# Patient Record
Sex: FEMALE | Race: WHITE | Employment: OTHER | URBAN - METROPOLITAN AREA
[De-identification: names, ages, dates, MRNs, and addresses within clinical notes are randomized per-mention and may not be internally consistent; named-entity substitution may affect disease eponyms.]

---

## 2017-01-03 ENCOUNTER — HOSPITAL ENCOUNTER (OUTPATIENT)
Dept: SURGERY | Age: 82
Discharge: HOME | End: 2017-01-03
Payer: MEDICARE

## 2017-01-03 VITALS
DIASTOLIC BLOOD PRESSURE: 63 MMHG | TEMPERATURE: 97.7 F | SYSTOLIC BLOOD PRESSURE: 147 MMHG | HEART RATE: 57 BPM | BODY MASS INDEX: 28.75 KG/M2 | RESPIRATION RATE: 20 BRPM | OXYGEN SATURATION: 98 % | HEIGHT: 63 IN | WEIGHT: 162.25 LBS

## 2017-01-03 LAB
ALBUMIN SERPL BCP-MCNC: 3.6 G/DL (ref 3.2–4.6)
ALBUMIN/GLOB SERPL: 1.1 {RATIO} (ref 1.2–3.5)
ALP SERPL-CCNC: 75 U/L (ref 50–136)
ALT SERPL-CCNC: 13 U/L (ref 12–65)
ANION GAP BLD CALC-SCNC: 9 MMOL/L (ref 7–16)
AST SERPL W P-5'-P-CCNC: 22 U/L (ref 15–37)
BILIRUB SERPL-MCNC: 0.5 MG/DL (ref 0.2–1.1)
BUN SERPL-MCNC: 11 MG/DL (ref 8–23)
CALCIUM SERPL-MCNC: 8.2 MG/DL (ref 8.3–10.4)
CHLORIDE SERPL-SCNC: 91 MMOL/L (ref 98–107)
CO2 SERPL-SCNC: 29 MMOL/L (ref 21–32)
CREAT SERPL-MCNC: 0.77 MG/DL (ref 0.6–1)
ERYTHROCYTE [DISTWIDTH] IN BLOOD BY AUTOMATED COUNT: 14.5 % (ref 11.9–14.6)
GLOBULIN SER CALC-MCNC: 3.3 G/DL (ref 2.3–3.5)
GLUCOSE SERPL-MCNC: 93 MG/DL (ref 65–100)
HCT VFR BLD AUTO: 38.1 % (ref 35.8–46.3)
HGB BLD-MCNC: 12.9 G/DL (ref 11.7–15.4)
MCH RBC QN AUTO: 30.3 PG (ref 26.1–32.9)
MCHC RBC AUTO-ENTMCNC: 33.9 G/DL (ref 31.4–35)
MCV RBC AUTO: 89.4 FL (ref 79.6–97.8)
PLATELET # BLD AUTO: 298 K/UL (ref 150–450)
PMV BLD AUTO: 9.3 FL (ref 10.8–14.1)
POTASSIUM SERPL-SCNC: 3.6 MMOL/L (ref 3.5–5.1)
PROT SERPL-MCNC: 6.9 G/DL (ref 6.3–8.2)
RBC # BLD AUTO: 4.26 M/UL (ref 4.05–5.25)
SODIUM SERPL-SCNC: 129 MMOL/L (ref 136–145)
WBC # BLD AUTO: 5.5 K/UL (ref 4.3–11.1)

## 2017-01-03 PROCEDURE — 85027 COMPLETE CBC AUTOMATED: CPT | Performed by: SURGERY

## 2017-01-03 PROCEDURE — 80053 COMPREHEN METABOLIC PANEL: CPT | Performed by: SURGERY

## 2017-01-03 NOTE — PERIOP NOTES
Able to print/ see  cardiac records from [facility] at this time--NOTED ef 72% ON LAST STRESS 2014--- BUT dr Bri Kauffman as mod/severe risk for surg-- then states reasonable to proceed---- will place chart in chartroom to have mda to review

## 2017-01-03 NOTE — PERIOP NOTES
Patient verified name, , and surgery as listed in [facility]. TYPE  CASE:1b  Orders per surgeon: on chart  Labs per surgeon:cbc, cmp--this collected today and sent to lab  Labs per anesthesia protocol: no add't needed  EKG  :  2016 and cardiac office note 2016---unable to see recent cath report or last echo-- that is in [facility]--- problems with epi--- will have to try again later--- did call [facility] cardio-- they also could not pull it up      Patient provided with handouts including guide to surgery , transfusions, pain management and hand hygiene for the family and community. Pt verbalizes understanding of all pre-op instructions . Instructed that family must be present in building at all times. Hibiclens and instructions given per hospital policy. Instructed patient to continue  previous medications as prescribed prior to surgery and  to take the following medications the day of surgery according to anesthesia guidelines : norvasc, dexilant,inderal       Original medication prescription bottles was visualized during patient appointment. Continue all previous medications unless otherwise directed. Instructed patient to hold  the following medications prior to surgery: per dr Mima Jean Baptiste and dr chavez pt to stop plavix 17-- last dose 17-- and continue on asa 81 mg--- pt verbalized understanding      Patient verbalized understanding of all instructions and provided all medical/health information to the best of their ability.

## 2017-01-09 ENCOUNTER — ANESTHESIA EVENT (OUTPATIENT)
Dept: SURGERY | Age: 82
End: 2017-01-09
Payer: MEDICARE

## 2017-01-10 ENCOUNTER — ANESTHESIA (OUTPATIENT)
Dept: SURGERY | Age: 82
End: 2017-01-10
Payer: MEDICARE

## 2017-01-10 ENCOUNTER — SURGERY (OUTPATIENT)
Age: 82
End: 2017-01-10

## 2017-01-10 PROCEDURE — 74011000258 HC RX REV CODE- 258: Performed by: SURGERY

## 2017-01-10 PROCEDURE — 77030008477 HC STYL SATN SLP COVD -A: Performed by: ANESTHESIOLOGY

## 2017-01-10 PROCEDURE — 74011250636 HC RX REV CODE- 250/636

## 2017-01-10 PROCEDURE — 74011000250 HC RX REV CODE- 250

## 2017-01-10 PROCEDURE — 77030008703 HC TU ET UNCUF COVD -A: Performed by: ANESTHESIOLOGY

## 2017-01-10 PROCEDURE — 77030020782 HC GWN BAIR PAWS FLX 3M -B: Performed by: ANESTHESIOLOGY

## 2017-01-10 PROCEDURE — 74011250636 HC RX REV CODE- 250/636: Performed by: ANESTHESIOLOGY

## 2017-01-10 PROCEDURE — 74011000250 HC RX REV CODE- 250: Performed by: SURGERY

## 2017-01-10 RX ORDER — ETOMIDATE 2 MG/ML
INJECTION INTRAVENOUS AS NEEDED
Status: DISCONTINUED | OUTPATIENT
Start: 2017-01-10 | End: 2017-01-10 | Stop reason: HOSPADM

## 2017-01-10 RX ORDER — LIDOCAINE HYDROCHLORIDE 20 MG/ML
INJECTION, SOLUTION EPIDURAL; INFILTRATION; INTRACAUDAL; PERINEURAL AS NEEDED
Status: DISCONTINUED | OUTPATIENT
Start: 2017-01-10 | End: 2017-01-10 | Stop reason: HOSPADM

## 2017-01-10 RX ORDER — PROPOFOL 10 MG/ML
INJECTION, EMULSION INTRAVENOUS AS NEEDED
Status: DISCONTINUED | OUTPATIENT
Start: 2017-01-10 | End: 2017-01-10 | Stop reason: HOSPADM

## 2017-01-10 RX ORDER — ROCURONIUM BROMIDE 10 MG/ML
INJECTION, SOLUTION INTRAVENOUS AS NEEDED
Status: DISCONTINUED | OUTPATIENT
Start: 2017-01-10 | End: 2017-01-10 | Stop reason: HOSPADM

## 2017-01-10 RX ORDER — GLYCOPYRROLATE 0.2 MG/ML
INJECTION INTRAMUSCULAR; INTRAVENOUS AS NEEDED
Status: DISCONTINUED | OUTPATIENT
Start: 2017-01-10 | End: 2017-01-10 | Stop reason: HOSPADM

## 2017-01-10 RX ORDER — NEOSTIGMINE METHYLSULFATE 1 MG/ML
INJECTION INTRAVENOUS AS NEEDED
Status: DISCONTINUED | OUTPATIENT
Start: 2017-01-10 | End: 2017-01-10 | Stop reason: HOSPADM

## 2017-01-10 RX ORDER — FENTANYL CITRATE 50 UG/ML
INJECTION, SOLUTION INTRAMUSCULAR; INTRAVENOUS AS NEEDED
Status: DISCONTINUED | OUTPATIENT
Start: 2017-01-10 | End: 2017-01-10 | Stop reason: HOSPADM

## 2017-01-10 RX ORDER — ONDANSETRON 2 MG/ML
INJECTION INTRAMUSCULAR; INTRAVENOUS AS NEEDED
Status: DISCONTINUED | OUTPATIENT
Start: 2017-01-10 | End: 2017-01-10 | Stop reason: HOSPADM

## 2017-01-10 RX ADMIN — LIDOCAINE HYDROCHLORIDE 40 MG: 20 INJECTION, SOLUTION EPIDURAL; INFILTRATION; INTRACAUDAL; PERINEURAL at 12:05

## 2017-01-10 RX ADMIN — GLYCOPYRROLATE 0.2 MG: 0.2 INJECTION INTRAMUSCULAR; INTRAVENOUS at 13:05

## 2017-01-10 RX ADMIN — AZTREONAM 2 G: 2 INJECTION, POWDER, LYOPHILIZED, FOR SOLUTION INTRAMUSCULAR; INTRAVENOUS at 12:20

## 2017-01-10 RX ADMIN — ONDANSETRON 4 MG: 2 INJECTION INTRAMUSCULAR; INTRAVENOUS at 13:05

## 2017-01-10 RX ADMIN — ROCURONIUM BROMIDE 20 MG: 10 INJECTION, SOLUTION INTRAVENOUS at 12:05

## 2017-01-10 RX ADMIN — SODIUM CHLORIDE, SODIUM LACTATE, POTASSIUM CHLORIDE, AND CALCIUM CHLORIDE: 600; 310; 30; 20 INJECTION, SOLUTION INTRAVENOUS at 12:00

## 2017-01-10 RX ADMIN — PROPOFOL 60 MG: 10 INJECTION, EMULSION INTRAVENOUS at 12:05

## 2017-01-10 RX ADMIN — BUPIVACAINE HYDROCHLORIDE 30 ML: 2.5 INJECTION, SOLUTION EPIDURAL; INFILTRATION; INTRACAUDAL; PERINEURAL at 13:04

## 2017-01-10 RX ADMIN — FENTANYL CITRATE 100 MCG: 50 INJECTION, SOLUTION INTRAMUSCULAR; INTRAVENOUS at 12:05

## 2017-01-10 RX ADMIN — ETOMIDATE 10 MG: 2 INJECTION INTRAVENOUS at 12:05

## 2017-01-10 RX ADMIN — NEOSTIGMINE METHYLSULFATE 2 MG: 1 INJECTION INTRAVENOUS at 13:05

## 2017-01-10 NOTE — ANESTHESIA PREPROCEDURE EVALUATION
Anesthetic History   No history of anesthetic complications            Review of Systems / Medical History  Patient summary reviewed, nursing notes reviewed and pertinent labs reviewed    Pulmonary                Comments: Restrictive lung disease   Neuro/Psych              Cardiovascular    Hypertension        Dysrhythmias : atrial fibrillation  CAD and cardiac stents      Comments: CABG in 2005    Stents times 4, off Plavix times six days    A fib ablation in 7/2016       GI/Hepatic/Renal     GERD: well controlled           Endo/Other        Arthritis     Other Findings              Physical Exam    Airway  Mallampati: II  TM Distance: 4 - 6 cm  Neck ROM: normal range of motion   Mouth opening: Normal     Cardiovascular    Rhythm: regular           Dental    Dentition: Upper partial plate     Pulmonary  Breath sounds clear to auscultation               Abdominal         Other Findings            Anesthetic Plan    ASA: 3  Patient did not consent to regional anesthesiaAnesthesia type: general            Anesthetic plan and risks discussed with: Patient

## 2017-01-10 NOTE — ANESTHESIA POSTPROCEDURE EVALUATION
Post-Anesthesia Evaluation and Assessment    Patient: Sdy Siu MRN: [id number]  SSN: [id number]    YOB: 1935  Age: [de-identified] y.o. Sex: female       Cardiovascular Function/Vital Signs  Visit Vitals    /74    Pulse 66    Temp 36.9 °C (98.4 °F)    Resp 16    Ht 5' 3\" (1.6 m)    Wt 73.6 kg (162 lb 4 oz)    SpO2 100%    BMI 28.74 kg/m2       Patient is status post general anesthesia for Procedure(s):  LEFT HERNIA INGUINAL REPAIR WITH MESH. Nausea/Vomiting: None    Postoperative hydration reviewed and adequate. Pain:  Pain Scale 1: Numeric (0 - 10) (01/10/17 1321)  Pain Intensity 1: 0 (01/10/17 1321)   Managed    Neurological Status:   Neuro (WDL): Exceptions to WDL (01/10/17 1321)  Neuro  Neurologic State: Lethargic (01/10/17 1321)  LUE Motor Response: Purposeful (01/10/17 1321)  LLE Motor Response: Purposeful (01/10/17 1321)  RUE Motor Response: Purposeful (01/10/17 1321)  RLE Motor Response: Purposeful (01/10/17 1321)   At baseline    Mental Status and Level of Consciousness: Arousable    Pulmonary Status:   O2 Device: Nasal cannula (01/10/17 1321)   Adequate oxygenation and airway patent    Complications related to anesthesia: None    Post-anesthesia assessment completed.  No concerns    Signed By: Eve Dodd MD     January 10, 2017

## 2017-03-12 ENCOUNTER — HOSPITAL ENCOUNTER (INPATIENT)
Age: 82
LOS: 2 days | Discharge: HOME | DRG: 287 | End: 2017-03-14
Attending: EMERGENCY MEDICINE | Admitting: INTERNAL MEDICINE
Payer: MEDICARE

## 2017-03-12 ENCOUNTER — APPOINTMENT (OUTPATIENT)
Dept: GENERAL RADIOLOGY | Age: 82
DRG: 287 | End: 2017-03-12
Attending: EMERGENCY MEDICINE
Payer: MEDICARE

## 2017-03-12 DIAGNOSIS — R07.9 ACUTE CHEST PAIN: Primary | ICD-10-CM

## 2017-03-12 DIAGNOSIS — E78.00 PURE HYPERCHOLESTEROLEMIA: ICD-10-CM

## 2017-03-12 DIAGNOSIS — I25.10 CORONARY ARTERY DISEASE INVOLVING NATIVE CORONARY ARTERY OF NATIVE HEART WITHOUT ANGINA PECTORIS: ICD-10-CM

## 2017-03-12 DIAGNOSIS — I25.110 CORONARY ARTERY DISEASE INVOLVING NATIVE CORONARY ARTERY OF NATIVE HEART WITH UNSTABLE ANGINA PECTORIS (HCC): ICD-10-CM

## 2017-03-12 PROBLEM — I24.9 ACS (ACUTE CORONARY SYNDROME) (HCC): Status: ACTIVE | Noted: 2017-03-12

## 2017-03-12 PROBLEM — Z95.1 S/P CABG (CORONARY ARTERY BYPASS GRAFT): Chronic | Status: ACTIVE | Noted: 2017-03-12

## 2017-03-12 PROBLEM — Z95.820 S/P ANGIOPLASTY WITH STENT: Chronic | Status: ACTIVE | Noted: 2017-03-12

## 2017-03-12 LAB
ALBUMIN SERPL BCP-MCNC: 3.7 G/DL (ref 3.2–4.6)
ALBUMIN/GLOB SERPL: 1.2 {RATIO} (ref 1.2–3.5)
ALP SERPL-CCNC: 72 U/L (ref 50–136)
ALT SERPL-CCNC: 11 U/L (ref 12–65)
ANION GAP BLD CALC-SCNC: 7 MMOL/L (ref 7–16)
AST SERPL W P-5'-P-CCNC: 13 U/L (ref 15–37)
BASOPHILS # BLD AUTO: 0 K/UL (ref 0–0.2)
BASOPHILS # BLD: 1 % (ref 0–2)
BILIRUB SERPL-MCNC: 0.4 MG/DL (ref 0.2–1.1)
BUN SERPL-MCNC: 14 MG/DL (ref 8–23)
CALCIUM SERPL-MCNC: 9 MG/DL (ref 8.3–10.4)
CHLORIDE SERPL-SCNC: 95 MMOL/L (ref 98–107)
CK SERPL-CCNC: 53 U/L (ref 21–215)
CO2 SERPL-SCNC: 31 MMOL/L (ref 21–32)
CREAT SERPL-MCNC: 0.93 MG/DL (ref 0.6–1)
DIFFERENTIAL METHOD BLD: ABNORMAL
EOSINOPHIL # BLD: 0.2 K/UL (ref 0–0.8)
EOSINOPHIL NFR BLD: 4 % (ref 0.5–7.8)
ERYTHROCYTE [DISTWIDTH] IN BLOOD BY AUTOMATED COUNT: 13.8 % (ref 11.9–14.6)
GLOBULIN SER CALC-MCNC: 3.2 G/DL (ref 2.3–3.5)
GLUCOSE SERPL-MCNC: 100 MG/DL (ref 65–100)
HCT VFR BLD AUTO: 38 % (ref 35.8–46.3)
HGB BLD-MCNC: 13.2 G/DL (ref 11.7–15.4)
IMM GRANULOCYTES # BLD: 0 K/UL (ref 0–0.5)
IMM GRANULOCYTES NFR BLD AUTO: 0.2 % (ref 0–5)
LYMPHOCYTES # BLD AUTO: 26 % (ref 13–44)
LYMPHOCYTES # BLD: 1.4 K/UL (ref 0.5–4.6)
MCH RBC QN AUTO: 31.8 PG (ref 26.1–32.9)
MCHC RBC AUTO-ENTMCNC: 34.7 G/DL (ref 31.4–35)
MCV RBC AUTO: 91.6 FL (ref 79.6–97.8)
MONOCYTES # BLD: 0.6 K/UL (ref 0.1–1.3)
MONOCYTES NFR BLD AUTO: 10 % (ref 4–12)
NEUTS SEG # BLD: 3.2 K/UL (ref 1.7–8.2)
NEUTS SEG NFR BLD AUTO: 59 % (ref 43–78)
PLATELET # BLD AUTO: 295 K/UL (ref 150–450)
PMV BLD AUTO: 9.2 FL (ref 10.8–14.1)
POTASSIUM SERPL-SCNC: 3.9 MMOL/L (ref 3.5–5.1)
PROT SERPL-MCNC: 6.9 G/DL (ref 6.3–8.2)
RBC # BLD AUTO: 4.15 M/UL (ref 4.05–5.25)
SODIUM SERPL-SCNC: 133 MMOL/L (ref 136–145)
TROPONIN I SERPL-MCNC: <0.02 NG/ML (ref 0.02–0.05)
TROPONIN I SERPL-MCNC: <0.02 NG/ML (ref 0.02–0.05)
WBC # BLD AUTO: 5.4 K/UL (ref 4.3–11.1)

## 2017-03-12 PROCEDURE — 74011250637 HC RX REV CODE- 250/637: Performed by: EMERGENCY MEDICINE

## 2017-03-12 PROCEDURE — 94760 N-INVAS EAR/PLS OXIMETRY 1: CPT

## 2017-03-12 PROCEDURE — 85025 COMPLETE CBC W/AUTO DIFF WBC: CPT | Performed by: EMERGENCY MEDICINE

## 2017-03-12 PROCEDURE — 74011250637 HC RX REV CODE- 250/637: Performed by: INTERNAL MEDICINE

## 2017-03-12 PROCEDURE — 93005 ELECTROCARDIOGRAM TRACING: CPT | Performed by: EMERGENCY MEDICINE

## 2017-03-12 PROCEDURE — 71020 XR CHEST PA LAT: CPT

## 2017-03-12 PROCEDURE — 74011000258 HC RX REV CODE- 258: Performed by: INTERNAL MEDICINE

## 2017-03-12 PROCEDURE — 84484 ASSAY OF TROPONIN QUANT: CPT | Performed by: EMERGENCY MEDICINE

## 2017-03-12 PROCEDURE — 74011250636 HC RX REV CODE- 250/636: Performed by: INTERNAL MEDICINE

## 2017-03-12 PROCEDURE — 96374 THER/PROPH/DIAG INJ IV PUSH: CPT | Performed by: EMERGENCY MEDICINE

## 2017-03-12 PROCEDURE — 65660000000 HC RM CCU STEPDOWN

## 2017-03-12 PROCEDURE — 74011250636 HC RX REV CODE- 250/636: Performed by: EMERGENCY MEDICINE

## 2017-03-12 PROCEDURE — 36415 COLL VENOUS BLD VENIPUNCTURE: CPT | Performed by: INTERNAL MEDICINE

## 2017-03-12 PROCEDURE — 80053 COMPREHEN METABOLIC PANEL: CPT | Performed by: EMERGENCY MEDICINE

## 2017-03-12 PROCEDURE — 96375 TX/PRO/DX INJ NEW DRUG ADDON: CPT | Performed by: EMERGENCY MEDICINE

## 2017-03-12 PROCEDURE — 82550 ASSAY OF CK (CPK): CPT | Performed by: INTERNAL MEDICINE

## 2017-03-12 PROCEDURE — 99284 EMERGENCY DEPT VISIT MOD MDM: CPT | Performed by: EMERGENCY MEDICINE

## 2017-03-12 RX ORDER — AMLODIPINE BESYLATE 5 MG/1
5 TABLET ORAL DAILY
Status: DISCONTINUED | OUTPATIENT
Start: 2017-03-13 | End: 2017-03-14 | Stop reason: HOSPADM

## 2017-03-12 RX ORDER — ASPIRIN 81 MG/1
81 TABLET ORAL DAILY
Status: DISCONTINUED | OUTPATIENT
Start: 2017-03-13 | End: 2017-03-12

## 2017-03-12 RX ORDER — PROPRANOLOL HYDROCHLORIDE 80 MG/1
80 CAPSULE, EXTENDED RELEASE ORAL DAILY
Status: DISCONTINUED | OUTPATIENT
Start: 2017-03-13 | End: 2017-03-14 | Stop reason: HOSPADM

## 2017-03-12 RX ORDER — MORPHINE SULFATE 2 MG/ML
2 INJECTION, SOLUTION INTRAMUSCULAR; INTRAVENOUS
Status: COMPLETED | OUTPATIENT
Start: 2017-03-12 | End: 2017-03-12

## 2017-03-12 RX ORDER — HEPARIN SODIUM 5000 [USP'U]/100ML
12-25 INJECTION, SOLUTION INTRAVENOUS
Status: DISCONTINUED | OUTPATIENT
Start: 2017-03-12 | End: 2017-03-13

## 2017-03-12 RX ORDER — ACETAMINOPHEN 325 MG/1
650 TABLET ORAL
Status: DISCONTINUED | OUTPATIENT
Start: 2017-03-12 | End: 2017-03-14 | Stop reason: HOSPADM

## 2017-03-12 RX ORDER — ROSUVASTATIN CALCIUM 20 MG/1
20 TABLET, COATED ORAL
Status: DISCONTINUED | OUTPATIENT
Start: 2017-03-12 | End: 2017-03-13

## 2017-03-12 RX ORDER — SODIUM CHLORIDE 0.9 % (FLUSH) 0.9 %
5-10 SYRINGE (ML) INJECTION EVERY 8 HOURS
Status: DISCONTINUED | OUTPATIENT
Start: 2017-03-12 | End: 2017-03-13 | Stop reason: HOSPADM

## 2017-03-12 RX ORDER — ONDANSETRON 2 MG/ML
4 INJECTION INTRAMUSCULAR; INTRAVENOUS
Status: COMPLETED | OUTPATIENT
Start: 2017-03-12 | End: 2017-03-12

## 2017-03-12 RX ORDER — SODIUM CHLORIDE 0.9 % (FLUSH) 0.9 %
5-10 SYRINGE (ML) INJECTION EVERY 8 HOURS
Status: DISCONTINUED | OUTPATIENT
Start: 2017-03-12 | End: 2017-03-14 | Stop reason: ALTCHOICE

## 2017-03-12 RX ORDER — SODIUM CHLORIDE 0.9 % (FLUSH) 0.9 %
5-10 SYRINGE (ML) INJECTION AS NEEDED
Status: DISCONTINUED | OUTPATIENT
Start: 2017-03-12 | End: 2017-03-14 | Stop reason: HOSPADM

## 2017-03-12 RX ORDER — MORPHINE SULFATE 2 MG/ML
2 INJECTION, SOLUTION INTRAMUSCULAR; INTRAVENOUS
Status: DISCONTINUED | OUTPATIENT
Start: 2017-03-12 | End: 2017-03-14 | Stop reason: HOSPADM

## 2017-03-12 RX ORDER — HEPARIN SODIUM 5000 [USP'U]/100ML
12-25 INJECTION, SOLUTION INTRAVENOUS CONTINUOUS
Status: DISCONTINUED | OUTPATIENT
Start: 2017-03-12 | End: 2017-03-12 | Stop reason: SDUPTHER

## 2017-03-12 RX ORDER — NITROGLYCERIN 0.4 MG/1
0.4 TABLET SUBLINGUAL
Status: DISCONTINUED | OUTPATIENT
Start: 2017-03-12 | End: 2017-03-14 | Stop reason: HOSPADM

## 2017-03-12 RX ORDER — CLOPIDOGREL BISULFATE 75 MG/1
75 TABLET ORAL DAILY
Status: DISCONTINUED | OUTPATIENT
Start: 2017-03-13 | End: 2017-03-14 | Stop reason: HOSPADM

## 2017-03-12 RX ORDER — DEXTROSE MONOHYDRATE AND SODIUM CHLORIDE 5; .45 G/100ML; G/100ML
100 INJECTION, SOLUTION INTRAVENOUS CONTINUOUS
Status: DISCONTINUED | OUTPATIENT
Start: 2017-03-13 | End: 2017-03-13

## 2017-03-12 RX ORDER — LORAZEPAM 0.5 MG/1
0.5 TABLET ORAL
Status: DISCONTINUED | OUTPATIENT
Start: 2017-03-12 | End: 2017-03-14 | Stop reason: HOSPADM

## 2017-03-12 RX ORDER — LISINOPRIL AND HYDROCHLOROTHIAZIDE 20; 25 MG/1; MG/1
1 TABLET ORAL DAILY
Status: DISCONTINUED | OUTPATIENT
Start: 2017-03-13 | End: 2017-03-14 | Stop reason: HOSPADM

## 2017-03-12 RX ORDER — PANTOPRAZOLE SODIUM 40 MG/1
40 TABLET, DELAYED RELEASE ORAL
Status: DISCONTINUED | OUTPATIENT
Start: 2017-03-13 | End: 2017-03-13

## 2017-03-12 RX ORDER — SODIUM CHLORIDE 0.9 % (FLUSH) 0.9 %
5-10 SYRINGE (ML) INJECTION AS NEEDED
Status: DISCONTINUED | OUTPATIENT
Start: 2017-03-12 | End: 2017-03-13 | Stop reason: HOSPADM

## 2017-03-12 RX ORDER — AMLODIPINE BESYLATE 5 MG/1
5 TABLET ORAL
Status: DISCONTINUED | OUTPATIENT
Start: 2017-03-13 | End: 2017-03-12 | Stop reason: SDUPTHER

## 2017-03-12 RX ORDER — ASPIRIN 81 MG/1
81 TABLET ORAL DAILY
Status: DISCONTINUED | OUTPATIENT
Start: 2017-03-12 | End: 2017-03-14 | Stop reason: HOSPADM

## 2017-03-12 RX ORDER — ACETAMINOPHEN 325 MG/1
325 TABLET ORAL
Status: DISCONTINUED | OUTPATIENT
Start: 2017-03-12 | End: 2017-03-12

## 2017-03-12 RX ADMIN — LORAZEPAM 0.5 MG: 0.5 TABLET ORAL at 23:50

## 2017-03-12 RX ADMIN — NITROGLYCERIN 1 INCH: 20 OINTMENT TOPICAL at 20:03

## 2017-03-12 RX ADMIN — HEPARIN SODIUM AND DEXTROSE 12 UNITS/KG/HR: 5000; 5 INJECTION INTRAVENOUS at 20:03

## 2017-03-12 RX ADMIN — ASPIRIN 81 MG: 81 TABLET, COATED ORAL at 20:46

## 2017-03-12 RX ADMIN — ACETAMINOPHEN 650 MG: 325 TABLET, FILM COATED ORAL at 20:33

## 2017-03-12 RX ADMIN — Medication 2 MG: at 18:38

## 2017-03-12 RX ADMIN — Medication 5 ML: at 20:32

## 2017-03-12 RX ADMIN — ONDANSETRON 4 MG: 2 INJECTION INTRAMUSCULAR; INTRAVENOUS at 18:38

## 2017-03-12 RX ADMIN — DEXTROSE MONOHYDRATE AND SODIUM CHLORIDE 100 ML/HR: 5; .45 INJECTION, SOLUTION INTRAVENOUS at 23:16

## 2017-03-12 RX ADMIN — NITROGLYCERIN 1 INCH: 20 OINTMENT TOPICAL at 18:38

## 2017-03-12 RX ADMIN — ROSUVASTATIN CALCIUM 20 MG: 20 TABLET ORAL at 20:31

## 2017-03-12 NOTE — ED TRIAGE NOTES
Patient complains of mid chest tightness that began today. States she took 2 NTG. Reports SOB and nausea.

## 2017-03-12 NOTE — IP AVS SNAPSHOT
Estephania Morales 
 
 
 [address] 
[phone] Patient: Maranda Runner MRN: [id number] You are allergic to the following Allergen Reactions Augmentin (Amoxicillin-Pot Clavulanate) Other (comments) Causes yeast infection Codeine Other (comments) Made my heart go crazy Other Medication Other (comments) Conjugated estrogens methyltestosterone Headaches Prednisone Other (comments) Visual loss and headache Prevnar 13 (Pneumoc 13-Sierra Conj-Dip Cr(Pf)) Other (comments) Fever, arm pain, redness, hallucinations, flu like symptoms, chest pain Recent Documentation Height Weight Breastfeeding? BMI OB Status Smoking Status 1.626 m 69.9 kg No 26.47 kg/m2 Hysterectomy Never Smoker Emergency Contacts Name Discharge Info Relation Home Work Mobile JorgeJean DISCHARGE CAREGIVER [3] Child [2] [phone] About your hospitalization You were admitted on:  March 12, 2017 You last received care in the:  [facility] 3 TELEMETRY You were discharged on:  March 14, 2017 Unit phone number:  [phone] Why you were hospitalized Your primary diagnosis was:  Acs (Acute Coronary Syndrome) (Hcc) Your diagnoses also included:  Angina At Rest (Hcc), Cad (Coronary Artery Disease), Hyperlipidemia, Hypertension, S/P Cabg (Coronary Artery Bypass Graft), S/P Angioplasty With Stent Providers Seen During Your Hospitalizations Provider Role Specialty Primary office phone Kev Wong MD Attending Provider Emergency Medicine [phone] Andrea Agosto MD Attending Provider Cardiology [phone] Your Primary Care Physician (PCP) Primary Care Physician Office Phone Office Fax Alice Hays [phone] Follow-up Information Follow up With Details Comments Contact Info Maryetta Epley, DO On 3/29/2017 Follow up on March 29th at 2:30pm [facility])  [address] [facility] [place] [address] 
[phone] Salbador Jones MD   [address] [place] [address] [address] Azul Casas 
[phone] Your Appointments Wednesday March 29, 2017  2:30 PM EDT HOSPITAL FOLLOW-UP with Maryetta Epley, DO [facility] ([facility]) [address] 
[address] [place] [address]  
[phone] Current Discharge Medication List  
  
START taking these medications Dose & Instructions Dispensing Information Comments Morning Noon Evening Bedtime  
 isosorbide mononitrate ER 30 mg tablet Commonly known as:  IMDUR Your last dose was: Your next dose is: Other:  _________ Dose:  30 mg Take 1 Tab by mouth daily. Quantity:  30 Tab Refills:  6  
     
   
   
   
  
 pantoprazole 40 mg tablet Commonly known as:  PROTONIX Replaces:  Dexlansoprazole 60 mg Cpdb Your last dose was: Your next dose is: Other:  _________ Dose:  40 mg Take 1 Tab by mouth two (2) times a day. Indications: GASTROESOPHAGEAL REFLUX, samples Quantity:  60 Tab Refills:  6 CONTINUE these medications which have CHANGED Dose & Instructions Dispensing Information Comments Morning Noon Evening Bedtime  
 amLODIPine 5 mg tablet Commonly known as:  Poppy Blade What changed:  how much to take Your last dose was: Your next dose is: Other:  _________ Dose:  2.5 mg Take 0.5 Tabs by mouth every morning. Quantity:  90 Tab Refills:  1  
     
   
   
   
  
 clopidogrel 75 mg Tab Commonly known as:  PLAVIX What changed:   
- when to take this 
- additional instructions Your last dose was: Your next dose is: Other:  _________ Dose:  75 mg Take 1 Tab by mouth daily. Quantity:  90 Tab Refills:  1  
     
   
   
   
  
 furosemide 40 mg tablet Commonly known as:  LASIX What changed:   
- when to take this 
- reasons to take this Your last dose was: Your next dose is: Other:  _________ Dose:  40 mg Take 1 Tab by mouth daily. Quantity:  30 Tab Refills:  3  
     
   
   
   
  
 lisinopril-hydroCHLOROthiazide 20-25 mg per tablet Commonly known as:  Oumar Ewing What changed:  when to take this Your last dose was: Your next dose is: Other:  _________ Dose:  1 Tab Take 1 Tab by mouth daily. Quantity:  90 Tab Refills:  3 LORazepam 1 mg tablet Commonly known as:  ATIVAN What changed:   
- how much to take 
- how to take this - when to take this 
- reasons to take this 
- additional instructions Your last dose was: Your next dose is: Other:  _________ Take 1/2 -1 tab q 8 hrs prn panic attacks  Indications: ANXIETY Quantity:  90 Tab Refills:  5  
     
   
   
   
  
 propranolol LA 80 mg SR capsule Commonly known as:  INDERAL LA What changed:  when to take this Your last dose was: Your next dose is: Other:  _________ Dose:  80 mg Take 1 Cap by mouth daily. Quantity:  90 Cap Refills:  1  
     
   
   
   
  
 rosuvastatin 40 mg tablet Commonly known as:  CRESTOR What changed:   
- medication strength 
- how much to take Your last dose was: Your next dose is: Other:  _________ Dose:  40 mg Take 1 Tab by mouth nightly. Quantity:  30 Tab Refills:  11 CONTINUE these medications which have NOT CHANGED Dose & Instructions Dispensing Information Comments Morning Noon Evening Bedtime  
 aspirin 81 mg tablet Your last dose was: Your next dose is: Other:  _________ Dose:  81 mg Take 81 mg by mouth nightly. Refills:  0  
     
   
   
   
  
 loratadine 10 mg Cap Your last dose was: Your next dose is: Other:  _________ Dose:  1 Tab Take 1 Tab by mouth nightly. Refills:  0  
     
   
   
   
  
 magnesium oxide 400 mg tablet Commonly known as:  MAG-OX Your last dose was: Your next dose is: Other:  _________ Dose:  400 mg Take 400 mg by mouth daily. Refills:  0  
     
   
   
   
  
 nitroglycerin 400 mcg/spray spray Commonly known as:  Rhesa Chasten Your last dose was: Your next dose is: Other:  _________ Dose:  1 Spray 1 Spray by SubLINGual route every five (5) minutes as needed for Chest Pain. Quantity:  1 Bottle Refills:  5 Potassium Gluconate 595 mg (99 mg) tablet Your last dose was: Your next dose is: Other:  _________ Dose:  595 mg Take 595 mg by mouth two (2) times a day. Refills:  0 PRESERVISION AREDS PO Your last dose was: Your next dose is: Other:  _________ Dose:  1 Tab Take 1 Tab by mouth two (2) times a day. Stopped 1/3/17 Refills:  0  
     
   
   
   
  
 TYLENOL 325 mg tablet Generic drug:  acetaminophen Your last dose was: Your next dose is: Other:  _________ Take  by mouth every four (4) hours as needed for Pain. Refills:  0 STOP taking these medications Dexlansoprazole 60 mg Cpdb Commonly known as:  DEXILANT Replaced by:  pantoprazole 40 mg tablet Where to Get Your Medications Information on where to get these meds will be given to you by the nurse or doctor. ! Ask your nurse or doctor about these medications  
  isosorbide mononitrate ER 30 mg tablet pantoprazole 40 mg tablet  
 rosuvastatin 40 mg tablet Discharge Instructions Cardiac Catheterization/Angiography Discharge Instructions *Check the puncture site frequently for swelling or bleeding. If you see any bleeding, lie down and apply pressure over the area with a clean town or washcloth. Notify your doctor for any redness, swelling, drainage or oozing from the puncture site. Notify your doctor for any fever or chills. *If the leg or arm with the puncture becomes cold, numb or painful, call [facility] at [phone]. *Activity should be limited for the next 48 hours. Climb stairs as little as possible and avoid any stooping, bending or strenuous activity for 48 hours. No heavy lifting (anything over 10 pounds) for three days. *Do not drive for 48 hours. *You may resume your usual diet. Drink more fluids than usual. 
 
*Have a responsible person drive you home and stay with you for at least 24 hours after your heart catheterization/angiography. *You may remove the bandage from your left wrist in 24 hours. You may shower in 24 hours. No tub baths, hot tubs or swimming for one week. Do not place any lotions, creams, powders, ointments over the puncture site for one week. You may place a clean band-aid over the puncture site each day for 5 days. Change this daily. Reducing Heart Attack Risk With Daily Medicine: Care Instructions Your Care Instructions Heart disease is the number one cause of death. If you are at risk for heart disease, there are many medicines that can reduce your risk. These include: · ACE inhibitors. These are a type of blood pressure medicine. They can reduce the risk of heart attacks and strokes if you are at high risk. · Statin medicines. These lower cholesterol. They can also reduce the risk of heart disease and strokes. · Aspirin. It can help certain people lower their risk of a heart attack or stroke. · Beta-blocker medicines. These are a type of blood pressure and heart medicine. They can reduce the chance of early death if you have had a heart attack. All medicines can cause side effects. So it is important to understand the pros and cons of any medicine you take. It is also important to take your medicines exactly as your doctor tells you to. Follow-up care is a key part of your treatment and safety. Be sure to make and go to all appointments, and call your doctor if you are having problems. It's also a good idea to know your test results and keep a list of the medicines you take. ACE inhibitors ACE (angiotensin-converting enzyme) inhibitors are used for three main reasons. They lower blood pressure, protect the kidneys, and prevent heart attacks and strokes. Examples include benazepril (Lotensin), lisinopril (Prinivil, Zestril), and ramipril (Altace). Before you start taking an ACE inhibitor, make sure your doctor knows if: 
· You are taking a water pill (diuretic). · You are taking potassium pills or using salt substitutes. · You are pregnant or breastfeeding. · You have had a kidney transplant or other kidney problems. ACE inhibitors can cause side effects. Call your doctor right away if you have: · Trouble breathing. · Swelling in your face, head, neck, or tongue. · Dizziness or lightheadedness. · A dry cough. Statins Statins lower cholesterol. Examples include atorvastatin (Lipitor), lovastatin (Mevacor), pravastatin (Pravachol), and simvastatin (Zocor). Before you start taking a statin, make sure your doctor knows if: 
· You have had a kidney transplant or other kidney problems. · You have liver disease. · You take any other prescription medicine, over-the-counter medicine, vitamins, supplements, or herbal remedies. · You are pregnant or breastfeeding. Statins can cause side effects. Call your doctor right away if you have: · New, severe muscle aches. · Brown urine. Aspirin Taking an aspirin every day can lower your risk for a heart attack. A heart attack occurs when a blood vessel in the heart gets blocked. When this happens, oxygen can't get to the heart muscle, and part of the heart dies. Aspirin can help prevent blood clots that can block the blood vessels. Talk to your doctor before you start taking aspirin every day. He or she may recommend that you take one low-dose aspirin (81 mg) tablet each day, with a meal and a full glass of water. Taking aspirin isn't right for everyone, because it can cause serious bleeding. And you may not be able to use aspirin if you: 
· Have asthma. · Have an ulcer or other stomach problem. · Take some other medicine (called a blood thinner) that prevents blood clots. · Are allergic to aspirin. Before having a surgery or procedure, tell your doctor or dentist that you take aspirin. He or she will tell you if you should stop taking aspirin beforehand. Make sure that you understand exactly what your doctor wants you to do. Aspirin can cause side effects. Call your doctor right away if you have: · Unusual bleeding or bruising. · Nausea, vomiting, or heartburn. · Black or bloody stools. Beta-blockers Beta-blockers are used for three main reasons. They lower blood pressure, relieve angina symptoms (such as chest pain or pressure), and reduce the chances of a second heart attack. They include atenolol (Tenormin), carvedilol (Coreg), and metoprolol (Lopressor). Before you start taking a beta-blocker, make sure your doctor knows if you have: · Severe asthma or frequent asthma attacks. · A very slow pulse (less than 55 beats a minute). Beta-blockers can cause side effects. Call your doctor right away if you have: · Wheezing or trouble breathing. · Dizziness or lightheadedness. · Asthma that gets worse. When should you call for help? Call 911 anytime you think you may need emergency care. For example, call if: · You passed out (lost consciousness). Call your doctor now or seek immediate medical care if: 
· You are wheezing or have trouble breathing. · You have swelling in your face, head, neck, or tongue. · You are dizzy or lightheaded, or you feel like you may faint. · You have severe muscle pain, weakness, or brown urine. · You have vision problems. · You have new bruises or blood spots under your skin. · Your stools are black and tarlike or have streaks of blood. Watch closely for changes in your health, and be sure to contact your doctor if: 
· You have ringing in your ears. · You feel very tired. · You have gas, constipation, or an upset stomach. Where can you learn more? Go to http://dale-nora.info/. Enter R428 in the search box to learn more about \"Reducing Heart Attack Risk With Daily Medicine: Care Instructions. \" Current as of: March 28, 2016 Content Version: 11.1 © 7348-2948 [employer]. Care instructions adapted under license by [employer] (which disclaims liability or warranty for this information). If you have questions about a medical condition or this instruction, always ask your healthcare professional. [address] any warranty or liability for your use of this information. Heart-Healthy Diet: Care Instructions Your Care Instructions A heart-healthy diet has lots of vegetables, fruits, nuts, beans, and whole grains, and is low in salt. It limits foods that are high in saturated fat, such as meats, cheeses, and fried foods. It may be hard to change your diet, but even small changes can lower your risk of heart attack and heart disease. Follow-up care is a key part of your treatment and safety. Be sure to make and go to all appointments, and call your doctor if you are having problems. It's also a good idea to know your test results and keep a list of the medicines you take. How can you care for yourself at home? Watch your portions · Learn what a serving is. A \"serving\" and a \"portion\" are not always the same thing. Make sure that you are not eating larger portions than are recommended. For example, a serving of pasta is ½ cup. A serving size of meat is 2 to 3 ounces. A 3-ounce serving is about the size of a deck of cards. Measure serving sizes until you are good at [place]" them. Keep in mind that restaurants often serve portions that are 2 or 3 times the size of one serving. · To keep your energy level up and keep you from feeling hungry, eat often but in smaller portions. · Eat only the number of calories you need to stay at a healthy weight. If you need to lose weight, eat fewer calories than your body burns (through exercise and other physical activity). Eat more fruits and vegetables · Eat a variety of fruit and vegetables every day. Dark green, deep orange, red, or yellow fruits and vegetables are especially good for you. Examples include spinach, carrots, peaches, and berries. · Keep carrots, celery, and other veggies handy for snacks. Buy fruit that is in season and store it where you can see it so that you will be tempted to eat it. · Cook dishes that have a lot of veggies in them, such as stir-fries and soups. Limit saturated and trans fat · Read food labels, and try to avoid saturated and trans fats. They increase your risk of heart disease. Trans fat is found in many processed foods such as cookies and crackers. · Use olive or canola oil when you cook. Try cholesterol-lowering spreads, such as Benecol or Take Control. · Bake, broil, grill, or steam foods instead of frying them. · Choose lean meats instead of high-fat meats such as hot dogs and sausages. Cut off all visible fat when you prepare meat. · Eat fish, skinless poultry, and meat alternatives such as soy products instead of high-fat meats. Soy products, such as tofu, may be especially good for your heart. · Choose low-fat or fat-free milk and dairy products. Eat fish · Eat at least two servings of fish a week. Certain fish, such as salmon and tuna, contain omega-3 fatty acids, which may help reduce your risk of heart attack. Eat foods high in fiber · Eat a variety of grain products every day. Include whole-grain foods that have lots of fiber and nutrients. Examples of whole-grain foods include oats, whole wheat bread, and brown rice. · Buy whole-grain breads and cereals, instead of white bread or pastries. Limit salt and sodium · Limit how much salt and sodium you eat to help lower your blood pressure. · Taste food before you salt it. Add only a little salt when you think you need it. With time, your taste buds will adjust to less salt. · Eat fewer snack items, fast foods, and other high-salt, processed foods. Check food labels for the amount of sodium in packaged foods. · Choose low-sodium versions of canned goods (such as soups, vegetables, and beans). Limit sugar · Limit drinks and foods with added sugar. These include candy, desserts, and soda pop. Limit alcohol · Limit alcohol to no more than 2 drinks a day for men and 1 drink a day for women. Too much alcohol can cause health problems. When should you call for help? Watch closely for changes in your health, and be sure to contact your doctor if: 
· You would like help planning heart-healthy meals. Where can you learn more? Go to http://dale-nora.info/. Enter V137 in the search box to learn more about \"Heart-Healthy Diet: Care Instructions. \" Current as of: January 27, 2016 Content Version: 11.1 © 7189-6559 [employer]. Care instructions adapted under license by [employer] (which disclaims liability or warranty for this information).  If you have questions about a medical condition or this instruction, always ask your healthcare professional. [employer] disclaims any warranty or liability for your use of this information. Discharge Orders None ACO Transitions of Care Introducing [employer] [address] Kerrie Coelho offers a voluntary care coordination program to provide high quality service and care to [facility] fee-for-service beneficiaries. Bev Saxena was designed to help you enhance your health and well-being through the following services: ? Transitions of Care  support for individuals who are transitioning from one care setting to another (example: Hospital to home). ? Chronic and Complex Care Coordination  support for individuals and caregivers of those with serious or chronic illnesses or with more than one chronic (ongoing) condition and those who take a number of different medications. If you meet specific medical criteria, a [facility] [address] may call you directly to coordinate your care with your primary care physician and your other care providers. For questions about the [facility] programs, please, contact your physicians office. For general questions or additional information about Accountable Care Organizations: 
Please visit www.medicare.gov/acos. html or call 1-800-MEDICARE (1-643.565.1326) TTY users should call 8-749.535.1209. Vpon Announcement We are excited to announce that we are making your provider's discharge notes available to you in Vpon. You will see these notes when they are completed and signed by the physician that discharged you from your recent hospital stay. If you have any questions or concerns about any information you see in Vpon, please call the Health Information Department where you were seen or reach out to your Primary Care Provider for more information about your plan of care. Introducing [facility]!    
 Yojana Maria introduces Vpon patient portal. Now you can access parts of your medical record, email your doctor's office, and request medication refills online. 1. In your internet browser, go to [email or web address] 2. Click on the First Time User? Click Here link in the Sign In box. You will see the New Member Sign Up page. 3. Enter your [employer] Access Code exactly as it appears below. You will not need to use this code after youve completed the sign-up process. If you do not sign up before the expiration date, you must request a new code. · [employer] Access Code: [email or web address] Expires: 4/30/2017  2:45 PM 
 
4. Enter the last four digits of your Social Security Number (xxxx) and Date of Birth (mm/dd/yyyy) as indicated and click Submit. You will be taken to the next sign-up page. 5. Create a [employer] ID. This will be your [employer] login ID and cannot be changed, so think of one that is secure and easy to remember. 6. Create a [employer] password. You can change your password at any time. 7. Enter your Password Reset Question and Answer. This can be used at a later time if you forget your password. 8. Enter your e-mail address. You will receive e-mail notification when new information is available in [address] 9. Click Sign Up. You can now view and download portions of your medical record. 10. Click the Download Summary menu link to download a portable copy of your medical information. If you have questions, please visit the Frequently Asked Questions section of the [employer] website. Remember, [employer] is NOT to be used for urgent needs. For medical emergencies, dial 911. Now available from your iPhone and Android! General Information Please provide this summary of care documentation to your next provider. Patient Signature:  ____________________________________________________________ Date:  ____________________________________________________________  
  
Caesar Mais  Provider Signature: ____________________________________________________________ Date:  ____________________________________________________________

## 2017-03-12 NOTE — ED PROVIDER NOTES
HPI Comments: [de-identified] yo female w/ exertional C pressure since this a.m. Slight sx yesterday.  + N. No vom/diaphoresis    Patient is a [de-identified] y.o. female presenting with chest pain. The history is provided by the patient. Chest Pain (Angina)    This is a new problem. The current episode started 6 to 12 hours ago. The problem has not changed since onset. The pain is associated with exertion. The pain is present in the substernal region. The pain is moderate. The quality of the pain is described as pressure-like. The pain does not radiate. The symptoms are aggravated by exertion. Associated symptoms include exertional chest pressure, nausea and shortness of breath. Pertinent negatives include no abdominal pain, no back pain, no cough, no diaphoresis, no fever, no headaches, no hemoptysis, no irregular heartbeat, no leg pain, no lower extremity edema, no orthopnea, no palpitations, no vomiting and no weakness. She has tried nitroglycerin for the symptoms. The treatment provided mild relief. Procedural history includes cardiac catheterization, cardiac stents and CABG. Past Medical History:   Diagnosis Date    Abdominal pain 10/28/2014    Angina at rest [facility])     pt denies    Arthritis     osteo - low back,  shoulders, hips, low back    Bilateral carotid bruits     Bursitis     CAD (coronary artery disease)     4 vessel CABG 2005;--- stents x 4--- last one placed 2014-- followed by dr Tory Dodd Chronic pain     left shoulder    Coagulation defects     on plavix    Constipation     Cough 09/11/2014    10/8/14, Methacholine Challenge Study: The point at which the FEV1 fell by at least 20%, the PC20, occurred above a dose of 25mg/mL of methacholine. This rules out the diagnosis of asthma with an extremely high degree of sensitivity. No post-challenge FEV1 recovery was identified. Puja Aponte MD        Depressive disorder     Dyspnea 09/11/2014    [facility]; 9/29/14:  IMPRESSION: The flow volume loop is consistent restriction. Spirometry suggest restriction with concomitant obstruction at low lung volumes. There is not a significant bronchodilator response. Lung volumes reveal mild restriction.  The unadjusted diffusion capacity is normal.  Mario Gresham MD      GERD (gastroesophageal reflux disease)     controlled with med    Headache     Hoarseness or changing voice     thougfht to be related to gerd    Hyperlipidemia     Hypertension     controlled with med    Kidney stone     yrs ago-- no tx required    Lumbago     Macular degeneration     Nodule of left lung     dx'ed 4 years ago-watched for several months-no new growth-being watched-yearly CT scan----- followed by dr. Jose Day Pain in joint, ankle and foot     left 3rd toe and right 2nd toe    Pain in joint, shoulder region     left now bothering > right, right ok    Palpitations 09/24/2015    followed by dr Maycol Dodd Panic disorder     panic attacks -- last one 2014    Renal mass 07/2016    ablation---left side--- followed by dr Maxwell Garcia in [place]    Sciatica     Vascular headache        Past Surgical History:   Procedure Laterality Date    ABDOMEN SURGERY PROC UNLISTED Left 07/2016    ablation for mass in left side of abd    HX CATARACT REMOVAL      left    HX CATARACT REMOVAL      HX COLONOSCOPY  12/15/2015    diverticulosis, internal hemorrhoid, colon polyp- no further colonoscopies needed    HX COLONOSCOPY  06/2016    HX CORONARY ARTERY BYPASS GRAFT      4 vessel CABG 2005    HX HEART CATHETERIZATION      stent 2014    HX HEART CATHETERIZATION      stent 2006    HX HEENT Left     macular pucker    HX HERNIA REPAIR Left 2017    HX ORTHOPAEDIC      finger, foot    HX PARTIAL HYSTERECTOMY      hyst         Family History:   Problem Relation Age of Onset    Diabetes Mother     Heart Surgery Mother     Heart Disease Mother     Heart Attack Father     Heart Disease Father     Cancer Sister      2 sisters w/ lung ca-neither one smoked    Heart Disease Sister     Cancer Brother      lung ca-smoker    Heart Attack Brother       age 22 of MI    Heart Disease Sister     Heart Surgery Sister     Heart Attack Sister     Heart Disease Sister     Heart Surgery Sister     Heart Attack Sister     Heart Surgery Brother       in [place] during 2nd heart surgery    Heart Disease Brother        Social History     Social History    Marital status:      Spouse name: N/A    Number of children: N/A    Years of education: N/A     Occupational History    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 , 2 sons. Denies alcohol use. Has always lived in [place]. 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 ALLERGIES: Augmentin [amoxicillin-pot clavulanate]; Codeine; Other medication; Prednisone; and Prevnar 13 [pneumoc 13-chang conj-dip cr(pf)]    Review of Systems   Constitutional: Negative for chills, diaphoresis and fever. Respiratory: Positive for shortness of breath. Negative for cough and hemoptysis. Cardiovascular: Positive for chest pain. Negative for palpitations and orthopnea. Gastrointestinal: Positive for nausea. Negative for abdominal pain, diarrhea and vomiting. Genitourinary: Negative for dysuria and flank pain. Musculoskeletal: Negative for back pain and neck pain.    Skin: Negative for color change and rash. Neurological: Negative for syncope, weakness and headaches. All other systems reviewed and are negative. Vitals:    03/12/17 1547   BP: 185/76   Pulse: 63   Resp: 16   Temp: 97.6 °F (36.4 °C)   SpO2: 99%   Weight: 71.7 kg (158 lb)   Height: 5' 4\" (1.626 m)            Physical Exam   Constitutional: She is oriented to person, place, and time. She appears well-developed and well-nourished. No distress. HENT:   Head: Normocephalic and atraumatic. Mouth/Throat: Oropharynx is clear and moist. No oropharyngeal exudate. Eyes: Conjunctivae and EOM are normal. Pupils are equal, round, and reactive to light. Neck: Normal range of motion. Neck supple. Cardiovascular: Normal rate, regular rhythm and intact distal pulses. No murmur heard. Pulmonary/Chest: Breath sounds normal. No respiratory distress. Abdominal: Soft. Bowel sounds are normal. She exhibits no mass. There is no tenderness. There is no rebound and no guarding. No hernia. Neurological: She is alert and oriented to person, place, and time. Gait normal.   Nl speech   Skin: Skin is warm and dry. Psychiatric: She has a normal mood and affect. Her speech is normal.   Nursing note and vitals reviewed. MDM  Number of Diagnoses or Management Options  Diagnosis management comments: Concern for [place]. MS/NTG paste. ASA was at home. Check EKG/troponins.  CXR    Spoke with cardiology--> will see       Amount and/or Complexity of Data Reviewed  Clinical lab tests: ordered and reviewed  Tests in the radiology section of CPT®: ordered and reviewed  Tests in the medicine section of CPT®: ordered and reviewed    Risk of Complications, Morbidity, and/or Mortality  Presenting problems: moderate  Diagnostic procedures: low  Management options: moderate  General comments: EKG: NSR w/o ST-T changes    Patient Progress  Patient progress: stable    ED Course       Procedures    Results Include:    Recent Results (from the past 24 hour(s))   CBC WITH AUTOMATED DIFF    Collection Time: 03/12/17  3:54 PM   Result Value Ref Range    WBC 5.4 4.3 - 11.1 K/uL    RBC 4.15 4.05 - 5.25 M/uL    HGB 13.2 11.7 - 15.4 g/dL    HCT 38.0 35.8 - 46.3 %    MCV 91.6 79.6 - 97.8 FL    MCH 31.8 26.1 - 32.9 PG    MCHC 34.7 31.4 - 35.0 g/dL    RDW 13.8 11.9 - 14.6 %    PLATELET 725 595 - 047 K/uL    MPV 9.2 (L) 10.8 - 14.1 FL    DF AUTOMATED      NEUTROPHILS 59 43 - 78 %    LYMPHOCYTES 26 13 - 44 %    MONOCYTES 10 4.0 - 12.0 %    EOSINOPHILS 4 0.5 - 7.8 %    BASOPHILS 1 0.0 - 2.0 %    IMMATURE GRANULOCYTES 0.2 0.0 - 5.0 %    ABS. NEUTROPHILS 3.2 1.7 - 8.2 K/UL    ABS. LYMPHOCYTES 1.4 0.5 - 4.6 K/UL    ABS. MONOCYTES 0.6 0.1 - 1.3 K/UL    ABS. EOSINOPHILS 0.2 0.0 - 0.8 K/UL    ABS. BASOPHILS 0.0 0.0 - 0.2 K/UL    ABS. IMM. GRANS. 0.0 0.0 - 0.5 K/UL   METABOLIC PANEL, COMPREHENSIVE    Collection Time: 03/12/17  3:54 PM   Result Value Ref Range    Sodium 133 (L) 136 - 145 mmol/L    Potassium 3.9 3.5 - 5.1 mmol/L    Chloride 95 (L) 98 - 107 mmol/L    CO2 31 21 - 32 mmol/L    Anion gap 7 7 - 16 mmol/L    Glucose 100 65 - 100 mg/dL    BUN 14 8 - 23 MG/DL    Creatinine 0.93 0.6 - 1.0 MG/DL    GFR est AA >60 >60 ml/min/1.73m2    GFR est non-AA >60 >60 ml/min/1.73m2    Calcium 9.0 8.3 - 10.4 MG/DL    Bilirubin, total 0.4 0.2 - 1.1 MG/DL    ALT (SGPT) 11 (L) 12 - 65 U/L    AST (SGOT) 13 (L) 15 - 37 U/L    Alk. phosphatase 72 50 - 136 U/L    Protein, total 6.9 6.3 - 8.2 g/dL    Albumin 3.7 3.2 - 4.6 g/dL    Globulin 3.2 2.3 - 3.5 g/dL    A-G Ratio 1.2 1.2 - 3.5     TROPONIN I    Collection Time: 03/12/17  3:54 PM   Result Value Ref Range    Troponin-I, Qt. <0.02 (L) 0.02 - 0.05 NG/ML     Xr Chest Pa Lat    Result Date: 3/12/2017  Chest X-ray INDICATION:  Chest pain for one day PA and lateral views of the chest were obtained. FINDINGS: The lungs are clear. There are no infiltrates or effusions. The heart size is normal.  There are sternotomy changes.       IMPRESSION: No acute findings in the chest

## 2017-03-12 NOTE — ED NOTES
TRANSFER - OUT REPORT:    Verbal report given to Jeni James RN on Laura Archer  being transferred to [facility] for routine progression of care       Report consisted of patients Situation, Background, Assessment and   Recommendations(SBAR). Information from the following report(s) SBAR, Kardex, [facility] and Recent Results was reviewed with the receiving nurse. Lines:   Peripheral IV 03/12/17 Right Antecubital (Active)   Site Assessment Clean, dry, & intact 3/12/2017  3:55 PM   Phlebitis Assessment 0 3/12/2017  3:55 PM   Infiltration Assessment 0 3/12/2017  3:55 PM   Dressing Status Clean, dry, & intact 3/12/2017  3:55 PM   Hub Color/Line Status Pink 3/12/2017  3:55 PM        Opportunity for questions and clarification was provided.       Patient transported with:   Monitor  Registered Nurse

## 2017-03-12 NOTE — H&P
[facility] cardiology see dictation [de-identified] yo pt of Dr Amairani Quan with chest pain Old CABG and stents Needs cath in AM

## 2017-03-12 NOTE — IP AVS SNAPSHOT
Current Discharge Medication List  
  
Take these medications at their scheduled times Dose & Instructions Dispensing Information Comments Morning Noon Evening Bedtime  
 amLODIPine 5 mg tablet Commonly known as:  Ju Hernandez Your next dose is: Today, Tomorrow Comments:  __________ Dose:  2.5 mg Take 0.5 Tabs by mouth every morning. Quantity:  90 Tab Refills:  1  
     
   
   
   
  
 aspirin 81 mg tablet Your next dose is: Today, Tomorrow Comments:  __________ Dose:  81 mg Take 81 mg by mouth nightly. Refills:  0  
     
   
   
   
  
 clopidogrel 75 mg Tab Commonly known as:  PLAVIX Your next dose is: Today, Tomorrow Comments:  __________ Dose:  75 mg Take 1 Tab by mouth daily. Quantity:  90 Tab Refills:  1  
     
   
   
   
  
 furosemide 40 mg tablet Commonly known as:  LASIX Your next dose is: Today, Tomorrow Comments:  __________ Dose:  40 mg Take 1 Tab by mouth daily. Quantity:  30 Tab Refills:  3  
     
   
   
   
  
 isosorbide mononitrate ER 30 mg tablet Commonly known as:  IMDUR Your next dose is: Today, Tomorrow Comments:  __________ Dose:  30 mg Take 1 Tab by mouth daily. Quantity:  30 Tab Refills:  6  
     
   
   
   
  
 lisinopril-hydroCHLOROthiazide 20-25 mg per tablet Commonly known as:  Marlene Cruz Your next dose is: Today, Tomorrow Comments:  __________ Dose:  1 Tab Take 1 Tab by mouth daily. Quantity:  90 Tab Refills:  3  
     
   
   
   
  
 loratadine 10 mg Cap Your next dose is: Today, Tomorrow Comments:  __________ Dose:  1 Tab Take 1 Tab by mouth nightly. Refills:  0  
     
   
   
   
  
 magnesium oxide 400 mg tablet Commonly known as:  MAG-OX Your next dose is: Today, Tomorrow Comments:  __________ Dose:  400 mg Take 400 mg by mouth daily. Refills:  0  
     
   
   
   
  
 pantoprazole 40 mg tablet Commonly known as:  PROTONIX Your next dose is: Today, Tomorrow Comments:  __________ Dose:  40 mg Take 1 Tab by mouth two (2) times a day. Indications: GASTROESOPHAGEAL REFLUX, samples Quantity:  60 Tab Refills:  6 Potassium Gluconate 595 mg (99 mg) tablet Your next dose is: Today, Tomorrow Comments:  __________ Dose:  595 mg Take 595 mg by mouth two (2) times a day. Refills:  0 PRESERVISION AREDS PO Your next dose is: Today, Tomorrow Comments:  __________ Dose:  1 Tab Take 1 Tab by mouth two (2) times a day. Stopped 1/3/17 Refills:  0  
     
   
   
   
  
 propranolol LA 80 mg SR capsule Commonly known as:  INDERAL LA Your next dose is: Today, Tomorrow Comments:  __________ Dose:  80 mg Take 1 Cap by mouth daily. Quantity:  90 Cap Refills:  1  
     
   
   
   
  
 rosuvastatin 40 mg tablet Commonly known as:  CRESTOR Your next dose is: Today, Tomorrow Comments:  __________ Dose:  40 mg Take 1 Tab by mouth nightly. Quantity:  30 Tab Refills:  11 Take these medications as needed Dose & Instructions Dispensing Information Comments Morning Noon Evening Bedtime  
 nitroglycerin 400 mcg/spray spray Commonly known as:  Sherrine Catena Your next dose is: Today, Tomorrow Comments:  __________ Dose:  1 Spray 1 Spray by SubLINGual route every five (5) minutes as needed for Chest Pain. Quantity:  1 Bottle Refills:  5  
     
   
   
   
  
 TYLENOL 325 mg tablet Generic drug:  acetaminophen Your next dose is: Today, Tomorrow Comments:  __________ Take  by mouth every four (4) hours as needed for Pain. Refills:  0 Take these medications as directed Dose & Instructions Dispensing Information Comments Morning Noon Evening Bedtime LORazepam 1 mg tablet Commonly known as:  ATIVAN Your next dose is: Today, Tomorrow Comments:  __________ Take 1/2 -1 tab q 8 hrs prn panic attacks  Indications: ANXIETY Quantity:  90 Tab Refills:  5 Where to Get Your Medications Information about where to get these medications is not yet available ! Ask your nurse or doctor about these medications  
  isosorbide mononitrate ER 30 mg tablet  
 pantoprazole 40 mg tablet  
 rosuvastatin 40 mg tablet

## 2017-03-12 NOTE — PROGRESS NOTES
TRANSFER - IN REPORT:    Verbal report received from [facility], RN (name) on Yoandy Burden  being received from ED (unit) for routine progression of care      Report consisted of patients Situation, Background, Assessment and   Recommendations(SBAR). Information from the following report(s) ED Summary was reviewed with the receiving nurse. Opportunity for questions and clarification was provided. Assessment completed upon patients arrival to unit and care assumed.

## 2017-03-13 LAB
APTT PPP: 38.1 SEC (ref 23.5–31.7)
APTT PPP: 81.4 SEC (ref 23.5–31.7)
APTT PPP: 91.4 SEC (ref 23.5–31.7)
CHOLEST SERPL-MCNC: 198 MG/DL
HDLC SERPL-MCNC: 71 MG/DL (ref 40–60)
HDLC SERPL: 2.8 {RATIO}
LDLC SERPL CALC-MCNC: 113.4 MG/DL
LIPID PROFILE,FLP: ABNORMAL
TRIGL SERPL-MCNC: 68 MG/DL (ref 35–150)
VLDLC SERPL CALC-MCNC: 13.6 MG/DL (ref 6–23)

## 2017-03-13 PROCEDURE — 74011250637 HC RX REV CODE- 250/637: Performed by: INTERNAL MEDICINE

## 2017-03-13 PROCEDURE — 99153 MOD SED SAME PHYS/QHP EA: CPT

## 2017-03-13 PROCEDURE — 74011000250 HC RX REV CODE- 250: Performed by: INTERNAL MEDICINE

## 2017-03-13 PROCEDURE — 74011000258 HC RX REV CODE- 258: Performed by: INTERNAL MEDICINE

## 2017-03-13 PROCEDURE — C1769 GUIDE WIRE: HCPCS

## 2017-03-13 PROCEDURE — 65660000000 HC RM CCU STEPDOWN

## 2017-03-13 PROCEDURE — 85730 THROMBOPLASTIN TIME PARTIAL: CPT | Performed by: INTERNAL MEDICINE

## 2017-03-13 PROCEDURE — 99152 MOD SED SAME PHYS/QHP 5/>YRS: CPT

## 2017-03-13 PROCEDURE — 77030029997 HC DEV COM RDL R BND TELE -B

## 2017-03-13 PROCEDURE — C1894 INTRO/SHEATH, NON-LASER: HCPCS

## 2017-03-13 PROCEDURE — 36415 COLL VENOUS BLD VENIPUNCTURE: CPT | Performed by: INTERNAL MEDICINE

## 2017-03-13 PROCEDURE — B2131ZZ FLUOROSCOPY OF MULTIPLE CORONARY ARTERY BYPASS GRAFTS USING LOW OSMOLAR CONTRAST: ICD-10-PCS | Performed by: INTERNAL MEDICINE

## 2017-03-13 PROCEDURE — B2111ZZ FLUOROSCOPY OF MULTIPLE CORONARY ARTERIES USING LOW OSMOLAR CONTRAST: ICD-10-PCS | Performed by: INTERNAL MEDICINE

## 2017-03-13 PROCEDURE — B2181ZZ FLUOROSCOPY OF LEFT INTERNAL MAMMARY BYPASS GRAFT USING LOW OSMOLAR CONTRAST: ICD-10-PCS | Performed by: INTERNAL MEDICINE

## 2017-03-13 PROCEDURE — 80061 LIPID PANEL: CPT | Performed by: INTERNAL MEDICINE

## 2017-03-13 PROCEDURE — 3E053GC INTRODUCTION OF OTHER THERAPEUTIC SUBSTANCE INTO PERIPHERAL ARTERY, PERCUTANEOUS APPROACH: ICD-10-PCS | Performed by: INTERNAL MEDICINE

## 2017-03-13 PROCEDURE — 74011636320 HC RX REV CODE- 636/320: Performed by: INTERNAL MEDICINE

## 2017-03-13 PROCEDURE — 74011250636 HC RX REV CODE- 250/636: Performed by: INTERNAL MEDICINE

## 2017-03-13 PROCEDURE — B2151ZZ FLUOROSCOPY OF LEFT HEART USING LOW OSMOLAR CONTRAST: ICD-10-PCS | Performed by: INTERNAL MEDICINE

## 2017-03-13 PROCEDURE — 93459 L HRT ART/GRFT ANGIO: CPT

## 2017-03-13 PROCEDURE — 77030004534 HC CATH ANGI DX INFN CARD -A

## 2017-03-13 PROCEDURE — 74011250636 HC RX REV CODE- 250/636

## 2017-03-13 PROCEDURE — 74011250637 HC RX REV CODE- 250/637: Performed by: NURSE PRACTITIONER

## 2017-03-13 PROCEDURE — 4A023N7 MEASUREMENT OF CARDIAC SAMPLING AND PRESSURE, LEFT HEART, PERCUTANEOUS APPROACH: ICD-10-PCS | Performed by: INTERNAL MEDICINE

## 2017-03-13 RX ORDER — SODIUM CHLORIDE 9 MG/ML
75 INJECTION, SOLUTION INTRAVENOUS CONTINUOUS
Status: DISCONTINUED | OUTPATIENT
Start: 2017-03-13 | End: 2017-03-14 | Stop reason: HOSPADM

## 2017-03-13 RX ORDER — SODIUM CHLORIDE 0.9 % (FLUSH) 0.9 %
5-10 SYRINGE (ML) INJECTION EVERY 8 HOURS
Status: DISCONTINUED | OUTPATIENT
Start: 2017-03-13 | End: 2017-03-14 | Stop reason: ALTCHOICE

## 2017-03-13 RX ORDER — ONDANSETRON 2 MG/ML
4 INJECTION INTRAMUSCULAR; INTRAVENOUS
Status: DISCONTINUED | OUTPATIENT
Start: 2017-03-13 | End: 2017-03-14 | Stop reason: HOSPADM

## 2017-03-13 RX ORDER — FENTANYL CITRATE 50 UG/ML
25-200 INJECTION, SOLUTION INTRAMUSCULAR; INTRAVENOUS
Status: DISCONTINUED | OUTPATIENT
Start: 2017-03-13 | End: 2017-03-13 | Stop reason: HOSPADM

## 2017-03-13 RX ORDER — SODIUM CHLORIDE 0.9 % (FLUSH) 0.9 %
5-10 SYRINGE (ML) INJECTION AS NEEDED
Status: DISCONTINUED | OUTPATIENT
Start: 2017-03-13 | End: 2017-03-14 | Stop reason: HOSPADM

## 2017-03-13 RX ORDER — ISOSORBIDE MONONITRATE 30 MG/1
30 TABLET, EXTENDED RELEASE ORAL DAILY
Status: DISCONTINUED | OUTPATIENT
Start: 2017-03-14 | End: 2017-03-14 | Stop reason: HOSPADM

## 2017-03-13 RX ORDER — HEPARIN SODIUM 200 [USP'U]/100ML
3 INJECTION, SOLUTION INTRAVENOUS CONTINUOUS
Status: DISCONTINUED | OUTPATIENT
Start: 2017-03-13 | End: 2017-03-13

## 2017-03-13 RX ORDER — SODIUM CHLORIDE 9 MG/ML
75 INJECTION, SOLUTION INTRAVENOUS CONTINUOUS
Status: DISPENSED | OUTPATIENT
Start: 2017-03-13 | End: 2017-03-13

## 2017-03-13 RX ORDER — LIDOCAINE HYDROCHLORIDE 20 MG/ML
1-40 INJECTION, SOLUTION INFILTRATION; PERINEURAL
Status: DISCONTINUED | OUTPATIENT
Start: 2017-03-13 | End: 2017-03-13 | Stop reason: HOSPADM

## 2017-03-13 RX ORDER — PANTOPRAZOLE SODIUM 40 MG/1
40 TABLET, DELAYED RELEASE ORAL 2 TIMES DAILY
Status: DISCONTINUED | OUTPATIENT
Start: 2017-03-13 | End: 2017-03-14 | Stop reason: HOSPADM

## 2017-03-13 RX ORDER — ACETAMINOPHEN 325 MG/1
650 TABLET ORAL
Status: DISCONTINUED | OUTPATIENT
Start: 2017-03-13 | End: 2017-03-13 | Stop reason: SDUPTHER

## 2017-03-13 RX ORDER — ROSUVASTATIN CALCIUM 20 MG/1
40 TABLET, COATED ORAL
Status: DISCONTINUED | OUTPATIENT
Start: 2017-03-13 | End: 2017-03-14 | Stop reason: HOSPADM

## 2017-03-13 RX ORDER — HEPARIN SODIUM 5000 [USP'U]/ML
35 INJECTION, SOLUTION INTRAVENOUS; SUBCUTANEOUS ONCE
Status: COMPLETED | OUTPATIENT
Start: 2017-03-13 | End: 2017-03-13

## 2017-03-13 RX ORDER — MIDAZOLAM HYDROCHLORIDE 1 MG/ML
.5-5 INJECTION, SOLUTION INTRAMUSCULAR; INTRAVENOUS
Status: DISCONTINUED | OUTPATIENT
Start: 2017-03-13 | End: 2017-03-13 | Stop reason: HOSPADM

## 2017-03-13 RX ADMIN — Medication 10 ML: at 21:00

## 2017-03-13 RX ADMIN — CLOPIDOGREL BISULFATE 75 MG: 75 TABLET ORAL at 08:53

## 2017-03-13 RX ADMIN — NITROGLYCERIN 1 INCH: 20 OINTMENT TOPICAL at 08:53

## 2017-03-13 RX ADMIN — IOPAMIDOL 130 ML: 755 INJECTION, SOLUTION INTRAVENOUS at 17:00

## 2017-03-13 RX ADMIN — PANTOPRAZOLE SODIUM 40 MG: 40 TABLET, DELAYED RELEASE ORAL at 05:41

## 2017-03-13 RX ADMIN — HEPARIN SODIUM 2500 UNITS: 5000 INJECTION, SOLUTION INTRAVENOUS; SUBCUTANEOUS at 02:40

## 2017-03-13 RX ADMIN — AMLODIPINE BESYLATE 5 MG: 5 TABLET ORAL at 08:53

## 2017-03-13 RX ADMIN — NITROGLYCERIN 1 INCH: 20 OINTMENT TOPICAL at 02:44

## 2017-03-13 RX ADMIN — ACETAMINOPHEN 650 MG: 325 TABLET, FILM COATED ORAL at 02:43

## 2017-03-13 RX ADMIN — MIDAZOLAM HYDROCHLORIDE 2 MG: 1 INJECTION, SOLUTION INTRAMUSCULAR; INTRAVENOUS at 16:27

## 2017-03-13 RX ADMIN — NITROGLYCERIN 1 INCH: 20 OINTMENT TOPICAL at 11:43

## 2017-03-13 RX ADMIN — SODIUM CHLORIDE 75 ML/HR: 900 INJECTION, SOLUTION INTRAVENOUS at 18:16

## 2017-03-13 RX ADMIN — Medication 10 ML: at 17:55

## 2017-03-13 RX ADMIN — HEPARIN SODIUM 3 ML/HR: 200 INJECTION, SOLUTION INTRAVENOUS at 16:18

## 2017-03-13 RX ADMIN — ROSUVASTATIN CALCIUM 40 MG: 20 TABLET ORAL at 21:03

## 2017-03-13 RX ADMIN — DEXTROSE MONOHYDRATE AND SODIUM CHLORIDE 100 ML/HR: 5; .45 INJECTION, SOLUTION INTRAVENOUS at 05:43

## 2017-03-13 RX ADMIN — ACETAMINOPHEN 650 MG: 325 TABLET, FILM COATED ORAL at 11:43

## 2017-03-13 RX ADMIN — LIDOCAINE HYDROCHLORIDE 100 MG: 20 INJECTION, SOLUTION INFILTRATION; PERINEURAL at 16:14

## 2017-03-13 RX ADMIN — ACETAMINOPHEN 650 MG: 325 TABLET, FILM COATED ORAL at 18:52

## 2017-03-13 RX ADMIN — FENTANYL CITRATE 25 MCG: 50 INJECTION, SOLUTION INTRAMUSCULAR; INTRAVENOUS at 16:39

## 2017-03-13 RX ADMIN — FENTANYL CITRATE 25 MCG: 50 INJECTION, SOLUTION INTRAMUSCULAR; INTRAVENOUS at 16:26

## 2017-03-13 RX ADMIN — LISINOPRIL AND HYDROCHLOROTHIAZIDE 1 TABLET: 25; 20 TABLET ORAL at 08:53

## 2017-03-13 RX ADMIN — PANTOPRAZOLE SODIUM 40 MG: 40 TABLET, DELAYED RELEASE ORAL at 17:54

## 2017-03-13 RX ADMIN — HEPARIN SODIUM 2 ML: 10000 INJECTION, SOLUTION INTRAVENOUS; SUBCUTANEOUS at 16:12

## 2017-03-13 RX ADMIN — PROPRANOLOL HYDROCHLORIDE 80 MG: 80 CAPSULE, EXTENDED RELEASE ORAL at 08:53

## 2017-03-13 RX ADMIN — MIDAZOLAM HYDROCHLORIDE 1 MG: 1 INJECTION, SOLUTION INTRAMUSCULAR; INTRAVENOUS at 16:38

## 2017-03-13 RX ADMIN — SODIUM CHLORIDE 75 ML/HR: 900 INJECTION, SOLUTION INTRAVENOUS at 08:59

## 2017-03-13 NOTE — PROCEDURES
T[facility] [address] 45       Name:  Dahiana Porter   MR#:  [id number]   :  1935   Account #:  [de-identified]   Date of Adm:  2017       DATE OF PROCEDURE: 2017. PROCEDURES   1. Left heart catheterization with selective coronary   arteriography, left ventriculogram via the left radial artery. 2. Left internal mammary artery arteriography. 3. Saphenous vein graft arteriography. INDICATION: Recurrent chest pain at rest and exertion,   concerning for unstable angina/acute coronary syndrome. The   patient arranged for cardiac catheterization by Dr. Jackeline Mccollum. TECHNICAL FACTORS: After informed consent was obtained, the   patient was brought to the cardiac catheterization lab. The left   radial artery was prepped and draped in the usual sterile   fashion. Utilizing a modified Seldinger technique and a   micropuncture needle, the left radial artery was entered. A 6-  Tristanian Terumo Slender sheath was placed without difficulty. A   radial cocktail, consisting of 2000 units of heparin, 2 mg   verapamil, 200 mcg nitroglycerin was administered. A 5-Tristanian   LIMA catheter was used to selectively engage the ostium of the   left internal mammary artery to the LAD. A JL3.5 catheter was   used to selectively engage the ostium of the left main coronary   artery, and a JR4 catheter was used to selectively engage the   ostium of the right coronary artery. A multipurpose catheter was   used to selectively engage the ostium of the saphenous vein   graft. Selective injections in various projections were   performed. Multipurpose catheter was used to cross the aortic   valve and the left ventricle. Hemodynamic measurements and left   ventriculogram were obtained. Left ventricular aortic pressure   gradient was obtained by pullback technique. CONTRAST: Isovue 130. HEMODYNAMIC RESULTS   1.  Aortic pressure 121/54 with a mean of 88.   2. Left ventricular end-diastolic pressure was 7.   3. There was no significant gradient across the aortic valve. SEDATION: The patient received moderate conscious sedation with   a total of 3 mg of Versed and 50 mcg of fentanyl. Duration of   sedation lasted 15 minutes. ANGIOGRAPHIC RESULTS   1. Left main coronary artery was a large caliber vessel. It has   a stent in the ostium, extending from the left main into the   proximal left circumflex. The left main portion of the stent   contains 10% to 20% ostial restenosis. Otherwise the stent is widely   patent. 2. LAD is a medium caliber vessel. It contains 40% ostial   stenosis, 30% proximal stenosis, and 60% to 70% distal stenosis   with competitive filling from the left internal mammary artery. 3. First diagonal artery: Appears to have been previously   grafted, 30% ostial stenosis. The graft is occluded. 4. Ramus intermedius  is a small to   medium caliber vessel. Competitive filling from the saphenous   vein graft. 5. Left circumflex: Medium caliber, codominant vessel. Has a   stent extending from the ostium into the proximal vessel. This   is continuous with the left main stent. The stent is patent with   20% to 30% ostial stenosis. The remainder of the circumflex is   patent. 6. First obtuse marginal: Medium caliber vessel. Patent. 7. Second obtuse marginal artery: Small caliber vessel. Patent. 8. Right coronary artery is a medium caliber, codominant vessel. Heavily diseased with diffuse 70% mid stenosis, 80% distal   stenosis. There is retrograde filling and competitive filling   from the saphenous vein graft. The saphenous vein graft fills   retrograde into the aorta. The distal right coronary artery   after the vein graft is patent with patency of the right PDA. BYPASS GRAFT ANATOMY   1. HOOD to LAD: Medium caliber vessel. Widely patent. It   attaches to the distal LAD distal to the anastomosis with 20%   luminal irregularities.    2. Saphenous vein graft to the first diagonal artery: 100% proximal.    3. Saphenous vein graft to RCA: Has irregularity in the proximal   segment with likely 30% to 40% obstruction. This does not appear   to be flow-limiting. After this, is an ectatic segment followed   by a relatively normal medium caliber graft which anastomoses to   the distal right coronary artery. The right coronary artery, PDA   is patent distal to the anastomosis. 4. Saphenous vein graft to  ramus   intermedius: A medium caliber vessel, 30% ostial stenosis, and   20% luminal irregularities in the mid and distal segment. It   attaches to a small caliber first obtuse marginal versus ramus. This is patent distal to the anastomosis. 5. Left ventriculogram performed in MUNIZ projection shows normal   left ventricular systolic function, EF 09% to 65%. No focal   segmental wall motion abnormalities. No mitral regurgitation. CONCLUSION   1. Obstructive multivessel coronary artery disease. 2. Normal left ventricular systolic function. 3. Patent left main/circumflex stent. 4. Three of 4 bypass grafts patent. There is occlusion of what   appears to be a vein graft to a diagonal. There is adequate   circulation from the native vessel without need for   intervention. PLAN: Medical therapy. Will add Imdur and treat for noncardiac   cause of her chest pain.         [place] Carrel, MD MGN / VINCE   D:  03/13/2017   18:37   T:  03/13/2017   19:17   Job #:  [id number]

## 2017-03-13 NOTE — PROGRESS NOTES
Bedside and Verbal shift change report given to Slava Ayala RN / Nuvia Magaña RN (oncoming nurse) by self Valdemar mares). Report included the following information SBAR, Kardex, MAR and Recent Results. Remains NPO for heart cath today. Consent on chart. Heparin verified with MAR. PTT due 0900.

## 2017-03-13 NOTE — PROGRESS NOTES
TRANSFER - IN REPORT:    Verbal report received from [facility] (name) on Leticia Mendoza  being received from cath lab (unit) for routine progression of care      Report consisted of patients Situation, Background, Assessment and   Recommendations(SBAR). Information from the following report(s) SBAR, Kardex, [facility] and Recent Results was reviewed with the receiving nurse. Opportunity for questions and clarification was provided. Assessment completed upon patients arrival to unit and care assumed. Left wrist site noted to have old oozing. Pulsating.  Will monitor closely

## 2017-03-13 NOTE — PROGRESS NOTES
Physical Exam   Skin:        Primary Nurse Tanya Linn RN and Suzanna Leventhal, RN performed a dual skin assessment on this patient.

## 2017-03-13 NOTE — PROGRESS NOTES
.Bedside and Verbal shift change report given to self (oncoming nurse) by Shaista Lima RN (offgoing nurse). Report included the following information SBAR, Kardex, MAR and Recent Results.         Heparin gtt verified at bedside

## 2017-03-13 NOTE — PROGRESS NOTES
Bedside and Verbal shift change report given to self (oncoming nurse) by Josie Godfrey RN / Kirsten Martinez RN (offgoing nurse). Report included the following information SBAR, Kardex, MAR and Recent Results.

## 2017-03-13 NOTE — ASSESSMENT & PLAN NOTE
Cath S 2016  Good lv fxn  Stent from lmca into the lcx ok.   [address] to lad, svg to ramus and svg to rca ok  Svg to daigonal 100% which is new since last cath    [facility]

## 2017-03-13 NOTE — PROGRESS NOTES
Bedside and Verbal shift change report given to Fabricio Walters RN (oncoming nurse) by self Julia Schmitting nurse). Report included the following information SBAR, Kardex, MAR and Recent Results. Vitals printed and placed on bedside chart. Site visualized. No new bleeding/oozing noted.  TR band in place

## 2017-03-13 NOTE — PROGRESS NOTES
TRANSFER - OUT REPORT:    Verbal report given to Brea Verdin RN/tele(name) on Deborah Art  being transferred to tele per Kobi Curtis RN and Sunita NGUYEN(unit) for routine progression of care       Report consisted of patients Situation, Background, Assessment and   Recommendations(SBAR). Information from the following report(s) SBAR and Procedure Summary was reviewed with the receiving nurse. Opportunity for questions and clarification was provided. Procedure: [facility]  Finding Summary: Diagnostic(cath/pci/pacer settings)  Location: LRA    Closure Device: R band with 15 ml of air at 1700(yes/no/description)  Post Site Assessment: no oozing or hematoma, verified with Kobi Curtis RN     Pre Procedure Meds:(if any)    ASA: 81 mg  When Received:  1600  Antiplatelet: 75 mg Plavix at 0853    Intra Procedure Meds:    Versed: 3 mg  Fentanyl: 50 mcg  Heparin: 2000 untis               Peripheral IV 03/12/17 Right Antecubital (Active)   Site Assessment Clean, dry, & intact 3/13/2017 11:48 AM   Phlebitis Assessment 0 3/13/2017 11:48 AM   Infiltration Assessment 0 3/13/2017 11:48 AM   Dressing Status Clean, dry, & intact 3/13/2017 11:48 AM   Dressing Type Transparent;Tape 3/13/2017 11:48 AM   Hub Color/Line Status Patent 3/13/2017 11:48 AM       Peripheral IV 03/12/17 Left; Outer Antecubital (Active)   Site Assessment Clean, dry, & intact 3/13/2017 11:48 AM   Phlebitis Assessment 0 3/13/2017 11:48 AM   Infiltration Assessment 0 3/13/2017 11:48 AM   Dressing Status Clean, dry, & intact 3/13/2017 11:48 AM   Dressing Type Transparent;Tape 3/13/2017 11:48 AM   Hub Color/Line Status Infusing 3/13/2017 11:48 AM        Post-Procedure Site Assessment (1)  Wound Type: Catheter entry/exit  Location: Radial  Orientation : Left  Closure Device:  (R band with 15 ml of air at 1700)  Site Assessment: Dry/intact                       is allergic to augmentin [amoxicillin-pot clavulanate]; codeine; other medication; prednisone; and prevnar 13 [pneumoc 13-chang conj-dip cr(pf)]. Past Medical History:   Diagnosis Date    Abdominal pain 10/28/2014    Angina at rest [facility])     pt denies    Arthritis     osteo - low back,  shoulders, hips, low back    Bilateral carotid bruits     Bursitis     CAD (coronary artery disease)     4 vessel CABG 2005;--- stents x 4--- last one placed 2014-- followed by dr Divine Serrano Chronic pain     left shoulder    Coagulation defects     on plavix    Constipation     Cough 09/11/2014    10/8/14, Methacholine Challenge Study: The point at which the FEV1 fell by at least 20%, the PC20, occurred above a dose of 25mg/mL of methacholine. This rules out the diagnosis of asthma with an extremely high degree of sensitivity. No post-challenge FEV1 recovery was identified. Diego Irene MD        Depressive disorder     Dyspnea 09/11/2014    [facility]; 9/29/14: IMPRESSION: The flow volume loop is consistent restriction. Spirometry suggest restriction with concomitant obstruction at low lung volumes. There is not a significant bronchodilator response. Lung volumes reveal mild restriction.  The unadjusted diffusion capacity is normal.  Jaden Davila MD      GERD (gastroesophageal reflux disease)     controlled with med    Headache     Hoarseness or changing voice     thougfht to be related to gerd    Hyperlipidemia     Hypertension     controlled with med    Kidney stone     yrs ago-- no tx required    Lumbago     Macular degeneration     Nodule of left lung     dx'ed 4 years ago-watched for several months-no new growth-being watched-yearly CT scan----- followed by dr. Jean Marie Martin Pain in joint, ankle and foot     left 3rd toe and right 2nd toe    Pain in joint, shoulder region     left now bothering > right, right ok    Palpitations 09/24/2015    followed by dr Ginger Barba Panic disorder     panic attacks -- last one 2014    Renal mass 07/2016    ablation---left side--- followed by dr Anjum Sauceda in [place]    Sciatica     Vascular headache      Visit Vitals    /55    Pulse 65    Temp 96.4 °F (35.8 °C)    Resp 16    Ht 5' 4\" (1.626 m)    Wt 71.3 kg (157 lb 1.6 oz)    SpO2 95%    Breastfeeding No    BMI 26.97 kg/m2

## 2017-03-13 NOTE — H&P
[facility] 66   HISTORY AND PHYSICAL       Name:  Josafat Peterson   MR#:  [id number]   :  1935   Account #:  [de-identified]   Date of Adm:  2017       ADMITTING DIAGNOSIS: Unstable angina. HISTORY OF PRESENT ILLNESS: This is an 25-year-old female, a   long-standing history of coronary artery disease, had initial   bypass surgery by Dr. Shalonda Dorantes in , and I believe she had   a mammary to the LAD at least 2 vein grafts. Since that time,   she has had numerous interventions of her chest pain with stents   to different vessels. Last one I could find  at [facility]. She had a left main stent. She had a stent to   the vein graft to the circumflex, I believe. She had a nuclear   study since that time, I believe, was negative but has   occasional episodes of chest discomfort. Recently, she just had   an abdominal hernia repair and did okay from a cardiac   standpoint. Here this weekend she started having her substernal   chest discomfort and tightness again and was relieved some with   nitroglycerin, but reminded of her prior angina. Finally, today   she decided to come in. EKG shows no acute changes. She should   be stable at this time. She says it is very similar feeling to   her prior angina once before. It has been getting worse and   it has been occurring at rest. She is not having any nausea,   vomiting or acute shortness of breath. PAST MEDICAL HISTORY: Notable for coronary artery disease. She   has had previous CABG. She has had numerous interventions. She   has had hypertension, hyperlipidemia which has been difficult to   control even with Crestor. She is not diabetic. She does not   currently smoke. She has had some mild reduced PFTs. She has   seen Pulmonary before. She has had hoarseness at times, a recent   hernia repair. She had some type of a renal mass, which had   cryoablation, she says.     FAMILY HISTORY: All family members have had heart disease. Mother and father and brothers all have had early heart disease   and coronary artery disease. SOCIAL HISTORY: She is . She does not smoke. She lives   alone currently. MEDICATIONS PRIOR TO ADMISSION: Include    1. Dexilant 60 per day. 2. Mag-Ox 400 per day. 3. Inderal LA 80 mg per day. 4. Nitroglycerin p.r.n.   5. Norvasc, I believe 5, or half that every day. 6. Plavix 75 mg.   7. Ativan 1 mg as needed. 8. Crestor 20 mg per day. 9. Potassium gluconate. 10. Prinzide which is lisinopril/hydrochlorothiazide 20/25 per   day. REVIEW OF SYSTEMS   There has been no major weight change. No neurological symptoms. Never had a stroke that she is aware of. She has had some mild   carotid plaque before. She has not had any complete shortness of   breath or wheezing. She denies any abdominal pain. She has had   chest tightness as noted. Hernia is healed up. She did fine from   a cardiac standpoint with that surgery. She had no swelling at   this time. She does occasionally take a Lasix. PHYSICAL EXAMINATION   GENERAL: She is alert. She is in no acute distress at this time. VITAL SIGNS: Initial blood pressure they have recorded is about   809/80 systolic. Heart rate is normal. Respirations are normal.   She is alert and responsive. No distress here. SKIN: Warm and dry. HEENT: Pupils reactive. NECK: Supple, without any definite carotid bruits. No JVD. LUNGS: Clear. HEART: Regular rate with faint S4. No murmur. ABDOMEN: Soft, nontender. Bowel sounds are normal. No bruits. EXTREMITIES: Show no edema or clubbing. Pulses are 2+ and equal.   NEUROLOGIC: She is alert and oriented. LABORATORY DATA: The white count is 5.4, hemoglobin is 13.2. Sodium 133, potassium 3.9, BUN is 14, creatinine is 0.93. AST   13, ALT 11. Troponin less than 0.01. Chest x-ray is clear. EKG   shows a sinus rhythm at 62. No acute ST change noted. IMPRESSION   1.  Recurrence of resting chest discomfort which is worrisome for   her angina. She has been doing fairly well and got through   surgery recently without any problems. Now, she started having   this this weekend. There are no acute ST changes. She was   concerned about it and came to the emergency room because it   reminded her of her prior symptoms. Her last intervention was   done in 2014. Certainly she has got old grafts and could have   progressive disease. No acute ischemic changes are noted   currently. 2. Coronary artery disease. 3. History of coronary bypass grafting with disease in the vein   graft. 4. Prior angioplasty stenting left main and grafts. Last   catheterization 2014. 5. Hypertension. Blood pressure a little high. We will increase   that Norvasc from 2.5 to 5.   6. Hyperlipidemia. It looks like she has got probably familial   and Crestor has not brought down. Apparently, Dr. Dannie Mcdermott   has talked to her about taking the new injection to bring it   down. RECOMMENDATIONS: The patient will be admitted, put her on a   heparin drip, nitrates. Continue aspirin. Increase Norvasc. Continue beta-blocker therapy. Will plan on repeat   catheterization tomorrow. She understands and agrees to it. Juan Eid MD      NDF / TW   D:  03/12/2017   18:56   T:  03/12/2017   22:29   Job #:  [id number]     cc:  Kait Ham MD

## 2017-03-13 NOTE — PROGRESS NOTES
[facility] CARDIOLOGY PROGRESS NOTE           3/13/2017 7:17 AM    Admit Date: 3/12/2017      Subjective:   Chest pain resolved, negative enzymes    ROS:  Cardiovascular:  As noted above    Objective:      Vitals:    03/12/17 2011 03/13/17 0030 03/13/17 0437 03/13/17 0528   BP: 178/79 115/59 119/60    Pulse: 63 66 60    Resp: 17 17 17    Temp: 97.1 °F (36.2 °C) 96.8 °F (36 °C) 96.8 °F (36 °C)    SpO2: 100% 100% 100%    Weight: 70.8 kg (156 lb 1.6 oz)   71.3 kg (157 lb 1.6 oz)   Height: 5' 4\" (1.626 m)          Physical Exam:  General-No Acute Distress  Neck- supple, no JVD  CV- regular rate and rhythm no MRG  Lung- clear bilaterally  Abd- soft, nontender, nondistended  Ext- no edema bilaterally. Skin- warm and dry    Data Review:   Recent Labs      03/13/17   0207  03/12/17   2200  03/12/17   1554  03/10/17   1513   NA   --    --   133*  130*   K   --    --   3.9  4.3   MG   --    --    --   1.9   BUN   --    --   14  14   CREA   --    --   0.93  0.95   GLU   --    --   100  101*   WBC   --    --   5.4   --    HGB   --    --   13.2   --    HCT   --    --   38.0   --    PLT   --    --   295   --    TROIQ   --   <0.02*  <0.02*   --    CHOL  198   --    --    --    TGL  68   --    --    --    LDLC  113.4*   --    --    --    HDL  71*   --    --    --        Assessment/Plan:     Principal Problem:    ACS (acute coronary syndrome) (HCC) (3/12/2017)--plans for [facility] possible PCI today    Active Problems:    CAD (coronary artery disease) (9/11/2014)      Overview: With CABG and 4 cardiac stents      Hypertension ()--stable on current meds      Hyperlipidemia ()--noted lipid profile, on crestor 20 mg at home--increased to 40 mg      Angina at rest [facility]) ()--cp resolved overnight      S/P CABG (coronary artery bypass graft) (3/12/2017)--  Last cath [facility] 2016  Good lv fxn  Stent from lmca into the lcx ok.   [address] to lad, svg to ramus and svg to rca ok  Svg to daigonal 100% which is new since last cath    MED RX      S/P angioplasty with stent (3/12/2017)          Anita Clark NP  3/13/2017 7:17 AM

## 2017-03-14 VITALS
RESPIRATION RATE: 18 BRPM | TEMPERATURE: 98 F | SYSTOLIC BLOOD PRESSURE: 123 MMHG | HEIGHT: 64 IN | WEIGHT: 154.2 LBS | HEART RATE: 57 BPM | BODY MASS INDEX: 26.32 KG/M2 | DIASTOLIC BLOOD PRESSURE: 38 MMHG | OXYGEN SATURATION: 96 %

## 2017-03-14 PROCEDURE — 74011250637 HC RX REV CODE- 250/637: Performed by: INTERNAL MEDICINE

## 2017-03-14 RX ORDER — ISOSORBIDE MONONITRATE 30 MG/1
30 TABLET, EXTENDED RELEASE ORAL DAILY
Qty: 30 TAB | Refills: 6 | Status: SHIPPED | OUTPATIENT
Start: 2017-03-14 | End: 2017-04-04

## 2017-03-14 RX ORDER — ROSUVASTATIN CALCIUM 40 MG/1
40 TABLET, COATED ORAL
Qty: 30 TAB | Refills: 11 | Status: SHIPPED | OUTPATIENT
Start: 2017-03-14 | End: 2017-12-21

## 2017-03-14 RX ORDER — PANTOPRAZOLE SODIUM 40 MG/1
40 TABLET, DELAYED RELEASE ORAL
Status: DISCONTINUED | OUTPATIENT
Start: 2017-03-14 | End: 2017-03-14

## 2017-03-14 RX ORDER — PANTOPRAZOLE SODIUM 40 MG/1
40 TABLET, DELAYED RELEASE ORAL 2 TIMES DAILY
Qty: 60 TAB | Refills: 6 | Status: SHIPPED | OUTPATIENT
Start: 2017-03-14 | End: 2018-01-11 | Stop reason: SDUPTHER

## 2017-03-14 RX ADMIN — ASPIRIN 81 MG: 81 TABLET, COATED ORAL at 08:45

## 2017-03-14 RX ADMIN — ISOSORBIDE MONONITRATE 30 MG: 30 TABLET, EXTENDED RELEASE ORAL at 08:45

## 2017-03-14 RX ADMIN — CLOPIDOGREL BISULFATE 75 MG: 75 TABLET ORAL at 08:45

## 2017-03-14 RX ADMIN — PROPRANOLOL HYDROCHLORIDE 80 MG: 80 CAPSULE, EXTENDED RELEASE ORAL at 08:45

## 2017-03-14 RX ADMIN — PANTOPRAZOLE SODIUM 40 MG: 40 TABLET, DELAYED RELEASE ORAL at 08:45

## 2017-03-14 RX ADMIN — LISINOPRIL AND HYDROCHLOROTHIAZIDE 1 TABLET: 25; 20 TABLET ORAL at 08:45

## 2017-03-14 RX ADMIN — AMLODIPINE BESYLATE 5 MG: 5 TABLET ORAL at 08:45

## 2017-03-14 NOTE — DISCHARGE SUMMARY
[facility] Cardiology Discharge Summary     Patient ID:  Tomer Pemberton  [id number]  [de-identified] y.o.  1935    Admit date: 3/12/2017    Discharge date:  3/14/2017    Admitting Physician: Brandon Vergara MD     Discharge Physician: GEOVANNI Oglesby/Dr. Amina Joaquin    Admission Diagnoses: ACS (acute coronary syndrome) [facility])    Discharge Diagnoses:   Patient Active Problem List    Diagnosis Date Noted    S/P CABG (coronary artery bypass graft) 03/12/2017    S/P angioplasty with stent 03/12/2017    ACS (acute coronary syndrome) ([facility] 03/12/2017    Inguinal hernia of left side with obstruction and without gangrene 12/19/2016    Renal mass     Osteoporosis, unspecified     Osteoarthrosis, shoulder region     [facility]     Preop cardiovascular exam 06/14/2016    Constipation by delayed colonic transit 10/16/2015    Renal cell carcinoma of left kidney (HCC)--seen by DR Ra Bosch  UROLOGIST 10/16/2015    Palpitations 09/24/2015    Essential hypertension, benign 09/24/2015    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 09/21/2015    Bronchiolitis 09/15/2015    Urinary complication 80/03/8203    Urgency of urination 07/09/2015    Irritable bowel syndrome 07/09/2015    Bronchitis with bronchospasm 07/09/2015    Chronic pain     Hypertension     GERD (gastroesophageal reflux disease)     Hyperlipidemia     Bilateral carotid bruits     COPD exacerbation (HCC)     Angina at rest [facility])     IHD (ischemic heart disease)     Dysuria 10/28/2014    CAD (coronary artery disease) 09/11/2014    Dyspnea 09/11/2014    Cough with sputum--mostly clear, slight yellow 09/11/2014       Cardiology Procedures this admission:  Diagnostic left heart catheterization  Consults: None    Hospital Course: Patient presented to the emergency department of [facility] with complaints of intermittent substernal chest pain and tightness.  Pain progressed and was occurring at rest. She presented to the ER. She stated pain was very similar to prior angina. Initial troponin was negative. EKG was negative for acute changes. She was evaluated and subsequently admitted for further cardiac evaluation and treatment. Cardiac enzymes were followed and remained negative. C was planned. Patient underwent cardiac catheterization by Dr. Janneth Ibrahim. Patient was found to have stable CAD with patent left main/LCx stent. She had 3/4 patent grafts. There was an occlusion of what appeared to be a vein graft to a diagonal. There was adequate circulation from the native vessel without need for intervention. Continued medical therapy was recommended with the addition of Imdur. Patient tolerated the procedure well and returned to the telemetry floor for recovery. The morning of 3/14/2017 patient was up feeling well without any complaints of chest pain or shortness of breath. Patient's left radial cath site was clean, dry and intact without hematoma. Patient was seen and examined by Dr. Janneth Ibrahim and determined stable and ready for discharge. Patient was instructed on the importance of medication compliance and outpatient follow up. For maximized medical therapy of CAD, patient will continue use of ASA, BB, ACE-I, statin and Imdur as well. She is discharged on BID PPI as well. She has a previously scheduled appointment with GI. Her Crestor dose was increased. She is a candidate for PCSK9 inhibitor. This has been discussed and will be discussed further on outpatient basis. The patient will follow up with [facility] Cardiology Dr. Tori Del Cid on March 29th at 2:30pm [facility]). DISPOSITION: The patient is being discharged home in stable condition on a low saturated fat, low cholesterol and low salt diet. The patient is instructed to advance activities as tolerated to the limit of fatigue or shortness of breath. The patient is instructed to avoid all heavy lifting for 3-5 days.  The patient is instructed to watch the cath site for bleeding/oozing; if seen, the patient is instructed to apply firm pressure with a clean cloth and call [facility] at [phone]. The patient is instructed to watch for signs of infection which include: increasing area of redness, fever/hot to touch or purulent drainage at the catheterization site. The patient is instructed not to soak in a bathtub for 7-10 days, but is cleared to shower. The patient is instructed to call the office or return to the ER for immediate evaluation for any shortness of breath or chest pain not relieved by NTG. Discharge Exam:   Visit Vitals    BP (!) 123/38    Pulse (!) 57    Temp 98 °F (36.7 °C)    Resp 18    Ht 5' 4\" (1.626 m)    Wt 69.9 kg (154 lb 3.2 oz)    SpO2 96%    Breastfeeding No    BMI 26.47 kg/m2       Physical Exam:  General: Well Developed, Well Nourished, No Acute Distress, Alert & Oriented  Neck: supple, no JVD  Heart: S1S2 with RRR  Lungs: Clear throughout auscultation bilaterally  Abd: soft, nontender, nondistended, with good bowel sounds  Ext: warm, no edema, calves supple/nontender, pulses 2+ bilaterally  Left wrist: clean, dry, and intact without hematoma or bleeding  Skin: warm and dry    Recent Results (from the past 24 hour(s))   PTT    Collection Time: 03/13/17  9:09 AM   Result Value Ref Range    aPTT 81.4 (H) 23.5 - 31.7 SEC   PTT    Collection Time: 03/13/17  3:19 PM   Result Value Ref Range    aPTT 91.4 (HH) 23.5 - 31.7 SEC         Patient Instructions:   Current Discharge Medication List      START taking these medications    Details   pantoprazole (PROTONIX) 40 mg tablet Take 1 Tab by mouth two (2) times a day. Indications: GASTROESOPHAGEAL REFLUX, samples  Qty: 60 Tab, Refills: 6      isosorbide mononitrate ER (IMDUR) 30 mg tablet Take 1 Tab by mouth daily.   Qty: 30 Tab, Refills: 6         CONTINUE these medications which have CHANGED    Details   rosuvastatin (CRESTOR) 40 mg tablet Take 1 Tab by mouth nightly. Qty: 30 Tab, Refills: 11    Associated Diagnoses: Coronary artery disease involving native coronary artery of native heart without angina pectoris; Pure hypercholesterolemia         CONTINUE these medications which have NOT CHANGED    Details   loratadine 10 mg cap Take 1 Tab by mouth nightly.      magnesium oxide (MAG-OX) 400 mg tablet Take 400 mg by mouth daily. propranolol LA (INDERAL LA) 80 mg SR capsule Take 1 Cap by mouth daily. Qty: 90 Cap, Refills: 1    Associated Diagnoses: Essential hypertension with goal blood pressure less than 140/90      nitroglycerin (NITROLINGUAL) 400 mcg/spray spray 1 Spray by SubLINGual route every five (5) minutes as needed for Chest Pain. Qty: 1 Bottle, Refills: 5    Associated Diagnoses: Essential hypertension with goal blood pressure less than 140/90      amLODIPine (NORVASC) 5 mg tablet Take 0.5 Tabs by mouth every morning. Qty: 90 Tab, Refills: 1    Associated Diagnoses: Essential hypertension with goal blood pressure less than 140/90      clopidogrel (PLAVIX) 75 mg tablet Take 1 Tab by mouth daily. Qty: 90 Tab, Refills: 1    Associated Diagnoses: Coronary artery disease involving native coronary artery of native heart without angina pectoris      LORazepam (ATIVAN) 1 mg tablet Take 1/2 -1 tab q 8 hrs prn panic attacks  Indications: ANXIETY  Qty: 90 Tab, Refills: 5    Associated Diagnoses: Panic attacks      lisinopril-hydrochlorothiazide (PRINZIDE, ZESTORETIC) 20-25 mg per tablet Take 1 Tab by mouth daily. Qty: 90 Tab, Refills: 3      Potassium Gluconate 595 mg (99 mg) tablet Take 595 mg by mouth two (2) times a day. furosemide (LASIX) 40 mg tablet Take 1 Tab by mouth daily. Qty: 30 Tab, Refills: 3      VIT A/VIT C/VIT E/ZINC/COPPER (PRESERVISION AREDS PO) Take 1 Tab by mouth two (2) times a day. Stopped 1/3/17      acetaminophen (TYLENOL) 325 mg tablet Take  by mouth every four (4) hours as needed for Pain.       aspirin 81 mg tablet Take 81 mg by mouth nightly.          STOP taking these medications       Dexlansoprazole (DEXILANT) 60 mg CpDB Comments:   Reason for Stopping:                 Signed:  Chance Markham PA-C  3/14/2017  8:45 AM

## 2017-03-14 NOTE — PROGRESS NOTES
Bedside and Verbal shift change report given to Araceli Avila, RN / Shelby Carroll RN (oncoming nurse) by self Abron Carlos Eduardo nurse). Report included the following information SBAR, Kardex, MAR and Recent Results. Right radial cath site WDL. Patient waiting for discharge home.

## 2017-03-14 NOTE — PROGRESS NOTES
Bedside and Verbal shift change report given to self (oncoming nurse) by Daniella Piedra RN (offgoing nurse). Report included the following information SBAR, Kardex, MAR and Recent Results.

## 2017-03-14 NOTE — DISCHARGE INSTRUCTIONS
Cardiac Catheterization/Angiography Discharge Instructions    *Check the puncture site frequently for swelling or bleeding. If you see any bleeding, lie down and apply pressure over the area with a clean town or washcloth. Notify your doctor for any redness, swelling, drainage or oozing from the puncture site. Notify your doctor for any fever or chills. *If the leg or arm with the puncture becomes cold, numb or painful, call [facility] at [phone]. *Activity should be limited for the next 48 hours. Climb stairs as little as possible and avoid any stooping, bending or strenuous activity for 48 hours. No heavy lifting (anything over 10 pounds) for three days. *Do not drive for 48 hours. *You may resume your usual diet. Drink more fluids than usual.    *Have a responsible person drive you home and stay with you for at least 24 hours after your heart catheterization/angiography. *You may remove the bandage from your left wrist in 24 hours. You may shower in 24 hours. No tub baths, hot tubs or swimming for one week. Do not place any lotions, creams, powders, ointments over the puncture site for one week. You may place a clean band-aid over the puncture site each day for 5 days. Change this daily. Reducing Heart Attack Risk With Daily Medicine: Care Instructions  Your Care Instructions    Heart disease is the number one cause of death. If you are at risk for heart disease, there are many medicines that can reduce your risk. These include:  · ACE inhibitors. These are a type of blood pressure medicine. They can reduce the risk of heart attacks and strokes if you are at high risk. · Statin medicines. These lower cholesterol. They can also reduce the risk of heart disease and strokes. · Aspirin. It can help certain people lower their risk of a heart attack or stroke. · Beta-blocker medicines. These are a type of blood pressure and heart medicine.  They can reduce the chance of early death if you have had a heart attack. All medicines can cause side effects. So it is important to understand the pros and cons of any medicine you take. It is also important to take your medicines exactly as your doctor tells you to. Follow-up care is a key part of your treatment and safety. Be sure to make and go to all appointments, and call your doctor if you are having problems. It's also a good idea to know your test results and keep a list of the medicines you take. ACE inhibitors  ACE (angiotensin-converting enzyme) inhibitors are used for three main reasons. They lower blood pressure, protect the kidneys, and prevent heart attacks and strokes. Examples include benazepril (Lotensin), lisinopril (Prinivil, Zestril), and ramipril (Altace). Before you start taking an ACE inhibitor, make sure your doctor knows if:  · You are taking a water pill (diuretic). · You are taking potassium pills or using salt substitutes. · You are pregnant or breastfeeding. · You have had a kidney transplant or other kidney problems. ACE inhibitors can cause side effects. Call your doctor right away if you have:  · Trouble breathing. · Swelling in your face, head, neck, or tongue. · Dizziness or lightheadedness. · A dry cough. Statins  Statins lower cholesterol. Examples include atorvastatin (Lipitor), lovastatin (Mevacor), pravastatin (Pravachol), and simvastatin (Zocor). Before you start taking a statin, make sure your doctor knows if:  · You have had a kidney transplant or other kidney problems. · You have liver disease. · You take any other prescription medicine, over-the-counter medicine, vitamins, supplements, or herbal remedies. · You are pregnant or breastfeeding. Statins can cause side effects. Call your doctor right away if you have:  · New, severe muscle aches. · Brown urine. Aspirin  Taking an aspirin every day can lower your risk for a heart attack.  A heart attack occurs when a blood vessel in the heart gets blocked. When this happens, oxygen can't get to the heart muscle, and part of the heart dies. Aspirin can help prevent blood clots that can block the blood vessels. Talk to your doctor before you start taking aspirin every day. He or she may recommend that you take one low-dose aspirin (81 mg) tablet each day, with a meal and a full glass of water. Taking aspirin isn't right for everyone, because it can cause serious bleeding. And you may not be able to use aspirin if you:  · Have asthma. · Have an ulcer or other stomach problem. · Take some other medicine (called a blood thinner) that prevents blood clots. · Are allergic to aspirin. Before having a surgery or procedure, tell your doctor or dentist that you take aspirin. He or she will tell you if you should stop taking aspirin beforehand. Make sure that you understand exactly what your doctor wants you to do. Aspirin can cause side effects. Call your doctor right away if you have:  · Unusual bleeding or bruising. · Nausea, vomiting, or heartburn. · Black or bloody stools. Beta-blockers  Beta-blockers are used for three main reasons. They lower blood pressure, relieve angina symptoms (such as chest pain or pressure), and reduce the chances of a second heart attack. They include atenolol (Tenormin), carvedilol (Coreg), and metoprolol (Lopressor). Before you start taking a beta-blocker, make sure your doctor knows if you have:  · Severe asthma or frequent asthma attacks. · A very slow pulse (less than 55 beats a minute). Beta-blockers can cause side effects. Call your doctor right away if you have:  · Wheezing or trouble breathing. · Dizziness or lightheadedness. · Asthma that gets worse. When should you call for help? Call 911 anytime you think you may need emergency care. For example, call if:  · You passed out (lost consciousness).   Call your doctor now or seek immediate medical care if:  · You are wheezing or have trouble breathing. · You have swelling in your face, head, neck, or tongue. · You are dizzy or lightheaded, or you feel like you may faint. · You have severe muscle pain, weakness, or brown urine. · You have vision problems. · You have new bruises or blood spots under your skin. · Your stools are black and tarlike or have streaks of blood. Watch closely for changes in your health, and be sure to contact your doctor if:  · You have ringing in your ears. · You feel very tired. · You have gas, constipation, or an upset stomach. Where can you learn more? Go to http://dale-nora.info/. Enter R428 in the search box to learn more about \"Reducing Heart Attack Risk With Daily Medicine: Care Instructions. \"  Current as of: March 28, 2016  Content Version: 11.1  © 4050-8449 [employer]. Care instructions adapted under license by [employer] (which disclaims liability or warranty for this information). If you have questions about a medical condition or this instruction, always ask your healthcare professional. [address] any warranty or liability for your use of this information. Heart-Healthy Diet: Care Instructions  Your Care Instructions    A heart-healthy diet has lots of vegetables, fruits, nuts, beans, and whole grains, and is low in salt. It limits foods that are high in saturated fat, such as meats, cheeses, and fried foods. It may be hard to change your diet, but even small changes can lower your risk of heart attack and heart disease. Follow-up care is a key part of your treatment and safety. Be sure to make and go to all appointments, and call your doctor if you are having problems. It's also a good idea to know your test results and keep a list of the medicines you take. How can you care for yourself at home? Watch your portions  · Learn what a serving is. A \"serving\" and a \"portion\" are not always the same thing.  Make sure that you are not eating larger portions than are recommended. For example, a serving of pasta is ½ cup. A serving size of meat is 2 to 3 ounces. A 3-ounce serving is about the size of a deck of cards. Measure serving sizes until you are good at [place]" them. Keep in mind that restaurants often serve portions that are 2 or 3 times the size of one serving. · To keep your energy level up and keep you from feeling hungry, eat often but in smaller portions. · Eat only the number of calories you need to stay at a healthy weight. If you need to lose weight, eat fewer calories than your body burns (through exercise and other physical activity). Eat more fruits and vegetables  · Eat a variety of fruit and vegetables every day. Dark green, deep orange, red, or yellow fruits and vegetables are especially good for you. Examples include spinach, carrots, peaches, and berries. · Keep carrots, celery, and other veggies handy for snacks. Buy fruit that is in season and store it where you can see it so that you will be tempted to eat it. · Cook dishes that have a lot of veggies in them, such as stir-fries and soups. Limit saturated and trans fat  · Read food labels, and try to avoid saturated and trans fats. They increase your risk of heart disease. Trans fat is found in many processed foods such as cookies and crackers. · Use olive or canola oil when you cook. Try cholesterol-lowering spreads, such as Benecol or Take Control. · Bake, broil, grill, or steam foods instead of frying them. · Choose lean meats instead of high-fat meats such as hot dogs and sausages. Cut off all visible fat when you prepare meat. · Eat fish, skinless poultry, and meat alternatives such as soy products instead of high-fat meats. Soy products, such as tofu, may be especially good for your heart. · Choose low-fat or fat-free milk and dairy products. Eat fish  · Eat at least two servings of fish a week.  Certain fish, such as salmon and tuna, contain omega-3 fatty acids, which may help reduce your risk of heart attack. Eat foods high in fiber  · Eat a variety of grain products every day. Include whole-grain foods that have lots of fiber and nutrients. Examples of whole-grain foods include oats, whole wheat bread, and brown rice. · Buy whole-grain breads and cereals, instead of white bread or pastries. Limit salt and sodium  · Limit how much salt and sodium you eat to help lower your blood pressure. · Taste food before you salt it. Add only a little salt when you think you need it. With time, your taste buds will adjust to less salt. · Eat fewer snack items, fast foods, and other high-salt, processed foods. Check food labels for the amount of sodium in packaged foods. · Choose low-sodium versions of canned goods (such as soups, vegetables, and beans). Limit sugar  · Limit drinks and foods with added sugar. These include candy, desserts, and soda pop. Limit alcohol  · Limit alcohol to no more than 2 drinks a day for men and 1 drink a day for women. Too much alcohol can cause health problems. When should you call for help? Watch closely for changes in your health, and be sure to contact your doctor if:  · You would like help planning heart-healthy meals. Where can you learn more? Go to http://dale-nora.info/. Enter V137 in the search box to learn more about \"Heart-Healthy Diet: Care Instructions. \"  Current as of: January 27, 2016  Content Version: 11.1  © 8318-4120 [employer]. Care instructions adapted under license by [employer] (which disclaims liability or warranty for this information). If you have questions about a medical condition or this instruction, always ask your healthcare professional. [place] [address] any warranty or liability for your use of this information.

## 2017-03-14 NOTE — PROGRESS NOTES
Radial compression band removed at 2105 after slowly reducing air from 15 cc to zero as per hospital protocol. Noted to have issue with bleeding prior to shift but no bleeding or hematoma noted. 2 x 2 gauze with tegaderm placed over puncture site. The affected extremity is warm and dry to the touch. Frequent vital signs documented per flowheet. Patient instructed to call if any bleeding noted on gauze. Patient verbalized understanding the nursing instructions.

## 2017-03-15 ENCOUNTER — PATIENT OUTREACH (OUTPATIENT)
Dept: CASE MANAGEMENT | Age: 82
End: 2017-03-15

## 2017-03-15 LAB
ATRIAL RATE: NORMAL BPM
CALCULATED P AXIS, ECG09: 63 DEGREES
CALCULATED R AXIS, ECG10: 67 DEGREES
CALCULATED T AXIS, ECG11: 85 DEGREES
DIAGNOSIS, 93000: NORMAL
DIASTOLIC BP, ECG02: NORMAL MMHG
P-R INTERVAL, ECG05: 164 MS
Q-T INTERVAL, ECG07: 386 MS
QRS DURATION, ECG06: 80 MS
QTC CALCULATION (BEZET), ECG08: 389 MS
SYSTOLIC BP, ECG01: NORMAL MMHG
VENTRICULAR RATE, ECG03: 61 BPM

## 2017-03-15 NOTE — PROGRESS NOTES
Transition of Care Discharge Follow-up Questionnaire   Date/Time of Call:   March 15, 2017 3:07PM   What was the patient hospitalized for? Patient hospitalized for ACS acute coronary syndrome. Does the patient understand his/her diagnosis and/or treatment and what happened during the hospitalization? Patient states understanding of diagnosis and treatment during hospitalization. Did the patient receive discharge instructions? Patient states discharge instructions explained and received before discharge to home. Review any discharge instructions (see notes in [facility]). Ask patient if they understand these. Do they have any questions? Discharge instructions reviewed with patient per [facility], opportunity for questions and clarification provided. Patient states no questions at this time. Were home services ordered (nursing, PT, OT, ST, etc.)? No home health services ordered. If so, has the first visit occurred? If not, why? (Assist with coordination of services if necessary. ) NA         Was any DME ordered? No durable medical equipment ordered. If so, has it been received? If not, why?  (Assist with coordination of arranging DME orders if necessary. ) NA         Complete a review of all medications (new, continued and discontinued meds per the D/C instructions and medication tab in [facility]). Care Coordinator reviewed all medications with patient per [facility], two new prescriptions given Protonix 40 mg tabs po and Imdur 30 mg tabs po prescribed. Dexilant 60 mg tabs po discontinued           Were all new prescriptions filled? If not, why?  (Assist with obtainment of medications if necessary.) Patient states new prescriptions filled and currently being taken per doctors order. Patient state that Imdur 30 mg po is causing headaches, patient states that she will be calling doctor to report side effects of medications.           Does the patient understand the purpose and dosing instructions for all medications? (If patient has questions, provide explanation and education.) Patient states understanding of medication purposes and dosing instructions. Does the patient have any problems in performing ADLs? (If patient is unable to perform ADLs - what is the limiting factor(s)? Do they have a support system that can assist? If no support system is present, discuss possible assistance that they may be able to obtain.) Patient states she is independent with ADL's and ambulation. Patient state that she is doing much better today. Does the patient have all follow-up appointments scheduled? Has transportation been arranged? [facility] follow-up should be within 7 days of discharge; all others should have PCP follow-up within 7 days of discharge; follow-ups with other specialists as appropriate or ordered.) Patient states follow up appointment scheduled with [facility] [place] @ 2:30PM with Dr. Dian Bhardwaj. Patient states she has no transportation needs at this time. Any other questions or concerns expressed by the patient? Patient expresses no questions or concerns. Schedule next appointment with SILVIA GUARDADO Coordinator or refer to RN Case Manager/  as appropriate. No further needs identified, patient instructed to call Care Coordinator if further questions or concerns arise.    ZACK Call Completed By: Kelly Prince LPN  Care Coordinator   [facility]

## 2017-04-14 ENCOUNTER — HOSPITAL ENCOUNTER (OUTPATIENT)
Dept: GENERAL RADIOLOGY | Age: 82
Discharge: HOME | End: 2017-04-14
Payer: MEDICARE

## 2017-04-14 DIAGNOSIS — J06.9 ACUTE URI: ICD-10-CM

## 2017-04-14 PROCEDURE — 71020 XR CHEST PA LAT: CPT

## 2017-04-21 PROBLEM — I24.9 ACS (ACUTE CORONARY SYNDROME) (HCC): Status: RESOLVED | Noted: 2017-03-12 | Resolved: 2017-04-21

## 2017-04-21 PROBLEM — F33.2 SEVERE EPISODE OF RECURRENT MAJOR DEPRESSIVE DISORDER, WITHOUT PSYCHOTIC FEATURES (HCC): Chronic | Status: ACTIVE | Noted: 2017-04-21

## 2017-04-25 PROBLEM — I73.9 CLAUDICATION (HCC): Status: ACTIVE | Noted: 2017-04-25

## 2017-04-27 ENCOUNTER — HOSPITAL ENCOUNTER (OUTPATIENT)
Age: 82
Setting detail: OUTPATIENT SURGERY
Discharge: HOME | End: 2017-04-27
Attending: INTERNAL MEDICINE | Admitting: INTERNAL MEDICINE
Payer: MEDICARE

## 2017-04-27 VITALS
DIASTOLIC BLOOD PRESSURE: 78 MMHG | BODY MASS INDEX: 27.14 KG/M2 | SYSTOLIC BLOOD PRESSURE: 166 MMHG | TEMPERATURE: 97.6 F | HEIGHT: 64 IN | HEART RATE: 52 BPM | OXYGEN SATURATION: 99 % | RESPIRATION RATE: 16 BRPM | WEIGHT: 159 LBS

## 2017-04-27 PROCEDURE — 91010 ESOPHAGUS MOTILITY STUDY: CPT | Performed by: INTERNAL MEDICINE

## 2017-04-27 PROCEDURE — 74011000250 HC RX REV CODE- 250: Performed by: INTERNAL MEDICINE

## 2017-04-27 RX ORDER — LIDOCAINE HYDROCHLORIDE 20 MG/ML
15 SOLUTION OROPHARYNGEAL AS NEEDED
Status: DISCONTINUED | OUTPATIENT
Start: 2017-04-27 | End: 2017-04-27 | Stop reason: HOSPADM

## 2017-04-27 RX ADMIN — LIDOCAINE HYDROCHLORIDE 15 ML: 20 SOLUTION ORAL; TOPICAL at 13:34

## 2017-04-27 RX ADMIN — BENZOCAINE 1 SPRAY: 220 SPRAY, METERED PERIODONTAL at 13:34

## 2017-04-27 NOTE — PROGRESS NOTES
left nare used for insertion of manometry catheter.  Procedure performed and patient tolerated well.   Patient discharged ambulatory,   Instructed to notify Dr Santos Dudley of any problems.

## 2017-04-28 NOTE — OP NOTES
[address]   OPERATIVE REPORT       Name:  Da Azul   MR#:  [id number]   :  1935   Account #:  [de-identified]   Date of Adm:  2017       PROCEDURE: High resolution impedance manometry    PREOPERATIVE DIAGNOSIS: Dysphagia, rule out achalasia. POSTOPERATIVE DIAGNOSES:   1. Hypertensive lower esophageal sphincter, but with adequate   relaxation (70/6 mmHg). 2. Normal peristalsis, distal contractile integral 2763,   peristalsis, 70%, normal impedance data, see below. PROCEDURE: After obtaining informed consent, the patient   underwent nasal intubation. lower esophageal sphincter was   located between 40.3 and 46.5 cm. The resting pressure at mid   respiration averaged 70 mmHg. This is quite high, but there was   adequate relaxation all the way down to 6 mmHg. This does not   suggest achalasia, but a hypertensive lower esophageal   sphincter. The patient had complete bolus transit of 80% liquid and 67%   viscous. This is virtually normal and therefore seems to confirm   the numbers above of adequate relaxation and no outlet   obstruction. The patient had normal DCI averaging 2763 and all 10 swallows   had an adequate DCI. 70% peristaltic and 30% simultaneous   according to the computer reading. The gradient was somewhat   steep for all of the swallows, but in general, this appears to   be a relatively normal study with the exception of the   hypertension at the LES. DISPOSITION: Further followup per Dr. Lorna Bates.         Matti Persaud MD      [facility] AT [facility] / [facility]   D:  2017   08:13   T:  2017   08:40   Job #:  [id number]

## 2017-05-12 PROBLEM — R73.02 GLUCOSE INTOLERANCE (IMPAIRED GLUCOSE TOLERANCE): Chronic | Status: ACTIVE | Noted: 2017-05-12

## 2017-05-12 PROBLEM — R68.89 ABNORMAL ANKLE BRACHIAL INDEX (ABI): Status: ACTIVE | Noted: 2017-05-12

## 2017-05-12 PROBLEM — R25.2 FOOT CRAMPS: Status: ACTIVE | Noted: 2017-05-12

## 2017-05-12 PROBLEM — G60.9 IDIOPATHIC PERIPHERAL NEUROPATHY: Status: ACTIVE | Noted: 2017-05-12

## 2017-06-02 ENCOUNTER — HOSPITAL ENCOUNTER (OUTPATIENT)
Dept: CT IMAGING | Age: 82
Discharge: HOME | End: 2017-06-02
Attending: RADIOLOGY
Payer: MEDICARE

## 2017-06-02 DIAGNOSIS — C64.2 RENAL CANCER, LEFT (HCC): ICD-10-CM

## 2017-06-02 LAB — CREAT BLD-MCNC: 0.9 MG/DL (ref 0.6–1)

## 2017-06-02 PROCEDURE — 82565 ASSAY OF CREATININE: CPT

## 2017-06-02 PROCEDURE — 74011636320 HC RX REV CODE- 636/320: Performed by: RADIOLOGY

## 2017-06-02 PROCEDURE — 74011000258 HC RX REV CODE- 258: Performed by: RADIOLOGY

## 2017-06-02 PROCEDURE — 74170 CT ABD WO CNTRST FLWD CNTRST: CPT

## 2017-06-02 RX ORDER — SODIUM CHLORIDE 0.9 % (FLUSH) 0.9 %
10 SYRINGE (ML) INJECTION
Status: COMPLETED | OUTPATIENT
Start: 2017-06-02 | End: 2017-06-02

## 2017-06-02 RX ADMIN — Medication 10 ML: at 14:16

## 2017-06-02 RX ADMIN — IOPAMIDOL 100 ML: 755 INJECTION, SOLUTION INTRAVENOUS at 14:16

## 2017-06-02 RX ADMIN — SODIUM CHLORIDE 100 ML: 900 INJECTION, SOLUTION INTRAVENOUS at 14:16

## 2017-06-24 ENCOUNTER — PATIENT OUTREACH (OUTPATIENT)
Dept: CASE MANAGEMENT | Age: 82
End: 2017-06-24

## 2017-06-24 NOTE — PROGRESS NOTES
This note will not be viewable in [address] PATIENT OUTREACH    Initial attempt to schedule AWV appointment unsuccessful. Unable to reach patient. Message left for call back or to directly contact [facility] @ [phone] during regular office hours, if preferred, to schedule AWV appointment.

## 2017-07-07 ENCOUNTER — HOSPITAL ENCOUNTER (OUTPATIENT)
Dept: ULTRASOUND IMAGING | Age: 82
Discharge: HOME | End: 2017-07-07
Attending: OPHTHALMOLOGY
Payer: MEDICARE

## 2017-07-07 DIAGNOSIS — H35.61 RETINAL HEMORRHAGE OF RIGHT EYE: ICD-10-CM

## 2017-07-07 PROCEDURE — 93880 EXTRACRANIAL BILAT STUDY: CPT

## 2017-07-10 PROBLEM — R10.32 LLQ PAIN: Status: ACTIVE | Noted: 2017-07-10

## 2017-07-26 ENCOUNTER — HOSPITAL ENCOUNTER (OUTPATIENT)
Dept: CT IMAGING | Age: 82
Discharge: HOME | End: 2017-07-26
Attending: SURGERY
Payer: MEDICARE

## 2017-07-26 DIAGNOSIS — R10.32 LLQ PAIN: ICD-10-CM

## 2017-07-26 LAB — CREAT BLD-MCNC: 0.9 MG/DL (ref 0.6–1)

## 2017-07-26 PROCEDURE — 74011000258 HC RX REV CODE- 258: Performed by: SURGERY

## 2017-07-26 PROCEDURE — 74011636320 HC RX REV CODE- 636/320: Performed by: SURGERY

## 2017-07-26 PROCEDURE — 74177 CT ABD & PELVIS W/CONTRAST: CPT

## 2017-07-26 PROCEDURE — 82565 ASSAY OF CREATININE: CPT

## 2017-07-26 RX ORDER — SODIUM CHLORIDE 0.9 % (FLUSH) 0.9 %
10 SYRINGE (ML) INJECTION
Status: COMPLETED | OUTPATIENT
Start: 2017-07-26 | End: 2017-07-26

## 2017-07-26 RX ADMIN — SODIUM CHLORIDE 100 ML: 900 INJECTION, SOLUTION INTRAVENOUS at 15:16

## 2017-07-26 RX ADMIN — Medication 10 ML: at 15:16

## 2017-07-26 RX ADMIN — IOPAMIDOL 100 ML: 755 INJECTION, SOLUTION INTRAVENOUS at 15:16

## 2017-07-26 RX ADMIN — DIATRIZOATE MEGLUMINE AND DIATRIZOATE SODIUM 15 ML: 660; 100 LIQUID ORAL; RECTAL at 15:16

## 2017-08-06 PROBLEM — K57.30 DIVERTICULOSIS OF LARGE INTESTINE: Status: ACTIVE | Noted: 2017-08-06

## 2017-08-15 PROBLEM — I95.1 ORTHOSTATIC HYPOTENSION: Status: ACTIVE | Noted: 2017-08-15

## 2017-08-15 PROBLEM — R00.1 BRADYCARDIA: Status: ACTIVE | Noted: 2017-08-15

## 2017-08-15 PROBLEM — R53.82 CHRONIC FATIGUE: Status: ACTIVE | Noted: 2017-08-15

## 2017-08-15 PROBLEM — R68.89 ABNORMAL ANKLE BRACHIAL INDEX (ABI): Status: RESOLVED | Noted: 2017-05-12 | Resolved: 2017-08-15

## 2017-08-15 PROBLEM — I73.9 CLAUDICATION (HCC): Status: RESOLVED | Noted: 2017-04-25 | Resolved: 2017-08-15

## 2017-08-15 PROBLEM — R42 DIZZINESS: Status: ACTIVE | Noted: 2017-08-15

## 2017-08-29 PROBLEM — I50.32 DIASTOLIC CHF, CHRONIC (HCC): Status: ACTIVE | Noted: 2017-08-29

## 2017-09-12 ENCOUNTER — APPOINTMENT (OUTPATIENT)
Dept: GENERAL RADIOLOGY | Age: 82
End: 2017-09-12
Attending: EMERGENCY MEDICINE
Payer: MEDICARE

## 2017-09-12 ENCOUNTER — HOSPITAL ENCOUNTER (EMERGENCY)
Age: 82
Discharge: HOME | End: 2017-09-13
Attending: EMERGENCY MEDICINE
Payer: MEDICARE

## 2017-09-12 DIAGNOSIS — R07.9 NONSPECIFIC CHEST PAIN: Primary | ICD-10-CM

## 2017-09-12 LAB
ALBUMIN SERPL-MCNC: 3.3 G/DL (ref 3.2–4.6)
ALBUMIN/GLOB SERPL: 0.9 {RATIO} (ref 1.2–3.5)
ALP SERPL-CCNC: 68 U/L (ref 50–136)
ALT SERPL-CCNC: 15 U/L (ref 12–65)
ANION GAP SERPL CALC-SCNC: 10 MMOL/L (ref 7–16)
AST SERPL-CCNC: 22 U/L (ref 15–37)
BASOPHILS # BLD: 0 K/UL (ref 0–0.2)
BASOPHILS NFR BLD: 0 % (ref 0–2)
BILIRUB SERPL-MCNC: 0.3 MG/DL (ref 0.2–1.1)
BUN SERPL-MCNC: 17 MG/DL (ref 8–23)
CALCIUM SERPL-MCNC: 8.8 MG/DL (ref 8.3–10.4)
CHLORIDE SERPL-SCNC: 90 MMOL/L (ref 98–107)
CO2 SERPL-SCNC: 30 MMOL/L (ref 21–32)
CREAT SERPL-MCNC: 1.03 MG/DL (ref 0.6–1)
DIFFERENTIAL METHOD BLD: ABNORMAL
EOSINOPHIL # BLD: 0.2 K/UL (ref 0–0.8)
EOSINOPHIL NFR BLD: 3 % (ref 0.5–7.8)
ERYTHROCYTE [DISTWIDTH] IN BLOOD BY AUTOMATED COUNT: 14 % (ref 11.9–14.6)
GLOBULIN SER CALC-MCNC: 3.5 G/DL (ref 2.3–3.5)
GLUCOSE SERPL-MCNC: 94 MG/DL (ref 65–100)
HCT VFR BLD AUTO: 36.1 % (ref 35.8–46.3)
HGB BLD-MCNC: 12.8 G/DL (ref 11.7–15.4)
IMM GRANULOCYTES # BLD: 0 K/UL (ref 0–0.5)
IMM GRANULOCYTES NFR BLD: 0.4 % (ref 0–5)
LYMPHOCYTES # BLD: 1.6 K/UL (ref 0.5–4.6)
LYMPHOCYTES NFR BLD: 29 % (ref 13–44)
MCH RBC QN AUTO: 30.7 PG (ref 26.1–32.9)
MCHC RBC AUTO-ENTMCNC: 35.5 G/DL (ref 31.4–35)
MCV RBC AUTO: 86.6 FL (ref 79.6–97.8)
MONOCYTES # BLD: 0.7 K/UL (ref 0.1–1.3)
MONOCYTES NFR BLD: 12 % (ref 4–12)
NEUTS SEG # BLD: 3.1 K/UL (ref 1.7–8.2)
NEUTS SEG NFR BLD: 56 % (ref 43–78)
PLATELET # BLD AUTO: 266 K/UL (ref 150–450)
PMV BLD AUTO: 9.1 FL (ref 10.8–14.1)
POTASSIUM SERPL-SCNC: 3.5 MMOL/L (ref 3.5–5.1)
PROT SERPL-MCNC: 6.8 G/DL (ref 6.3–8.2)
RBC # BLD AUTO: 4.17 M/UL (ref 4.05–5.25)
SODIUM SERPL-SCNC: 130 MMOL/L (ref 136–145)
TROPONIN I SERPL-MCNC: <0.02 NG/ML (ref 0.02–0.05)
WBC # BLD AUTO: 5.6 K/UL (ref 4.3–11.1)

## 2017-09-12 PROCEDURE — 85025 COMPLETE CBC W/AUTO DIFF WBC: CPT | Performed by: EMERGENCY MEDICINE

## 2017-09-12 PROCEDURE — 99285 EMERGENCY DEPT VISIT HI MDM: CPT | Performed by: EMERGENCY MEDICINE

## 2017-09-12 PROCEDURE — 93005 ELECTROCARDIOGRAM TRACING: CPT | Performed by: EMERGENCY MEDICINE

## 2017-09-12 PROCEDURE — 84484 ASSAY OF TROPONIN QUANT: CPT | Performed by: EMERGENCY MEDICINE

## 2017-09-12 PROCEDURE — 71020 XR CHEST PA LAT: CPT

## 2017-09-12 PROCEDURE — 83880 ASSAY OF NATRIURETIC PEPTIDE: CPT | Performed by: EMERGENCY MEDICINE

## 2017-09-12 PROCEDURE — 80053 COMPREHEN METABOLIC PANEL: CPT | Performed by: EMERGENCY MEDICINE

## 2017-09-12 RX ORDER — SUCRALFATE 1 G/10ML
1 SUSPENSION ORAL
Status: COMPLETED | OUTPATIENT
Start: 2017-09-12 | End: 2017-09-13

## 2017-09-12 NOTE — ED TRIAGE NOTES
Patient to ed via pov with c/o \"feeling heavy in my chest\" since Sunday--patient reports nausea--patient reports she this feels like indigestion--hx of CABG and 4 stents--cardiologist is Dr. Edwige Levin

## 2017-09-13 VITALS
DIASTOLIC BLOOD PRESSURE: 75 MMHG | OXYGEN SATURATION: 98 % | WEIGHT: 160 LBS | TEMPERATURE: 97.4 F | HEART RATE: 57 BPM | HEIGHT: 64 IN | RESPIRATION RATE: 18 BRPM | SYSTOLIC BLOOD PRESSURE: 169 MMHG | BODY MASS INDEX: 27.31 KG/M2

## 2017-09-13 LAB
ATRIAL RATE: 60 BPM
BNP SERPL-MCNC: 67 PG/ML
CALCULATED P AXIS, ECG09: 72 DEGREES
CALCULATED R AXIS, ECG10: 71 DEGREES
CALCULATED T AXIS, ECG11: 84 DEGREES
DIAGNOSIS, 93000: NORMAL
P-R INTERVAL, ECG05: 172 MS
Q-T INTERVAL, ECG07: 414 MS
QRS DURATION, ECG06: 88 MS
QTC CALCULATION (BEZET), ECG08: 414 MS
TROPONIN I BLD-MCNC: 0.01 NG/ML (ref 0.02–0.05)
VENTRICULAR RATE, ECG03: 60 BPM

## 2017-09-13 PROCEDURE — 74011250637 HC RX REV CODE- 250/637: Performed by: EMERGENCY MEDICINE

## 2017-09-13 PROCEDURE — 84484 ASSAY OF TROPONIN QUANT: CPT

## 2017-09-13 RX ORDER — ISOSORBIDE MONONITRATE 60 MG/1
60 TABLET, EXTENDED RELEASE ORAL
Qty: 30 TAB | Refills: 0 | Status: SHIPPED | OUTPATIENT
Start: 2017-09-13 | End: 2018-01-29

## 2017-09-13 RX ADMIN — SUCRALFATE 1 G: 1 SUSPENSION ORAL at 00:10

## 2017-09-13 NOTE — ED NOTES
This RN called lab about getting the BNP done. Lab stated that the instrument was down at the moment and would get it done once the machine was back up. Dr. Greg Perez notified.

## 2017-09-13 NOTE — DISCHARGE INSTRUCTIONS
Chest Pain: Care Instructions  Your Care Instructions  There are many things that can cause chest pain. Some are not serious and will get better on their own in a few days. But some kinds of chest pain need more testing and treatment. Your doctor may have recommended a follow-up visit in the next 8 to 12 hours. If you are not getting better, you may need more tests or treatment. Even though your doctor has released you, you still need to watch for any problems. The doctor carefully checked you, but sometimes problems can develop later. If you have new symptoms or if your symptoms do not get better, get medical care right away. If you have worse or different chest pain or pressure that lasts more than 5 minutes or you passed out (lost consciousness), call 911 or seek other emergency help right away. A medical visit is only one step in your treatment. Even if you feel better, you still need to do what your doctor recommends, such as going to all suggested follow-up appointments and taking medicines exactly as directed. This will help you recover and help prevent future problems. How can you care for yourself at home? · Rest until you feel better. · Take your medicine exactly as prescribed. Call your doctor if you think you are having a problem with your medicine. · Do not drive after taking a prescription pain medicine. When should you call for help? Call 911 if:  · You passed out (lost consciousness). · You have severe difficulty breathing. · You have symptoms of a heart attack. These may include:  ¨ Chest pain or pressure, or a strange feeling in your chest.  ¨ Sweating. ¨ Shortness of breath. ¨ Nausea or vomiting. ¨ Pain, pressure, or a strange feeling in your back, neck, jaw, or upper belly or in one or both shoulders or arms. ¨ Lightheadedness or sudden weakness. ¨ A fast or irregular heartbeat.   After you call 911, the  may tell you to chew 1 adult-strength or 2 to 4 low-dose aspirin. Wait for an ambulance. Do not try to drive yourself. Call your doctor today if:  · You have any trouble breathing. · Your chest pain gets worse. · You are dizzy or lightheaded, or you feel like you may faint. · You are not getting better as expected. · You are having new or different chest pain. Where can you learn more? Go to http://dale-nora.info/. Enter A120 in the search box to learn more about \"Chest Pain: Care Instructions. \"  Current as of: March 20, 2017  Content Version: 11.3  © 9485-4691 [employer]. Care instructions adapted under license by [employer] (which disclaims liability or warranty for this information). If you have questions about a medical condition or this instruction, always ask your healthcare professional. [address] any warranty or liability for your use of this information.

## 2017-09-13 NOTE — ED PROVIDER NOTES
HPI Comments: Patient presents with a three-day history of chest discomfort. Patient states that she describes as a heaviness and tightness in the center of her chest seems to increase with activity and feel better when lying still. She has taken some nitroglycerin with some incomplete relief she denies radiation of discomfort but also reports an  Unusual pain between her shoulder blades has been going on for \"a good little bit\". She reports some shortness of breath as well  And occasional hot flashes. She was seen by her cardiologist about 3 weeks ago with an unremarkable evaluation. She reports history of CHF and is currently taking Lasix. She denies any increased swelling of her lower extremities. She does report some nausea and dyspepsia with  A \"severe indigestion\" last night    Patient is a 81 y.o. female presenting with chest pain. The history is provided by the patient. Chest Pain    This is a new problem. The current episode started more than 2 days ago. The problem has not changed since onset. Duration of episode(s) is 3 days. The problem occurs constantly. The pain is associated with normal activity, exertion and rest. The pain is present in the substernal region. The pain is at a severity of 5/10. The pain is moderate. The quality of the pain is described as pressure-like and tightness. The pain does not radiate. Associated symptoms include exertional chest pressure, nausea and shortness of breath. Pertinent negatives include no abdominal pain, no back pain, no claudication, no cough, no diaphoresis, no dizziness, no fever, no headaches, no hemoptysis, no irregular heartbeat, no leg pain, no lower extremity edema, no malaise/fatigue, no near-syncope, no numbness, no orthopnea, no palpitations, no PND, no sputum production, no vomiting and no weakness. She has tried nothing for the symptoms. The treatment provided no relief. Risk factors include cardiac disease.         Past Medical History: Diagnosis Date    Abdominal pain 10/28/2014    Angina at rest [facility])     pt denies    Arthritis     osteo - low back,  shoulders, hips, low back    Bilateral carotid bruits     Bursitis     CAD (coronary artery disease)     4 vessel CABG 2005;--- stents x 4--- last one placed 2014-- followed by dr Kalen Norris Chronic pain     left shoulder    Coagulation defects     on plavix    Constipation     Cough 09/11/2014    10/8/14, Methacholine Challenge Study: The point at which the FEV1 fell by at least 20%, the PC20, occurred above a dose of 25mg/mL of methacholine. This rules out the diagnosis of asthma with an extremely high degree of sensitivity. No post-challenge FEV1 recovery was identified. David Stokes MD        Depressive disorder     Dyspnea 09/11/2014    \[facility]\""; 9/29/14: IMPRESSION: The flow volume loop is consistent restriction. Spirometry suggest restriction with concomitant obstruction at low lung volumes. There is not a significant bronchodilator response. Lung volumes reveal mild restriction.  The unadjusted diffusion capacity is normal.  Adela Villanueva MD      GERD (gastroesophageal reflux disease)     controlled with med    Headache     Hoarseness or changing voice     thougfht to be related to gerd    Hyperlipidemia     Hypertension     controlled with med    Kidney stone     yrs ago-- no tx required    Lumbago     Macular degeneration     Nodule of left lung     dx'ed 4 years ago-watched for several months-no new growth-being watched-yearly CT scan----- followed by dr. Malik Labor Pain in joint, ankle and foot     left 3rd toe and right 2nd toe    Pain in joint, shoulder region     left now bothering > right, right ok    Palpitations 09/24/2015    followed by dr Lance Alston Panic disorder     panic attacks -- last one 2014    Renal mass 07/2016    ablation---left side--- followed by dr Kendra Ochoa in [place]    Sciatica     Vascular headache        Past Surgical History:   Procedure Laterality Date    ABDOMEN SURGERY PROC UNLISTED Left 2016    ablation for mass in left side of abd    HX CATARACT REMOVAL      left    HX CATARACT REMOVAL      HX COLONOSCOPY  12/15/2015    diverticulosis, internal hemorrhoid, colon polyp- no further colonoscopies needed    HX COLONOSCOPY  2016    HX CORONARY ARTERY BYPASS GRAFT      4 vessel CABG     HX HEART CATHETERIZATION      stent     HX HEART CATHETERIZATION      stent 2006    HX HEENT Left     macular pucker    HX HERNIA REPAIR Left 2017    HX ORTHOPAEDIC      finger, foot    HX PARTIAL HYSTERECTOMY      hyst         Family History:   Problem Relation Age of Onset    Diabetes Mother     Heart Surgery Mother     Heart Disease Mother     Heart Attack Father     Heart Disease Father     Cancer Sister      2 sisters w/ lung ca-neither one smoked    Heart Disease Sister     Cancer Brother      lung ca-smoker    Heart Attack Brother       age 22 of MI    Heart Disease Sister     Heart Surgery Sister     Heart Attack Sister     Heart Disease Sister     Heart Surgery Sister     Heart Attack Sister     Heart Surgery Brother       in [place] during 2nd heart surgery    Heart Disease Brother     No Known Problems Maternal Grandmother     No Known Problems Maternal Grandfather     No Known Problems Paternal Grandmother     No Known Problems Paternal Grandfather        Social History     Social History    Marital status:      Spouse name: N/A    Number of children: N/A    Years of education: N/A     Occupational History    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 , 2 sons. Denies alcohol use. Has always lived in [place]. 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 ALLERGIES: Augmentin [amoxicillin-pot clavulanate]; Codeine; Gabapentin; Other medication; Prednisone; and Prevnar 13 [pneumoc 13-chang conj-dip cr(pf)]    Review of Systems   Constitutional: Negative for diaphoresis, fever and malaise/fatigue. Respiratory: Positive for shortness of breath. Negative for cough, hemoptysis and sputum production. Cardiovascular: Positive for chest pain. Negative for palpitations, orthopnea, claudication, PND and near-syncope. Gastrointestinal: Positive for nausea. Negative for abdominal pain and vomiting. Musculoskeletal: Negative for back pain. Neurological: Negative for dizziness, weakness, numbness and headaches. All other systems reviewed and are negative. Vitals:    09/12/17 1910   BP: 179/83   Pulse: 62   Resp: 17   Temp: 97.9 °F (36.6 °C)   SpO2: 98%   Weight: 72.6 kg (160 lb)   Height: 5' 4\" (1.626 m)            Physical Exam   Constitutional: She is oriented to person, place, and time. She appears well-developed and well-nourished. HENT:   Head: Normocephalic and atraumatic. Eyes: Conjunctivae and EOM are normal. Pupils are equal, round, and reactive to light. Neck: Normal range of motion. Neck supple. Cardiovascular: Normal rate, regular rhythm and normal heart sounds. Pulmonary/Chest: Effort normal and breath sounds normal.   Abdominal: Soft. Bowel sounds are normal.   Musculoskeletal: Normal range of motion. She exhibits no edema, tenderness or deformity. Neurological: She is alert and oriented to person, place, and time. Skin: Skin is warm and dry. Psychiatric: She has a normal mood and affect. Her behavior is normal.   Nursing note and vitals reviewed.        MDM  Number of Diagnoses or Management Options  Diagnosis management comments: Differential diagnosis includes possible cardiac etiology of discomfort respiratory etiology or gastrointestinal etiology       Amount and/or Complexity of Data Reviewed  Clinical lab tests: ordered and reviewed  Tests in the radiology section of CPT®: ordered and reviewed  Independent visualization of images, tracings, or specimens: yes (EKG at 1905: Normal sinus rhythm, rate of 60, possible left atrial enlargement, no acute ischemic changes)    Risk of Complications, Morbidity, and/or Mortality  Presenting problems: high  Diagnostic procedures: moderate  Management options: moderate    Patient Progress  Patient progress: stable    ED Course       Procedures

## 2017-09-13 NOTE — ED NOTES
I have reviewed discharge and follow-up instructions with the patient. The patient verbalized understanding. 1 prescriptions given to patient. Patient ambulatory upon discharge with family.

## 2017-09-14 ENCOUNTER — PATIENT OUTREACH (OUTPATIENT)
Dept: CASE MANAGEMENT | Age: 82
End: 2017-09-14

## 2017-09-14 NOTE — PROGRESS NOTES
Transition of Care Discharge Follow-up Questionnaire   Date/Time of Call:   9/14/17 12:15p   What was the patient hospitalized for? Nonspecific chest pain   Does the patient understand his/her diagnosis and/or treatment and what happened during the hospitalization? Patient understands the diagnosis and treatments that occurred. Did the patient receive discharge instructions? Yes   Review any discharge instructions (see notes in [facility]). Ask patient if they understand these. Do they have any questions? Patient understands discharge instructions. Were home services ordered (nursing, PT, OT, ST, etc.)? No   If so, has the first visit occurred? If not, why? (Assist with coordination of services if necessary.) n/a   Was any DME ordered? No   If so, has it been received? If not, why?  (Assist with coordination of arranging DME orders if necessary.) n/a   Complete a review of all medications (new, continued and discontinued meds per the D/C instructions and medication tab in [facility]). Patient and care coordinator reviewed current medications. Were all new prescriptions filled? If not, why?  (Assist with obtainment of medications if necessary.) Patient states all new prescriptions were filled but she has not started taking medications. Does the patient understand the purpose and dosing instructions for all medications? (If patient has questions, provide explanation and education.) Patient states that she understands the purpose and dosing instructions for all medications. Does the patient have any problems in performing ADLs? (If patient is unable to perform ADLs  what is the limiting factor(s)? Do they have a support system that can assist? If no support system is present, discuss possible assistance that they may be able to obtain.) Patient is independent with ADLs. No problems in performing ADLs were voiced by patient.     Does the patient have all follow-up appointments scheduled? 7 day f/up with PCP?    7-14 day f/up with specialist?    If f/up has not been made  what actions has the care coordinator made to accomplish this? Has transportation been arranged? [facility] follow-up should be within 7 days of discharge; all others should have PCP follow-up within 7 days of discharge; follow-ups with other specialists should be within 7-14 days of discharge.) Patient has the following appt(s):  Patient had follow-up with PCP 9/13 after ED visit. Any other questions or concerns expressed by the patient? No.    Schedule next appointment with SILVIA Rojas or refer to RN Case Manager/  per the workflow guidelines. When is care coordinators next follow-up call scheduled? If referred for CCM  what RN care manager was the referral assigned? CC will follow-up with patient within the next 30 days. ZACK Call Completed By:   Keyonna Stanford LPN  Care Coordinator       This note will not be viewable in [address]

## 2017-10-07 ENCOUNTER — PATIENT OUTREACH (OUTPATIENT)
Dept: CASE MANAGEMENT | Age: 82
End: 2017-10-07

## 2017-10-08 NOTE — PROGRESS NOTES
Per chart review patient attended follow-up with Cardiologist. Niya Dodson provided contact information to patient in case patient needs further assistance. CC will resolve episodeThis note will not be viewable in [address]

## 2017-10-10 PROBLEM — R00.1 BRADYCARDIA: Status: RESOLVED | Noted: 2017-08-15 | Resolved: 2017-10-10

## 2017-10-10 PROBLEM — G60.9 IDIOPATHIC PERIPHERAL NEUROPATHY: Chronic | Status: ACTIVE | Noted: 2017-05-12

## 2017-10-10 PROBLEM — R10.32 LLQ PAIN: Status: RESOLVED | Noted: 2017-07-10 | Resolved: 2017-10-10

## 2017-10-10 PROBLEM — I95.1 ORTHOSTATIC HYPOTENSION: Status: RESOLVED | Noted: 2017-08-15 | Resolved: 2017-10-10

## 2017-10-10 PROBLEM — K57.30 DIVERTICULOSIS OF LARGE INTESTINE: Chronic | Status: ACTIVE | Noted: 2017-08-06

## 2017-10-10 PROBLEM — R42 DIZZINESS: Status: RESOLVED | Noted: 2017-08-15 | Resolved: 2017-10-10

## 2017-10-10 PROBLEM — R25.2 FOOT CRAMPS: Chronic | Status: ACTIVE | Noted: 2017-05-12

## 2017-10-10 PROBLEM — R53.82 CHRONIC FATIGUE: Chronic | Status: ACTIVE | Noted: 2017-08-15

## 2017-10-23 ENCOUNTER — HOSPITAL ENCOUNTER (OUTPATIENT)
Dept: ULTRASOUND IMAGING | Age: 82
Discharge: HOME | End: 2017-10-23
Attending: INTERNAL MEDICINE
Payer: MEDICARE

## 2017-10-23 DIAGNOSIS — R10.11 RUQ ABDOMINAL PAIN: ICD-10-CM

## 2017-10-23 PROCEDURE — 76700 US EXAM ABDOM COMPLETE: CPT

## 2017-12-07 ENCOUNTER — HOSPITAL ENCOUNTER (EMERGENCY)
Age: 82
Discharge: HOME | End: 2017-12-07
Attending: EMERGENCY MEDICINE
Payer: MEDICARE

## 2017-12-07 ENCOUNTER — APPOINTMENT (OUTPATIENT)
Dept: GENERAL RADIOLOGY | Age: 82
End: 2017-12-07
Attending: EMERGENCY MEDICINE
Payer: MEDICARE

## 2017-12-07 VITALS
SYSTOLIC BLOOD PRESSURE: 148 MMHG | WEIGHT: 160 LBS | HEIGHT: 64 IN | RESPIRATION RATE: 16 BRPM | OXYGEN SATURATION: 100 % | DIASTOLIC BLOOD PRESSURE: 78 MMHG | BODY MASS INDEX: 27.31 KG/M2 | HEART RATE: 64 BPM | TEMPERATURE: 97.6 F

## 2017-12-07 DIAGNOSIS — J06.9 UPPER RESPIRATORY INFECTION WITH COUGH AND CONGESTION: Primary | ICD-10-CM

## 2017-12-07 LAB
ALBUMIN SERPL-MCNC: 3.1 G/DL (ref 3.2–4.6)
ALBUMIN/GLOB SERPL: 0.9 {RATIO} (ref 1.2–3.5)
ALP SERPL-CCNC: 83 U/L (ref 50–136)
ALT SERPL-CCNC: 11 U/L (ref 12–65)
ANION GAP SERPL CALC-SCNC: 11 MMOL/L (ref 7–16)
AST SERPL-CCNC: 24 U/L (ref 15–37)
ATRIAL RATE: 55 BPM
BASOPHILS # BLD: 0 K/UL (ref 0–0.2)
BASOPHILS NFR BLD: 0 % (ref 0–2)
BILIRUB SERPL-MCNC: 0.3 MG/DL (ref 0.2–1.1)
BUN SERPL-MCNC: 15 MG/DL (ref 8–23)
CALCIUM SERPL-MCNC: 8.6 MG/DL (ref 8.3–10.4)
CALCULATED P AXIS, ECG09: 90 DEGREES
CALCULATED R AXIS, ECG10: 47 DEGREES
CALCULATED T AXIS, ECG11: 74 DEGREES
CHLORIDE SERPL-SCNC: 98 MMOL/L (ref 98–107)
CO2 SERPL-SCNC: 26 MMOL/L (ref 21–32)
CREAT SERPL-MCNC: 0.81 MG/DL (ref 0.6–1)
DIAGNOSIS, 93000: NORMAL
DIFFERENTIAL METHOD BLD: ABNORMAL
EOSINOPHIL # BLD: 0.2 K/UL (ref 0–0.8)
EOSINOPHIL NFR BLD: 4 % (ref 0.5–7.8)
ERYTHROCYTE [DISTWIDTH] IN BLOOD BY AUTOMATED COUNT: 14.2 % (ref 11.9–14.6)
FLUAV AG NPH QL IA: NEGATIVE
FLUBV AG NPH QL IA: NEGATIVE
GLOBULIN SER CALC-MCNC: 3.4 G/DL (ref 2.3–3.5)
GLUCOSE SERPL-MCNC: 85 MG/DL (ref 65–100)
HCT VFR BLD AUTO: 34.4 % (ref 35.8–46.3)
HGB BLD-MCNC: 11.7 G/DL (ref 11.7–15.4)
IMM GRANULOCYTES # BLD: 0 K/UL (ref 0–0.5)
IMM GRANULOCYTES NFR BLD AUTO: 0 % (ref 0–5)
LYMPHOCYTES # BLD: 1.7 K/UL (ref 0.5–4.6)
LYMPHOCYTES NFR BLD: 29 % (ref 13–44)
MCH RBC QN AUTO: 31.1 PG (ref 26.1–32.9)
MCHC RBC AUTO-ENTMCNC: 34 G/DL (ref 31.4–35)
MCV RBC AUTO: 91.5 FL (ref 79.6–97.8)
MONOCYTES # BLD: 0.7 K/UL (ref 0.1–1.3)
MONOCYTES NFR BLD: 12 % (ref 4–12)
NEUTS SEG # BLD: 3.2 K/UL (ref 1.7–8.2)
NEUTS SEG NFR BLD: 55 % (ref 43–78)
P-R INTERVAL, ECG05: 160 MS
PLATELET # BLD AUTO: 313 K/UL (ref 150–450)
PMV BLD AUTO: 9.2 FL (ref 10.8–14.1)
POTASSIUM SERPL-SCNC: 3.7 MMOL/L (ref 3.5–5.1)
PROT SERPL-MCNC: 6.5 G/DL (ref 6.3–8.2)
Q-T INTERVAL, ECG07: 432 MS
QRS DURATION, ECG06: 72 MS
QTC CALCULATION (BEZET), ECG08: 413 MS
RBC # BLD AUTO: 3.76 M/UL (ref 4.05–5.25)
SODIUM SERPL-SCNC: 135 MMOL/L (ref 136–145)
VENTRICULAR RATE, ECG03: 55 BPM
WBC # BLD AUTO: 5.8 K/UL (ref 4.3–11.1)

## 2017-12-07 PROCEDURE — 85025 COMPLETE CBC W/AUTO DIFF WBC: CPT | Performed by: EMERGENCY MEDICINE

## 2017-12-07 PROCEDURE — 87804 INFLUENZA ASSAY W/OPTIC: CPT | Performed by: EMERGENCY MEDICINE

## 2017-12-07 PROCEDURE — 74011000250 HC RX REV CODE- 250: Performed by: EMERGENCY MEDICINE

## 2017-12-07 PROCEDURE — 71010 XR CHEST PORT: CPT

## 2017-12-07 PROCEDURE — 80053 COMPREHEN METABOLIC PANEL: CPT | Performed by: EMERGENCY MEDICINE

## 2017-12-07 PROCEDURE — 77030013140 HC MSK NEB VYRM -A

## 2017-12-07 PROCEDURE — 93005 ELECTROCARDIOGRAM TRACING: CPT | Performed by: EMERGENCY MEDICINE

## 2017-12-07 PROCEDURE — 99283 EMERGENCY DEPT VISIT LOW MDM: CPT | Performed by: EMERGENCY MEDICINE

## 2017-12-07 PROCEDURE — 94640 AIRWAY INHALATION TREATMENT: CPT

## 2017-12-07 RX ORDER — FLUTICASONE PROPIONATE 50 MCG
2 SPRAY, SUSPENSION (ML) NASAL DAILY
Qty: 1 BOTTLE | Refills: 0 | Status: SHIPPED | OUTPATIENT
Start: 2017-12-07 | End: 2017-12-17

## 2017-12-07 RX ORDER — ALBUTEROL SULFATE 90 UG/1
2 AEROSOL, METERED RESPIRATORY (INHALATION)
Qty: 1 INHALER | Refills: 0 | Status: SHIPPED | OUTPATIENT
Start: 2017-12-07 | End: 2017-12-12

## 2017-12-07 RX ORDER — IPRATROPIUM BROMIDE AND ALBUTEROL SULFATE 2.5; .5 MG/3ML; MG/3ML
3 SOLUTION RESPIRATORY (INHALATION)
Status: COMPLETED | OUTPATIENT
Start: 2017-12-07 | End: 2017-12-07

## 2017-12-07 RX ADMIN — IPRATROPIUM BROMIDE AND ALBUTEROL SULFATE 3 ML: .5; 3 SOLUTION RESPIRATORY (INHALATION) at 17:35

## 2017-12-07 NOTE — ED PROVIDER NOTES
HPI:  81 F, here with congestion and coughing x 1 week. \"blew out about a gallon from my nose\". Takes allegra at home. No fever. + productive cough. No chest pain. + wheezing at home with chest tightness. No abdominal pain     ROS  Constitutional: No fever  Skin: no rash  Eye:   ENMT: No sore throat, + congestion  Respiratory: + shortness of breath, + cough  Cardiovascular: No chest pain  Gastrointestinal: No vomiting, no nausea, no diarrhea, no constipation no abdominal pain  : No dysuria  MSK: No back pain  Neuro: No headache, no change in mental status, no numbness, no tingling, no weakness  All other review of systems positive per history of present illness and the above otherwise negative or noncontributory. Visit Vitals    /67 (BP 1 Location: Right arm)    Pulse (!) 57    Temp 97.6 °F (36.4 °C)    Resp 18    Ht 5' 4\" (1.626 m)    Wt 72.6 kg (160 lb)    SpO2 96%    BMI 27.46 kg/m2     Past Medical History:   Diagnosis Date    Abdominal pain 10/28/2014    [facility])     pt denies    Arthritis     osteo - low back,  shoulders, hips, low back    Bilateral carotid bruits     Bursitis     CAD (coronary artery disease)     4 vessel CABG 2005;--- stents x 4--- last one placed 2014-- followed by dr Kristyn Aiken Chronic pain     left shoulder    Coagulation defects     on plavix    Constipation     Cough 09/11/2014    10/8/14, Methacholine Challenge Study: The point at which the FEV1 fell by at least 20%, the PC20, occurred above a dose of 25mg/mL of methacholine. This rules out the diagnosis of asthma with an extremely high degree of sensitivity. No post-challenge FEV1 recovery was identified. Tari Calzada MD        Depressive disorder     Dyspnea 09/11/2014    [facility]; 9/29/14: IMPRESSION: The flow volume loop is consistent restriction. Spirometry suggest restriction with concomitant obstruction at low lung volumes.  There is not a significant bronchodilator response. Lung volumes reveal mild restriction. The unadjusted diffusion capacity is normal.  Milton López MD      GERD (gastroesophageal reflux disease)     controlled with med    Headache     Hoarseness or changing voice     thougfht to be related to gerd    Hyperlipidemia     Hypertension     controlled with med    Kidney stone     yrs ago-- no tx required    Lumbago     Macular degeneration     Nodule of left lung     dx'ed 4 years ago-watched for several months-no new growth-being watched-yearly CT scan----- followed by dr. Demarco Collier Pain in joint, ankle and foot     left 3rd toe and right 2nd toe    Pain in joint, shoulder region     left now bothering > right, right ok    Palpitations 09/24/2015    followed by dr Axel Wilson Panic disorder     panic attacks -- last one 2014    Renal mass 07/2016    ablation---left side--- followed by dr Neelam Tovar in [place]    Sciatica     Vascular headache      Past Surgical History:   Procedure Laterality Date    ABDOMEN SURGERY PROC UNLISTED Left 07/2016    ablation for mass in left side of abd    HX CATARACT REMOVAL      left    HX CATARACT REMOVAL      HX COLONOSCOPY  12/15/2015    diverticulosis, internal hemorrhoid, colon polyp- no further colonoscopies needed    HX COLONOSCOPY  06/2016    HX CORONARY ARTERY BYPASS GRAFT      4 vessel CABG 2005    HX HEART CATHETERIZATION      stent 2014    HX HEART CATHETERIZATION      stent 2006    HX HEENT Left     macular pucker    HX HERNIA REPAIR Left 2017    HX ORTHOPAEDIC      finger, foot    HX PARTIAL HYSTERECTOMY      hyst     Prior to Admission Medications   Prescriptions Last Dose Informant Patient Reported? Taking? B.infantis-B.ani-B.long-B.bifi (PROBIOTIC 4X) 10-15 mg TbEC   Yes No   Sig: Take  by mouth. Potassium Gluconate 595 mg (99 mg) tablet   Yes No   Sig: Take 595 mg by mouth two (2) times a day.    VIT A/VIT C/VIT E/ZINC/COPPER (PRESERVISION AREDS PO)   Yes No   Sig: Take 1 Tab by mouth two (2) times a day. Stopped 1/3/17   acetaminophen (TYLENOL) 325 mg tablet   Yes No   Sig: Take  by mouth every four (4) hours as needed for Pain. amLODIPine (NORVASC) 2.5 mg tablet   No No   Sig: Take 1 Tab by mouth daily. Indications: hypertension   aspirin 81 mg tablet   Yes No   Sig: Take 81 mg by mouth nightly. clopidogrel (PLAVIX) 75 mg tab   No No   Sig: Take 1 Tab by mouth daily. coenzyme q10 (CO Q-10) 10 mg cap   Yes No   Sig: Take  by mouth.   cyanocobalamin (VITAMIN B-12) 1,000 mcg tablet   Yes No   Sig: Take 1,000 mcg by mouth daily. doxycycline (ADOXA) 100 mg tablet   No No   Sig: Take 1 Tab by mouth two (2) times a day. escitalopram oxalate (LEXAPRO) 5 mg tablet   No No   Sig: Take 1 Tab by mouth daily. furosemide (LASIX) 40 mg tablet   No No   Sig: Take 1 Tab by mouth daily. isosorbide mononitrate ER (IMDUR) 60 mg CR tablet   No No   Sig: Take 1 Tab by mouth every morning. lisinopril-hydroCHLOROthiazide (PRINZIDE, ZESTORETIC) 20-25 mg per tablet   No No   Sig: Take 1 Tab by mouth every morning. loratadine (CLARITIN) 10 mg tablet   Yes No   Sig: Take 10 mg by mouth.   magnesium oxide (MAG-OX) 400 mg tablet   Yes No   Sig: Take 400 mg by mouth daily. nitroglycerin (NITROLINGUAL) 400 mcg/spray spray   No No   Si Spray by SubLINGual route every five (5) minutes as needed for Chest Pain.   pantoprazole (PROTONIX) 40 mg tablet   No No   Sig: Take 1 Tab by mouth two (2) times a day. Indications: GASTROESOPHAGEAL REFLUX, samples   propranolol LA (INDERAL LA) 60 mg SR capsule   No No   Sig: Take 1 Cap by mouth daily. ranolazine ER (RANEXA) 500 mg SR tablet   No No   Sig: Take 1 Tab by mouth two (2) times a day. rosuvastatin (CRESTOR) 40 mg tablet   No No   Sig: Take 1 Tab by mouth nightly. Patient taking differently: Take 40 mg by mouth nightly. Pt has always been on 20mg, but cardio gave 40mg tab at last visit. Taking 1/2 tab (20mg) every day.       Facility-Administered Medications: None         Adult Exam   General: alert, no acute distress  Head: normocephalic, atraumatic  ENT: moist mucous membranes. Boggy nasal determined bilaterally. + blowing nose during exam. No blood. Neck: supple, non-tender; full range of motion  Cardiovascular: regular rate and rhythm, normal peripheral perfusion, no edema  Respiratory: no retraction. Scattered wheezes noted throughout bilateral lung fields  Gastrointestinal: soft, non-tender; no rebound or guarding, no peritoneal signs, no distension  Back: non-tender, full range of motion  Musculoskeletal: normal range of motion, normal strength, no gross deformities  Neurological: alert and oriented x 4, no gross focal deficits; normal speech  Psychiatric: cooperative; appropriate mood and affect    MDM:EKG obtained interpreted by me. Rate of 55 sinus rhythm. Normal axis and interval.  No ST elevation that is suspicious for acute STEMI. Nonspecific ST depression noted in lead 1. No STEMI noted. Nonspecific T-wave changes. No ectopy, Brugada noted. Low suspicion for ACS. Low suspicion for PE. Chest x-ray without consolidation. We'll give DuoNeb and reassessed. Recent Results (from the past 8 hour(s))   CBC WITH AUTOMATED DIFF    Collection Time: 12/07/17  5:05 PM   Result Value Ref Range    WBC 5.8 4.3 - 11.1 K/uL    RBC 3.76 (L) 4.05 - 5.25 M/uL    HGB 11.7 11.7 - 15.4 g/dL    HCT 34.4 (L) 35.8 - 46.3 %    MCV 91.5 79.6 - 97.8 FL    MCH 31.1 26.1 - 32.9 PG    MCHC 34.0 31.4 - 35.0 g/dL    RDW 14.2 11.9 - 14.6 %    PLATELET 208 949 - 920 K/uL    MPV 9.2 (L) 10.8 - 14.1 FL    DF AUTOMATED      NEUTROPHILS 55 43 - 78 %    LYMPHOCYTES 29 13 - 44 %    MONOCYTES 12 4.0 - 12.0 %    EOSINOPHILS 4 0.5 - 7.8 %    BASOPHILS 0 0.0 - 2.0 %    IMMATURE GRANULOCYTES 0 0.0 - 5.0 %    ABS. NEUTROPHILS 3.2 1.7 - 8.2 K/UL    ABS. LYMPHOCYTES 1.7 0.5 - 4.6 K/UL    ABS. MONOCYTES 0.7 0.1 - 1.3 K/UL    ABS. EOSINOPHILS 0.2 0.0 - 0.8 K/UL    ABS. BASOPHILS 0.0 0.0 - 0.2 K/UL    ABS. IMM. GRANS. 0.0 0.0 - 0.5 K/UL   METABOLIC PANEL, COMPREHENSIVE    Collection Time: 12/07/17  5:05 PM   Result Value Ref Range    Sodium 135 (L) 136 - 145 mmol/L    Potassium 3.7 3.5 - 5.1 mmol/L    Chloride 98 98 - 107 mmol/L    CO2 26 21 - 32 mmol/L    Anion gap 11 7 - 16 mmol/L    Glucose 85 65 - 100 mg/dL    BUN 15 8 - 23 MG/DL    Creatinine 0.81 0.6 - 1.0 MG/DL    GFR est AA >60 >60 ml/min/1.73m2    GFR est non-AA >60 >60 ml/min/1.73m2    Calcium 8.6 8.3 - 10.4 MG/DL    Bilirubin, total 0.3 0.2 - 1.1 MG/DL    ALT (SGPT) 11 (L) 12 - 65 U/L    AST (SGOT) 24 15 - 37 U/L    Alk. phosphatase 83 50 - 136 U/L    Protein, total 6.5 6.3 - 8.2 g/dL    Albumin 3.1 (L) 3.2 - 4.6 g/dL    Globulin 3.4 2.3 - 3.5 g/dL    A-G Ratio 0.9 (L) 1.2 - 3.5     INFLUENZA A & B AG (RAPID TEST)    Collection Time: 12/07/17  5:05 PM   Result Value Ref Range    Influenza A Ag NEGATIVE  NEG      Influenza B Ag NEGATIVE  NEG         Xr Chest Port    Result Date: 12/7/2017  PORTABLE CHEST X-RAY HISTORY: Severe cough x1 week. COMPARISON: September 12, 2017 FINDINGS: Median sternotomy wires are present. There is no lobar consolidation or pleural effusions. IMPRESSION: No consolidation. Chest x-ray unremarkable. After albuterol and Atrovent wheezing almost completely resolved. Cough  Improved. Chest no chest pain, shortness of breath. Likely viral pathology. We'll discharge her with Flonase, albuterol. Return precautions given. No further questions. Dragon voice recognition software was used to create this note. Although the note has been reviewed and corrected where necessary, additional errors may have been overlooked and remain in the text.

## 2017-12-07 NOTE — ED NOTES
I have reviewed discharge instructions with the patient. The patient verbalized understanding. Patient left ED via Discharge Method: ambulatory to Home with family. Opportunity for questions and clarification provided. Patient given 2 scripts. To continue your aftercare when you leave the hospital, you may receive an automated call from our care team to check in on how you are doing. This is a free service and part of our promise to provide the best care and service to meet your aftercare needs.  If you have questions, or wish to unsubscribe from this service please call [phone]. Thank you for Choosing our [facility] Emergency Department.

## 2017-12-07 NOTE — DISCHARGE INSTRUCTIONS
Upper Respiratory Infection (Cold): Care Instructions  Your Care Instructions    An upper respiratory infection, or URI, is an infection of the nose, sinuses, or throat. URIs are spread by coughs, sneezes, and direct contact. The common cold is the most frequent kind of URI. The flu and sinus infections are other kinds of URIs. Almost all URIs are caused by viruses. Antibiotics won't cure them. But you can treat most infections with home care. This may include drinking lots of fluids and taking over-the-counter pain medicine. You will probably feel better in 4 to 10 days. The doctor has checked you carefully, but problems can develop later. If you notice any problems or new symptoms, get medical treatment right away. Follow-up care is a key part of your treatment and safety. Be sure to make and go to all appointments, and call your doctor if you are having problems. It's also a good idea to know your test results and keep a list of the medicines you take. How can you care for yourself at home? · To prevent dehydration, drink plenty of fluids, enough so that your urine is light yellow or clear like water. Choose water and other caffeine-free clear liquids until you feel better. If you have kidney, heart, or liver disease and have to limit fluids, talk with your doctor before you increase the amount of fluids you drink. · Take an over-the-counter pain medicine, such as acetaminophen (Tylenol), ibuprofen (Advil, Motrin), or naproxen (Aleve). Read and follow all instructions on the label. · Before you use cough and cold medicines, check the label. These medicines may not be safe for young children or for people with certain health problems. · Be careful when taking over-the-counter cold or flu medicines and Tylenol at the same time. Many of these medicines have acetaminophen, which is Tylenol. Read the labels to make sure that you are not taking more than the recommended dose.  Too much acetaminophen (Tylenol) can be harmful. · Get plenty of rest.  · Do not smoke or allow others to smoke around you. If you need help quitting, talk to your doctor about stop-smoking programs and medicines. These can increase your chances of quitting for good. When should you call for help? Call 911 anytime you think you may need emergency care. For example, call if:  ? · You have severe trouble breathing. ?Call your doctor now or seek immediate medical care if:  ? · You seem to be getting much sicker. ? · You have new or worse trouble breathing. ? · You have a new or higher fever. ? · You have a new rash. ? Watch closely for changes in your health, and be sure to contact your doctor if:  ? · You have a new symptom, such as a sore throat, an earache, or sinus pain. ? · You cough more deeply or more often, especially if you notice more mucus or a change in the color of your mucus. ? · You do not get better as expected. Where can you learn more? Go to http://dale-nora.info/. Enter V447 in the search box to learn more about \"Upper Respiratory Infection (Cold): Care Instructions. \"  Current as of: May 12, 2017  Content Version: 11.4  © 8822-1400 [employer]. Care instructions adapted under license by [employer] (which disclaims liability or warranty for this information). If you have questions about a medical condition or this instruction, always ask your healthcare professional. [place] [address] any warranty or liability for your use of this information.

## 2017-12-07 NOTE — ED TRIAGE NOTES
Patient brought in via EMS for SOB with exertion. Seen and treated by Virla Sullivan. Albuterol attempted by EMS, but per EMS patient was unable to complete due to coughing. 324 ASA given.

## 2017-12-12 PROBLEM — E78.00 PURE HYPERCHOLESTEROLEMIA: Chronic | Status: ACTIVE | Noted: 2017-12-12

## 2017-12-12 PROBLEM — E78.00 PURE HYPERCHOLESTEROLEMIA: Chronic | Status: RESOLVED | Noted: 2017-12-12 | Resolved: 2017-12-12

## 2017-12-12 PROBLEM — E78.2 MIXED HYPERLIPIDEMIA: Chronic | Status: ACTIVE | Noted: 2017-12-12

## 2017-12-21 ENCOUNTER — HOSPITAL ENCOUNTER (EMERGENCY)
Age: 82
Discharge: HOME | End: 2017-12-21
Attending: EMERGENCY MEDICINE
Payer: MEDICARE

## 2017-12-21 ENCOUNTER — APPOINTMENT (OUTPATIENT)
Dept: CT IMAGING | Age: 82
End: 2017-12-21
Attending: EMERGENCY MEDICINE
Payer: MEDICARE

## 2017-12-21 VITALS
RESPIRATION RATE: 15 BRPM | SYSTOLIC BLOOD PRESSURE: 155 MMHG | BODY MASS INDEX: 26.63 KG/M2 | HEIGHT: 64 IN | DIASTOLIC BLOOD PRESSURE: 76 MMHG | OXYGEN SATURATION: 100 % | TEMPERATURE: 97.6 F | WEIGHT: 156 LBS | HEART RATE: 56 BPM

## 2017-12-21 DIAGNOSIS — R51.9 ACUTE NONINTRACTABLE HEADACHE, UNSPECIFIED HEADACHE TYPE: Primary | ICD-10-CM

## 2017-12-21 LAB
ALBUMIN SERPL-MCNC: 3.9 G/DL (ref 3.2–4.6)
ALBUMIN/GLOB SERPL: 1.1 {RATIO} (ref 1.2–3.5)
ALP SERPL-CCNC: 80 U/L (ref 50–136)
ALT SERPL-CCNC: 9 U/L (ref 12–65)
ANION GAP SERPL CALC-SCNC: 10 MMOL/L (ref 7–16)
AST SERPL-CCNC: 13 U/L (ref 15–37)
ATRIAL RATE: 57 BPM
BASOPHILS # BLD: 0 K/UL (ref 0–0.2)
BASOPHILS NFR BLD: 0 % (ref 0–2)
BILIRUB SERPL-MCNC: 0.5 MG/DL (ref 0.2–1.1)
BUN SERPL-MCNC: 21 MG/DL (ref 8–23)
CALCIUM SERPL-MCNC: 9.1 MG/DL (ref 8.3–10.4)
CALCULATED P AXIS, ECG09: 65 DEGREES
CALCULATED R AXIS, ECG10: 64 DEGREES
CALCULATED T AXIS, ECG11: 79 DEGREES
CHLORIDE SERPL-SCNC: 93 MMOL/L (ref 98–107)
CO2 SERPL-SCNC: 29 MMOL/L (ref 21–32)
CREAT SERPL-MCNC: 0.87 MG/DL (ref 0.6–1)
DIAGNOSIS, 93000: NORMAL
DIFFERENTIAL METHOD BLD: ABNORMAL
EOSINOPHIL # BLD: 0.1 K/UL (ref 0–0.8)
EOSINOPHIL NFR BLD: 2 % (ref 0.5–7.8)
ERYTHROCYTE [DISTWIDTH] IN BLOOD BY AUTOMATED COUNT: 14.6 % (ref 11.9–14.6)
GLOBULIN SER CALC-MCNC: 3.4 G/DL (ref 2.3–3.5)
GLUCOSE SERPL-MCNC: 103 MG/DL (ref 65–100)
HCT VFR BLD AUTO: 41.7 % (ref 35.8–46.3)
HGB BLD-MCNC: 14.3 G/DL (ref 11.7–15.4)
IMM GRANULOCYTES # BLD: 0 K/UL (ref 0–0.5)
IMM GRANULOCYTES NFR BLD AUTO: 0 % (ref 0–5)
LYMPHOCYTES # BLD: 1.4 K/UL (ref 0.5–4.6)
LYMPHOCYTES NFR BLD: 23 % (ref 13–44)
MCH RBC QN AUTO: 31.5 PG (ref 26.1–32.9)
MCHC RBC AUTO-ENTMCNC: 34.3 G/DL (ref 31.4–35)
MCV RBC AUTO: 91.9 FL (ref 79.6–97.8)
MONOCYTES # BLD: 0.8 K/UL (ref 0.1–1.3)
MONOCYTES NFR BLD: 13 % (ref 4–12)
NEUTS SEG # BLD: 3.8 K/UL (ref 1.7–8.2)
NEUTS SEG NFR BLD: 62 % (ref 43–78)
P-R INTERVAL, ECG05: 144 MS
PLATELET # BLD AUTO: 358 K/UL (ref 150–450)
PMV BLD AUTO: 9 FL (ref 10.8–14.1)
POTASSIUM SERPL-SCNC: 4 MMOL/L (ref 3.5–5.1)
PROT SERPL-MCNC: 7.3 G/DL (ref 6.3–8.2)
Q-T INTERVAL, ECG07: 410 MS
QRS DURATION, ECG06: 76 MS
QTC CALCULATION (BEZET), ECG08: 399 MS
RBC # BLD AUTO: 4.54 M/UL (ref 4.05–5.25)
SODIUM SERPL-SCNC: 132 MMOL/L (ref 136–145)
TROPONIN I BLD-MCNC: 0 NG/ML (ref 0.02–0.05)
VENTRICULAR RATE, ECG03: 57 BPM
WBC # BLD AUTO: 6.1 K/UL (ref 4.3–11.1)

## 2017-12-21 PROCEDURE — 81003 URINALYSIS AUTO W/O SCOPE: CPT | Performed by: EMERGENCY MEDICINE

## 2017-12-21 PROCEDURE — 70450 CT HEAD/BRAIN W/O DYE: CPT

## 2017-12-21 PROCEDURE — 93005 ELECTROCARDIOGRAM TRACING: CPT | Performed by: EMERGENCY MEDICINE

## 2017-12-21 PROCEDURE — 85025 COMPLETE CBC W/AUTO DIFF WBC: CPT | Performed by: EMERGENCY MEDICINE

## 2017-12-21 PROCEDURE — 84484 ASSAY OF TROPONIN QUANT: CPT

## 2017-12-21 PROCEDURE — 99285 EMERGENCY DEPT VISIT HI MDM: CPT | Performed by: EMERGENCY MEDICINE

## 2017-12-21 PROCEDURE — 80053 COMPREHEN METABOLIC PANEL: CPT | Performed by: EMERGENCY MEDICINE

## 2017-12-21 PROCEDURE — 74011250637 HC RX REV CODE- 250/637: Performed by: EMERGENCY MEDICINE

## 2017-12-21 RX ORDER — ACETAMINOPHEN 500 MG
1000 TABLET ORAL
Status: COMPLETED | OUTPATIENT
Start: 2017-12-21 | End: 2017-12-21

## 2017-12-21 RX ADMIN — ACETAMINOPHEN 1000 MG: 500 TABLET, FILM COATED ORAL at 15:51

## 2017-12-21 NOTE — ED PROVIDER NOTES
HPI Comments: 29-year-old lady with a history of a near syncopal episode on Monday. Now presents with concerns about high blood pressure. Patient says that she went to her primary care Dr. Vladimir Rodriguez insisted that she come to the emergency department as the blood pressure was elevated. Patient says for about a week she hadn't been feeling well but didn't have any specific symptoms including no chest pain, difficulty breathing, fevers, cough, or chills. She says she has had a mild frontal headache that she rates as a 5 out of 10. For the syncopal episode she said she was visiting a friend at a nursing home when she started to feel hot and flushed and then thought she might pass out. She was able to sit down into a chair and lay her head back and after a little while the symptoms passed. She said during that spell she does not have any chest pain or belly breathing. She had a similar episode about 6 months prior. Elements of this note were made using speech recognition software. As such, there may be errors of speech recognition present. Patient is a 81 y.o. female presenting with hypertension. The history is provided by the patient. Hypertension    Associated symptoms include headaches. Pertinent negatives include no chest pain, no palpitations, no confusion, no dizziness, no shortness of breath, no nausea and no vomiting.         Past Medical History:   Diagnosis Date    Abdominal pain 10/28/2014    [facility])     pt denies    Arthritis     osteo - low back,  shoulders, hips, low back    Bilateral carotid bruits     Bursitis     CAD (coronary artery disease)     4 vessel CABG 2005;--- stents x 4--- last one placed 2014-- followed by dr David Pascal Chronic pain     left shoulder    Coagulation defects     on plavix    Constipation     Cough 09/11/2014    10/8/14, Methacholine Challenge Study: The point at which the FEV1 fell by at least 20%, the PC20, occurred above a dose of 25mg/mL of methacholine. This rules out the diagnosis of asthma with an extremely high degree of sensitivity. No post-challenge FEV1 recovery was identified. Randall Woodard MD        Depressive disorder     Dyspnea 09/11/2014    [facility]; 9/29/14: IMPRESSION: The flow volume loop is consistent restriction. Spirometry suggest restriction with concomitant obstruction at low lung volumes. There is not a significant bronchodilator response. Lung volumes reveal mild restriction.  The unadjusted diffusion capacity is normal.  Fara Awad MD      GERD (gastroesophageal reflux disease)     controlled with med    Headache     Hoarseness or changing voice     thougfht to be related to gerd    Hyperlipidemia     Hypertension     controlled with med    Kidney stone     yrs ago-- no tx required    Lumbago     Macular degeneration     Nodule of left lung     dx'ed 4 years ago-watched for several months-no new growth-being watched-yearly CT scan----- followed by dr. Priya Hernandez Pain in joint, ankle and foot     left 3rd toe and right 2nd toe    Pain in joint, shoulder region     left now bothering > right, right ok    Palpitations 09/24/2015    followed by dr Esteban Mejía Panic disorder     panic attacks -- last one 2014    Renal mass 07/2016    ablation---left side--- followed by dr Matt Salinas in [place]    Sciatica     Vascular headache        Past Surgical History:   Procedure Laterality Date    ABDOMEN SURGERY PROC UNLISTED Left 07/2016    ablation for mass in left side of abd    HX CATARACT REMOVAL      left    HX CATARACT REMOVAL      HX COLONOSCOPY  12/15/2015    diverticulosis, internal hemorrhoid, colon polyp- no further colonoscopies needed    HX COLONOSCOPY  06/2016    HX CORONARY ARTERY BYPASS GRAFT      4 vessel CABG 2005    HX HEART CATHETERIZATION      stent 2014    HX HEART CATHETERIZATION      stent 2006    HX HEENT Left     macular pucker    HX HERNIA REPAIR Left 2017    HX ORTHOPAEDIC      finger, foot    HX PARTIAL HYSTERECTOMY      hyst         Family History:   Problem Relation Age of Onset    Diabetes Mother     Heart Surgery Mother     Heart Disease Mother     Heart Attack Father     Heart Disease Father     Cancer Sister      2 sisters w/ lung ca-neither one smoked    Heart Disease Sister     Cancer Brother      lung ca-smoker    Heart Attack Brother       age 22 of MI    Heart Disease Sister     Heart Surgery Sister     Heart Attack Sister     Heart Disease Sister     Heart Surgery Sister     Heart Attack Sister     Heart Surgery [place]       [place] during 2nd heart [place]    Heart Disease Brother     No Known Problems Maternal Grandmother     No Known Problems Maternal Grandfather     No Known Problems Paternal Grandmother     No Known Problems Paternal Grandfather        Social History     Social History    Marital status:      Spouse name: N/A    Number of children: N/A    Years of education: N/A     Occupational History    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 , 2 sons. Denies alcohol use. Has always lived in [place]. 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 ALLERGIES: Augmentin [amoxicillin-pot clavulanate]; Codeine; Gabapentin;  Other medication; Prednisone; and Prevnar 13 [pneumoc 13-chang conj-dip cr(pf)]    Review of Systems   Constitutional: Negative for chills, diaphoresis and fever. HENT: Negative for congestion, rhinorrhea and sore throat. Eyes: Negative for redness and visual disturbance. Respiratory: Negative for cough, chest tightness, shortness of breath and wheezing. Cardiovascular: Negative for chest pain and palpitations. Gastrointestinal: Negative for abdominal pain, blood in stool, diarrhea, nausea and vomiting. Endocrine: Negative for polydipsia and polyuria. Genitourinary: Negative for dysuria and hematuria. Musculoskeletal: Negative for arthralgias, myalgias and neck stiffness. Skin: Negative for rash. Allergic/Immunologic: Negative for environmental allergies and food allergies. Neurological: Positive for headaches. Negative for dizziness and weakness. Hematological: Negative for adenopathy. Does not bruise/bleed easily. Psychiatric/Behavioral: Negative for confusion and sleep disturbance. The patient is not nervous/anxious. Vitals:    12/21/17 1324   BP: 197/78   Pulse: (!) 55   Resp: 16   Temp: 97.6 °F (36.4 °C)   SpO2: 96%   Weight: 70.8 kg (156 lb)   Height: 5' 4\" (1.626 m)            Physical Exam   Constitutional: She is oriented to person, place, and time. She appears well-developed and well-nourished. HENT:   Head: Normocephalic and atraumatic. Eyes: Conjunctivae and EOM are normal. Pupils are equal, round, and reactive to light. Neck: Normal range of motion. Cardiovascular: Normal rate and regular rhythm. Pulmonary/Chest: Effort normal and breath sounds normal. No respiratory distress. She has no wheezes. She has no rales. She exhibits no tenderness. Abdominal: Soft. Bowel sounds are normal. There is no rebound and no guarding. Musculoskeletal: Normal range of motion. She exhibits no edema or tenderness. Lymphadenopathy:     She has no cervical adenopathy. Neurological: She is alert and oriented to person, place, and time.    Skin: Skin is warm and dry.   Psychiatric: She has a normal mood and affect. Nursing note and vitals reviewed. MDM  Number of Diagnoses or Management Options  Diagnosis management comments: Patient's initial blood work is unremarkable. I will get a head CT scan to virus or syncope. She has not taken anything for her headache so I will attempt to treat it with some Tylenol.     ED Course       Procedures

## 2017-12-21 NOTE — DISCHARGE INSTRUCTIONS
Return with any fevers, vomiting, speech problems, weakness, numbness, worsening symptoms, or additional concerns. Follow-up with your primary care doctor as needed and your cardiologist as planned.

## 2017-12-21 NOTE — ED TRIAGE NOTES
Pt states she has had a headache since she fell Monday. Pt states she did not fall to the ground and was caught by a bystander. Pt was seen at her md office today and sent to the er for htn.

## 2017-12-21 NOTE — ED NOTES
I have reviewed discharge instructions with the patient. The patient verbalized understanding. Patient left ED via Discharge Method: ambulatory to Home with family. Opportunity for questions and clarification provided. Patient given 0 scripts. To continue your aftercare when you leave the hospital, you may receive an automated call from our care team to check in on how you are doing. This is a free service and part of our promise to provide the best care and service to meet your aftercare needs.  If you have questions, or wish to unsubscribe from this service please call [phone]. Thank you for Choosing our [facility] Emergency Department.

## 2018-01-18 PROBLEM — I50.32 DIASTOLIC CHF, CHRONIC (HCC): Chronic | Status: ACTIVE | Noted: 2017-08-29

## 2018-01-19 ENCOUNTER — HOSPITAL ENCOUNTER (INPATIENT)
Age: 83
LOS: 3 days | Discharge: HOME | DRG: 645 | End: 2018-01-22
Attending: EMERGENCY MEDICINE | Admitting: INTERNAL MEDICINE
Payer: MEDICARE

## 2018-01-19 ENCOUNTER — APPOINTMENT (OUTPATIENT)
Dept: CT IMAGING | Age: 83
DRG: 645 | End: 2018-01-19
Attending: INTERNAL MEDICINE
Payer: MEDICARE

## 2018-01-19 ENCOUNTER — APPOINTMENT (OUTPATIENT)
Dept: GENERAL RADIOLOGY | Age: 83
DRG: 645 | End: 2018-01-19
Attending: EMERGENCY MEDICINE
Payer: MEDICARE

## 2018-01-19 DIAGNOSIS — E87.1 HYPONATREMIA: Primary | ICD-10-CM

## 2018-01-19 DIAGNOSIS — E83.42 HYPOMAGNESEMIA: ICD-10-CM

## 2018-01-19 LAB
ALBUMIN SERPL-MCNC: 3.2 G/DL (ref 3.2–4.6)
ALBUMIN/GLOB SERPL: 0.8 {RATIO} (ref 1.2–3.5)
ALP SERPL-CCNC: 73 U/L (ref 50–136)
ALT SERPL-CCNC: 11 U/L (ref 12–65)
ANION GAP SERPL CALC-SCNC: 10 MMOL/L (ref 7–16)
ANION GAP SERPL CALC-SCNC: 11 MMOL/L (ref 7–16)
APPEARANCE UR: NORMAL
AST SERPL-CCNC: 22 U/L (ref 15–37)
ATRIAL RATE: 60 BPM
ATRIAL RATE: 69 BPM
BASOPHILS # BLD: 0 K/UL (ref 0–0.2)
BASOPHILS NFR BLD: 0 % (ref 0–2)
BILIRUB SERPL-MCNC: 0.5 MG/DL (ref 0.2–1.1)
BILIRUB UR QL: NEGATIVE
BUN SERPL-MCNC: 12 MG/DL (ref 8–23)
BUN SERPL-MCNC: 9 MG/DL (ref 8–23)
CALCIUM SERPL-MCNC: 7.8 MG/DL (ref 8.3–10.4)
CALCIUM SERPL-MCNC: 8.5 MG/DL (ref 8.3–10.4)
CALCULATED P AXIS, ECG09: 68 DEGREES
CALCULATED P AXIS, ECG09: 70 DEGREES
CALCULATED R AXIS, ECG10: 70 DEGREES
CALCULATED R AXIS, ECG10: 74 DEGREES
CALCULATED T AXIS, ECG11: 81 DEGREES
CALCULATED T AXIS, ECG11: 81 DEGREES
CHLORIDE SERPL-SCNC: 87 MMOL/L (ref 98–107)
CHLORIDE SERPL-SCNC: 87 MMOL/L (ref 98–107)
CO2 SERPL-SCNC: 26 MMOL/L (ref 21–32)
CO2 SERPL-SCNC: 27 MMOL/L (ref 21–32)
COLOR UR: YELLOW
CREAT SERPL-MCNC: 0.6 MG/DL (ref 0.6–1)
CREAT SERPL-MCNC: 0.79 MG/DL (ref 0.6–1)
DIAGNOSIS, 93000: NORMAL
DIAGNOSIS, 93000: NORMAL
DIFFERENTIAL METHOD BLD: ABNORMAL
EOSINOPHIL # BLD: 0.1 K/UL (ref 0–0.8)
EOSINOPHIL NFR BLD: 2 % (ref 0.5–7.8)
ERYTHROCYTE [DISTWIDTH] IN BLOOD BY AUTOMATED COUNT: 13.6 % (ref 11.9–14.6)
GLOBULIN SER CALC-MCNC: 3.8 G/DL (ref 2.3–3.5)
GLUCOSE BLD STRIP.AUTO-MCNC: 108 MG/DL (ref 65–100)
GLUCOSE BLD STRIP.AUTO-MCNC: 84 MG/DL (ref 65–100)
GLUCOSE SERPL-MCNC: 107 MG/DL (ref 65–100)
GLUCOSE SERPL-MCNC: 94 MG/DL (ref 65–100)
GLUCOSE UR STRIP.AUTO-MCNC: NEGATIVE MG/DL
HCT VFR BLD AUTO: 40.3 % (ref 35.8–46.3)
HGB BLD-MCNC: 14.4 G/DL (ref 11.7–15.4)
HGB UR QL STRIP: NEGATIVE
IMM GRANULOCYTES # BLD: 0.1 K/UL (ref 0–0.5)
IMM GRANULOCYTES NFR BLD AUTO: 1 % (ref 0–5)
KETONES UR QL STRIP.AUTO: NEGATIVE MG/DL
LEUKOCYTE ESTERASE UR QL STRIP.AUTO: NEGATIVE
LYMPHOCYTES # BLD: 0.8 K/UL (ref 0.5–4.6)
LYMPHOCYTES NFR BLD: 16 % (ref 13–44)
MAGNESIUM SERPL-MCNC: 1.7 MG/DL (ref 1.8–2.4)
MCH RBC QN AUTO: 30.9 PG (ref 26.1–32.9)
MCHC RBC AUTO-ENTMCNC: 35.7 G/DL (ref 31.4–35)
MCV RBC AUTO: 86.5 FL (ref 79.6–97.8)
MONOCYTES # BLD: 0.6 K/UL (ref 0.1–1.3)
MONOCYTES NFR BLD: 11 % (ref 4–12)
NEUTS SEG # BLD: 3.6 K/UL (ref 1.7–8.2)
NEUTS SEG NFR BLD: 70 % (ref 43–78)
NITRITE UR QL STRIP.AUTO: NEGATIVE
OSMOLALITY UR: 466 MOSM/KG H2O (ref 50–1400)
P-R INTERVAL, ECG05: 168 MS
P-R INTERVAL, ECG05: 182 MS
PH UR STRIP: 7.5 [PH] (ref 5–9)
PLATELET # BLD AUTO: 256 K/UL (ref 150–450)
PMV BLD AUTO: 9.1 FL (ref 10.8–14.1)
POTASSIUM SERPL-SCNC: 3 MMOL/L (ref 3.5–5.1)
POTASSIUM SERPL-SCNC: 4.4 MMOL/L (ref 3.5–5.1)
PROT SERPL-MCNC: 7 G/DL (ref 6.3–8.2)
PROT UR STRIP-MCNC: NEGATIVE MG/DL
Q-T INTERVAL, ECG07: 404 MS
Q-T INTERVAL, ECG07: 418 MS
QRS DURATION, ECG06: 78 MS
QRS DURATION, ECG06: 80 MS
QTC CALCULATION (BEZET), ECG08: 418 MS
QTC CALCULATION (BEZET), ECG08: 432 MS
RBC # BLD AUTO: 4.66 M/UL (ref 4.05–5.25)
SODIUM SERPL-SCNC: 124 MMOL/L (ref 136–145)
SODIUM SERPL-SCNC: 124 MMOL/L (ref 136–145)
SODIUM UR-SCNC: 130 MMOL/L
SP GR UR REFRACTOMETRY: 1.01 (ref 1–1.02)
TROPONIN I SERPL-MCNC: <0.02 NG/ML (ref 0.02–0.05)
UROBILINOGEN UR QL STRIP.AUTO: 0.2 EU/DL (ref 0.2–1)
VENTRICULAR RATE, ECG03: 60 BPM
VENTRICULAR RATE, ECG03: 69 BPM
WBC # BLD AUTO: 5.2 K/UL (ref 4.3–11.1)

## 2018-01-19 PROCEDURE — 84484 ASSAY OF TROPONIN QUANT: CPT | Performed by: INTERNAL MEDICINE

## 2018-01-19 PROCEDURE — 65270000029 HC RM PRIVATE

## 2018-01-19 PROCEDURE — 74011636320 HC RX REV CODE- 636/320: Performed by: EMERGENCY MEDICINE

## 2018-01-19 PROCEDURE — 83935 ASSAY OF URINE OSMOLALITY: CPT | Performed by: NURSE PRACTITIONER

## 2018-01-19 PROCEDURE — 80053 COMPREHEN METABOLIC PANEL: CPT | Performed by: EMERGENCY MEDICINE

## 2018-01-19 PROCEDURE — 74011000250 HC RX REV CODE- 250: Performed by: EMERGENCY MEDICINE

## 2018-01-19 PROCEDURE — 96360 HYDRATION IV INFUSION INIT: CPT | Performed by: EMERGENCY MEDICINE

## 2018-01-19 PROCEDURE — 94640 AIRWAY INHALATION TREATMENT: CPT

## 2018-01-19 PROCEDURE — 74011000258 HC RX REV CODE- 258: Performed by: EMERGENCY MEDICINE

## 2018-01-19 PROCEDURE — 36415 COLL VENOUS BLD VENIPUNCTURE: CPT | Performed by: NURSE PRACTITIONER

## 2018-01-19 PROCEDURE — 74011000250 HC RX REV CODE- 250: Performed by: INTERNAL MEDICINE

## 2018-01-19 PROCEDURE — 84300 ASSAY OF URINE SODIUM: CPT | Performed by: NURSE PRACTITIONER

## 2018-01-19 PROCEDURE — 93005 ELECTROCARDIOGRAM TRACING: CPT | Performed by: INTERNAL MEDICINE

## 2018-01-19 PROCEDURE — 71046 X-RAY EXAM CHEST 2 VIEWS: CPT

## 2018-01-19 PROCEDURE — 74011250636 HC RX REV CODE- 250/636: Performed by: EMERGENCY MEDICINE

## 2018-01-19 PROCEDURE — 81003 URINALYSIS AUTO W/O SCOPE: CPT | Performed by: EMERGENCY MEDICINE

## 2018-01-19 PROCEDURE — 99285 EMERGENCY DEPT VISIT HI MDM: CPT | Performed by: EMERGENCY MEDICINE

## 2018-01-19 PROCEDURE — 74011250636 HC RX REV CODE- 250/636: Performed by: INTERNAL MEDICINE

## 2018-01-19 PROCEDURE — 96361 HYDRATE IV INFUSION ADD-ON: CPT | Performed by: EMERGENCY MEDICINE

## 2018-01-19 PROCEDURE — 94760 N-INVAS EAR/PLS OXIMETRY 1: CPT

## 2018-01-19 PROCEDURE — 80048 BASIC METABOLIC PNL TOTAL CA: CPT | Performed by: NURSE PRACTITIONER

## 2018-01-19 PROCEDURE — 74011250637 HC RX REV CODE- 250/637: Performed by: INTERNAL MEDICINE

## 2018-01-19 PROCEDURE — 83735 ASSAY OF MAGNESIUM: CPT | Performed by: EMERGENCY MEDICINE

## 2018-01-19 PROCEDURE — 82962 GLUCOSE BLOOD TEST: CPT

## 2018-01-19 PROCEDURE — 85025 COMPLETE CBC W/AUTO DIFF WBC: CPT | Performed by: EMERGENCY MEDICINE

## 2018-01-19 PROCEDURE — 93005 ELECTROCARDIOGRAM TRACING: CPT | Performed by: EMERGENCY MEDICINE

## 2018-01-19 PROCEDURE — 71260 CT THORAX DX C+: CPT

## 2018-01-19 RX ORDER — SODIUM CHLORIDE 0.9 % (FLUSH) 0.9 %
5-10 SYRINGE (ML) INJECTION AS NEEDED
Status: DISCONTINUED | OUTPATIENT
Start: 2018-01-19 | End: 2018-01-22 | Stop reason: HOSPADM

## 2018-01-19 RX ORDER — MORPHINE SULFATE 2 MG/ML
2 INJECTION, SOLUTION INTRAMUSCULAR; INTRAVENOUS
Status: DISCONTINUED | OUTPATIENT
Start: 2018-01-19 | End: 2018-01-21 | Stop reason: SDUPTHER

## 2018-01-19 RX ORDER — BUDESONIDE 0.5 MG/2ML
500 INHALANT ORAL
Status: DISCONTINUED | OUTPATIENT
Start: 2018-01-19 | End: 2018-01-22 | Stop reason: HOSPADM

## 2018-01-19 RX ORDER — PROPRANOLOL HYDROCHLORIDE 60 MG/1
60 CAPSULE, EXTENDED RELEASE ORAL DAILY
Status: DISCONTINUED | OUTPATIENT
Start: 2018-01-20 | End: 2018-01-22 | Stop reason: HOSPADM

## 2018-01-19 RX ORDER — LISINOPRIL 20 MG/1
20 TABLET ORAL DAILY
Status: DISCONTINUED | OUTPATIENT
Start: 2018-01-20 | End: 2018-01-22 | Stop reason: HOSPADM

## 2018-01-19 RX ORDER — CLOPIDOGREL BISULFATE 75 MG/1
75 TABLET ORAL DAILY
Status: DISCONTINUED | OUTPATIENT
Start: 2018-01-20 | End: 2018-01-22 | Stop reason: HOSPADM

## 2018-01-19 RX ORDER — IPRATROPIUM BROMIDE AND ALBUTEROL SULFATE 2.5; .5 MG/3ML; MG/3ML
3 SOLUTION RESPIRATORY (INHALATION)
Status: DISCONTINUED | OUTPATIENT
Start: 2018-01-19 | End: 2018-01-22 | Stop reason: HOSPADM

## 2018-01-19 RX ORDER — ACETAMINOPHEN 325 MG/1
650 TABLET ORAL
Status: DISCONTINUED | OUTPATIENT
Start: 2018-01-19 | End: 2018-01-22 | Stop reason: HOSPADM

## 2018-01-19 RX ORDER — SODIUM CHLORIDE 0.9 % (FLUSH) 0.9 %
5-10 SYRINGE (ML) INJECTION EVERY 8 HOURS
Status: DISCONTINUED | OUTPATIENT
Start: 2018-01-19 | End: 2018-01-22 | Stop reason: HOSPADM

## 2018-01-19 RX ORDER — SODIUM CHLORIDE 9 MG/ML
1000 INJECTION, SOLUTION INTRAVENOUS ONCE
Status: COMPLETED | OUTPATIENT
Start: 2018-01-19 | End: 2018-01-19

## 2018-01-19 RX ORDER — IPRATROPIUM BROMIDE AND ALBUTEROL SULFATE 2.5; .5 MG/3ML; MG/3ML
3 SOLUTION RESPIRATORY (INHALATION)
Status: COMPLETED | OUTPATIENT
Start: 2018-01-19 | End: 2018-01-19

## 2018-01-19 RX ORDER — LANOLIN ALCOHOL/MO/W.PET/CERES
400 CREAM (GRAM) TOPICAL DAILY
Status: DISCONTINUED | OUTPATIENT
Start: 2018-01-20 | End: 2018-01-22 | Stop reason: HOSPADM

## 2018-01-19 RX ORDER — AMLODIPINE BESYLATE 5 MG/1
2.5 TABLET ORAL DAILY
Status: DISCONTINUED | OUTPATIENT
Start: 2018-01-20 | End: 2018-01-22 | Stop reason: HOSPADM

## 2018-01-19 RX ORDER — ISOSORBIDE MONONITRATE 60 MG/1
60 TABLET, EXTENDED RELEASE ORAL DAILY
Status: DISCONTINUED | OUTPATIENT
Start: 2018-01-20 | End: 2018-01-20

## 2018-01-19 RX ORDER — ENOXAPARIN SODIUM 100 MG/ML
40 INJECTION SUBCUTANEOUS EVERY 24 HOURS
Status: DISCONTINUED | OUTPATIENT
Start: 2018-01-19 | End: 2018-01-22 | Stop reason: HOSPADM

## 2018-01-19 RX ORDER — MAGNESIUM SULFATE HEPTAHYDRATE 40 MG/ML
2 INJECTION, SOLUTION INTRAVENOUS ONCE
Status: COMPLETED | OUTPATIENT
Start: 2018-01-19 | End: 2018-01-19

## 2018-01-19 RX ORDER — ONDANSETRON 2 MG/ML
4 INJECTION INTRAMUSCULAR; INTRAVENOUS
Status: DISCONTINUED | OUTPATIENT
Start: 2018-01-19 | End: 2018-01-22 | Stop reason: HOSPADM

## 2018-01-19 RX ORDER — SODIUM CHLORIDE 0.9 % (FLUSH) 0.9 %
10 SYRINGE (ML) INJECTION
Status: COMPLETED | OUTPATIENT
Start: 2018-01-19 | End: 2018-01-19

## 2018-01-19 RX ORDER — ASPIRIN 81 MG/1
81 TABLET ORAL
Status: DISCONTINUED | OUTPATIENT
Start: 2018-01-19 | End: 2018-01-22 | Stop reason: HOSPADM

## 2018-01-19 RX ORDER — PANTOPRAZOLE SODIUM 40 MG/1
40 TABLET, DELAYED RELEASE ORAL 2 TIMES DAILY
Status: DISCONTINUED | OUTPATIENT
Start: 2018-01-19 | End: 2018-01-22 | Stop reason: HOSPADM

## 2018-01-19 RX ADMIN — ACETAMINOPHEN 650 MG: 325 TABLET ORAL at 21:58

## 2018-01-19 RX ADMIN — BUDESONIDE 500 MCG: 0.5 INHALANT RESPIRATORY (INHALATION) at 21:51

## 2018-01-19 RX ADMIN — IOPAMIDOL 100 ML: 755 INJECTION, SOLUTION INTRAVENOUS at 14:29

## 2018-01-19 RX ADMIN — MAGNESIUM SULFATE HEPTAHYDRATE 2 G: 40 INJECTION, SOLUTION INTRAVENOUS at 12:15

## 2018-01-19 RX ADMIN — ASPIRIN 81 MG: 81 TABLET, COATED ORAL at 21:54

## 2018-01-19 RX ADMIN — Medication 10 ML: at 14:29

## 2018-01-19 RX ADMIN — IPRATROPIUM BROMIDE AND ALBUTEROL SULFATE 3 ML: .5; 3 SOLUTION RESPIRATORY (INHALATION) at 11:20

## 2018-01-19 RX ADMIN — Medication 10 ML: at 16:05

## 2018-01-19 RX ADMIN — Medication 10 ML: at 21:54

## 2018-01-19 RX ADMIN — ENOXAPARIN SODIUM 40 MG: 40 INJECTION SUBCUTANEOUS at 16:04

## 2018-01-19 RX ADMIN — MORPHINE SULFATE 2 MG: 2 INJECTION, SOLUTION INTRAMUSCULAR; INTRAVENOUS at 13:47

## 2018-01-19 RX ADMIN — SODIUM CHLORIDE 1000 ML: 900 INJECTION, SOLUTION INTRAVENOUS at 10:11

## 2018-01-19 RX ADMIN — SODIUM CHLORIDE 100 ML: 900 INJECTION, SOLUTION INTRAVENOUS at 14:29

## 2018-01-19 RX ADMIN — PANTOPRAZOLE SODIUM 40 MG: 40 TABLET, DELAYED RELEASE ORAL at 16:38

## 2018-01-19 NOTE — PROGRESS NOTES
Dual skin assessment completed by this nurse and Savanah NGUYEN. Pt has no noted skin breakdown on the sacrum or heels. Skin is intact and appropriate for ethnicity. Will continue to monitor. Upper partial present.

## 2018-01-19 NOTE — ED PROVIDER NOTES
HPI Comments: Presents with complaint of low sodium. Patient was seen by her primary care provider and had labs and was sent to the ER for low sodium. She reports feeling poorly for the past 3 weeks, not eating or drinking well, lost 12 pounds, cough and shortness of breath. She has been on Levaquin 2 different doses and got a steroid shot yesterday. Reports feeling weak all over and near syncope worsened yesterday. Patient is a 81 y.o. female presenting with abnormal lab results. The history is provided by the patient. Abnormal Lab Results   This is a new problem. The current episode started more than 1 week ago. The problem has been gradually worsening. Associated symptoms include shortness of breath. Pertinent negatives include no chest pain and no abdominal pain. The symptoms are aggravated by coughing. Treatments tried: levaquin. The treatment provided no relief. Past Medical History:   Diagnosis Date    Abdominal pain 10/28/2014    [facility])     pt denies    Arthritis     osteo - low back,  shoulders, hips, low back    Bilateral carotid bruits     Bursitis     CAD (coronary artery disease)     4 vessel CABG 2005;--- stents x 4--- last one placed 2014-- followed by dr Dory Crowley Chronic pain     left shoulder    Coagulation defects     on plavix    Constipation     Cough 09/11/2014    10/8/14, Methacholine Challenge Study: The point at which the FEV1 fell by at least 20%, the PC20, occurred above a dose of 25mg/mL of methacholine. This rules out the diagnosis of asthma with an extremely high degree of sensitivity. No post-challenge FEV1 recovery was identified. Ella Ellis MD        Depressive disorder     Dyspnea 09/11/2014    [facility]; 9/29/14: IMPRESSION: The flow volume loop is consistent restriction. Spirometry suggest restriction with concomitant obstruction at low lung volumes. There is not a significant bronchodilator response.  Lung volumes reveal mild restriction.  The unadjusted diffusion capacity is normal.  Mazin Brumfield MD      GERD (gastroesophageal reflux disease)     controlled with med    Headache     Hoarseness or changing voice     thougfht to be related to gerd    Hyperlipidemia     Hypertension     controlled with med    Kidney stone     yrs ago-- no tx required    Lumbago     Macular degeneration     Nodule of left lung     dx'ed 4 years ago-watched for several months-no new growth-being watched-yearly CT scan----- followed by dr. Andree Childers Pain in joint, ankle and foot     left 3rd toe and right 2nd toe    Pain in joint, shoulder region     left now bothering > right, right ok    Palpitations 2015    followed by dr Radha Tyson Panic disorder     panic attacks -- last one     Renal mass 2016    ablation---left side--- followed by dr Kristel Gilbert in [place]    Sciatica     Vascular headache        Past Surgical History:   Procedure Laterality Date    ABDOMEN SURGERY PROC UNLISTED Left 2016    ablation for mass in left side of abd    HX CATARACT REMOVAL      left    HX CATARACT REMOVAL      HX COLONOSCOPY  12/15/2015    diverticulosis, internal hemorrhoid, colon polyp- no further colonoscopies needed    HX COLONOSCOPY  2016    HX CORONARY ARTERY BYPASS GRAFT      4 vessel CABG     HX HEART CATHETERIZATION      stent     HX HEART CATHETERIZATION      stent 2006    HX HEENT Left     macular pucker    HX HERNIA REPAIR Left 2017    HX ORTHOPAEDIC      finger, foot    HX PARTIAL HYSTERECTOMY      hyst         Family History:   Problem Relation Age of Onset    Diabetes Mother     Heart Surgery Mother     Heart Disease Mother     Heart Attack Father     Heart Disease Father     Cancer Sister      2 sisters w/ lung ca-neither one smoked    Heart Disease Sister     Cancer Brother      lung ca-smoker    Heart Attack Brother       age 22 of [place]    Heart Disease Sister     Heart Surgery Sister  Heart Attack Sister     Heart Disease Sister     Heart Surgery Sister     Heart Attack Sister     Heart Surgery [place]       [place] during 2[place] heart [place]    Heart Disease Brother     No Known Problems Maternal Grandmother     No Known Problems Maternal Grandfather     No Known Problems Paternal Grandmother     No Known Problems Paternal Grandfather        Social History     Social History    Marital status:      Spouse name: N/A    Number of children: N/A    Years of education: N/A     Occupational History    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 , 2 sons. Denies alcohol use. Has always lived in [place]. 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 ALLERGIES: Augmentin [amoxicillin-pot clavulanate]; Codeine; Gabapentin; Other medication; Prednisone; and Prevnar 13 [pneumoc 13-chang conj-dip cr(pf)]    Review of Systems   Constitutional: Negative for chills and fever. Respiratory: Positive for shortness of breath. Cardiovascular: Negative for chest pain. Gastrointestinal: Negative for abdominal pain. All other systems reviewed and are negative.       Vitals:    18 1000 18 1121   BP: 146/82    Pulse: 68    Resp: 18    Temp: 98.2 °F (36.8 °C)    SpO2: 99% 96%   Weight: 68 kg (150 lb)    Height: 5' 4\" (1.626 m)             Physical Exam   Constitutional: She is oriented to person, place, and time. She appears well-developed and well-nourished. No distress. HENT:   Head: Normocephalic and atraumatic. Mouth/Throat: No oropharyngeal exudate. Neck: Normal range of motion. Cardiovascular: Normal rate and regular rhythm. Pulmonary/Chest: Effort normal. No respiratory distress. She has wheezes. She has no rales. Abdominal: Soft. She exhibits no distension. There is no tenderness. There is no rebound and no guarding. Musculoskeletal: Normal range of motion. She exhibits no edema. Neurological: She is alert and oriented to person, place, and time. No cranial nerve deficit. Skin: Skin is warm and dry. No rash noted. She is not diaphoretic. No erythema. Psychiatric: She has a normal mood and affect. Her behavior is normal.   Nursing note and vitals reviewed. MDM  Number of Diagnoses or Management Options  Hypomagnesemia:   Hyponatremia:   Diagnosis management comments: Pt has hx of restrictive lung dz. Started on levo 750 yesterday. No prednisone but given kenalog shot.   Bolus fluids and replace mag and admit       Amount and/or Complexity of Data Reviewed  Clinical lab tests: reviewed and ordered  Review and summarize past medical records: yes  Discuss the patient with other providers: yes  Independent visualization of images, tracings, or specimens: yes (nsr no ST elevation nl QRS)    Risk of Complications, Morbidity, and/or Mortality  Presenting problems: high  Diagnostic procedures: moderate  Management options: high    Patient Progress  Patient progress: stable    ED Course       Procedures

## 2018-01-19 NOTE — H&P
HOSPITALIST H&P/CONSULT  NAME:  Glen Poole   Age:  81 y.o.  :   1935   MRN:   [id number]  PCP: Veronica Murillo MD  Consulting MD:  Treatment Team: Attending Provider: Tabitah Colon MD; Primary Nurse: Tam Aleman RN  HPI:     Pt is a 80 yo female with pmh CAD, benign pulm nodule, depression, GERD who presented to ER this morning after findings of hypernatremia Na 119 and with c/o dizziness, weakness, poor appetite, cough x 3 weeks. Her Na on 17 was WNL. No change in meds and Na today 124. She did start on Levaquin yesterday per PCP for URI. She appears euvolemic but states has not been eating/drinking per her normal.  Denies nausea, vomiting, diarrhea. She has good 02 sats but has significant wheezing on exam.  Hemodynamically stable. SR on monitor. Complete ROS done and is as stated in HPI or otherwise negative  Past Medical History:   Diagnosis Date    Abdominal pain 10/28/2014    Angina at rest [facility])     pt denies    Arthritis     osteo - low back,  shoulders, hips, low back    Bilateral carotid bruits     Bursitis     CAD (coronary artery disease)     4 vessel CABG ;--- stents x 4--- last one placed -- followed by dr John Lama Chronic pain     left shoulder    Coagulation defects     on plavix    Constipation     Cough 2014    10/8/14, Methacholine Challenge Study: The point at which the FEV1 fell by at least 20%, the PC20, occurred above a dose of 25mg/mL of methacholine. This rules out the diagnosis of asthma with an extremely high degree of sensitivity. No post-challenge FEV1 recovery was identified. Jory Lynn MD        Depressive disorder     Dyspnea 2014    [facility] in [place]; 14: IMPRESSION: The flow volume loop is consistent restriction. Spirometry suggest restriction with concomitant obstruction at low lung volumes. There is not a significant bronchodilator response. Lung volumes reveal mild restriction.  The unadjusted diffusion capacity is normal.  Lysle Carrel, MD      GERD (gastroesophageal reflux disease)     controlled with med    Headache     Hoarseness or changing voice     thougfht to be related to gerd    Hyperlipidemia     Hypertension     controlled with med    Kidney stone     yrs ago-- no tx required    Lumbago     Macular degeneration     Nodule of left lung     dx'ed 4 years ago-watched for several months-no new growth-being watched-yearly CT scan----- followed by dr. cSooter Buenrostro Pain in joint, ankle and foot     left 3rd toe and right 2nd toe    Pain in joint, shoulder region     left now bothering > right, right ok    Palpitations 09/24/2015    followed by dr Ronak Jung Panic disorder     panic attacks -- last one 2014    Renal mass 07/2016    ablation---left side--- followed by dr Rochelle Knott in [place]    Sciatica     Vascular headache       Past Surgical History:   Procedure Laterality Date    ABDOMEN SURGERY PROC UNLISTED Left 07/2016    ablation for mass in left side of abd    HX CATARACT REMOVAL      left    HX CATARACT REMOVAL      HX COLONOSCOPY  12/15/2015    diverticulosis, internal hemorrhoid, colon polyp- no further colonoscopies needed    HX COLONOSCOPY  06/2016    HX CORONARY ARTERY BYPASS GRAFT      4 vessel CABG 2005    HX HEART CATHETERIZATION      stent 2014    HX HEART CATHETERIZATION      stent 2006    HX HEENT Left     macular pucker    HX HERNIA REPAIR Left 2017    HX ORTHOPAEDIC      finger, foot    HX PARTIAL HYSTERECTOMY      hyst      Prior to Admission Medications   Prescriptions Last Dose Informant Patient Reported? Taking? B.infantis-B.ani-B.long-B.bifi (PROBIOTIC 4X) 10-15 mg TbEC   Yes No   Sig: Take  by mouth. HYDROcodone-homatropine (HYCODAN) 5-1.5 mg/5 mL (5 mL) syrup   No No   Sig: Take 5-10 mL by mouth four (4) times daily. Max Daily Amount: 40 mL.    Potassium Gluconate 595 mg (99 mg) tablet   Yes No   Sig: Take 595 mg by mouth two (2) times a day. VIT A/VIT C/VIT E/ZINC/COPPER (PRESERVISION AREDS PO)   Yes No   Sig: Take 1 Tab by mouth two (2) times a day. Stopped 1/3/17   acetaminophen (TYLENOL) 325 mg tablet   Yes No   Sig: Take  by mouth every four (4) hours as needed for Pain. amLODIPine (NORVASC) 2.5 mg tablet   No No   Sig: Take 1 Tab by mouth daily. Indications: hypertension   aspirin 81 mg tablet   Yes No   Sig: Take 81 mg by mouth nightly. clopidogrel (PLAVIX) 75 mg tab   No No   Sig: Take 1 Tab by mouth daily. coenzyme q10 (CO Q-10) 10 mg cap   Yes No   Sig: Take  by mouth.   cyanocobalamin (VITAMIN B-12) 1,000 mcg tablet   Yes No   Sig: Take 1,000 mcg by mouth daily. escitalopram oxalate (LEXAPRO) 5 mg tablet   No No   Sig: Take 1 Tab by mouth daily. furosemide (LASIX) 40 mg tablet   No No   Sig: Take 1 Tab by mouth daily. isosorbide mononitrate ER (IMDUR) 60 mg CR tablet   No No   Sig: Take 1 Tab by mouth every morning. levoFLOXacin (LEVAQUIN) 750 mg tablet   No No   Sig: Take 1 Tab by mouth daily. lisinopril-hydroCHLOROthiazide (PRINZIDE, ZESTORETIC) 20-25 mg per tablet   No No   Sig: Take 1 Tab by mouth every morning. loratadine (CLARITIN) 10 mg tablet   Yes No   Sig: Take 10 mg by mouth.   magnesium oxide (MAG-OX) 400 mg tablet   Yes No   Sig: Take 400 mg by mouth daily. nitroglycerin (NITROLINGUAL) 400 mcg/spray spray   No No   Si Spray by SubLINGual route every five (5) minutes as needed for Chest Pain.   pantoprazole (PROTONIX) 40 mg tablet   No No   Sig: Take 1 Tab by mouth two (2) times a day. Indications: gastroesophageal reflux disease, samples   propranolol LA (INDERAL LA) 60 mg SR capsule   No No   Sig: Take 1 Cap by mouth daily. triamcinolone acetonide (KENALOG) 40 mg/mL injection   No No   Si mL by Intra artICUlar route once for 1 dose.       Facility-Administered Medications: None     Allergies   Allergen Reactions    Augmentin [Amoxicillin-Pot Clavulanate] Other (comments)     Causes yeast infection    Codeine Other (comments)     Made my heart go crazy    Gabapentin Drowsiness    Other Medication Other (comments)     Conjugated estrogens methyltestosterone  Headaches    Prednisone Other (comments)     Visual loss and headache    Prevnar 13 [Pneumoc 13-Sierra Conj-Dip Cr(Pf)] Other (comments)     Fever, arm pain, redness, hallucinations, flu like symptoms, chest pain      Social History   Substance Use Topics    Smoking status: Never Smoker    Smokeless tobacco: Never Used    Alcohol use No      Family History   Problem Relation Age of Onset    Diabetes Mother     Heart Surgery Mother     Heart Disease Mother     Heart Attack Father     Heart Disease Father     Cancer Sister      2 sisters w/ lung ca-neither one smoked    Heart Disease Sister     Cancer Brother      lung ca-smoker    Heart Attack Brother       age 22 of MI    Heart Disease Sister     Heart Surgery Sister     Heart Attack Sister     Heart Disease Sister     Heart Surgery Sister     Heart Attack Sister     Heart Surgery Brother       in [place] during 2nd heart surgery    Heart Disease Brother     No Known Problems Maternal Grandmother     No Known Problems Maternal Grandfather     No Known Problems Paternal Grandmother     No Known Problems Paternal Grandfather       Objective:     Visit Vitals    /72    Pulse 63    Temp 98.2 °F (36.8 °C)    Resp 18    Ht 5' 4\" (1.626 m)    Wt 68 kg (150 lb)    SpO2 96%    BMI 25.75 kg/m2      Temp (24hrs), Av.2 °F (36.8 °C), Min:98.2 °F (36.8 °C), Max:98.2 °F (36.8 °C)    Oxygen Therapy  O2 Sat (%): 96 % (18 1121)  O2 Device: Room air (18 1121)    Physical Exam:  General:    Alert, cooperative, no distress, appears stated age. Head:   Normocephalic, without obvious abnormality, atraumatic. Nose:  Nares normal. No drainage or sinus tenderness.   Lungs:   Wheezing/rhonchi throughout   Heart:   Regular rate and rhythm,  no murmur, rub or gallop. Abdomen:   Soft, non-tender. Not distended. Bowel sounds normal.   Extremities: No cyanosis. No edema. No clubbing  Skin:     Texture, turgor normal. No rashes or lesions. Not Jaundiced  Neurologic: Alert and oriented x 3, no focal deficits     Data Review:   Recent Results (from the past 24 hour(s))   CBC WITH AUTOMATED DIFF    Collection Time: 01/18/18  3:29 PM   Result Value Ref Range    WBC 5.6 3.4 - 10.8 x10E3/uL    RBC 4.36 3.77 - 5.28 x10E6/uL    HGB 13.6 11.1 - 15.9 g/dL    HCT 38.6 34.0 - 46.6 %    MCV 89 79 - 97 fL    MCH 31.2 26.6 - 33.0 pg    MCHC 35.2 31.5 - 35.7 g/dL    RDW 14.5 12.3 - 15.4 %    PLATELET 975 170 - 698 x10E3/uL    NEUTROPHILS 62 Not Estab. %    Lymphocytes 24 Not Estab. %    MONOCYTES 12 Not Estab. %    EOSINOPHILS 2 Not Estab. %    BASOPHILS 0 Not Estab. %    ABS. NEUTROPHILS 3.4 1.4 - 7.0 x10E3/uL    Abs Lymphocytes 1.3 0.7 - 3.1 x10E3/uL    ABS. MONOCYTES 0.7 0.1 - 0.9 x10E3/uL    ABS. EOSINOPHILS 0.1 0.0 - 0.4 x10E3/uL    ABS. BASOPHILS 0.0 0.0 - 0.2 x10E3/uL    IMMATURE GRANULOCYTES 0 Not Estab. %    ABS. IMM.  GRANS. 0.0 0.0 - 0.1 S29Y6/RM   METABOLIC PANEL, BASIC    Collection Time: 01/18/18  3:29 PM   Result Value Ref Range    Glucose 141 (H) 65 - 99 mg/dL    BUN 12 8 - 27 mg/dL    Creatinine 0.89 0.57 - 1.00 mg/dL    GFR est non-AA 61 >59 mL/min/1.73    GFR est AA 70 >59 mL/min/1.73    BUN/Creatinine ratio 13 12 - 28    Sodium 119 (<) 134 - 144 mmol/L    Potassium 4.0 3.5 - 5.2 mmol/L    Chloride 78 (L) 96 - 106 mmol/L    CO2 28 18 - 29 mmol/L    Calcium 8.6 (L) 8.7 - 10.3 mg/dL   CBC WITH AUTOMATED DIFF    Collection Time: 01/19/18 10:04 AM   Result Value Ref Range    WBC 5.2 4.3 - 11.1 K/uL    RBC 4.66 4.05 - 5.25 M/uL    HGB 14.4 11.7 - 15.4 g/dL    HCT 40.3 35.8 - 46.3 %    MCV 86.5 79.6 - 97.8 FL    MCH 30.9 26.1 - 32.9 PG    MCHC 35.7 (H) 31.4 - 35.0 g/dL    RDW 13.6 11.9 - 14.6 %    PLATELET 981 086 - 046 K/uL    MPV 9.1 (L) 10.8 - 14.1 FL    DF AUTOMATED NEUTROPHILS 70 43 - 78 %    LYMPHOCYTES 16 13 - 44 %    MONOCYTES 11 4.0 - 12.0 %    EOSINOPHILS 2 0.5 - 7.8 %    BASOPHILS 0 0.0 - 2.0 %    IMMATURE GRANULOCYTES 1 0.0 - 5.0 %    ABS. NEUTROPHILS 3.6 1.7 - 8.2 K/UL    ABS. LYMPHOCYTES 0.8 0.5 - 4.6 K/UL    ABS. MONOCYTES 0.6 0.1 - 1.3 K/UL    ABS. EOSINOPHILS 0.1 0.0 - 0.8 K/UL    ABS. BASOPHILS 0.0 0.0 - 0.2 K/UL    ABS. IMM. GRANS. 0.1 0.0 - 0.5 K/UL   METABOLIC PANEL, COMPREHENSIVE    Collection Time: 01/19/18 10:04 AM   Result Value Ref Range    Sodium 124 (LL) 136 - 145 mmol/L    Potassium 4.4 3.5 - 5.1 mmol/L    Chloride 87 (L) 98 - 107 mmol/L    CO2 27 21 - 32 mmol/L    Anion gap 10 7 - 16 mmol/L    Glucose 94 65 - 100 mg/dL    BUN 9 8 - 23 MG/DL    Creatinine 0.79 0.6 - 1.0 MG/DL    GFR est AA >60 >60 ml/min/1.73m2    GFR est non-AA >60 >60 ml/min/1.73m2    Calcium 8.5 8.3 - 10.4 MG/DL    Bilirubin, total 0.5 0.2 - 1.1 MG/DL    ALT (SGPT) 11 (L) 12 - 65 U/L    AST (SGOT) 22 15 - 37 U/L    Alk. phosphatase 73 50 - 136 U/L    Protein, total 7.0 6.3 - 8.2 g/dL    Albumin 3.2 3.2 - 4.6 g/dL    Globulin 3.8 (H) 2.3 - 3.5 g/dL    A-G Ratio 0.8 (L) 1.2 - 3.5     MAGNESIUM    Collection Time: 01/19/18 10:04 AM   Result Value Ref Range    Magnesium 1.7 (L) 1.8 - 2.4 mg/dL   EKG, 12 LEAD, INITIAL    Collection Time: 01/19/18 12:06 PM   Result Value Ref Range    Ventricular Rate 60 BPM    Atrial Rate 60 BPM    P-R Interval 168 ms    QRS Duration 80 ms    Q-T Interval 418 ms    QTC Calculation (Bezet) 418 ms    Calculated P Axis 70 degrees    Calculated R Axis 74 degrees    Calculated T Axis 81 degrees    Diagnosis       !! AGE AND GENDER SPECIFIC ECG ANALYSIS !!   Normal sinus rhythm  ST abnormality, possible digitalis effect  Abnormal ECG  When compared with ECG of 21-DEC-2017 13:30,  No significant change was found  Confirmed by Christiano Francois ([id number]) on 1/19/2018 12:18:16 PM     URINALYSIS W/ RFLX MICROSCOPIC    Collection Time: 01/19/18 12:11 PM   Result Value Ref Range    Color YELLOW      Appearance CLOUDY      Specific gravity 1.013 1.001 - 1.023      pH (UA) 7.5 5.0 - 9.0      Protein NEGATIVE  NEG mg/dL    Glucose NEGATIVE  mg/dL    Ketone NEGATIVE  NEG mg/dL    Bilirubin NEGATIVE  NEG      Blood NEGATIVE  NEG      Urobilinogen 0.2 0.2 - 1.0 EU/dL    Nitrites NEGATIVE  NEG      Leukocyte Esterase NEGATIVE  NEG       Imaging /Procedures /Studies     Assessment and Plan: Active Hospital Problems    Diagnosis Date Noted    Hyponatremia 01/19/2018       A/P:    Hyponatremia- Unknown etiology but will hold Lexapro, HCTZ, Lasix. Does appear euvolemic but states has not been eating/drinking well. Urine studies and plasma osmo pending to assess for SIADH. May need CT chest, hx of pulm nodule. NS @ 100 and trend BMP q 8 hours. URI- Chest x ray WNL. Start Pulmicort, PRN duo nebs. Pt declines steroids due to reaction. No fever, no leukocytosis so will hold off on abx. CAD- Cont ASA, plavix, lisinopril. Hold Lasix, HCTZ. Dizziness, weakness- Consult PT. Obtain orthostatic vitals. DC planning- [facility] in 2-3 days.      DVT Prophylaxis:  Lovenox   Code Status: Full  Anticipated discharge: 48-72 hours     Signed By: Jaky Sanchez NP     January 19, 2018

## 2018-01-19 NOTE — IP AVS SNAPSHOT
Monse Hickey 
 
 
 [address] [facility] [address] [facility] 
[phone] Patient: Leanna Peoples MRN: [id number] OAO:[id number] About your hospitalization You were admitted on:  January 19, 2018 You last received care in the:  [facility] 6 MED SURG You were discharged on:  January 22, 2018 Why you were hospitalized Your primary diagnosis was:  Hyponatremia Your diagnoses also included:  S/P Cabg (Coronary Artery Bypass Graft), Cad (Coronary Artery Disease), Hypokalemia Due To Inadequate Potassium Intake Follow-up Information Follow up With Details Comments Contact Info Dannie Grider MD On 2/1/2018 at 11:00 [address] 
[phone] Your Scheduled Appointments Monday January 22, 2018  2:30 PM EST Follow Up with Dannie Grider MD  
[address] [place] [address] [address]  
[phone] Tuesday January 30, 2018  4:00 PM EST Office Visit with Brayan Mendes DO  
[facility] CARDIOLOGY [place] OFFICE ([address] [place] [address]) [address] [place] [address]  
[phone] Thursday February 01, 2018 11:00 AM EST Follow Up with Dannie Grider MD  
[address] [place] [address] [address]  
[phone] Discharge Orders None A check mary indicates which time of day the medication should be taken. My Medications START taking these medications Instructions Each Dose to Equal  
 Morning Noon Evening Bedtime  
 lisinopril 20 mg tablet Commonly known as:  Fiona Dimas Your next dose is:  Tomorrow Morning Take 1 Tab by mouth daily. 20 mg CONTINUE taking these medications Instructions Each Dose to Equal  
 Morning Noon Evening Bedtime  
 amLODIPine 2.5 mg tablet Commonly known as:  Keith Teague Your next dose is:  Tomorrow Morning Take 1 Tab by mouth daily. Indications: hypertension 2.5 mg  
    
  
   
   
   
  
 aspirin 81 mg tablet Your next dose is: This evening Take 81 mg by mouth nightly. 81 mg CLARITIN 10 mg tablet Generic drug:  loratadine Your next dose is:  Tomorrow Morning Take 10 mg by mouth. 10 mg  
    
  
   
   
   
  
 clopidogrel 75 mg Tab Commonly known as:  PLAVIX Your next dose is:  Tomorrow Morning Take 1 Tab by mouth daily. 75 mg  
    
  
   
   
   
  
 CO Q-10 10 mg Cap Generic drug:  coenzyme q10 Your next dose is:  Tomorrow Morning Take  by mouth.  
     
  
   
   
   
  
 escitalopram oxalate 5 mg tablet Commonly known as:  Clevester Clap Your next dose is:  Tomorrow Morning Take 1 Tab by mouth daily. 5 mg HYDROcodone-homatropine 5-1.5 mg/5 mL (5 mL) syrup Commonly known as:  HYCODAN Your next dose is: This afternoon Take 5-10 mL by mouth four (4) times daily. Max Daily Amount: 40 mL. 5-10 mL  
    
  
   
  
   
  
   
  
  
 isosorbide mononitrate ER 60 mg CR tablet Commonly known as:  IMDUR Your next dose is:  Tomorrow Morning Take 1 Tab by mouth every morning. 60 mg  
    
  
   
   
   
  
 magnesium oxide 400 mg tablet Commonly known as:  MAG-OX Your next dose is:  Tomorrow Morning Take 400 mg by mouth daily. 400 mg  
    
  
   
   
   
  
 nitroglycerin 400 mcg/spray spray Commonly known as:  Ileene Snare Your next dose is: Take on as needed schedule 1 [place] by SubLINGual route every five (5) minutes as needed for Chest Pain. 1 Spray  
    
   
   
   
  
 pantoprazole 40 mg tablet Commonly known as:  PROTONIX Your next dose is: This evening Take 1 Tab by mouth two (2) times a day.  Indications: gastroesophageal reflux disease, samples 40 mg  
    
  
   
   
  
   
  
 Potassium Gluconate 595 mg (99 mg) tablet Your next dose is: This evening Take 595 mg by mouth two (2) times a day. 595 mg PRESERVISION AREDS PO Your next dose is: This evening Take 1 Tab by mouth two (2) times a day. Stopped 1/3/17  
 1 Tab PROBIOTIC 4X 10-15 mg Tbec Generic drug:  B.infantis-B.ani-B.long-B.bifi Your next dose is:  Tomorrow Morning Take  by mouth.  
     
  
   
   
   
  
 propranolol LA 60 mg SR capsule Commonly known as:  INDERAL LA Your next dose is:  Tomorrow Morning Take 1 Cap by mouth daily. 60 mg  
    
  
   
   
   
  
 TYLENOL 325 mg tablet Generic drug:  acetaminophen Your next dose is: Take on as needed schedule Take  by mouth every four (4) hours as needed for Pain. VITAMIN B-12 1,000 mcg tablet Generic drug:  cyanocobalamin Your next dose is:  Tomorrow Morning Take 1,000 mcg by mouth daily. 1000 mcg STOP taking these medications   
 furosemide 40 mg tablet Commonly known as:  LASIX  
   
  
 levoFLOXacin 750 mg tablet Commonly known as:  LEVAQUIN  
   
  
 lisinopril-hydroCHLOROthiazide 20-25 mg per tablet Commonly known as:  PRINZIDE, ZESTORETIC  
   
  
 REPATHA SURECLICK pen injection Generic drug:  evolocumab  
   
  
 triamcinolone acetonide 40 mg/mL injection Commonly known as:  KENALOG Where to Get Your Medications Information on where to get these meds will be given to you by the nurse or doctor. ! Ask your nurse or doctor about these medications  
  lisinopril 20 mg tablet Discharge Instructions DISCHARGE SUMMARY from Nurse PATIENT INSTRUCTIONS: 
 
 
F-face looks uneven A-arms unable to move or move unevenly S-speech slurred or non-existent T-time-call 911 as soon as signs and symptoms begin-DO NOT go Back to bed or wait to see if you get better-TIME IS BRAIN. Warning Signs of HEART ATTACK Call 911 if you have these symptoms: 
? Chest discomfort. Most heart attacks involve discomfort in the center of the chest that lasts more than a few minutes, or that goes away and comes back. It can feel like uncomfortable pressure, squeezing, fullness, or pain. ? Discomfort in other areas of the upper body. Symptoms can include pain or discomfort in one or both arms, the back, neck, jaw, or stomach. ? Shortness of breath with or without chest discomfort. ? Other signs may include breaking out in a cold sweat, nausea, or lightheadedness. Don't wait more than five minutes to call [address]! Fast action can save your life. Calling 911 is almost always the fastest way to get lifesaving treatment. Emergency Medical Services staff can begin treatment when they arrive  up to an hour sooner than if someone gets to the hospital by car. The discharge information has been reviewed with the patient. The patient verbalized understanding. Discharge medications reviewed with the patient and appropriate educational materials and side effects teaching were provided. ___________________________________________________________________________________________________________________________________ ACO Transitions of Care Introducing [employer] [address] Kerrie Coelho offers a voluntary care coordination program to provide high quality service and care to [facility] fee-for-service beneficiaries. Marcelina Martinez was designed to help you enhance your health and well-being through the following services: ? Transitions of Care  support for individuals who are transitioning from one care setting to another (example: Hospital to home). ? Chronic and Complex Care Coordination  support for individuals and caregivers of those with serious or chronic illnesses or with more than one chronic (ongoing) condition and those who take a number of different medications. If you meet specific medical criteria, a [address] [place] [address] Rd may call you directly to coordinate your care with your primary care physician and your other care providers. For questions about the [facility] programs, please, contact your physicians office. For general questions or additional information about Accountable Care Organizations: 
Please visit www.medicare.gov/acos. html or call 1-800-MEDICARE (4-876.441.6924) TTY users should call 7-950.839.1678. Introducing [facility] & HEALTH SERVICES! [facility] introduces myQaa patient portal. Now you can access parts of your medical record, email your doctor's office, and request medication refills online. 1. In your internet browser, go to [email or web address] 2. Click on the First Time User? Click Here link in the Sign In box. You will see the New Member Sign Up page. 3. Enter your myQaa Access Code exactly as it appears below. You will not need to use this code after youve completed the sign-up process. If you do not sign up before the expiration date, you must request a new code. · myQaa Access Code: [email or web address] Expires: 3/1/2018  2:28 PM 
 
4. Enter the last four digits of your Social Security Number (xxxx) and Date of Birth (mm/dd/yyyy) as indicated and click Submit. You will be taken to the next sign-up page. 5. Create a myQaa ID.  This will be your myQaa login ID and cannot be changed, so think of one that is secure and easy to remember. 6. Create a FreeBrie password. You can change your password at any time. 7. Enter your Password Reset Question and Answer. This can be used at a later time if you forget your password. 8. Enter your e-mail address. You will receive e-mail notification when new information is available in [address] 9. Click Sign Up. You can now view and download portions of your medical record. 10. Click the Download Summary menu link to download a portable copy of your medical information. If you have questions, please visit the Frequently Asked Questions section of the FreeBrie website. Remember, FreeBrie is NOT to be used for urgent needs. For medical emergencies, dial 911. Now available from your iPhone and Android! Providers Seen During Your Hospitalization Provider Specialty Primary office phone Bari Brady MD Emergency Medicine [phone] [address] Internal Medicine [phone] Your Primary Care Physician (PCP) Primary Care Physician Office Phone Office Fax Bennye Sacks [phone] You are allergic to the following Allergen Reactions Augmentin (Amoxicillin-Pot Clavulanate) Other (comments) Causes yeast infection Codeine Other (comments) Made my heart go crazy Gabapentin Drowsiness Other Medication Other (comments) Conjugated estrogens methyltestosterone Headaches Prednisone Other (comments) Visual loss and headache Prevnar 13 (Pneumoc 13-Sierra Conj-Dip Cr(Pf)) Other (comments) Fever, arm pain, redness, hallucinations, flu like symptoms, chest pain Recent Documentation Height Weight BMI OB Status Smoking Status 1.626 m 69.3 kg 26.21 kg/m2 Hysterectomy Never Smoker Emergency Contacts Name Discharge Info Relation Home Work Mobile  Jean Patel DISCHARGE CAREGIVER [3] Son [22] [phone] Patient Belongings The following personal items are in your possession at time of discharge: 
  Dental Appliances: Uppers, With patient         Home Medications: None   Jewelry: Ring, Earrings, Bracelet (x5)  Clothing: At bedside    Other Valuables: None Discharge Instructions Attachments/References HYPONATREMIA (ENGLISH) Patient Handouts Hyponatremia: Care Instructions Your Care Instructions Hyponatremia (say \"ad-in-npz-TREE-peter-uh\") means that you don't have enough sodium in your blood. It can cause nausea, vomiting, and headaches. Or you may not feel hungry. In serious cases, it can cause seizures, a coma, or even death. Hyponatremia is not a disease. It is a problem caused by something else, such as medicines or exercising for a long time in hot weather. You can get hyponatremia if you lose a lot of fluids and then you drink a lot of water or other liquids that don't have much sodium. You can also get it if you have kidney, liver, heart, or other health problems. Treatment is focused on getting your sodium levels back to normal. 
Follow-up care is a key part of your treatment and safety. Be sure to make and go to all appointments, and call your doctor if you are having problems. It's also a good idea to know your test results and keep a list of the medicines you take. How can you care for yourself at home? · If your doctor recommends it, drink fluids that have sodium. Sports drinks are a good choice. Or you can eat salty foods. · If your doctor recommends it, limit the amount of water you drink. And limit fluids that are mostly water. These include tea, coffee, and juice. · Take your medicines exactly as prescribed. Call your doctor if you have any problems with your medicine. · Get your sodium levels tested when your doctor tells you to. When should you call for help? Call 911 anytime you think you may need emergency care.  For example, call if: 
? · You have a seizure. ? · You passed out (lost consciousness). ?Call your doctor now or seek immediate medical care if: 
? · You are confused or it is hard to focus. ? · You have little or no appetite. ? · You feel sick to your stomach or you vomit. ? · You have a headache. ? · You have mood changes. ? · You feel more tired than usual. ? Watch closely for changes in your health, and be sure to contact your doctor if: 
? · You do not get better as expected. Where can you learn more? Go to http://dale-nora.info/. Enter L167 in the search box to learn more about \"Hyponatremia: Care Instructions. \" Current as of: October 14, 2016 Content Version: 11.4 © 9166-4450 Healthwise, Incorporated. Care instructions adapted under license by kiwi[employer] (which disclaims liability or warranty for this information). If you have questions about a medical condition or this instruction, always ask your healthcare professional. [address] any warranty or liability for your use of this information. Please provide this summary of care documentation to your next provider. Signatures-by signing, you are acknowledging that this After Visit Summary has been reviewed with you and you have received a copy. Patient Signature:  ____________________________________________________________ Date:  ____________________________________________________________  
  
Taylor Hernandez Provider Signature:  ____________________________________________________________ Date:  ____________________________________________________________

## 2018-01-19 NOTE — PROGRESS NOTES
TRANSFER - IN REPORT:    Verbal report received from Aida(name) on Glen Poole  being received from ED(unit) for routine progression of care      Report consisted of patients Situation, Background, Assessment and   Recommendations(SBAR). Information from the following report(s) SBAR was reviewed with the receiving nurse. Opportunity for questions and clarification was provided. Assessment completed upon patients arrival to unit and care assumed.

## 2018-01-19 NOTE — ED TRIAGE NOTES
Pt arrives per EMS stating she went to MD's office for not feeling well yesterday. Started on antibiotics. States they called her today and told her to come to the ER due to low sodium level. Sodium level 119. No hx of low sodium level. Pt states started with diarrhea this AM but states she believes it was due to the new antibiotic she started taking.

## 2018-01-19 NOTE — PROGRESS NOTES
Admission database completed, pt reports \"blacking out\" about 3 weeks ago while visiting a friend in a nursing home, EMS was called, but pt declined to be transported to ER, she states she discussed this with Dr. Anjelica Mcintyre, her cardiologist.

## 2018-01-20 PROBLEM — E87.6 HYPOKALEMIA DUE TO INADEQUATE POTASSIUM INTAKE: Status: ACTIVE | Noted: 2018-01-20

## 2018-01-20 LAB
ANION GAP SERPL CALC-SCNC: 10 MMOL/L (ref 7–16)
ANION GAP SERPL CALC-SCNC: 12 MMOL/L (ref 7–16)
ANION GAP SERPL CALC-SCNC: 12 MMOL/L (ref 7–16)
BUN SERPL-MCNC: 12 MG/DL (ref 8–23)
BUN SERPL-MCNC: 13 MG/DL (ref 8–23)
BUN SERPL-MCNC: 14 MG/DL (ref 8–23)
CALCIUM SERPL-MCNC: 7.7 MG/DL (ref 8.3–10.4)
CALCIUM SERPL-MCNC: 7.9 MG/DL (ref 8.3–10.4)
CALCIUM SERPL-MCNC: 8 MG/DL (ref 8.3–10.4)
CHLORIDE SERPL-SCNC: 84 MMOL/L (ref 98–107)
CHLORIDE SERPL-SCNC: 85 MMOL/L (ref 98–107)
CHLORIDE SERPL-SCNC: 86 MMOL/L (ref 98–107)
CO2 SERPL-SCNC: 24 MMOL/L (ref 21–32)
CO2 SERPL-SCNC: 26 MMOL/L (ref 21–32)
CO2 SERPL-SCNC: 28 MMOL/L (ref 21–32)
CREAT SERPL-MCNC: 0.59 MG/DL (ref 0.6–1)
CREAT SERPL-MCNC: 0.62 MG/DL (ref 0.6–1)
CREAT SERPL-MCNC: 0.67 MG/DL (ref 0.6–1)
ERYTHROCYTE [DISTWIDTH] IN BLOOD BY AUTOMATED COUNT: 13.5 % (ref 11.9–14.6)
GLUCOSE BLD STRIP.AUTO-MCNC: 108 MG/DL (ref 65–100)
GLUCOSE BLD STRIP.AUTO-MCNC: 110 MG/DL (ref 65–100)
GLUCOSE BLD STRIP.AUTO-MCNC: 130 MG/DL (ref 65–100)
GLUCOSE BLD STRIP.AUTO-MCNC: 283 MG/DL (ref 65–100)
GLUCOSE SERPL-MCNC: 100 MG/DL (ref 65–100)
GLUCOSE SERPL-MCNC: 128 MG/DL (ref 65–100)
GLUCOSE SERPL-MCNC: 129 MG/DL (ref 65–100)
HCT VFR BLD AUTO: 34.9 % (ref 35.8–46.3)
HGB BLD-MCNC: 12.4 G/DL (ref 11.7–15.4)
MCH RBC QN AUTO: 30.5 PG (ref 26.1–32.9)
MCHC RBC AUTO-ENTMCNC: 35.5 G/DL (ref 31.4–35)
MCV RBC AUTO: 86 FL (ref 79.6–97.8)
PLATELET # BLD AUTO: 241 K/UL (ref 150–450)
PMV BLD AUTO: 8.7 FL (ref 10.8–14.1)
POTASSIUM SERPL-SCNC: 3 MMOL/L (ref 3.5–5.1)
POTASSIUM SERPL-SCNC: 3 MMOL/L (ref 3.5–5.1)
POTASSIUM SERPL-SCNC: 3.9 MMOL/L (ref 3.5–5.1)
RBC # BLD AUTO: 4.06 M/UL (ref 4.05–5.25)
SODIUM SERPL-SCNC: 122 MMOL/L (ref 136–145)
SODIUM SERPL-SCNC: 122 MMOL/L (ref 136–145)
SODIUM SERPL-SCNC: 123 MMOL/L (ref 136–145)
WBC # BLD AUTO: 4.9 K/UL (ref 4.3–11.1)

## 2018-01-20 PROCEDURE — 94640 AIRWAY INHALATION TREATMENT: CPT

## 2018-01-20 PROCEDURE — 80048 BASIC METABOLIC PNL TOTAL CA: CPT | Performed by: NURSE PRACTITIONER

## 2018-01-20 PROCEDURE — 74011250636 HC RX REV CODE- 250/636: Performed by: INTERNAL MEDICINE

## 2018-01-20 PROCEDURE — 94760 N-INVAS EAR/PLS OXIMETRY 1: CPT

## 2018-01-20 PROCEDURE — 82962 GLUCOSE BLOOD TEST: CPT

## 2018-01-20 PROCEDURE — 74011250637 HC RX REV CODE- 250/637: Performed by: INTERNAL MEDICINE

## 2018-01-20 PROCEDURE — 74011000250 HC RX REV CODE- 250: Performed by: INTERNAL MEDICINE

## 2018-01-20 PROCEDURE — 85027 COMPLETE CBC AUTOMATED: CPT | Performed by: NURSE PRACTITIONER

## 2018-01-20 PROCEDURE — 65270000029 HC RM PRIVATE

## 2018-01-20 PROCEDURE — 36415 COLL VENOUS BLD VENIPUNCTURE: CPT | Performed by: NURSE PRACTITIONER

## 2018-01-20 PROCEDURE — 97530 THERAPEUTIC ACTIVITIES: CPT

## 2018-01-20 PROCEDURE — 97161 PT EVAL LOW COMPLEX 20 MIN: CPT

## 2018-01-20 RX ORDER — SODIUM CHLORIDE TAB 1 GM 1 G
1 TAB MISCELLANEOUS
Status: COMPLETED | OUTPATIENT
Start: 2018-01-20 | End: 2018-01-22

## 2018-01-20 RX ORDER — DIPHENHYDRAMINE HCL 25 MG
25 CAPSULE ORAL
Status: DISCONTINUED | OUTPATIENT
Start: 2018-01-20 | End: 2018-01-22 | Stop reason: HOSPADM

## 2018-01-20 RX ORDER — POTASSIUM CHLORIDE 14.9 MG/ML
20 INJECTION INTRAVENOUS ONCE
Status: COMPLETED | OUTPATIENT
Start: 2018-01-20 | End: 2018-01-20

## 2018-01-20 RX ORDER — POTASSIUM CHLORIDE 1.5 G/1.77G
40 POWDER, FOR SOLUTION ORAL 2 TIMES DAILY WITH MEALS
Status: DISCONTINUED | OUTPATIENT
Start: 2018-01-20 | End: 2018-01-20

## 2018-01-20 RX ADMIN — ASPIRIN 81 MG: 81 TABLET, COATED ORAL at 22:06

## 2018-01-20 RX ADMIN — MORPHINE SULFATE 2 MG: 2 INJECTION, SOLUTION INTRAMUSCULAR; INTRAVENOUS at 00:14

## 2018-01-20 RX ADMIN — Medication 400 MG: at 09:00

## 2018-01-20 RX ADMIN — CLOPIDOGREL BISULFATE 75 MG: 75 TABLET ORAL at 08:14

## 2018-01-20 RX ADMIN — BUDESONIDE 500 MCG: 0.5 INHALANT RESPIRATORY (INHALATION) at 20:59

## 2018-01-20 RX ADMIN — POTASSIUM CHLORIDE 20 MEQ: 200 INJECTION, SOLUTION INTRAVENOUS at 13:28

## 2018-01-20 RX ADMIN — Medication 10 ML: at 06:14

## 2018-01-20 RX ADMIN — ONDANSETRON 4 MG: 2 INJECTION INTRAMUSCULAR; INTRAVENOUS at 08:08

## 2018-01-20 RX ADMIN — LISINOPRIL 20 MG: 20 TABLET ORAL at 08:14

## 2018-01-20 RX ADMIN — ONDANSETRON 4 MG: 2 INJECTION INTRAMUSCULAR; INTRAVENOUS at 00:14

## 2018-01-20 RX ADMIN — POTASSIUM CHLORIDE 40 MEQ: 1.5 POWDER, FOR SOLUTION ORAL at 11:15

## 2018-01-20 RX ADMIN — ACETAMINOPHEN 650 MG: 325 TABLET ORAL at 08:12

## 2018-01-20 RX ADMIN — PANTOPRAZOLE SODIUM 40 MG: 40 TABLET, DELAYED RELEASE ORAL at 06:14

## 2018-01-20 RX ADMIN — DIPHENHYDRAMINE HYDROCHLORIDE 25 MG: 25 CAPSULE ORAL at 22:06

## 2018-01-20 RX ADMIN — Medication 10 ML: at 13:30

## 2018-01-20 RX ADMIN — SODIUM CHLORIDE TAB 1 GM 1 G: 1 TAB at 16:14

## 2018-01-20 RX ADMIN — PANTOPRAZOLE SODIUM 40 MG: 40 TABLET, DELAYED RELEASE ORAL at 16:14

## 2018-01-20 RX ADMIN — ENOXAPARIN SODIUM 40 MG: 40 INJECTION SUBCUTANEOUS at 16:14

## 2018-01-20 RX ADMIN — AMLODIPINE BESYLATE 2.5 MG: 5 TABLET ORAL at 08:14

## 2018-01-20 RX ADMIN — SODIUM CHLORIDE TAB 1 GM 1 G: 1 TAB at 11:15

## 2018-01-20 RX ADMIN — Medication 10 ML: at 22:06

## 2018-01-20 RX ADMIN — ONDANSETRON 4 MG: 2 INJECTION INTRAMUSCULAR; INTRAVENOUS at 16:12

## 2018-01-20 RX ADMIN — PROPRANOLOL HYDROCHLORIDE 60 MG: 60 CAPSULE, EXTENDED RELEASE ORAL at 08:14

## 2018-01-20 NOTE — PROGRESS NOTES
Problem: Mobility Impaired (Adult and Pediatric)  Goal: *Acute Goals and Plan of Care (Insert Text)    PHYSICAL THERAPY: Initial Assessment, Discharge, Treatment Day: Day of Assessment, PM 1/20/2018  INPATIENT: Hospital Day: 2  [place] / [place] / Product Type: Medicare /      NAME/AGE/GENDER: Glen Poole is a 81 y.o. female   PRIMARY DIAGNOSIS: Hyponatremia Hyponatremia Hyponatremia        ICD-10: Treatment Diagnosis:   · Generalized Muscle Weakness (M62.81)  · Difficulty in walking, Not elsewhere classified (R26.2)   Precaution/Allergies:  Augmentin [amoxicillin-pot clavulanate]; Codeine; Gabapentin; Other medication; Prednisone; and Prevnar 13 [pneumoc 13-chang conj-dip cr(pf)]      ASSESSMENT:     Ms. Denice Elias presents supine at arrival and stated she needed to go to toilet. After going to toilet walked 250ft with no AD, but wanting to reach for rails. Discussed need to walk around house with [facility] or  if feeling need to touch something as she moved around. Balance risk is post/L. Pt lives alone and wishes to be independent. She mentioned that she does have RW in house and may decide to use. Her main limitation is nausea presently. Pt will be discharged from PT service at this time due to independence. Anticipate discharge to home when medically cleared. This section established at most recent assessment   PROBLEM LIST (Impairments causing functional limitations):  1. Decreased Balance   INTERVENTIONS PLANNED: (Benefits and precautions of physical therapy have been discussed with the patient.)  1.  Balance Exercise     TREATMENT PLAN: Frequency/Duration: 1 time a week for duration of hospital stay  Rehabilitation Potential For Stated Goals: Good     RECOMMENDED REHABILITATION/EQUIPMENT: (at time of discharge pending progress): Due to the probability of continued deficits (see above) this patient will not likely need continued skilled physical therapy after discharge. Equipment:    None at this time              HISTORY:   History of Present Injury/Illness (Reason for Referral):  80 yo female with pmh CAD, benign pulm nodule, depression, GERD who presented to ER this morning after findings of hypernatremia Na 119 and with c/o dizziness, weakness, poor appetite, cough x 3 weeks. Her Na on 12/21/17 was WNL. No change in meds and Na today 124. She did start on Levaquin yesterday per PCP for URI. She appears euvolemic but states has not been eating/drinking per her normal.  Denies nausea, vomiting, diarrhea. She has good 02 sats but has significant wheezing on exam.  Hemodynamically stable. SR on monitor. Past Medical History/Comorbidities:   Ms. Swapnil Daniel  has a past medical history of Abdominal pain (10/28/2014); Angina at rest [facility]); Arthritis; Bilateral carotid bruits; Bursitis; CAD (coronary artery disease); Cervicalgia; Chronic pain; Coagulation defects; Constipation; Cough (09/11/2014); Depressive disorder; Dyspnea (09/11/2014); GERD (gastroesophageal reflux disease); Headache; Hoarseness or changing voice; Hyperlipidemia; Hypertension; Kidney stone; Lumbago; Macular degeneration; Nodule of left lung; Pain in joint, ankle and foot; Pain in joint, shoulder region; Palpitations (09/24/2015); Panic disorder; Renal mass (07/2016); Sciatica; and Vascular headache. Ms. Swapnil Daniel  has a past surgical history that includes hx coronary artery bypass graft; hx partial hysterectomy; hx cataract removal; hx heent (Left); hx heart catheterization; hx heart catheterization; hx cataract removal; hx colonoscopy (12/15/2015); hx colonoscopy (06/2016); hx orthopaedic; pr abdomen surgery proc unlisted (Left, 07/2016); and hx hernia repair (Left, 2017).   Social History/Living Environment:   Home Environment: Private residence  One/Two Story Residence: One story  Living Alone: Yes  Support Systems: Child(concepcion), Family member(s)  Patient Expects to be Discharged to[de-identified] Private residence  Current DME Used/Available at Home: None  Prior Level of Function/Work/Activity:  Pt completely independent, no AD used     Number of Personal Factors/Comorbidities that affect the Plan of Care: 0: LOW COMPLEXITY   EXAMINATION:   Most Recent Physical Functioning:   Gross Assessment:  AROM: Within functional limits  Strength: Within functional limits  Coordination: Within functional limits  Tone: Normal  Sensation: Intact               Posture:     Balance:  Sitting: Intact  Standing: Impaired  Standing - Static: Good  Standing - Dynamic : Fair Bed Mobility:  Rolling: Independent  Supine to Sit: Independent  Scooting: Independent  Wheelchair Mobility:     Transfers:  Sit to Stand: Supervision  Stand to Sit: Supervision  Gait:     Gait Abnormalities: Decreased step clearance;Shuffling gait; Path deviations  Distance (ft): 250 Feet (ft)  Assistive Device:  (none)  Ambulation - Level of Assistance: Stand-by asssistance      Body Structures Involved:  1. None Body Functions Affected:  1. Neuromusculoskeletal  2. Movement Related Activities and Participation Affected:  1. Mobility  2. Self Care  3. Domestic Life  4. Community, Social and [place] [facility]   Number of elements that affect the Plan of Care: 1-2: LOW COMPLEXITY   CLINICAL PRESENTATION:   Presentation: Stable and uncomplicated: LOW COMPLEXITY   CLINICAL DECISION MAKIN [facility] Mobility Inpatient Short Form  How much difficulty does the patient currently have. .. Unable A Lot A Little None   1. Turning over in bed (including adjusting bedclothes, sheets and blankets)? [] 1   [] 2   [] 3   [x] 4   2. Sitting down on and standing up from a chair with arms ( e.g., wheelchair, bedside commode, etc.)   [] 1   [] 2   [] 3   [x] 4   3. Moving from lying on back to sitting on the side of the bed? [] 1   [] 2   [] 3   [x] 4   How much help from another person does the patient currently need. ..  Total A Lot A Little None 4. Moving to and from a bed to a chair (including a wheelchair)? [] 1   [] 2   [] 3   [x] 4   5. Need to walk in hospital room? [] 1   [] 2   [] 3   [x] 4   6. Climbing 3-5 steps with a railing? [] 1   [] 2   [] 3   [x] 4   © 2007, Trustees of [address] [place] [address], under license to [employer] All rights reserved      Score:  Initial: 24   Most Recent: X (Date: -- )    Interpretation of Tool:  Represents activities that are increasingly more difficult (i.e. Bed mobility, Transfers, Gait). Score 24 23 22-20 19-15 14-10 9-7 6     Modifier CH CI CJ CK CL CM CN      ? Mobility - Walking and Moving Around:     - CURRENT STATUS: CH - 0% impaired, limited or restricted    - GOAL STATUS: CH - 0% impaired, limited or restricted    - D/C STATUS:  CH - 0% impaired, limited or restricted  [place] / [place] / Product Type: Medicare /      Medical Necessity:     · NA. Reason for Services/Other Comments:  · NA. Use of outcome tool(s) and clinical judgement create a POC that gives a: Clear prediction of patient's progress: LOW COMPLEXITY            TREATMENT:   (In addition to Assessment/Re-Assessment sessions the following treatments were rendered)   Pre-treatment Symptoms/Complaints:  none  Pain: Initial:   Pain Intensity 1: 4  Pain Location 1: Head  Post Session:  0       Therapeutic Activity: (    8min): Therapeutic activities including Bed transfers, Chair transfers, Toilet transfers and Ambulation on level ground to improve mobility, strength, balance and coordination. Required minimal   to promote static and dynamic balance in standing, promote coordination of bilateral, lower extremity(s) and promote motor control of bilateral, lower extremity(s).          Braces/Orthotics/Lines/Etc:   · O2 Device: Room air  Treatment/Session Assessment:    · Response to Treatment:  See above  · Interdisciplinary Collaboration:   o Physical Therapist  o Registered Nurse  · After treatment position/precautions:   o Up in chair  o Call light within reach  o RN notified   · Compliance with Program/Exercises: compliant most of the time. · Recommendations/Intent for next treatment session: \"Next visit will focus on NA\".   Total Treatment Duration:  PT Patient Time In/Time Out  Time In: 1320  Time Out: [address] [facility] [address], DPT

## 2018-01-20 NOTE — PROGRESS NOTES
Uneventful shift. Hourly rounds completed throughout shift. Patient received Tylenol for a headache. After hour assessment headache had not improved. Stated that it made her sick. Zofran and morphine given. Some improvement. Patient is to speak with MD regarding Imdur medication. Patient denies needs at this time. Will continue to monitor and give bedside report to oncoming day shift nurse.

## 2018-01-20 NOTE — PROGRESS NOTES
Progress Note    Patient: Irene Jordan MRN: [id number]  SSN: [id number]    YOB: 1935  Age: 81 y.o. Sex: female      Admit Date: 1/19/2018   Pt is a 82 yo female with CAD, benign pulmonary nodule, depression, GERD who presented to ER with Na 119 and with c/o dizziness, weakness, poor appetite, cough x 3 weeks. Her Na on 12/21/17 was WNL. Subjective:   Patient is having headache after taking Imdur. She said she did not take Imdur at home and could not tolerated when it was prescribed in the past. She would have headache with it. Her oral intake was very minimal in the past 2-3 weeks. She is working on her breakfast now. No fever. No diarrhea. Feeling nauseous. Objective:     Vitals:    01/19/18 2153 01/19/18 2337 01/20/18 0421 01/20/18 0800   BP:  116/70 160/74 141/63   Pulse:  60 60 (!) 53   Resp:  17 17 17   Temp:  97.6 °F (36.4 °C) 97.7 °F (36.5 °C) 97.7 °F (36.5 °C)   SpO2: 99% 97% 98% 98%   Weight:       Height:                Intake and Output:  Current Shift:    Last three shifts:      Physical Exam:   General:                    Alert, cooperative, no respiratory distress, appears stated age. complaining of headache. No stiffness of neck. Head:                                   Normocephalic, without obvious abnormality, atraumatic. Nose:                                   Nares normal. No drainage or sinus tenderness. Lungs:                       Wheezing/rhonchi throughout   Heart:                                  Regular rate and rhythm,  no murmur, rub or gallop. Abdomen:                  Soft, non-tender. Not distended. Bowel sounds normal.   Extremities:               No cyanosis. No edema. No clubbing  Skin:                                    Texture, turgor normal. No rashes or lesions.   Not Jaundiced  Neurologic:                Alert and oriented x 3, no focal deficits     Lab/Data Review:  Recent Results (from the past 24 hour(s))   CBC WITH AUTOMATED DIFF Collection Time: 01/19/18 10:04 AM   Result Value Ref Range    WBC 5.2 4.3 - 11.1 K/uL    RBC 4.66 4.05 - 5.25 M/uL    HGB 14.4 11.7 - 15.4 g/dL    HCT 40.3 35.8 - 46.3 %    MCV 86.5 79.6 - 97.8 FL    MCH 30.9 26.1 - 32.9 PG    MCHC 35.7 (H) 31.4 - 35.0 g/dL    RDW 13.6 11.9 - 14.6 %    PLATELET 211 600 - 772 K/uL    MPV 9.1 (L) 10.8 - 14.1 FL    DF AUTOMATED      NEUTROPHILS 70 43 - 78 %    LYMPHOCYTES 16 13 - 44 %    MONOCYTES 11 4.0 - 12.0 %    EOSINOPHILS 2 0.5 - 7.8 %    BASOPHILS 0 0.0 - 2.0 %    IMMATURE GRANULOCYTES 1 0.0 - 5.0 %    ABS. NEUTROPHILS 3.6 1.7 - 8.2 K/UL    ABS. LYMPHOCYTES 0.8 0.5 - 4.6 K/UL    ABS. MONOCYTES 0.6 0.1 - 1.3 K/UL    ABS. EOSINOPHILS 0.1 0.0 - 0.8 K/UL    ABS. BASOPHILS 0.0 0.0 - 0.2 K/UL    ABS. IMM. GRANS. 0.1 0.0 - 0.5 K/UL   METABOLIC PANEL, COMPREHENSIVE    Collection Time: 01/19/18 10:04 AM   Result Value Ref Range    Sodium 124 (LL) 136 - 145 mmol/L    Potassium 4.4 3.5 - 5.1 mmol/L    Chloride 87 (L) 98 - 107 mmol/L    CO2 27 21 - 32 mmol/L    Anion gap 10 7 - 16 mmol/L    Glucose 94 65 - 100 mg/dL    BUN 9 8 - 23 MG/DL    Creatinine 0.79 0.6 - 1.0 MG/DL    GFR est AA >60 >60 ml/min/1.73m2    GFR est non-AA >60 >60 ml/min/1.73m2    Calcium 8.5 8.3 - 10.4 MG/DL    Bilirubin, total 0.5 0.2 - 1.1 MG/DL    ALT (SGPT) 11 (L) 12 - 65 U/L    AST (SGOT) 22 15 - 37 U/L    Alk.  phosphatase 73 50 - 136 U/L    Protein, total 7.0 6.3 - 8.2 g/dL    Albumin 3.2 3.2 - 4.6 g/dL    Globulin 3.8 (H) 2.3 - 3.5 g/dL    A-G Ratio 0.8 (L) 1.2 - 3.5     MAGNESIUM    Collection Time: 01/19/18 10:04 AM   Result Value Ref Range    Magnesium 1.7 (L) 1.8 - 2.4 mg/dL   EKG, 12 LEAD, INITIAL    Collection Time: 01/19/18 12:06 PM   Result Value Ref Range    Ventricular Rate 60 BPM    Atrial Rate 60 BPM    P-R Interval 168 ms    QRS Duration 80 ms    Q-T Interval 418 ms    QTC Calculation (Bezet) 418 ms    Calculated P Axis 70 degrees    Calculated R Axis 74 degrees    Calculated T Axis 81 degrees Diagnosis       !! AGE AND GENDER SPECIFIC ECG ANALYSIS !! Normal sinus rhythm  ST abnormality, possible digitalis effect  Abnormal ECG  When compared with ECG of 21-DEC-2017 13:30,  No significant change was found  Confirmed by Sonny Sen ([id number]) on 1/19/2018 12:18:16 PM     URINALYSIS W/ RFLX MICROSCOPIC    Collection Time: 01/19/18 12:11 PM   Result Value Ref Range    Color YELLOW      Appearance CLOUDY      Specific gravity 1.013 1.001 - 1.023      pH (UA) 7.5 5.0 - 9.0      Protein NEGATIVE  NEG mg/dL    Glucose NEGATIVE  mg/dL    Ketone NEGATIVE  NEG mg/dL    Bilirubin NEGATIVE  NEG      Blood NEGATIVE  NEG      Urobilinogen 0.2 0.2 - 1.0 EU/dL    Nitrites NEGATIVE  NEG      Leukocyte Esterase NEGATIVE  NEG     OSMOLALITY, UR    Collection Time: 01/19/18 12:11 PM   Result Value Ref Range    Osmolality,urine 466 50 - 1400 MOSM/kg H2O   SODIUM, UR, RANDOM    Collection Time: 01/19/18 12:11 PM   Result Value Ref Range    Sodium urine, random 130 MMOL/L   TROPONIN I    Collection Time: 01/19/18  1:41 PM   Result Value Ref Range    Troponin-I, Qt. <0.02 (L) 0.02 - 0.05 NG/ML   EKG, 12 LEAD, INITIAL    Collection Time: 01/19/18  1:45 PM   Result Value Ref Range    Ventricular Rate 69 BPM    Atrial Rate 69 BPM    P-R Interval 182 ms    QRS Duration 78 ms    Q-T Interval 404 ms    QTC Calculation (Bezet) 432 ms    Calculated P Axis 68 degrees    Calculated R Axis 70 degrees    Calculated T Axis 81 degrees    Diagnosis       !! AGE AND GENDER SPECIFIC ECG ANALYSIS !!   Demand pacemaker; interpretation is based on intrinsic rhythm  Sinus rhythm with occasional Premature ventricular complexes  Possible Left atrial enlargement  Nonspecific ST and T wave abnormality  Abnormal ECG  When compared with ECG of 19-JAN-2018 12:06,  Electronic demand pacing is now Present  Premature ventricular complexes are now Present  Confirmed by Sonny Sen [id number]) on 1/19/2018 2:22:43 PM     GLUCOSE, POC    Collection Time: 01/19/18  2:47 PM   Result Value Ref Range    Glucose (POC) 84 65 - 100 mg/dL   GLUCOSE, POC    Collection Time: 01/19/18  7:29 PM   Result Value Ref Range    Glucose (POC) 108 (H) 65 - 289 mg/dL   METABOLIC PANEL, BASIC    Collection Time: 01/19/18  9:38 PM   Result Value Ref Range    Sodium 124 (LL) 136 - 145 mmol/L    Potassium 3.0 (L) 3.5 - 5.1 mmol/L    Chloride 87 (L) 98 - 107 mmol/L    CO2 26 21 - 32 mmol/L    Anion gap 11 7 - 16 mmol/L    Glucose 107 (H) 65 - 100 mg/dL    BUN 12 8 - 23 MG/DL    Creatinine 0.60 0.6 - 1.0 MG/DL    GFR est AA >60 >60 ml/min/1.73m2    GFR est non-AA >60 >60 ml/min/1.73m2    Calcium 7.8 (L) 8.3 - 71.5 MG/DL   METABOLIC PANEL, BASIC    Collection Time: 01/20/18  5:32 AM   Result Value Ref Range    Sodium 123 (LL) 136 - 145 mmol/L    Potassium 3.0 (L) 3.5 - 5.1 mmol/L    Chloride 85 (L) 98 - 107 mmol/L    CO2 28 21 - 32 mmol/L    Anion gap 10 7 - 16 mmol/L    Glucose 100 65 - 100 mg/dL    BUN 12 8 - 23 MG/DL    Creatinine 0.59 (L) 0.6 - 1.0 MG/DL    GFR est AA >60 >60 ml/min/1.73m2    GFR est non-AA >60 >60 ml/min/1.73m2    Calcium 8.0 (L) 8.3 - 10.4 MG/DL   CBC W/O DIFF    Collection Time: 01/20/18  5:32 AM   Result Value Ref Range    WBC 4.9 4.3 - 11.1 K/uL    RBC 4.06 4.05 - 5.25 M/uL    HGB 12.4 11.7 - 15.4 g/dL    HCT 34.9 (L) 35.8 - 46.3 %    MCV 86.0 79.6 - 97.8 FL    MCH 30.5 26.1 - 32.9 PG    MCHC 35.5 (H) 31.4 - 35.0 g/dL    RDW 13.5 11.9 - 14.6 %    PLATELET 932 582 - 930 K/uL    MPV 8.7 (L) 10.8 - 14.1 FL   GLUCOSE, POC    Collection Time: 01/20/18  7:41 AM   Result Value Ref Range    Glucose (POC) 108 (H) 65 - 100 mg/dL       Current Facility-Administered Medications:     amLODIPine (NORVASC) tablet 2.5 mg, 2.5 mg, Oral, DAILY, Meryl Chavira DO    aspirin delayed-release tablet 81 mg, 81 mg, Oral, QHS, Meryl Chavira DO, 81 mg at 01/19/18 2154    clopidogrel (PLAVIX) tablet 75 mg, 75 mg, Oral, DAILY, Meryl Chavira DO    magnesium oxide (MAG-OX) tablet 400 mg, 400 mg, Oral, DAILY, Lorrine Hench, DO    pantoprazole (PROTONIX) tablet 40 mg, 40 mg, Oral, BID, Lorrine Hench, DO, 40 mg at 01/20/18 2833    propranolol LA (INDERAL LA) capsule 60 mg, 60 mg, Oral, DAILY, Lorrine Hench, DO    sodium chloride (NS) flush 5-10 mL, 5-10 mL, IntraVENous, Q8H, Elfredia Fatoumata Montenegro, DO, 10 mL at 01/20/18 5342    sodium chloride (NS) flush 5-10 mL, 5-10 mL, IntraVENous, PRN, Lorrine Hench, DO    acetaminophen (TYLENOL) tablet 650 mg, 650 mg, Oral, Q4H PRN, Lorrine Hench, DO, 650 mg at 01/20/18 0597    [facility]) injection 4 mg, 4 mg, IntraVENous, Q4H PRN, Lorrine Hench, DO, 4 mg at 01/20/18 0808    enoxaparin (LOVENOX) injection 40 mg, 40 mg, SubCUTAneous, Q24H, Elfredia Fatoumata Montenegro, DO, 40 mg at 01/19/18 1604    lisinopril (PRINIVIL, ZESTRIL) tablet 20 mg, 20 mg, Oral, DAILY, Elfredia Fatoumata Montenegro, DO    budesonide (PULMICORT) 500 mcg/2 ml nebulizer suspension, 500 mcg, Nebulization, BID RT, Lorrine Hench, DO, 500 mcg at 01/19/18 2151    albuterol-ipratropium (DUO-NEB) 2.5 MG-0.5 MG/3 ML, 3 mL, Nebulization, Q6H PRN, Lorrine Hench, DO    morphine injection 2 mg, 2 mg, IntraVENous, Q4H PRN, Lorrine Hench, DO, 2 mg at 01/20/18 0014          Assessment:     Principal Problem:    Hyponatremia (1/19/2018)    Active Problems:    CAD (coronary artery disease) (9/11/2014)      Overview: With CABG and 4 cardiac stents      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 Improved with some IV fluid and oral intake. Will make sure her nausea and vomiting is optimally managed. Continue oral intake. Ad pilar salt intake for now. Avoid excessive water intake. History of CAD, no chest pain. No other acute symptoms. No sign of fluid overload. Will continue home medication. Monitor. Discontinue Imdur.  Patient could not tolerate it. S/P CABG, noted. Monitor. Hypokalemia : will replace. DVT prophylaxis : on Lovenox. I discussed the plan of care with patient and nurse.      Signed By: Lanie Crane MD     January 20, 2018

## 2018-01-20 NOTE — PROGRESS NOTES
Problem: Nutrition Deficit  Goal: *Optimize nutritional status  Nutrition  Reason for assessment: Referral received from nursing admission Malnutrition Screening Tool for recently lost 2-13# without trying and eating poorly due to decreased appetite. Assessment:   Diet order(s): regular and boost with each tray  Food/Nutrition Patient History:  The patient is seen in company of her two family members at bedside. She reports that she has not eaten much at all over the past 2-3 weeks. She states that she has been forcing herself to eat small items (an egg at breakfast, toast, and diluted boost). She states that she dilutes boost d/t the sweetness as she can not handle sweet items at this time. Per patient, her GI function (diarrhea, abdominal pain) has improved greatly since yesterday and she states that she was able to eat some dinner served last night (fish.)  Labs remarkable for Na 122, ? SIADH. Anthropometrics:Height: 5' 4\" (162.6 cm),  Weight: 68 kg (150 lb),  , Body mass index is 25.75 kg/(m^2). BMI class of normal weight for age. WT / BMI 1/19/2018 1/18/2018 12/21/2017 12/21/2017 12/12/2017   WEIGHT 150 lb 150 lb 156 lb 156 lb 12.8 oz 162 lb     WT / BMI 12/7/2017 12/1/2017 10/3/2017   WEIGHT 160 lb 161 lb 6.4 oz 162 lb 3.2 oz   Per weights in EMR, potential for a 6-12 pound weight loss over ~1 month. Macronutrient needs:  EER:  0614-9999 kcal /day (20-25 kcal/kg listed BW)  EPR:  55-65 grams protein/day (1-1.2 grams/kg IBW)(GFR >60)  Intake/Comparative Standards: One recorded meal intake of 25%. Acknowledge that one meal intake does not represent a trend. Nutrition Diagnosis: Inadequate oral intake r/t decreased ability to consume sufficient oral intake as evidenced by patient with poor oral intake for 2-3 weeks PTA and weight loss noted above. Intervention:  Meals and snacks: Continue current diet. Encouraged ordering items off of the always available menu.    Nutrition Supplement Therapy: boost TID (chocolate)   Discharge nutrition plan: Too soon to determine.     Sona Estevez Chadd [address], [address] [place] [address], [facility] [address], [phone]

## 2018-01-21 LAB
ANION GAP SERPL CALC-SCNC: 10 MMOL/L (ref 7–16)
ANION GAP SERPL CALC-SCNC: 11 MMOL/L (ref 7–16)
ANION GAP SERPL CALC-SCNC: 11 MMOL/L (ref 7–16)
ANION GAP SERPL CALC-SCNC: 9 MMOL/L (ref 7–16)
BUN SERPL-MCNC: 10 MG/DL (ref 8–23)
BUN SERPL-MCNC: 8 MG/DL (ref 8–23)
BUN SERPL-MCNC: 9 MG/DL (ref 8–23)
BUN SERPL-MCNC: 9 MG/DL (ref 8–23)
CALCIUM SERPL-MCNC: 8 MG/DL (ref 8.3–10.4)
CALCIUM SERPL-MCNC: 8.2 MG/DL (ref 8.3–10.4)
CALCIUM SERPL-MCNC: 8.3 MG/DL (ref 8.3–10.4)
CALCIUM SERPL-MCNC: 8.4 MG/DL (ref 8.3–10.4)
CHLORIDE SERPL-SCNC: 87 MMOL/L (ref 98–107)
CHLORIDE SERPL-SCNC: 88 MMOL/L (ref 98–107)
CHLORIDE SERPL-SCNC: 88 MMOL/L (ref 98–107)
CHLORIDE SERPL-SCNC: 99 MMOL/L (ref 98–107)
CO2 SERPL-SCNC: 25 MMOL/L (ref 21–32)
CO2 SERPL-SCNC: 26 MMOL/L (ref 21–32)
CO2 SERPL-SCNC: 26 MMOL/L (ref 21–32)
CO2 SERPL-SCNC: 27 MMOL/L (ref 21–32)
CREAT SERPL-MCNC: 0.58 MG/DL (ref 0.6–1)
CREAT SERPL-MCNC: 0.68 MG/DL (ref 0.6–1)
CREAT SERPL-MCNC: 0.76 MG/DL (ref 0.6–1)
CREAT SERPL-MCNC: 0.82 MG/DL (ref 0.6–1)
GLUCOSE BLD STRIP.AUTO-MCNC: 106 MG/DL (ref 65–100)
GLUCOSE BLD STRIP.AUTO-MCNC: 106 MG/DL (ref 65–100)
GLUCOSE BLD STRIP.AUTO-MCNC: 139 MG/DL (ref 65–100)
GLUCOSE SERPL-MCNC: 104 MG/DL (ref 65–100)
GLUCOSE SERPL-MCNC: 111 MG/DL (ref 65–100)
GLUCOSE SERPL-MCNC: 90 MG/DL (ref 65–100)
GLUCOSE SERPL-MCNC: 98 MG/DL (ref 65–100)
POTASSIUM SERPL-SCNC: 3.3 MMOL/L (ref 3.5–5.1)
POTASSIUM SERPL-SCNC: 3.3 MMOL/L (ref 3.5–5.1)
POTASSIUM SERPL-SCNC: 3.4 MMOL/L (ref 3.5–5.1)
POTASSIUM SERPL-SCNC: 3.8 MMOL/L (ref 3.5–5.1)
SODIUM SERPL-SCNC: 124 MMOL/L (ref 136–145)
SODIUM SERPL-SCNC: 124 MMOL/L (ref 136–145)
SODIUM SERPL-SCNC: 125 MMOL/L (ref 136–145)
SODIUM SERPL-SCNC: 134 MMOL/L (ref 136–145)
TSH SERPL DL<=0.005 MIU/L-ACNC: 1.42 UIU/ML (ref 0.36–3.74)

## 2018-01-21 PROCEDURE — 74011000250 HC RX REV CODE- 250: Performed by: INTERNAL MEDICINE

## 2018-01-21 PROCEDURE — 65270000029 HC RM PRIVATE

## 2018-01-21 PROCEDURE — 74011250637 HC RX REV CODE- 250/637: Performed by: INTERNAL MEDICINE

## 2018-01-21 PROCEDURE — 80048 BASIC METABOLIC PNL TOTAL CA: CPT | Performed by: INTERNAL MEDICINE

## 2018-01-21 PROCEDURE — 94640 AIRWAY INHALATION TREATMENT: CPT

## 2018-01-21 PROCEDURE — 80048 BASIC METABOLIC PNL TOTAL CA: CPT | Performed by: NURSE PRACTITIONER

## 2018-01-21 PROCEDURE — 84443 ASSAY THYROID STIM HORMONE: CPT | Performed by: INTERNAL MEDICINE

## 2018-01-21 PROCEDURE — 94760 N-INVAS EAR/PLS OXIMETRY 1: CPT

## 2018-01-21 PROCEDURE — 74011250636 HC RX REV CODE- 250/636: Performed by: INTERNAL MEDICINE

## 2018-01-21 PROCEDURE — 36415 COLL VENOUS BLD VENIPUNCTURE: CPT | Performed by: NURSE PRACTITIONER

## 2018-01-21 PROCEDURE — 82962 GLUCOSE BLOOD TEST: CPT

## 2018-01-21 RX ORDER — MORPHINE SULFATE 4 MG/ML
2 INJECTION, SOLUTION INTRAMUSCULAR; INTRAVENOUS
Status: DISCONTINUED | OUTPATIENT
Start: 2018-01-21 | End: 2018-01-21

## 2018-01-21 RX ORDER — TOLVAPTAN 15 MG/1
15 TABLET ORAL ONCE
Status: COMPLETED | OUTPATIENT
Start: 2018-01-21 | End: 2018-01-21

## 2018-01-21 RX ADMIN — AMLODIPINE BESYLATE 2.5 MG: 5 TABLET ORAL at 10:45

## 2018-01-21 RX ADMIN — SODIUM CHLORIDE TAB 1 GM 1 G: 1 TAB at 08:00

## 2018-01-21 RX ADMIN — TOLVAPTAN 15 MG: 15 TABLET ORAL at 13:36

## 2018-01-21 RX ADMIN — SODIUM CHLORIDE TAB 1 GM 1 G: 1 TAB at 13:36

## 2018-01-21 RX ADMIN — ENOXAPARIN SODIUM 40 MG: 40 INJECTION SUBCUTANEOUS at 17:08

## 2018-01-21 RX ADMIN — CLOPIDOGREL BISULFATE 75 MG: 75 TABLET ORAL at 10:45

## 2018-01-21 RX ADMIN — BUDESONIDE 500 MCG: 0.5 INHALANT RESPIRATORY (INHALATION) at 11:34

## 2018-01-21 RX ADMIN — BUDESONIDE 500 MCG: 0.5 INHALANT RESPIRATORY (INHALATION) at 19:18

## 2018-01-21 RX ADMIN — Medication 400 MG: at 10:45

## 2018-01-21 RX ADMIN — SODIUM CHLORIDE TAB 1 GM 1 G: 1 TAB at 17:08

## 2018-01-21 RX ADMIN — PANTOPRAZOLE SODIUM 40 MG: 40 TABLET, DELAYED RELEASE ORAL at 17:08

## 2018-01-21 RX ADMIN — ASPIRIN 81 MG: 81 TABLET, COATED ORAL at 21:11

## 2018-01-21 RX ADMIN — PROPRANOLOL HYDROCHLORIDE 60 MG: 60 CAPSULE, EXTENDED RELEASE ORAL at 10:45

## 2018-01-21 RX ADMIN — Medication 10 ML: at 21:10

## 2018-01-21 RX ADMIN — Medication 10 ML: at 05:55

## 2018-01-21 RX ADMIN — DIPHENHYDRAMINE HYDROCHLORIDE 25 MG: 25 CAPSULE ORAL at 21:11

## 2018-01-21 RX ADMIN — LISINOPRIL 20 MG: 20 TABLET ORAL at 10:45

## 2018-01-21 RX ADMIN — Medication 10 ML: at 17:09

## 2018-01-21 RX ADMIN — PANTOPRAZOLE SODIUM 40 MG: 40 TABLET, DELAYED RELEASE ORAL at 05:55

## 2018-01-21 NOTE — PROGRESS NOTES
Attempted to visit with patient. Doctor in room. Will follow.     Harjinder Matthew, staff Diana gupta 37, [address] [facility]  /   [email or web address]

## 2018-01-21 NOTE — PROGRESS NOTES
Care Management Interventions  PCP Verified by CM: Yes (Dr Francesca Nina w/[facility])  Last Visit to PCP: 01/18/18  Mode of Transport at Discharge: Self  Transition of Care Consult (CM Consult): Discharge Planning, [address] (Pt lives alone and would like [facility] RN for med checks, PT, OT, RN aide. Per PT noteds, no PT is needed. )  MyChart Signup: No  Discharge Durable Medical Equipment: No (Pt stated that having a cane would be helpful)  Physical Therapy Consult: Yes (Per PT no HHPT necessary.)  Occupational Therapy Consult: No  Speech Therapy Consult: No  Current Support Network: Lives Alone, Family Lives Nearby  Confirm Follow Up Transport: Family (Son and ER contact, Elridge Osler, to transport pt home. )  Plan discussed with Pt/Family/Caregiver: Yes  Discharge Location  Discharge Placement: Home (Although PT does not recommend HHPT, etc, pt interested in additional  help. SW asked pt to ask her PCP when she follows up for [facility] and DME if not ordered @ [facility])  Pt reported that her family is very supportive and lives nearby and that pt's [employer] family, [employer], is very supportive. Plan is D/C home - pt's son/ER contact will transport pt home. . Pt stated that she may request HH PT and RN svcs when she sees her OP \"heart doctor next\".

## 2018-01-21 NOTE — CONSULTS
Renal Consult    Subjective:     Octaviano Dimas is a 81 y.o.  female who is being seen for hyponatremia. She has a  Past hx of CAD, benign pulm nodule, depression, GERD . She had been struggling with bronchitis for a couple of weeks. Labs were done and she was told the Sodium was 119 and admitted. .  Review of older labs with mild hyponatremia with sodium in low 130 range. She has been on  HCTZ in hte combination form with lisinopril. There is a hx of CHF and has been on loop diuretic in the past but not lately. With her recent illness she was not eating much at all, but was pushing fluids in an attempt to not be dehydrated. Initially treated with saline and stopping the diuretic and she has had some slow improvement. Tolvaptan has been ordered for today. No fever, chills, sweats, epistaxis, vision changes, headache, shortness of breath, wheezing,  chest pain, palpitations. No diarrhea or constipation. No dysuria, hematuria. No paresthesia, seizure history, no arthritis/ arthralgia or skin rashes. Past Medical History:   Diagnosis Date    Abdominal pain 10/28/2014    [facility]ina at rest [facility])     pt denies    Arthritis     osteo - low back,  shoulders, hips, low back    Bilateral carotid bruits     Bursitis     CAD (coronary artery disease)     4 vessel CABG 2005;--- stents x 4--- last one placed 2014-- followed by dr Yane Martinez Chronic pain     left shoulder    Coagulation defects     on plavix    Constipation     Cough 09/11/2014    10/8/14, Methacholine Challenge Study: The point at which the FEV1 fell by at least 20%, the PC20, occurred above a dose of 25mg/mL of methacholine. This rules out the diagnosis of asthma with an extremely high degree of sensitivity. No post-challenge FEV1 recovery was identified. Lizy Rios MD        Depressive disorder     Dyspnea 09/11/2014    [facility]; 9/29/14: IMPRESSION: The flow volume loop is consistent restriction.  Spirometry suggest restriction with concomitant obstruction at low lung volumes. There is not a significant bronchodilator response. Lung volumes reveal mild restriction.  The unadjusted diffusion capacity is normal.  Twin Murillo MD      GERD (gastroesophageal reflux disease)     controlled with med    Headache     Hoarseness or changing voice     thougfht to be related to gerd    Hyperlipidemia     Hypertension     controlled with med    Kidney stone     yrs ago-- no tx required    Lumbago     Macular degeneration     Nodule of left lung     dx'ed 4 years ago-watched for several months-no new growth-being watched-yearly CT scan----- followed by dr. Lisa Rice Pain in joint, ankle and foot     left 3rd toe and right 2nd toe    Pain in joint, shoulder region     left now bothering > right, right ok    Palpitations 09/24/2015    followed by dr Malgorzata Smith Panic disorder     panic attacks -- last one 2014    Renal mass 07/2016    ablation---left side--- followed by dr Sonal Riddle in [place]    Sciatica     Vascular headache       Past Surgical History:   Procedure Laterality Date    ABDOMEN SURGERY PROC UNLISTED Left 07/2016    ablation for mass in left side of abd    HX CATARACT REMOVAL      left    HX CATARACT REMOVAL      HX COLONOSCOPY  12/15/2015    diverticulosis, internal hemorrhoid, colon polyp- no further colonoscopies needed    HX COLONOSCOPY  06/2016    HX CORONARY ARTERY BYPASS GRAFT      4 vessel CABG 2005    HX HEART CATHETERIZATION      stent 2014    HX HEART CATHETERIZATION      stent 2006    HX HEENT Left     macular pucker    HX HERNIA REPAIR Left 2017    HX ORTHOPAEDIC      finger, foot    HX PARTIAL HYSTERECTOMY      hyst     Family History   Problem Relation Age of Onset    Diabetes Mother     Heart Surgery Mother     Heart Disease Mother     Heart Attack Father     Heart Disease Father     Cancer Sister      2 sisters w/ lung ca-neither one smoked    Heart Disease Sister     Cancer Brother      lung ca-smoker    Heart Attack Brother       age 22 of MI    Heart Disease Sister     Heart Surgery Sister     Heart Attack Sister     Heart Disease Sister     Heart Surgery Sister     Heart Attack Sister     Heart Surgery Brother       in [place] during 2nd heart surgery    Heart Disease Brother     No Known Problems Maternal Grandmother     No Known Problems Maternal Grandfather     No Known Problems Paternal Grandmother     No Known Problems Paternal Grandfather       Social History   Substance Use Topics    Smoking status: Never Smoker    Smokeless tobacco: Never Used    Alcohol use No       Current Facility-Administered Medications   Medication Dose Route Frequency Provider Last Rate Last Dose    tolvaptan (SAMSCA) tablet 15 mg  15 mg Oral ONCE Charli Maldonado MD        sodium chloride tablet 1 g  1 g Oral TID WITH MEALS Serenity Tovar MD   1 g at 18 0800    diphenhydrAMINE (BENADRYL) capsule 25 mg  25 mg Oral QHS PRN Eino Soho, DO   25 mg at 18 2206    amLODIPine (NORVASC) tablet 2.5 mg  2.5 mg Oral DAILY Lin Axel Montenegro, DO   2.5 mg at 18 1045    aspirin delayed-release tablet 81 mg  81 mg Oral QHS Bebeto Ng, DO   81 mg at 18 2206    clopidogrel (PLAVIX) tablet 75 mg  75 mg Oral DAILY Lin Spore Montenegro, DO   75 mg at 18 1045    magnesium oxide (MAG-OX) tablet 400 mg  400 mg Oral DAILY Bebeto Ng, DO   400 mg at 18 1045    pantoprazole (PROTONIX) tablet 40 mg  40 mg Oral BID Bebeto Ng, DO   40 mg at 18 0555    propranolol LA (INDERAL LA) capsule 60 mg  60 mg Oral DAILY Lin Spore Montenegro, DO   60 mg at 18 1045    sodium chloride (NS) flush 5-10 mL  5-10 mL IntraVENous Q8H Lin Spore Montenegro, DO   10 mL at 18 0555    sodium chloride (NS) flush 5-10 mL  5-10 mL IntraVENous PRN Bebeto Ng, DO        acetaminophen (TYLENOL) tablet 650 mg  650 mg Oral Q4H PRN Bebeto Ng, DO   650 mg at 01/20/18 7820    ondansetron (ZOFRAN) injection 4 mg  4 mg IntraVENous Q4H PRN Bebeto Ng, DO   4 mg at 01/20/18 1612    enoxaparin (LOVENOX) injection 40 mg  40 mg SubCUTAneous Q24H Bebeto Ng, DO   40 mg at 01/20/18 1614    lisinopril (PRINIVIL, ZESTRIL) tablet 20 mg  20 mg Oral DAILY Bebeto Ng, DO   20 mg at 01/21/18 1045    budesonide (PULMICORT) 500 mcg/2 ml nebulizer suspension  500 mcg Nebulization BID RT Bebeto Ng, DO   500 mcg at 01/21/18 1134    albuterol-ipratropium (DUO-NEB) 2.5 MG-0.5 MG/3 ML  3 mL Nebulization Q6H PRN Bebeto Ng, DO            Allergies   Allergen Reactions    Augmentin [Amoxicillin-Pot Clavulanate] Other (comments)     Causes yeast infection    Codeine Other (comments)     Made my heart go crazy    Gabapentin Drowsiness    Other Medication Other (comments)     Conjugated estrogens methyltestosterone  Headaches    Prednisone Other (comments)     Visual loss and headache    Prevnar 13 [Pneumoc 13-Sierra Conj-Dip Cr(Pf)] Other (comments)     Fever, arm pain, redness, hallucinations, flu like symptoms, chest pain        Review of Systems:  A comprehensive review of systems was negative except for that written in the History of Present Illness. Objective: Intake and Output:    01/21 0701 - 01/21 1900  In: 240 [P.O.:240]  Out: -   01/19 1901 - 01/21 0700  In: 480 [P.O.:480]  Out: -     Physical Exam:   General appearance: no distress, appears stated age  Head: atraumatic  Eyes: conjunctivae/corneas clear. Nose: no discharge  Throat: Lips, mucosa, and tongue normal.   Neck: supple, symmetrical, trachea midline and no JVD  Lungs: clear to auscultation bilaterally  Heart: regular rate and rhythm, S1, S2, no pericardial friction rub  Abdomen: soft, non-tender.  Bowel sounds normal.   Extremities: No edema    Skin:  No rashes or lesions         Data Review:   Recent Results (from the past 24 hour(s))   GLUCOSE, POC    Collection Time: 01/20/18  4:38 PM   Result Value Ref Range    Glucose (POC) 283 (H) 65 - 553 mg/dL   METABOLIC PANEL, BASIC    Collection Time: 01/20/18  5:37 PM   Result Value Ref Range    Sodium 122 (LL) 136 - 145 mmol/L    Potassium 3.9 3.5 - 5.1 mmol/L    Chloride 86 (L) 98 - 107 mmol/L    CO2 24 21 - 32 mmol/L    Anion gap 12 7 - 16 mmol/L    Glucose 128 (H) 65 - 100 mg/dL    BUN 14 8 - 23 MG/DL    Creatinine 0.67 0.6 - 1.0 MG/DL    GFR est AA >60 >60 ml/min/1.73m2    GFR est non-AA >60 >60 ml/min/1.73m2    Calcium 7.9 (L) 8.3 - 10.4 MG/DL   GLUCOSE, POC    Collection Time: 01/20/18  7:46 PM   Result Value Ref Range    Glucose (POC) 110 (H) 65 - 550 mg/dL   METABOLIC PANEL, BASIC    Collection Time: 01/21/18  2:22 AM   Result Value Ref Range    Sodium 124 (LL) 136 - 145 mmol/L    Potassium 3.3 (L) 3.5 - 5.1 mmol/L    Chloride 88 (L) 98 - 107 mmol/L    CO2 25 21 - 32 mmol/L    Anion gap 11 7 - 16 mmol/L    Glucose 104 (H) 65 - 100 mg/dL    BUN 9 8 - 23 MG/DL    Creatinine 0.58 (L) 0.6 - 1.0 MG/DL    GFR est AA >60 >60 ml/min/1.73m2    GFR est non-AA >60 >60 ml/min/1.73m2    Calcium 8.0 (L) 8.3 - 10.4 MG/DL   TSH 3RD GENERATION    Collection Time: 01/21/18  5:06 AM   Result Value Ref Range    TSH 1.420 0.358 - 5.958 uIU/mL   METABOLIC PANEL, BASIC    Collection Time: 01/21/18  5:07 AM   Result Value Ref Range    Sodium 124 (LL) 136 - 145 mmol/L    Potassium 3.4 (L) 3.5 - 5.1 mmol/L    Chloride 88 (L) 98 - 107 mmol/L    CO2 26 21 - 32 mmol/L    Anion gap 10 7 - 16 mmol/L    Glucose 98 65 - 100 mg/dL    BUN 9 8 - 23 MG/DL    Creatinine 0.68 0.6 - 1.0 MG/DL    GFR est AA >60 >60 ml/min/1.73m2    GFR est non-AA >60 >60 ml/min/1.73m2    Calcium 8.4 8.3 - 10.4 MG/DL   GLUCOSE, POC    Collection Time: 01/21/18  7:32 AM   Result Value Ref Range    Glucose (POC) 106 (H) 65 - 529 mg/dL   METABOLIC PANEL, BASIC    Collection Time: 01/21/18  9:48 AM   Result Value Ref Range    Sodium 125 (L) 136 - 145 mmol/L    Potassium 3.3 (L) 3.5 - 5.1 mmol/L    Chloride 87 (L) 98 - 107 mmol/L    CO2 27 21 - 32 mmol/L    Anion gap 11 7 - 16 mmol/L    Glucose 90 65 - 100 mg/dL    BUN 8 8 - 23 MG/DL    Creatinine 0.76 0.6 - 1.0 MG/DL    GFR est AA >60 >60 ml/min/1.73m2    GFR est non-AA >60 >60 ml/min/1.73m2    Calcium 8.3 8.3 - 10.4 MG/DL   GLUCOSE, POC    Collection Time: 01/21/18 11:00 AM   Result Value Ref Range    Glucose (POC) 106 (H) 65 - 100 mg/dL           Assessment:     Principal Problem:    Hyponatremia (1/19/2018)    Active Problems:    CAD (coronary artery disease) (9/11/2014)      Overview: With CABG and 4 cardiac stents      S/P CABG (coronary artery bypass graft) (3/12/2017)      Hypokalemia due to inadequate potassium intake (1/20/2018)        Plan:     Hyponatremia. Acute on some chronic changes. She is not symptomatic. Agree the hctz should be stopped and I would not continue at discharge. If she needs a diuretic than a loop variety would be better. A bronchitis could be sufficient to cause a temporary increase in vasopressin. This coupled with the liquid  Intake makes sense. tolvaptan has been ordered x1. I instructed her today to drink to thirst.  Don't restrict and don't push fluid. At discharge she should stop pushing fluids as well. Appreciate the opportunity to evaluate her hyponatremia.     Signed By: Matt Lewis MD     January 21, 2018

## 2018-01-21 NOTE — PROGRESS NOTES
Uneventful shift. Hourly rounds completed throughout shift. Patient received benadryl this shift. Slept all night. Continue to have critical sodium level which has been stable at 124. No new orders. Normal for patient at this hospital stay. Patient denies needs at this time. Will continue to monitor and give bedside report to oncoming day shift nurse.

## 2018-01-21 NOTE — PROGRESS NOTES
Hospitalist Progress Note    Subjective:   Daily Progress Note: 1/21/2018 11:52 AM    Ms. Beau Lechuga is an 81year old WF, with a pmh of CAD and depression, who presented 1/19 for dizziness and weakness and was found to have symptomatic hyponatremia due to medications, decreased po intake and possible SIADH. She was initially started on /hr. Now off ivfs. States she started Repatha injections 3 weeks ago and since then has had a bad taste in her mouth and has not wanted to eat nor drink anything. Sodium 119 on admission. Lasix, HCTZ and lexapro have been held. PO intake encouraged but still poor. Sodium 125 this morning. She denies chest pain, sob, vomiting. Still some occasional nausea. States she is starting to feel some improvement.       Current Facility-Administered Medications   Medication Dose Route Frequency    tolvaptan (SAMSCA) tablet 15 mg  15 mg Oral ONCE    sodium chloride tablet 1 g  1 g Oral TID WITH MEALS    diphenhydrAMINE (BENADRYL) capsule 25 mg  25 mg Oral QHS PRN    amLODIPine (NORVASC) tablet 2.5 mg  2.5 mg Oral DAILY    aspirin delayed-release tablet 81 mg  81 mg Oral QHS    clopidogrel (PLAVIX) tablet 75 mg  75 mg Oral DAILY    magnesium oxide (MAG-OX) tablet 400 mg  400 mg Oral DAILY    pantoprazole (PROTONIX) tablet 40 mg  40 mg Oral BID    propranolol LA (INDERAL LA) capsule 60 mg  60 mg Oral DAILY    sodium chloride (NS) flush 5-10 mL  5-10 mL IntraVENous Q8H    sodium chloride (NS) flush 5-10 mL  5-10 mL IntraVENous PRN    acetaminophen (TYLENOL) tablet 650 mg  650 mg Oral Q4H PRN    ondansetron (ZOFRAN) injection 4 mg  4 mg IntraVENous Q4H PRN    enoxaparin (LOVENOX) injection 40 mg  40 mg SubCUTAneous Q24H    lisinopril (PRINIVIL, ZESTRIL) tablet 20 mg  20 mg Oral DAILY    budesonide (PULMICORT) 500 mcg/2 ml nebulizer suspension  500 mcg Nebulization BID RT    albuterol-ipratropium (DUO-NEB) 2.5 MG-0.5 MG/3 ML  3 mL Nebulization Q6H PRN        Review of Systems  A comprehensive review of systems was negative except for that written in the HPI.     Objective:     Visit Vitals    /62    Pulse 65    Temp 97.7 °F (36.5 °C)    Resp 17    Ht 5' 4\" (1.626 m)    Wt 69.3 kg (152 lb 11.2 oz)    SpO2 98%    BMI 26.21 kg/m2      O2 Device: Room air    Temp (24hrs), Av.8 °F (36.6 °C), Min:97.4 °F (36.3 °C), Max:98.3 °F (36.8 °C)      701 - 1900  In: 240 [P.O.:240]  Out: -   1901 -  0700  In: 480 [P.O.:480]  Out: -     General: awake, alert, oriented, no acute distress, well groomed  Eyes; non icteric, EOMI  Neck; supple  CV: RRR  Pulm; CTAB  Abd; soft, non tender, non distended  Ext: warm, dry, good skin turgor, no lower extremity edema    Additional comments:I reviewed the patient's new clinical lab test results. j    Data Review    Recent Results (from the past 24 hour(s))   GLUCOSE, POC    Collection Time: 18  4:38 PM   Result Value Ref Range    Glucose (POC) 283 (H) 65 - 905 mg/dL   METABOLIC PANEL, BASIC    Collection Time: 18  5:37 PM   Result Value Ref Range    Sodium 122 (LL) 136 - 145 mmol/L    Potassium 3.9 3.5 - 5.1 mmol/L    Chloride 86 (L) 98 - 107 mmol/L    CO2 24 21 - 32 mmol/L    Anion gap 12 7 - 16 mmol/L    Glucose 128 (H) 65 - 100 mg/dL    BUN 14 8 - 23 MG/DL    Creatinine 0.67 0.6 - 1.0 MG/DL    GFR est AA >60 >60 ml/min/1.73m2    GFR est non-AA >60 >60 ml/min/1.73m2    Calcium 7.9 (L) 8.3 - 10.4 MG/DL   GLUCOSE, POC    Collection Time: 18  7:46 PM   Result Value Ref Range    Glucose (POC) 110 (H) 65 - 650 mg/dL   METABOLIC PANEL, BASIC    Collection Time: 18  2:22 AM   Result Value Ref Range    Sodium 124 (LL) 136 - 145 mmol/L    Potassium 3.3 (L) 3.5 - 5.1 mmol/L    Chloride 88 (L) 98 - 107 mmol/L    CO2 25 21 - 32 mmol/L    Anion gap 11 7 - 16 mmol/L    Glucose 104 (H) 65 - 100 mg/dL    BUN 9 8 - 23 MG/DL    Creatinine 0.58 (L) 0.6 - 1.0 MG/DL    GFR est AA >60 >60 ml/min/1.73m2    GFR est non-AA >60 >60 ml/min/1.73m2    Calcium 8.0 (L) 8.3 - 10.4 MG/DL   TSH 3RD GENERATION    Collection Time: 01/21/18  5:06 AM   Result Value Ref Range    TSH 1.420 0.358 - 6.426 uIU/mL   METABOLIC PANEL, BASIC    Collection Time: 01/21/18  5:07 AM   Result Value Ref Range    Sodium 124 (LL) 136 - 145 mmol/L    Potassium 3.4 (L) 3.5 - 5.1 mmol/L    Chloride 88 (L) 98 - 107 mmol/L    CO2 26 21 - 32 mmol/L    Anion gap 10 7 - 16 mmol/L    Glucose 98 65 - 100 mg/dL    BUN 9 8 - 23 MG/DL    Creatinine 0.68 0.6 - 1.0 MG/DL    GFR est AA >60 >60 ml/min/1.73m2    GFR est non-AA >60 >60 ml/min/1.73m2    Calcium 8.4 8.3 - 10.4 MG/DL   GLUCOSE, POC    Collection Time: 01/21/18  7:32 AM   Result Value Ref Range    Glucose (POC) 106 (H) 65 - 961 mg/dL   METABOLIC PANEL, BASIC    Collection Time: 01/21/18  9:48 AM   Result Value Ref Range    Sodium 125 (L) 136 - 145 mmol/L    Potassium 3.3 (L) 3.5 - 5.1 mmol/L    Chloride 87 (L) 98 - 107 mmol/L    CO2 27 21 - 32 mmol/L    Anion gap 11 7 - 16 mmol/L    Glucose 90 65 - 100 mg/dL    BUN 8 8 - 23 MG/DL    Creatinine 0.76 0.6 - 1.0 MG/DL    GFR est AA >60 >60 ml/min/1.73m2    GFR est non-AA >60 >60 ml/min/1.73m2    Calcium 8.3 8.3 - 10.4 MG/DL   GLUCOSE, POC    Collection Time: 01/21/18 11:00 AM   Result Value Ref Range    Glucose (POC) 106 (H) 65 - 100 mg/dL         Assessment/Plan:     Principal Problem:    Hyponatremia (1/19/2018)  Multifactorial.  Holding lexapro, HCTZ and lasix. Give Samsca 15 mg x one and check Na four hours afterwards. Check TSH to ensure hypothyroidism isn't playing a role. Active Problems:    CAD (coronary artery disease) (9/11/2014)      Overview: With CABG and 4 cardiac stents      S/P CABG (coronary artery bypass graft) (3/12/2017)      Hypokalemia due to inadequate potassium intake (1/20/2018)    PT states patient is near her baseline functional status. Home tomorrow?     Care Plan discussed with: Patient/Family    Signed By: Emeterio Clements MD January 21, 2018

## 2018-01-22 ENCOUNTER — PATIENT OUTREACH (OUTPATIENT)
Dept: CASE MANAGEMENT | Age: 83
End: 2018-01-22

## 2018-01-22 VITALS
DIASTOLIC BLOOD PRESSURE: 81 MMHG | RESPIRATION RATE: 18 BRPM | SYSTOLIC BLOOD PRESSURE: 150 MMHG | BODY MASS INDEX: 26.07 KG/M2 | HEART RATE: 65 BPM | WEIGHT: 152.7 LBS | OXYGEN SATURATION: 99 % | HEIGHT: 64 IN | TEMPERATURE: 97.5 F

## 2018-01-22 LAB
ANION GAP SERPL CALC-SCNC: 8 MMOL/L (ref 7–16)
BUN SERPL-MCNC: 10 MG/DL (ref 8–23)
CALCIUM SERPL-MCNC: 8.4 MG/DL (ref 8.3–10.4)
CHLORIDE SERPL-SCNC: 100 MMOL/L (ref 98–107)
CO2 SERPL-SCNC: 26 MMOL/L (ref 21–32)
CREAT SERPL-MCNC: 0.75 MG/DL (ref 0.6–1)
GLUCOSE BLD STRIP.AUTO-MCNC: 88 MG/DL (ref 65–100)
GLUCOSE SERPL-MCNC: 91 MG/DL (ref 65–100)
MAGNESIUM SERPL-MCNC: 2.1 MG/DL (ref 1.8–2.4)
PHOSPHATE SERPL-MCNC: 2.1 MG/DL (ref 2.3–3.7)
POTASSIUM SERPL-SCNC: 3.6 MMOL/L (ref 3.5–5.1)
SODIUM SERPL-SCNC: 134 MMOL/L (ref 136–145)

## 2018-01-22 PROCEDURE — 74011250637 HC RX REV CODE- 250/637: Performed by: INTERNAL MEDICINE

## 2018-01-22 PROCEDURE — 94640 AIRWAY INHALATION TREATMENT: CPT

## 2018-01-22 PROCEDURE — 83735 ASSAY OF MAGNESIUM: CPT | Performed by: NURSE PRACTITIONER

## 2018-01-22 PROCEDURE — 94760 N-INVAS EAR/PLS OXIMETRY 1: CPT

## 2018-01-22 PROCEDURE — 82962 GLUCOSE BLOOD TEST: CPT

## 2018-01-22 PROCEDURE — 74011000250 HC RX REV CODE- 250: Performed by: INTERNAL MEDICINE

## 2018-01-22 PROCEDURE — 80048 BASIC METABOLIC PNL TOTAL CA: CPT | Performed by: NURSE PRACTITIONER

## 2018-01-22 PROCEDURE — 84100 ASSAY OF PHOSPHORUS: CPT | Performed by: NURSE PRACTITIONER

## 2018-01-22 PROCEDURE — 36415 COLL VENOUS BLD VENIPUNCTURE: CPT | Performed by: NURSE PRACTITIONER

## 2018-01-22 RX ORDER — LISINOPRIL 20 MG/1
20 TABLET ORAL DAILY
Qty: 30 TAB | Refills: 0 | Status: SHIPPED | OUTPATIENT
Start: 2018-01-22 | End: 2018-01-29 | Stop reason: SDUPTHER

## 2018-01-22 RX ADMIN — Medication 400 MG: at 08:42

## 2018-01-22 RX ADMIN — PROPRANOLOL HYDROCHLORIDE 60 MG: 60 CAPSULE, EXTENDED RELEASE ORAL at 08:41

## 2018-01-22 RX ADMIN — SODIUM CHLORIDE TAB 1 GM 1 G: 1 TAB at 08:41

## 2018-01-22 RX ADMIN — LISINOPRIL 20 MG: 20 TABLET ORAL at 08:41

## 2018-01-22 RX ADMIN — Medication 10 ML: at 05:30

## 2018-01-22 RX ADMIN — BUDESONIDE 500 MCG: 0.5 INHALANT RESPIRATORY (INHALATION) at 07:19

## 2018-01-22 RX ADMIN — PANTOPRAZOLE SODIUM 40 MG: 40 TABLET, DELAYED RELEASE ORAL at 05:29

## 2018-01-22 RX ADMIN — AMLODIPINE BESYLATE 2.5 MG: 5 TABLET ORAL at 08:42

## 2018-01-22 RX ADMIN — IPRATROPIUM BROMIDE AND ALBUTEROL SULFATE 3 ML: .5; 3 SOLUTION RESPIRATORY (INHALATION) at 07:20

## 2018-01-22 RX ADMIN — CLOPIDOGREL BISULFATE 75 MG: 75 TABLET ORAL at 08:41

## 2018-01-22 NOTE — DISCHARGE SUMMARY
Physician Discharge Summary       Patient: Hilary Hernández MRN: [id number]  SSN: [id number]    YOB: 1935  Age: 81 y.o. Sex: female    PCP: Summer Parada MD    Allergies: Augmentin [amoxicillin-pot clavulanate]; Codeine; Gabapentin; Other medication; Prednisone; and Prevnar 13 [pneumoc 13-chang conj-dip cr(pf)]    Admit date: 1/19/2018  Admitting Provider: Lyubov Pearce DO    Discharge date: 1/22/2018  Discharging Provider: Laury Josue MD    * Admission Diagnoses: Hyponatremia    * Discharge Diagnoses:    Hospital Problems as of 1/22/2018  Date Reviewed: 1/18/2018          Codes Class Noted - Resolved POA    Hypokalemia due to inadequate potassium intake ICD-10-CM: E87.6  ICD-9-CM: 276.8  1/20/2018 - Present Unknown        * (Principal)Hyponatremia ICD-10-CM: E87.1  ICD-9-CM: 276.1  1/19/2018 - Present Unknown        S/P CABG (coronary artery bypass graft) (Chronic) ICD-10-CM: Z95.1  ICD-9-CM: V45.81  3/12/2017 - Present Yes        CAD (coronary artery disease) (Chronic) ICD-10-CM: I25.10  ICD-9-CM: 414.00  9/11/2014 - Present Yes    Overview Signed 9/11/2014  4:26 PM by Ashlie Palacio NP     With CABG and 4 cardiac stents                   [facility] Course:     Ms. Natalie Bedolla is an 81year old WF, with a pmh of CAD and depression, who presented 1/19 for dizziness and weakness and was found to have symptomatic hyponatremia due to medications, decreased po intake and possible SIADH. She was initially started on /hr. Now off ivfs. States she started Repatha injections 3 weeks ago and since then has had a bad taste in her mouth and has not wanted to eat nor drink anything. She states she will no longer be taking Repatha. Sodium 119 on admission. Lasix, HCTZ and lexapro have been held since admission and lexapro is being restarted on discharge as her diuretics are believed to be the main culprit along with decreased intake.    She has been given one dose of Samsca 15 mg and sodium today is 134. She is encouraged to drink when thirsty. Nephrology recommends using a loop diuretic if any diuretic is needing to be initiated in the future. She states the taste in her mouth has improved and she is tolerating po intake. She is medically stable for discharge home. Consults: Nephrology    Significant Diagnostic Studies:     CT CHEST WITH INTRAVENOUS CONTRAST      HISTORY: Hyponatremia.     COMPARISON: 04/17/2015     TECHNIQUE:   5 mm axial scans from the apices through the diaphragms following  75 mL Isovue 370 without acute complication. Contrast necessary to increase  sensitivity for neoplasia and infection. Radiation dose reduction techniques  were used for this study:  Our CT scanners use one or all of the following:  Automated exposure control, adjustment of the mA and/or kVp according to  patient's size, iterative reconstruction.     FINDINGS:  Hazy atelectatic changes at the lung bases and basal lung scarring. No acute pulmonary infiltrate or lymphadenopathy. Postoperative changes from  CABG with also coronary artery stents. Mild cardiomegaly is unchanged. Imaging  through the upper abdomen shows normal adrenal glands. A left-sided  retroperitoneal nonenhancing cyst measures 2.6 cm just inferior to the  pancreatic mid body. This is stable from the CT scan 07/26/2017. Atherosclerotic  abdominal aorta. Partially seen left midpole renal scarring is stable from July 2017 also. 1.7 cm right retrocrural cyst is stable.     IMPRESSION  IMPRESSION: No acute abnormality at the chest. Stable chronic findings at the  abdomen.     Discharge Exam:    General: awake, alert, oriented, cooperative, no acute distress  Eyes; non icteric, EOMI  Neck; supple  CV: RRR  Pulm; CTAB  ABD: soft, non tender, active bowel sounds  Neuro: cranial nerves II-XII grossly intact, moves all extremities without focal deficit    * Discharge Condition: stable  * Disposition: Home    Discharge Medications:  Current Discharge Medication List      START taking these medications    Details   lisinopril (PRINIVIL, ZESTRIL) 20 mg tablet Take 1 Tab by mouth daily. Qty: 30 Tab, Refills: 0         CONTINUE these medications which have NOT CHANGED    Details   HYDROcodone-homatropine (HYCODAN) 5-1.5 mg/5 mL (5 mL) syrup Take 5-10 mL by mouth four (4) times daily. Max Daily Amount: 40 mL. Qty: 240 mL, Refills: 0    Associated Diagnoses: Bronchitis      escitalopram oxalate (LEXAPRO) 5 mg tablet Take 1 Tab by mouth daily. Qty: 90 Tab, Refills: 1    Associated Diagnoses: Severe episode of recurrent major depressive disorder, without psychotic features (HCC)      pantoprazole (PROTONIX) 40 mg tablet Take 1 Tab by mouth two (2) times a day. Indications: gastroesophageal reflux disease, samples  Qty: 60 Tab, Refills: 6      isosorbide mononitrate ER (IMDUR) 60 mg CR tablet Take 1 Tab by mouth every morning. Qty: 30 Tab, Refills: 0      clopidogrel (PLAVIX) 75 mg tab Take 1 Tab by mouth daily. Qty: 80 Tab, Refills: 1    Associated Diagnoses: Coronary artery disease involving native coronary artery of native heart without angina pectoris      amLODIPine (NORVASC) 2.5 mg tablet Take 1 Tab by mouth daily. Indications: hypertension  Qty: 90 Tab, Refills: 1    Associated Diagnoses: Essential hypertension with goal blood pressure less than 140/90      propranolol LA (INDERAL LA) 60 mg SR capsule Take 1 Cap by mouth daily. Qty: 90 Cap, Refills: 1    Associated Diagnoses: Essential hypertension with goal blood pressure less than 140/90      coenzyme q10 (CO Q-10) 10 mg cap Take  by mouth.      loratadine (CLARITIN) 10 mg tablet Take 10 mg by mouth.      cyanocobalamin (VITAMIN B-12) 1,000 mcg tablet Take 1,000 mcg by mouth daily. B.infantis-B.ani-B.long-B.bifi (PROBIOTIC 4X) 10-15 mg TbEC Take  by mouth.      magnesium oxide (MAG-OX) 400 mg tablet Take 400 mg by mouth daily.       nitroglycerin (NITROLINGUAL) 400 mcg/spray spray 1 Spray by SubLINGual route every five (5) minutes as needed for Chest Pain. Qty: 1 Bottle, Refills: 5    Associated Diagnoses: Essential hypertension with goal blood pressure less than 140/90      Potassium Gluconate 595 mg (99 mg) tablet Take 595 mg by mouth two (2) times a day. VIT A/VIT C/VIT E/ZINC/COPPER (PRESERVISION AREDS PO) Take 1 Tab by mouth two (2) times a day. Stopped 1/3/17      acetaminophen (TYLENOL) 325 mg tablet Take  by mouth every four (4) hours as needed for Pain. aspirin 81 mg tablet Take 81 mg by mouth nightly. STOP taking these medications       evolocumab (REPATHA SURECLICK) pen injection Comments:   Reason for Stopping:         triamcinolone acetonide (KENALOG) 40 mg/mL injection Comments:   Reason for Stopping:         levoFLOXacin (LEVAQUIN) 750 mg tablet Comments:   Reason for Stopping:         lisinopril-hydroCHLOROthiazide (PRINZIDE, ZESTORETIC) 20-25 mg per tablet Comments:   Reason for Stopping:         furosemide (LASIX) 40 mg tablet Comments:   Reason for Stopping:               * Follow-up Care/Patient Instructions: Activity: ad pilar  Diet: cardiac    Follow-up Information     Follow up With Details Comments Contact Info    Arnav Mcintyre MD On 2/1/2018 at 11:00 [address]  [phone]          35 minutes spent in discharge planning and coordination of care.      Signed:  Candace Bright MD  1/22/2018  9:08 AM

## 2018-01-22 NOTE — PROGRESS NOTES
Discharge instructions and prescriptions provided and explained to patient, patient voiced understanding. Medication side effect sheet reviewed with pt. No home meds or valuables to return. Opportunity for questions provided. Pt waiting on cane to be delivered from materials. Contacted Oliva Johnson ([phone]) per pt request to notify of discharge. No answer, message left.

## 2018-01-22 NOTE — DISCHARGE INSTRUCTIONS
DISCHARGE SUMMARY from Nurse    PATIENT INSTRUCTIONS:    After general anesthesia or intravenous sedation, for 24 hours or while taking prescription Narcotics:  · Limit your activities  · Do not drive and operate hazardous machinery  · Do not make important personal or business decisions  · Do  not drink alcoholic beverages  · If you have not urinated within 8 hours after discharge, please contact your surgeon on call. Report the following to your surgeon:  · Excessive pain, swelling, redness or odor of or around the surgical area  · Temperature over 100.5  · Nausea and vomiting lasting longer than 4 hours or if unable to take medications  · Any signs of decreased circulation or nerve impairment to extremity: change in color, persistent  numbness, tingling, coldness or increase pain  · Any questions    What to do at Home:  Recommended activity: Activity as tolerated,     If you experience any of the following symptoms fever, chills, new or unrelieved pain, persistent nausea or vomiting, excessive weakness or confusion, please follow up with PCP. *  Please give a list of your current medications to your Primary Care Provider. *  Please update this list whenever your medications are discontinued, doses are      changed, or new medications (including over-the-counter products) are added. *  Please carry medication information at all times in case of emergency situations. These are general instructions for a healthy lifestyle:    No smoking/ No tobacco products/ Avoid exposure to second hand smoke  Surgeon General's Warning:  Quitting smoking now greatly reduces serious risk to your health.     Obesity, smoking, and sedentary lifestyle greatly increases your risk for illness    A healthy diet, regular physical exercise & weight monitoring are important for maintaining a healthy lifestyle    You may be retaining fluid if you have a history of heart failure or if you experience any of the following symptoms:  Weight gain of 3 pounds or more overnight or 5 pounds in a week, increased swelling in our hands or feet or shortness of breath while lying flat in bed. Please call your doctor as soon as you notice any of these symptoms; do not wait until your next office visit. Recognize signs and symptoms of STROKE:    F-face looks uneven    A-arms unable to move or move unevenly    S-speech slurred or non-existent    T-time-call 911 as soon as signs and symptoms begin-DO NOT go       Back to bed or wait to see if you get better-TIME IS BRAIN. Warning Signs of HEART ATTACK     Call 911 if you have these symptoms:   Chest discomfort. Most heart attacks involve discomfort in the center of the chest that lasts more than a few minutes, or that goes away and comes back. It can feel like uncomfortable pressure, squeezing, fullness, or pain.  Discomfort in other areas of the upper body. Symptoms can include pain or discomfort in one or both arms, the back, neck, jaw, or stomach.  Shortness of breath with or without chest discomfort.  Other signs may include breaking out in a cold sweat, nausea, or lightheadedness. Don't wait more than five minutes to call 911 - MINUTES MATTER! Fast action can save your life. Calling 911 is almost always the fastest way to get lifesaving treatment. Emergency Medical Services staff can begin treatment when they arrive -- up to an hour sooner than if someone gets to the hospital by car. The discharge information has been reviewed with the patient. The patient verbalized understanding. Discharge medications reviewed with the patient and appropriate educational materials and side effects teaching were provided.   ___________________________________________________________________________________________________________________________________

## 2018-01-22 NOTE — PROGRESS NOTES
Visit with patient prior to discharge. She was very thankful for the care she received.     Jamia Cevallos, staff Diana gupta 79, [facility]  /   [email or web address]

## 2018-01-22 NOTE — PROGRESS NOTES
Transition of Care Discharge Follow-up Questionnaire   Date/Time of Call:   1/22/18 3:30   What was the patient hospitalized for? Hyponatremia          Does the patient understand his/her diagnosis and/or treatment and what happened during the hospitalization? Yes and verbalized understanding    Did the patient receive discharge instructions? Yes      Review any discharge instructions (see notes in [facility]). Ask patient if they understand these. Do they have any questions? Reviewed via [facility] and verbalized understanding    Were home services ordered (nursing, PT, OT, ST, etc.)? No      If so, has the first visit occurred? If not, why? (Assist with coordination of services if necessary.) n/a      Was any DME ordered? No      If so, has it been received? If not, why?  (Assist with coordination of arranging DME orders if necessary.) n/a      Complete a review of all medications (new, continued and discontinued meds per the D/C instructions and medication tab in [facility]). Reviewed via [facility] and verbalized understanding of dosing and instructions. Stated that she doesnt take the Imdur due to it makes her very sick and a severe headache . Will send message to Dr. Sarah Conway about patient not taking Imdur and patient requesting to have Imdur removed from her current list of meds     Were all new prescriptions filled? If not, why?  (Assist with obtainment of medications if necessary.)   Yes        Does the patient understand the purpose and dosing instructions for all medications? (If patient has questions, provide explanation and education.) Yes    Does the patient have any problems in performing ADLs? (If patient is unable to perform ADLs  what is the limiting factor(s)?   Do they have a support system that can assist? If no support system is present, discuss possible assistance that they may be able to obtain.) A&O  Independent with all ADLs   Patient still drives      Does the patient have all follow-up appointments scheduled? Has transportation been arranged? [facility] Pulmonary follow-up should be within 7 days of discharge; all others should have PCP follow-up within 7 days of discharge; follow-ups with other specialists as appropriate or ordered.) Yes and either she or her sons will drive to appointments  1/29/18 PCP    Any other questions or concerns expressed by the patient? Not at this time   Schedule next appointment with SILVIA GUARDADO Coordinator or refer to RN Case Manager/  as appropriate. In one week after PCP f/u apt    ZACK Call Completed By: Bruno Walsh RN, CCM                    This note will not be viewable in [address]

## 2018-01-22 NOTE — Clinical Note
I phoned patient today to f/u  after hospitalization. Stated she wants Imdur removed from her med list because she says she won't take it anymore because it makes her very sick and gives her a severe headache. Thank you.

## 2018-01-22 NOTE — PROGRESS NOTES
Uneventful shift. Hourly rounds completed throughout shift. Patient slept all night. Patient denies needs at this time. Will continue to monitor and give bedside report to oncoming day shift nurse.

## 2018-01-23 ENCOUNTER — PATIENT OUTREACH (OUTPATIENT)
Dept: CASE MANAGEMENT | Age: 83
End: 2018-01-23

## 2018-01-23 NOTE — PROGRESS NOTES
ZACK completed by Norma Wood RN 1/23 as patient is engaged with CCM. Hoang Coker LPN/ Care Coordinator  [address] / [address]  [email or web address]. [facility] note will not be viewable in [address]

## 2018-01-30 ENCOUNTER — PATIENT OUTREACH (OUTPATIENT)
Dept: CASE MANAGEMENT | Age: 83
End: 2018-01-30

## 2018-01-30 NOTE — PROGRESS NOTES
· Outreached to patient to f/u from [facility] per protocol. · Patient A&O and answers questions appropriately   · Attended f/u PCP apt on 1/29/18 and cardiology on 1/30/18  · Independent with all ADL's. · Denies any med, transport, or psychosocial issues  · No CCM needs voiced or identified  · Patient has my contact information  · Case closed at this time. This note will not be viewable in [address]

## 2018-02-23 ENCOUNTER — HOSPITAL ENCOUNTER (OUTPATIENT)
Dept: LAB | Age: 83
Discharge: HOME | End: 2018-02-23
Payer: MEDICARE

## 2018-02-23 DIAGNOSIS — I50.32 DIASTOLIC CHF, CHRONIC (HCC): Chronic | ICD-10-CM

## 2018-02-23 PROBLEM — R51.9 FREQUENT HEADACHES: Status: ACTIVE | Noted: 2018-01-04

## 2018-02-23 PROBLEM — R14.0 ABDOMINAL DISTENSION (GASEOUS): Status: ACTIVE | Noted: 2018-02-23

## 2018-02-23 PROBLEM — K30 FUNCTIONAL DYSPEPSIA: Status: ACTIVE | Noted: 2018-02-23

## 2018-02-23 PROBLEM — K22.2 ESOPHAGEAL OBSTRUCTION: Status: ACTIVE | Noted: 2018-02-23

## 2018-02-23 PROBLEM — K44.9 DIAPHRAGMATIC HERNIA WITHOUT OBSTRUCTION OR GANGRENE: Status: ACTIVE | Noted: 2018-02-23

## 2018-02-23 PROBLEM — C64.2 MALIGNANT NEOPLASM OF LEFT KIDNEY, EXCEPT RENAL PELVIS (HCC): Status: ACTIVE | Noted: 2018-02-23

## 2018-02-23 PROBLEM — C64.9 MALIGNANT NEOPLASM OF KIDNEY EXCLUDING RENAL PELVIS (HCC): Status: ACTIVE | Noted: 2018-02-23

## 2018-02-23 PROBLEM — Z12.11 ENCOUNTER FOR SCREENING FOR MALIGNANT NEOPLASM OF COLON: Status: RESOLVED | Noted: 2018-02-23 | Resolved: 2018-02-23

## 2018-02-23 PROBLEM — R11.0 NAUSEA: Status: ACTIVE | Noted: 2018-02-23

## 2018-02-23 PROBLEM — I82.432 ACUTE DEEP VEIN THROMBOSIS (DVT) OF POPLITEAL VEIN OF LEFT LOWER EXTREMITY (HCC): Chronic | Status: ACTIVE | Noted: 2018-02-23

## 2018-02-23 PROBLEM — H35.372 MACULAR PUCKER, LEFT EYE: Chronic | Status: ACTIVE | Noted: 2018-02-23

## 2018-02-23 PROBLEM — Z12.11 ENCOUNTER FOR SCREENING FOR MALIGNANT NEOPLASM OF COLON: Status: ACTIVE | Noted: 2018-02-23

## 2018-02-23 PROBLEM — E87.1 HYPONATREMIA: Status: RESOLVED | Noted: 2018-01-19 | Resolved: 2018-02-23

## 2018-02-23 PROBLEM — R07.9 CHEST PAIN: Status: RESOLVED | Noted: 2018-02-23 | Resolved: 2018-02-23

## 2018-02-23 PROBLEM — C64.2 MALIGNANT NEOPLASM OF LEFT KIDNEY, EXCEPT RENAL PELVIS (HCC): Chronic | Status: RESOLVED | Noted: 2018-02-23 | Resolved: 2018-02-23

## 2018-02-23 PROBLEM — R07.9 CHEST PAIN: Status: ACTIVE | Noted: 2018-02-23

## 2018-02-23 PROBLEM — H60.00 FURUNCLE OF EAR CANAL: Status: RESOLVED | Noted: 2017-09-18 | Resolved: 2018-02-23

## 2018-02-23 PROBLEM — R43.2: Chronic | Status: ACTIVE | Noted: 2018-02-23

## 2018-02-23 PROBLEM — K30 FUNCTIONAL DYSPEPSIA: Status: RESOLVED | Noted: 2018-02-23 | Resolved: 2018-02-23

## 2018-02-23 PROBLEM — K22.4 DYSKINESIA OF ESOPHAGUS: Status: ACTIVE | Noted: 2018-02-23

## 2018-02-23 PROBLEM — C64.9 MALIGNANT NEOPLASM OF KIDNEY EXCLUDING RENAL PELVIS (HCC): Status: RESOLVED | Noted: 2018-02-23 | Resolved: 2018-02-23

## 2018-02-23 PROBLEM — H60.00 FURUNCLE OF EAR CANAL: Status: ACTIVE | Noted: 2017-09-18

## 2018-02-23 PROBLEM — R53.82 CHRONIC FATIGUE: Chronic | Status: RESOLVED | Noted: 2017-08-15 | Resolved: 2018-02-23

## 2018-02-23 PROBLEM — K64.8 OTHER HEMORRHOIDS: Status: RESOLVED | Noted: 2018-02-23 | Resolved: 2018-02-23

## 2018-02-23 PROBLEM — R51.9 LEFT-SIDED HEADACHE: Status: ACTIVE | Noted: 2018-02-23

## 2018-02-23 PROBLEM — R14.0 ABDOMINAL DISTENSION (GASEOUS): Status: RESOLVED | Noted: 2018-02-23 | Resolved: 2018-02-23

## 2018-02-23 PROBLEM — R51.9 FREQUENT HEADACHES: Status: RESOLVED | Noted: 2018-01-04 | Resolved: 2018-02-23

## 2018-02-23 PROBLEM — K44.9 DIAPHRAGMATIC HERNIA WITHOUT OBSTRUCTION OR GANGRENE: Chronic | Status: ACTIVE | Noted: 2018-02-23

## 2018-02-23 PROBLEM — E87.6 HYPOKALEMIA DUE TO INADEQUATE POTASSIUM INTAKE: Status: RESOLVED | Noted: 2018-01-20 | Resolved: 2018-02-23

## 2018-02-23 PROBLEM — K64.8 OTHER HEMORRHOIDS: Status: ACTIVE | Noted: 2018-02-23

## 2018-02-23 PROBLEM — K63.5 POLYP OF COLON: Status: RESOLVED | Noted: 2018-02-23 | Resolved: 2018-02-23

## 2018-02-23 PROBLEM — R25.2 FOOT CRAMPS: Chronic | Status: RESOLVED | Noted: 2017-05-12 | Resolved: 2018-02-23

## 2018-02-23 PROBLEM — R11.0 NAUSEA: Status: RESOLVED | Noted: 2018-02-23 | Resolved: 2018-02-23

## 2018-02-23 PROBLEM — K63.5 POLYP OF COLON: Status: ACTIVE | Noted: 2018-02-23

## 2018-02-23 PROBLEM — C64.2 MALIGNANT NEOPLASM OF LEFT KIDNEY, EXCEPT RENAL PELVIS (HCC): Chronic | Status: ACTIVE | Noted: 2018-02-23

## 2018-02-23 PROBLEM — K22.2 ESOPHAGEAL OBSTRUCTION: Status: RESOLVED | Noted: 2018-02-23 | Resolved: 2018-02-23

## 2018-02-23 PROBLEM — K22.4 DYSKINESIA OF ESOPHAGUS: Chronic | Status: ACTIVE | Noted: 2018-02-23

## 2018-02-23 PROBLEM — H35.372 MACULAR PUCKER, LEFT EYE: Status: ACTIVE | Noted: 2018-02-23

## 2018-02-23 LAB
ANION GAP SERPL CALC-SCNC: 6 MMOL/L (ref 7–16)
BNP SERPL-MCNC: 161 PG/ML
BUN SERPL-MCNC: 14 MG/DL (ref 8–23)
CALCIUM SERPL-MCNC: 8.9 MG/DL (ref 8.3–10.4)
CHLORIDE SERPL-SCNC: 103 MMOL/L (ref 98–107)
CO2 SERPL-SCNC: 30 MMOL/L (ref 21–32)
CREAT SERPL-MCNC: 0.86 MG/DL (ref 0.6–1)
GLUCOSE SERPL-MCNC: 97 MG/DL (ref 65–100)
MAGNESIUM SERPL-MCNC: 2.1 MG/DL (ref 1.8–2.4)
POTASSIUM SERPL-SCNC: 4.2 MMOL/L (ref 3.5–5.1)
SODIUM SERPL-SCNC: 139 MMOL/L (ref 136–145)

## 2018-02-23 PROCEDURE — 83735 ASSAY OF MAGNESIUM: CPT | Performed by: INTERNAL MEDICINE

## 2018-02-23 PROCEDURE — 80048 BASIC METABOLIC PNL TOTAL CA: CPT | Performed by: INTERNAL MEDICINE

## 2018-02-23 PROCEDURE — 83880 ASSAY OF NATRIURETIC PEPTIDE: CPT | Performed by: INTERNAL MEDICINE

## 2018-02-23 PROCEDURE — 36415 COLL VENOUS BLD VENIPUNCTURE: CPT | Performed by: INTERNAL MEDICINE

## 2018-03-09 ENCOUNTER — APPOINTMENT (OUTPATIENT)
Dept: GENERAL RADIOLOGY | Age: 83
End: 2018-03-09
Attending: FAMILY MEDICINE
Payer: MEDICARE

## 2018-03-09 ENCOUNTER — HOSPITAL ENCOUNTER (OUTPATIENT)
Dept: MRI IMAGING | Age: 83
Discharge: HOME | End: 2018-03-09
Attending: FAMILY MEDICINE
Payer: MEDICARE

## 2018-03-09 DIAGNOSIS — R43.2 DISTORTED TASTE: Chronic | ICD-10-CM

## 2018-03-09 DIAGNOSIS — R51.9 LEFT-SIDED HEADACHE: ICD-10-CM

## 2018-03-09 PROCEDURE — 70551 MRI BRAIN STEM W/O DYE: CPT

## 2018-03-16 PROBLEM — R19.6 HALITOSIS: Status: ACTIVE | Noted: 2018-03-02

## 2018-05-10 NOTE — ED NOTES
Doctor at bedside.
Dr. Misael Myers consulted about pt's new onset headache, left arm pain and high BP
Lab called about pt's urine results.
Report attempted to give to admitting nurse.  Will try again
TRANSFER - OUT REPORT:    Verbal report given to Damaris Castro rN(name) on Alivia Drain  being transferred to 606(unit) for routine progression of care       Report consisted of patients Situation, Background, Assessment and   Recommendations(SBAR). Information from the following report(s) SBAR, Kardex, ED Summary and Recent Results was reviewed with the receiving nurse. Lines:   Peripheral IV 01/19/18 Left Forearm (Active)   Site Assessment Clean, dry, & intact 1/19/2018  1:59 PM   Phlebitis Assessment 0 1/19/2018  1:59 PM   Infiltration Assessment 0 1/19/2018  1:59 PM   Hub Color/Line Status Pink 1/19/2018  1:59 PM   Alcohol Cap Used No 1/19/2018  1:59 PM       Peripheral IV 01/19/18 Right Antecubital (Active)   Site Assessment Clean, dry, & intact 1/19/2018  2:00 PM   Phlebitis Assessment 0 1/19/2018  2:00 PM   Infiltration Assessment 0 1/19/2018  2:00 PM   Dressing Status Clean, dry, & intact 1/19/2018  2:00 PM   Hub Color/Line Status Pink 1/19/2018  2:00 PM   Alcohol Cap Used No 1/19/2018  2:00 PM        Opportunity for questions and clarification was provided.       Patient transported with:
- - -

## 2018-05-22 ENCOUNTER — HOSPITAL ENCOUNTER (OUTPATIENT)
Dept: CT IMAGING | Age: 83
Discharge: HOME | End: 2018-05-22
Attending: RADIOLOGY
Payer: MEDICARE

## 2018-05-22 VITALS — BODY MASS INDEX: 25.61 KG/M2 | HEIGHT: 64 IN | WEIGHT: 150 LBS

## 2018-05-22 DIAGNOSIS — C64.2 RENAL CANCER, LEFT (HCC): ICD-10-CM

## 2018-05-22 LAB — CREAT BLD-MCNC: 0.9 MG/DL (ref 0.8–1.5)

## 2018-05-22 PROCEDURE — 74011000258 HC RX REV CODE- 258: Performed by: RADIOLOGY

## 2018-05-22 PROCEDURE — 74011636320 HC RX REV CODE- 636/320: Performed by: RADIOLOGY

## 2018-05-22 PROCEDURE — 74170 CT ABD WO CNTRST FLWD CNTRST: CPT

## 2018-05-22 PROCEDURE — 82565 ASSAY OF CREATININE: CPT

## 2018-05-22 RX ORDER — SODIUM CHLORIDE 0.9 % (FLUSH) 0.9 %
10 SYRINGE (ML) INJECTION
Status: COMPLETED | OUTPATIENT
Start: 2018-05-22 | End: 2018-05-22

## 2018-05-22 RX ADMIN — Medication 10 ML: at 14:39

## 2018-05-22 RX ADMIN — SODIUM CHLORIDE 100 ML: 900 INJECTION, SOLUTION INTRAVENOUS at 14:39

## 2018-05-22 RX ADMIN — IOPAMIDOL 100 ML: 755 INJECTION, SOLUTION INTRAVENOUS at 14:39

## 2018-07-03 ENCOUNTER — HOSPITAL ENCOUNTER (OUTPATIENT)
Dept: ULTRASOUND IMAGING | Age: 83
Discharge: HOME | End: 2018-07-03
Attending: FAMILY MEDICINE
Payer: MEDICARE

## 2018-07-03 DIAGNOSIS — I82.432 ACUTE DEEP VEIN THROMBOSIS (DVT) OF POPLITEAL VEIN OF LEFT LOWER EXTREMITY (HCC): Chronic | ICD-10-CM

## 2018-07-03 PROCEDURE — 93971 EXTREMITY STUDY: CPT

## 2018-07-13 ENCOUNTER — HOSPITAL ENCOUNTER (OUTPATIENT)
Dept: GENERAL RADIOLOGY | Age: 83
Discharge: HOME | End: 2018-07-13
Payer: MEDICARE

## 2018-07-13 DIAGNOSIS — M25.552 LEFT HIP PAIN: ICD-10-CM

## 2018-07-13 PROCEDURE — 73502 X-RAY EXAM HIP UNI 2-3 VIEWS: CPT

## 2018-07-13 NOTE — PROGRESS NOTES
Mild bilateral hip arthritis. This does not explain her pain. Have her f/u Monday if not better. Go to er if worse abd pain, fever.

## 2018-07-26 ENCOUNTER — HOSPITAL ENCOUNTER (OUTPATIENT)
Dept: CT IMAGING | Age: 83
Discharge: HOME | End: 2018-07-26
Attending: RADIOLOGY
Payer: MEDICARE

## 2018-07-26 DIAGNOSIS — R10.2 PELVIC PAIN: ICD-10-CM

## 2018-07-26 LAB — CREAT BLD-MCNC: 1 MG/DL (ref 0.8–1.5)

## 2018-07-26 PROCEDURE — 74011636320 HC RX REV CODE- 636/320: Performed by: RADIOLOGY

## 2018-07-26 PROCEDURE — 74011000258 HC RX REV CODE- 258: Performed by: RADIOLOGY

## 2018-07-26 PROCEDURE — 72193 CT PELVIS W/DYE: CPT

## 2018-07-26 PROCEDURE — 82565 ASSAY OF CREATININE: CPT

## 2018-07-26 RX ORDER — SODIUM CHLORIDE 0.9 % (FLUSH) 0.9 %
10 SYRINGE (ML) INJECTION
Status: COMPLETED | OUTPATIENT
Start: 2018-07-26 | End: 2018-07-26

## 2018-07-26 RX ADMIN — SODIUM CHLORIDE 100 ML: 900 INJECTION, SOLUTION INTRAVENOUS at 11:46

## 2018-07-26 RX ADMIN — Medication 10 ML: at 11:46

## 2018-07-26 RX ADMIN — IOPAMIDOL 100 ML: 755 INJECTION, SOLUTION INTRAVENOUS at 11:46

## 2018-07-26 RX ADMIN — DIATRIZOATE MEGLUMINE AND DIATRIZOATE SODIUM 15 ML: 660; 100 LIQUID ORAL; RECTAL at 11:46

## 2018-10-19 ENCOUNTER — APPOINTMENT (OUTPATIENT)
Dept: GENERAL RADIOLOGY | Age: 83
End: 2018-10-19
Attending: EMERGENCY MEDICINE
Payer: MEDICARE

## 2018-10-19 ENCOUNTER — HOSPITAL ENCOUNTER (OUTPATIENT)
Age: 83
Setting detail: OBSERVATION
Discharge: HOME | End: 2018-10-20
Attending: EMERGENCY MEDICINE | Admitting: INTERNAL MEDICINE
Payer: MEDICARE

## 2018-10-19 DIAGNOSIS — I20.0 UNSTABLE ANGINA (HCC): Primary | ICD-10-CM

## 2018-10-19 PROBLEM — I82.432 DEEP VEIN THROMBOSIS (DVT) OF POPLITEAL VEIN OF LEFT LOWER EXTREMITY (HCC): Status: ACTIVE | Noted: 2018-02-23

## 2018-10-19 LAB
ALBUMIN SERPL-MCNC: 3.5 G/DL (ref 3.2–4.6)
ALBUMIN/GLOB SERPL: 1.1 {RATIO} (ref 1.2–3.5)
ALP SERPL-CCNC: 67 U/L (ref 50–136)
ALT SERPL-CCNC: 12 U/L (ref 12–65)
ANION GAP SERPL CALC-SCNC: 8 MMOL/L (ref 7–16)
AST SERPL-CCNC: 12 U/L (ref 15–37)
ATRIAL RATE: 64 BPM
BASOPHILS # BLD: 0 K/UL (ref 0–0.2)
BASOPHILS NFR BLD: 1 % (ref 0–2)
BILIRUB SERPL-MCNC: 0.4 MG/DL (ref 0.2–1.1)
BUN SERPL-MCNC: 16 MG/DL (ref 8–23)
CALCIUM SERPL-MCNC: 8.5 MG/DL (ref 8.3–10.4)
CALCULATED P AXIS, ECG09: 68 DEGREES
CALCULATED R AXIS, ECG10: 62 DEGREES
CALCULATED T AXIS, ECG11: 99 DEGREES
CHLORIDE SERPL-SCNC: 101 MMOL/L (ref 98–107)
CO2 SERPL-SCNC: 30 MMOL/L (ref 21–32)
CREAT SERPL-MCNC: 1.27 MG/DL (ref 0.6–1)
DIAGNOSIS, 93000: NORMAL
DIFFERENTIAL METHOD BLD: ABNORMAL
EOSINOPHIL # BLD: 0.2 K/UL (ref 0–0.8)
EOSINOPHIL NFR BLD: 4 % (ref 0.5–7.8)
ERYTHROCYTE [DISTWIDTH] IN BLOOD BY AUTOMATED COUNT: 14.2 %
GLOBULIN SER CALC-MCNC: 3.3 G/DL (ref 2.3–3.5)
GLUCOSE SERPL-MCNC: 90 MG/DL (ref 65–100)
HCT VFR BLD AUTO: 36.1 % (ref 35.8–46.3)
HGB BLD-MCNC: 11.7 G/DL (ref 11.7–15.4)
IMM GRANULOCYTES # BLD: 0 K/UL (ref 0–0.5)
IMM GRANULOCYTES NFR BLD AUTO: 0 % (ref 0–5)
LYMPHOCYTES # BLD: 1.3 K/UL (ref 0.5–4.6)
LYMPHOCYTES NFR BLD: 24 % (ref 13–44)
MCH RBC QN AUTO: 30.4 PG (ref 26.1–32.9)
MCHC RBC AUTO-ENTMCNC: 32.4 G/DL (ref 31.4–35)
MCV RBC AUTO: 93.8 FL (ref 79.6–97.8)
MONOCYTES # BLD: 0.6 K/UL (ref 0.1–1.3)
MONOCYTES NFR BLD: 11 % (ref 4–12)
NEUTS SEG # BLD: 3.1 K/UL (ref 1.7–8.2)
NEUTS SEG NFR BLD: 60 % (ref 43–78)
NRBC # BLD: 0 K/UL (ref 0–0.2)
P-R INTERVAL, ECG05: 160 MS
PLATELET # BLD AUTO: 241 K/UL (ref 150–450)
PMV BLD AUTO: 9.5 FL (ref 9.4–12.3)
POTASSIUM SERPL-SCNC: 3.7 MMOL/L (ref 3.5–5.1)
PROT SERPL-MCNC: 6.8 G/DL (ref 6.3–8.2)
Q-T INTERVAL, ECG07: 408 MS
QRS DURATION, ECG06: 76 MS
QTC CALCULATION (BEZET), ECG08: 420 MS
RBC # BLD AUTO: 3.85 M/UL (ref 4.05–5.2)
SODIUM SERPL-SCNC: 139 MMOL/L (ref 136–145)
TROPONIN I SERPL-MCNC: <0.02 NG/ML (ref 0.02–0.05)
TROPONIN I SERPL-MCNC: <0.02 NG/ML (ref 0.02–0.05)
VENTRICULAR RATE, ECG03: 64 BPM
WBC # BLD AUTO: 5.1 K/UL (ref 4.3–11.1)

## 2018-10-19 PROCEDURE — 85025 COMPLETE CBC W/AUTO DIFF WBC: CPT

## 2018-10-19 PROCEDURE — 93005 ELECTROCARDIOGRAM TRACING: CPT | Performed by: EMERGENCY MEDICINE

## 2018-10-19 PROCEDURE — 80053 COMPREHEN METABOLIC PANEL: CPT

## 2018-10-19 PROCEDURE — 96365 THER/PROPH/DIAG IV INF INIT: CPT

## 2018-10-19 PROCEDURE — 74011250636 HC RX REV CODE- 250/636: Performed by: NURSE PRACTITIONER

## 2018-10-19 PROCEDURE — 99218 HC RM OBSERVATION: CPT

## 2018-10-19 PROCEDURE — 36415 COLL VENOUS BLD VENIPUNCTURE: CPT

## 2018-10-19 PROCEDURE — 94760 N-INVAS EAR/PLS OXIMETRY 1: CPT | Performed by: EMERGENCY MEDICINE

## 2018-10-19 PROCEDURE — 74011250637 HC RX REV CODE- 250/637: Performed by: NURSE PRACTITIONER

## 2018-10-19 PROCEDURE — 96366 THER/PROPH/DIAG IV INF ADDON: CPT

## 2018-10-19 PROCEDURE — 84484 ASSAY OF TROPONIN QUANT: CPT

## 2018-10-19 PROCEDURE — 71046 X-RAY EXAM CHEST 2 VIEWS: CPT

## 2018-10-19 PROCEDURE — 99285 EMERGENCY DEPT VISIT HI MDM: CPT | Performed by: EMERGENCY MEDICINE

## 2018-10-19 RX ORDER — SODIUM CHLORIDE 9 MG/ML
75 INJECTION, SOLUTION INTRAVENOUS CONTINUOUS
Status: DISCONTINUED | OUTPATIENT
Start: 2018-10-20 | End: 2018-10-20 | Stop reason: HOSPADM

## 2018-10-19 RX ORDER — PANTOPRAZOLE SODIUM 40 MG/1
40 TABLET, DELAYED RELEASE ORAL 2 TIMES DAILY
Status: DISCONTINUED | OUTPATIENT
Start: 2018-10-19 | End: 2018-10-20 | Stop reason: HOSPADM

## 2018-10-19 RX ORDER — NITROGLYCERIN 0.4 MG/1
0.4 TABLET SUBLINGUAL
Status: DISCONTINUED | OUTPATIENT
Start: 2018-10-19 | End: 2018-10-20 | Stop reason: HOSPADM

## 2018-10-19 RX ORDER — HEPARIN SODIUM 5000 [USP'U]/100ML
12-25 INJECTION, SOLUTION INTRAVENOUS
Status: DISCONTINUED | OUTPATIENT
Start: 2018-10-19 | End: 2018-10-20 | Stop reason: HOSPADM

## 2018-10-19 RX ORDER — ACETAMINOPHEN 325 MG/1
650 TABLET ORAL
Status: DISCONTINUED | OUTPATIENT
Start: 2018-10-19 | End: 2018-10-20 | Stop reason: HOSPADM

## 2018-10-19 RX ORDER — LISINOPRIL 20 MG/1
20 TABLET ORAL DAILY
Status: DISCONTINUED | OUTPATIENT
Start: 2018-10-20 | End: 2018-10-20 | Stop reason: HOSPADM

## 2018-10-19 RX ORDER — HEPARIN SODIUM 5000 [USP'U]/ML
4000 INJECTION, SOLUTION INTRAVENOUS; SUBCUTANEOUS ONCE
Status: COMPLETED | OUTPATIENT
Start: 2018-10-19 | End: 2018-10-19

## 2018-10-19 RX ORDER — PROPRANOLOL HYDROCHLORIDE 60 MG/1
60 CAPSULE, EXTENDED RELEASE ORAL DAILY
Status: DISCONTINUED | OUTPATIENT
Start: 2018-10-20 | End: 2018-10-20 | Stop reason: HOSPADM

## 2018-10-19 RX ORDER — ROSUVASTATIN CALCIUM 20 MG/1
40 TABLET, COATED ORAL
Status: DISCONTINUED | OUTPATIENT
Start: 2018-10-19 | End: 2018-10-20 | Stop reason: HOSPADM

## 2018-10-19 RX ORDER — SODIUM CHLORIDE 0.9 % (FLUSH) 0.9 %
5-10 SYRINGE (ML) INJECTION AS NEEDED
Status: DISCONTINUED | OUTPATIENT
Start: 2018-10-19 | End: 2018-10-20 | Stop reason: HOSPADM

## 2018-10-19 RX ORDER — ASPIRIN 81 MG/1
81 TABLET ORAL DAILY
Status: DISCONTINUED | OUTPATIENT
Start: 2018-10-20 | End: 2018-10-20 | Stop reason: HOSPADM

## 2018-10-19 RX ADMIN — PANTOPRAZOLE SODIUM 40 MG: 40 TABLET, DELAYED RELEASE ORAL at 22:14

## 2018-10-19 RX ADMIN — HEPARIN SODIUM AND DEXTROSE 12 UNITS/KG/HR: 5000; 5 INJECTION INTRAVENOUS at 22:10

## 2018-10-19 RX ADMIN — HEPARIN SODIUM 4000 UNITS: 5000 INJECTION INTRAVENOUS; SUBCUTANEOUS at 22:10

## 2018-10-19 RX ADMIN — ROSUVASTATIN CALCIUM 40 MG: 20 TABLET, FILM COATED ORAL at 22:14

## 2018-10-19 NOTE — ED PROVIDER NOTES
Patient is an 76-year-old female who is coming in with progressively worsening chest tightness over the last month but particularly worse today. She does have a history of having a CABG done in 2005. She since had stents last believes they were placed about 3-4 years ago. She states that the tightness is much worse with any exertion. And improves while laying still currently she denies any pain but does report a little bit of tightness that has lingered. She hasn't gotten much relief from nitroglycerin that she tried earlier. She also took an aspirin today. Past Medical History:  
Diagnosis Date  Abdominal pain 10/28/2014  Angina at rest [facility])   
 pt denies  Arthritis   
 osteo - low back,  shoulders, hips, low back  Bilateral carotid bruits  Bursitis  CAD (coronary artery disease) 4 vessel CABG 2005;--- stents x 4--- last one placed 2014-- followed by dr Morgan Ferrer  Cervicalgia  Chronic pain   
 left shoulder  Coagulation defects   
 on plavix  Constipation  Cough 09/11/2014  
 10/8/14, Methacholine Challenge Study: The point at which the FEV1 fell by at least 20%, the PC20, occurred above a dose of 25mg/mL of methacholine. This rules out the diagnosis of asthma with an extremely high degree of sensitivity. No post-challenge FEV1 recovery was identified. Susi Velasquez MD      
 Depressive disorder  Dyspnea 09/11/2014 [facility]; 9/29/14: IMPRESSION: The flow volume loop is consistent restriction. Spirometry suggest restriction with concomitant obstruction at low lung volumes. There is not a significant bronchodilator response. Lung volumes reveal mild restriction. The unadjusted diffusion capacity is normal.  Stacy Dwyer MD    
 GERD (gastroesophageal reflux disease)   
 controlled with med  Headache   
 Hoarseness or changing voice   
 thougfht to be related to gerd  Hyperlipidemia  Hypertension   
 controlled with med  Kidney stone yrs ago-- no tx required  Lumbago  Macular degeneration  Nodule of left lung   
 dx'ed 4 years ago-watched for several months-no new growth-being watched-yearly CT scan----- followed by dr. Sarah Olivares  Pain in joint, ankle and foot   
 left 3rd toe and right 2nd toe  Pain in joint, shoulder region   
 left now bothering > right, right ok  Palpitations 2015  
 followed by dr Charlie Clayton  Panic disorder   
 panic attacks -- last one   Renal mass 2016  
 ablation---left side--- followed by dr Raymond Rosas in [place]  Sciatica  Vascular headache Past Surgical History:  
Procedure Laterality Date  ABDOMEN SURGERY PROC UNLISTED Left 2016  
 ablation for mass in left side of abd  
 HX CATARACT REMOVAL    
 left  HX CATARACT REMOVAL    
 HX COLONOSCOPY  12/15/2015  
 diverticulosis, internal hemorrhoid, colon polyp- no further colonoscopies needed  HX COLONOSCOPY  2016  HX CORONARY ARTERY BYPASS GRAFT    
 4 vessel CABG   HX HEART CATHETERIZATION    
 stent   HX HEART CATHETERIZATION    
 stent 2006  HX HEENT Left   
 macular pucker  HX HERNIA REPAIR Left 2017  HX ORTHOPAEDIC    
 finger, foot  HX PARTIAL HYSTERECTOMY    
 hyst  
 
   
Family History:  
Problem Relation Age of Onset  Diabetes Mother  Heart Surgery Mother  Heart Disease Mother  Heart Attack Father  Heart Disease Father  No Known Problems Sister  Cancer Sister 2 sisters w/ lung ca-neither one smoked  Heart Disease Sister  Cancer Brother   
     lung ca-smoker  Heart Attack Brother   
      age 22 of MI  
 Heart Disease Sister  Heart Surgery Sister  Heart Attack Sister  Heart Disease Sister  Heart Surgery Sister  Heart Attack Sister  Heart Surgery Brother   
      in OR during 2nd heart surgery  Heart Disease Brother  No Known Problems Maternal Grandmother  No Known Problems Maternal Grandfather  No Known Problems Paternal Grandmother  No Known Problems Paternal Grandfather Social History Socioeconomic History  Marital status:  Spouse name: Not on file  Number of children: Not on file  Years of education: Not on file  Highest education level: Not on file Social Needs  Financial resource strain: Not on file  Food insecurity - worry: Not on file  Food insecurity - inability: Not on file  Transportation needs - medical: Not on file  Transportation needs - non-medical: Not on file Occupational History  Occupation: [employer] Employer: RETIRED Comment: 12 years  Occupation: [employer] Employer: RETIRED Comment: 20 years  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  
 Drug use: No  
 Sexual activity: No  
Other Topics Concern  Not on file Social History Narrative Lifelong nonsmoker with 20 year secondhand smoke exposure from her  cigarettes. 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 , 2 sons. Denies alcohol use. Has always lived in [place]. 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 ALLERGIES: Augmentin [amoxicillin-pot clavulanate]; Codeine; Gabapentin; Hydrochlorothiazide; Other medication; Prednisone; and Prevnar 13 [pneumoc 13-chang conj-dip cr(pf)] Review of Systems Constitutional: Negative for chills and fever. Respiratory: Positive for chest tightness. Negative for shortness of breath, wheezing and stridor. Cardiovascular: Negative for chest pain and palpitations. Gastrointestinal: Negative for abdominal pain, diarrhea, nausea and vomiting. Skin: Negative. All other systems reviewed and are negative. Vitals:  
 10/19/18 1433 BP: 138/71 Pulse: 63 Resp: 18 Temp: 97.6 °F (36.4 °C) SpO2: 100% Weight: 69.9 kg (154 lb) Height: 5' 4\" (1.626 m) Physical Exam  
Constitutional: She is oriented to person, place, and time. She appears well-developed and well-nourished. No distress. HENT:  
Head: Normocephalic and atraumatic. Eyes: Conjunctivae are normal. No scleral icterus. Neck: Normal range of motion. Neck supple. Cardiovascular: Normal rate, regular rhythm and normal heart sounds. No murmur heard. Pulmonary/Chest: Effort normal and breath sounds normal. No stridor. No respiratory distress. She has no wheezes. She has no rales. Abdominal: Soft. There is no tenderness. There is no rebound and no guarding. Neurological: She is alert and oriented to person, place, and time. No focal weakness Skin: Skin is warm and dry. No rash noted. She is not diaphoretic. Psychiatric: She has a normal mood and affect. Her behavior is normal.  
Nursing note and vitals reviewed. MDM Number of Diagnoses or Management Options Unstable angina [facility]):  
Diagnosis management comments: Patient has concerns for unstable angina. Currently EKG is relatively unchanged and first troponin negative I have paged cardiology for evaluation. Joesph Mendez MD; 10/19/2018 @5:28 PM Voice dictation software was used during the making of this note. This software is not perfect and grammatical and other typographical errors may be present. This note has not been proofread for errors. 
===================================================================  
 
I spoke with cardiology Dr. Rula Torres who will evaluate patient. Pt resting comfortably. Joesph Mendez MD 5:36 PM   
 
  
Amount and/or Complexity of Data Reviewed Clinical lab tests: ordered and reviewed (Results for orders placed or performed during the hospital encounter of 10/19/18 
-CBC WITH AUTOMATED DIFF Result                      Value             Ref Range WBC                         5.1               4.3 - 11.1 K* 
     RBC                         3.85 (L)          4.05 - 5.2 M* HGB                         11.7              11.7 - 15.4 * HCT                         36.1              35.8 - 46.3 % MCV                         93.8              79.6 - 97.8 * MCH                         30.4              26.1 - 32.9 * MCHC                        32.4              31.4 - 35.0 * RDW                         14.2              % PLATELET                    241               150 - 450 K/* MPV                         9.5               9.4 - 12.3 FL ABSOLUTE NRBC               0.00              0.0 - 0.2 K/* DF                          AUTOMATED NEUTROPHILS                 60                43 - 78 % LYMPHOCYTES                 24                13 - 44 % MONOCYTES                   11                4.0 - 12.0 % EOSINOPHILS                 4                 0.5 - 7.8 % BASOPHILS                   1                 0.0 - 2.0 % IMMATURE GRANULOCYTES       0                 0.0 - 5.0 %   
     ABS. NEUTROPHILS            3.1               1.7 - 8.2 K/* ABS. LYMPHOCYTES            1.3               0.5 - 4.6 K/* ABS. MONOCYTES              0.6               0.1 - 1.3 K/* ABS. EOSINOPHILS            0.2               0.0 - 0.8 K/* ABS. BASOPHILS              0.0               0.0 - 0.2 K/* ABS. IMM. GRANS.            0.0               0.0 - 0.5 K/* 
-METABOLIC PANEL, COMPREHENSIVE Result                      Value             Ref Range Sodium                      139               136 - 145 mm* Potassium                   3.7               3.5 - 5.1 mm* Chloride                    101               98 - 107 mmo* CO2                         30                21 - 32 mmol* Anion gap                   8                 7 - 16 mmol/L Glucose                     90                65 - 100 mg/* BUN                         16                8 - 23 MG/DL Creatinine                  1.27 (H)          0.6 - 1.0 MG* 
     GFR est AA                  52 (L)            >60 ml/min/1* GFR est non-AA              43 (L)            >60 ml/min/1* Calcium                     8.5               8.3 - 10.4 M* Bilirubin, total            0.4               0.2 - 1.1 MG* ALT (SGPT)                  12                12 - 65 U/L   
     AST (SGOT)                  12 (L)            15 - 37 U/L Alk. phosphatase            67                50 - 136 U/L Protein, total              6.8               6.3 - 8.2 g/* Albumin                     3.5               3.2 - 4.6 g/* Globulin                    3.3               2.3 - 3.5 g/* A-G Ratio                   1.1 (L)           1.2 - 3.5     
-TROPONIN I Result                      Value             Ref Range Troponin-I, Qt.             <0.02 (L)         0.02 - 0.05 * ) Tests in the radiology section of CPT®: ordered and reviewed (Xr Chest Pa Lat Result Date: 10/19/2018 Chest 2 view DATE: 10/19/2018 Prior exam 1/19/2018 CLINICAL INFORMATION: Chest heaviness and shortness of breath for one week Metallic sternal wires are present. Heart is upper limits of normal in size. Mediastinum is unremarkable. Pulmonary vascularity normal and lungs clear. No pleural effusion. IMPRESSION: No acute abnormality 
 ) Independent visualization of images, tracings, or specimens: yes (Normal sinus rhythm rate of 64, narrow QRS complex, no bundle branch blocks, and no ST segment elevation ) Procedures

## 2018-10-19 NOTE — ED TRIAGE NOTES
Pt states she feels heavy and tight in her chest.  Pain is worse today. States she has been dealing with it for several days but dealt with it because she thought it was due to her HF. Sees Dr Cameron Feldman at [facility] cardiology and was instructed to come to ER. Never had a MI but had CABG x4 back in 2005. Also has 4 stents.

## 2018-10-19 NOTE — H&P
[facility] Cardiology History & Physical  
  
Date of  Admission: 10/19/2018  4:14 PM  
 
Primary Care Physician:  Dr. Coleen Ormond Primary Cardiologist:  Dr. Arianne French Admitting Physician:  Dr. Ash Robles CC:  Chest pain HPI:  Susana Joyner is a 81 y.o. female with PMH of CAD (CABG 2004, PCI x 4, LHC 2017 showed 3/4 patent grafts and medical therapy was continued), chronic diastolic HF (EF 84% on echo 2017), HTN, GERD, and DVT (2/2018 on Xarelto), who presented to the ED with progressive CP. The chest pain has gradually worsened over the past month. The pain was at first with exertion, but today was occurring at rest and not going away. The pain is substernal pressure with radiation to back with associated shortness of breath. In the ED labs are unremarkable and initial troponin is negative. CXR normal.  EKG shows SR with NSST changes. On exam the chest tightness remains. Past Medical History:  
Diagnosis Date  Abdominal pain 10/28/2014  Angina at rest [facility])   
 pt denies  Arthritis   
 osteo - low back,  shoulders, hips, low back  Bilateral carotid bruits  Bursitis  CAD (coronary artery disease) 4 vessel CABG 2005;--- stents x 4--- last one placed 2014-- followed by dr Riddhi Omer  Cervicalgia  Chronic pain   
 left shoulder  Coagulation defects   
 on plavix  Constipation  Cough 09/11/2014  
 10/8/14, Methacholine Challenge Study: The point at which the FEV1 fell by at least 20%, the PC20, occurred above a dose of 25mg/mL of methacholine. This rules out the diagnosis of asthma with an extremely high degree of sensitivity. No post-challenge FEV1 recovery was identified. Jose Ramirez MD      
 Depressive disorder  Dyspnea 09/11/2014 [facility]; 9/29/14: IMPRESSION: The flow volume loop is consistent restriction. Spirometry suggest restriction with concomitant obstruction at low lung volumes. There is not a significant bronchodilator response. Lung volumes reveal mild restriction. The unadjusted diffusion capacity is normal.  Terell Pisano MD    
 GERD (gastroesophageal reflux disease)   
 controlled with med  Headache   
 Hoarseness or changing voice   
 thougfht to be related to gerd  Hyperlipidemia  Hypertension   
 controlled with med  Kidney stone   
 yrs ago-- no tx required  Lumbago  Macular degeneration  Nodule of left lung   
 dx'ed 4 years ago-watched for several months-no new growth-being watched-yearly CT scan----- followed by dr. Lenox Ganser  Pain in joint, ankle and foot   
 left 3rd toe and right 2nd toe  Pain in joint, shoulder region   
 left now bothering > right, right ok  Palpitations 09/24/2015  
 followed by dr Kimberly Gil  Panic disorder   
 panic attacks -- last one 2014  Renal mass 07/2016  
 ablation---left side--- followed by dr Irish Moore in [place]  Sciatica  Vascular headache Past Surgical History:  
Procedure Laterality Date  ABDOMEN SURGERY PROC UNLISTED Left 07/2016  
 ablation for mass in left side of abd  
 HX CATARACT REMOVAL    
 left  HX CATARACT REMOVAL    
 HX COLONOSCOPY  12/15/2015  
 diverticulosis, internal hemorrhoid, colon polyp- no further colonoscopies needed  HX COLONOSCOPY  06/2016  HX CORONARY ARTERY BYPASS GRAFT    
 4 vessel CABG 2005  HX HEART CATHETERIZATION    
 stent 2014  HX HEART CATHETERIZATION    
 stent 2006  HX HEENT Left   
 macular pucker  HX HERNIA REPAIR Left 2017  HX ORTHOPAEDIC    
 finger, foot  HX PARTIAL HYSTERECTOMY    
 hyst  
 
 
Allergies Allergen Reactions  Augmentin [Amoxicillin-Pot Clavulanate] Other (comments) Causes yeast infection  Codeine Other (comments) Made my heart go crazy  Gabapentin Drowsiness  Hydrochlorothiazide Other (comments) Hyponatremia  Other Medication Other (comments) Conjugated estrogens methyltestosterone Headaches  Prednisone Other (comments) Visual loss and headache  Prevnar 13 [Pneumoc 13-Sierra Conj-Dip Cr(Pf)] Other (comments) Fever, arm pain, redness, hallucinations, flu like symptoms, chest pain Social History Socioeconomic History  Marital status:  Spouse name: Not on file  Number of children: Not on file  Years of education: Not on file  Highest education level: Not on file Social Needs  Financial resource strain: Not on file  Food insecurity - worry: Not on file  Food insecurity - inability: Not on file  Transportation needs - medical: Not on file  Transportation needs - non-medical: Not on file Occupational History  Occupation: [employer] Employer: RETIRED Comment: 12 years  Occupation: [employer] Employer: RETIRED Comment: 20 years  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  
 Drug use: No  
 Sexual activity: No  
Other Topics Concern  Not on file Social History Narrative Lifelong nonsmoker with 20 year secondhand smoke exposure from her  cigarettes. 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 , 2 sons. Denies alcohol use. Has always lived in [place]. 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 Family History Problem Relation Age of Onset  Diabetes Mother  Heart Surgery Mother  Heart Disease Mother  Heart Attack Father  Heart Disease Father  No Known Problems Sister  Cancer Sister 2 sisters w/ lung ca-neither one smoked  Heart Disease Sister  Cancer Brother   
     lung ca-smoker  Heart Attack Brother  age 22 of MI  
 Heart Disease Sister  Heart Surgery Sister  Heart Attack Sister  Heart Disease Sister  Heart Surgery Sister  Heart Attack Sister  Heart Surgery Brother   
      in OR during 2nd heart surgery  Heart Disease Brother  No Known Problems Maternal Grandmother  No Known Problems Maternal Grandfather  No Known Problems Paternal Grandmother  No Known Problems Paternal Grandfather No current facility-administered medications for this encounter. Current Outpatient Medications Medication Sig  potassium chloride (KLOR-CON) 10 mEq tablet Take 2 Tabs by mouth daily.  magnesium oxide (MAG-OX) 400 mg tablet Take 400 mg by mouth daily.  biotin 10,000 mcg cap Take  by mouth.  furosemide (LASIX) 40 mg tablet Take 1 Tab by mouth two (2) times a day.  aspirin delayed-release 81 mg tablet Take  by mouth daily.  rosuvastatin (CRESTOR) 40 mg tablet Take 1 Tab by mouth nightly.  rivaroxaban (XARELTO) 20 mg tab tablet Take 1 Tab by mouth daily (with breakfast). Indications: deep venous thrombosis  lisinopril (PRINIVIL, ZESTRIL) 20 mg tablet Take 1 Tab by mouth daily.  propranolol LA (INDERAL LA) 60 mg SR capsule TAKE ONE CAPSULE BY MOUTH ONCE DAILY.  pantoprazole (PROTONIX) 40 mg tablet Take 1 Tab by mouth two (2) times a day. Indications: gastroesophageal reflux disease, samples  DOCUSATE SODIUM (STOOL SOFTENER PO) Take  by mouth.  CALCIUM CARB/MAGNESIUM OXID/D3 (CALCIUM MAGNESIUM + D PO) Take  by mouth.  lubiPROStone (AMITIZA) 8 mcg capsule Take  by mouth two (2) times daily (with meals).  coenzyme q10 (CO Q-10) 10 mg cap Take  by mouth.  loratadine (CLARITIN) 10 mg tablet Take 10 mg by mouth.  cyanocobalamin (VITAMIN B-12) 1,000 mcg tablet Take 1,000 mcg by mouth daily.  nitroglycerin (NITROLINGUAL) 400 mcg/spray spray 1 Spray by SubLINGual route every five (5) minutes as needed for Chest Pain.  VIT A/VIT C/VIT E/ZINC/COPPER (PRESERVISION AREDS PO) Take 1 Tab by mouth two (2) times a day. Stopped 1/3/17  acetaminophen (TYLENOL) 325 mg tablet Take  by mouth every four (4) hours as needed for Pain. Review of Systems Review of Systems Constitution: Negative. HENT: Negative. Eyes: Negative. Cardiovascular: Positive for chest pain and dyspnea on exertion. Respiratory: Positive for shortness of breath. Endocrine: Negative. Hematologic/Lymphatic: Negative. Skin: Negative. Musculoskeletal: Negative. Gastrointestinal: Negative. Genitourinary: Negative. Neurological: Negative. Psychiatric/Behavioral: Negative. Allergic/Immunologic: Negative. Subjective:  
 
Visit Vitals /71 (BP 1 Location: Right arm, BP Patient Position: At rest) Pulse 63 Temp 97.6 °F (36.4 °C) Resp 18 Ht 5' 4\" (1.626 m) Wt 69.9 kg (154 lb) SpO2 100% BMI 26.43 kg/m² Physical Exam  
Constitutional: She is oriented to person, place, and time and well-developed, well-nourished, and in no distress. HENT:  
Head: Normocephalic. Eyes: Pupils are equal, round, and reactive to light. Neck: Normal range of motion. Cardiovascular: Normal rate and regular rhythm. Pulmonary/Chest: Effort normal and breath sounds normal.  
Abdominal: Soft. Bowel sounds are normal.  
Musculoskeletal: Normal range of motion. Neurological: She is alert and oriented to person, place, and time. Skin: Skin is warm and dry. Psychiatric: Mood, memory, affect and judgment normal.  
 
 
Cardiographics Telemetry: normal sinus rhythm ECG: normal sinus rhythm, nonspecific ST and T waves changes Labs:  
Recent Results (from the past 24 hour(s)) EKG, 12 LEAD, INITIAL Collection Time: 10/19/18  2:34 PM  
Result Value Ref Range Ventricular Rate 64 BPM  
 Atrial Rate 64 BPM  
 P-R Interval 160 ms QRS Duration 76 ms  
 Q-T Interval 408 ms QTC Calculation (Bezet) 420 ms Calculated P Axis 68 degrees Calculated R Axis 62 degrees Calculated T Axis 99 degrees Diagnosis Normal sinus rhythm Nonspecific ST and T wave abnormality Abnormal ECG When compared with ECG of 19-JAN-2018 13:45, Electronic demand pacing is no longer Present Premature ventricular complexes are no longer Present T wave inversion no longer evident in Anterior leads Confirmed by Gema Mcfarlane MD (), JUDE ALVES ([id number]) on 10/19/2018 5:38:00 PM 
  
CBC WITH AUTOMATED DIFF Collection Time: 10/19/18  2:43 PM  
Result Value Ref Range WBC 5.1 4.3 - 11.1 K/uL  
 RBC 3.85 (L) 4.05 - 5.2 M/uL  
 HGB 11.7 11.7 - 15.4 g/dL HCT 36.1 35.8 - 46.3 % MCV 93.8 79.6 - 97.8 FL  
 MCH 30.4 26.1 - 32.9 PG  
 MCHC 32.4 31.4 - 35.0 g/dL  
 RDW 14.2 % PLATELET 549 799 - 737 K/uL MPV 9.5 9.4 - 12.3 FL ABSOLUTE NRBC 0.00 0.0 - 0.2 K/uL  
 DF AUTOMATED NEUTROPHILS 60 43 - 78 % LYMPHOCYTES 24 13 - 44 % MONOCYTES 11 4.0 - 12.0 % EOSINOPHILS 4 0.5 - 7.8 % BASOPHILS 1 0.0 - 2.0 % IMMATURE GRANULOCYTES 0 0.0 - 5.0 %  
 ABS. NEUTROPHILS 3.1 1.7 - 8.2 K/UL  
 ABS. LYMPHOCYTES 1.3 0.5 - 4.6 K/UL  
 ABS. MONOCYTES 0.6 0.1 - 1.3 K/UL  
 ABS. EOSINOPHILS 0.2 0.0 - 0.8 K/UL  
 ABS. BASOPHILS 0.0 0.0 - 0.2 K/UL  
 ABS. IMM. GRANS. 0.0 0.0 - 0.5 K/UL METABOLIC PANEL, COMPREHENSIVE Collection Time: 10/19/18  2:43 PM  
Result Value Ref Range Sodium 139 136 - 145 mmol/L Potassium 3.7 3.5 - 5.1 mmol/L Chloride 101 98 - 107 mmol/L  
 CO2 30 21 - 32 mmol/L Anion gap 8 7 - 16 mmol/L Glucose 90 65 - 100 mg/dL BUN 16 8 - 23 MG/DL Creatinine 1.27 (H) 0.6 - 1.0 MG/DL  
 GFR est AA 52 (L) >60 ml/min/1.73m2 GFR est non-AA 43 (L) >60 ml/min/1.73m2 Calcium 8.5 8.3 - 10.4 MG/DL Bilirubin, total 0.4 0.2 - 1.1 MG/DL  
 ALT (SGPT) 12 12 - 65 U/L  
 AST (SGOT) 12 (L) 15 - 37 U/L Alk. phosphatase 67 50 - 136 U/L Protein, total 6.8 6.3 - 8.2 g/dL Albumin 3.5 3.2 - 4.6 g/dL Globulin 3.3 2.3 - 3.5 g/dL A-G Ratio 1.1 (L) 1.2 - 3.5    
TROPONIN I Collection Time: 10/19/18  2:43 PM  
Result Value Ref Range Troponin-I, Qt. <0.02 (L) 0.02 - 0.05 NG/ML Patient has been seen and examined by Dr. Tori Floyd and he agrees with the following assessment and plan: 
 
 Assessment/Plan:  
  
  Chest pain-- Trend troponin levels. Nitro paste to chest.  Check echo, [facility] with possible PCI tomorrow given accelerating symptoms. CAD (coronary artery disease) -- continue ASA, ACE, BB, and statin. Plavix was stopped due to pt being on Xarelto for DVT. Overview: With CABG and 4 cardiac stents GERD (gastroesophageal reflux disease) -- continue PPI Essential hypertension, benign-- continue home meds. Monitor bp and titrate meds as needed. S/P CABG (coronary artery bypass graft) -- see above Diastolic CHF, chronic -- appears euvolemic. Mixed hyperlipidemia -- continue statin Karri Dickson NP 
10/19/2018 6:21 PM 
 
ATTENDING ADDENDUM: 
 
Patient seen and examined by me. Agree with above note by physician extender. Key findings are:  No CP or TAFOYA at present, but recurrent left sided CP x several weeks, accelerating in severity and frequency, worst episode today with associated SOB and weakness. ECG and trop negative in ER. Cath earlier this year without need for PCI. CV- RRR without murmur Lungs- Clear bilaterally Abd- soft, nontender, nondistended Ext- no edema Plan: As above. Admit, rule out, [facility] with possible PCI in AM. Heparin gtt, NTG, ASA, BB statin for now. The benefits and risks of left heart catheterization and possible percutaneous intervention were discussed with the patient. Risks including but not limited to bleeding, infection, contrast allergy reaction, acute kidney injury, MI, stroke, emergent CABG and death were discussed. The patient understands the risks of the procedure and wishes to proceed.   
 
Shital Quiroz MD 
 [address] Cardiology Pager [phone]

## 2018-10-20 VITALS
SYSTOLIC BLOOD PRESSURE: 156 MMHG | DIASTOLIC BLOOD PRESSURE: 57 MMHG | RESPIRATION RATE: 16 BRPM | WEIGHT: 156.7 LBS | OXYGEN SATURATION: 95 % | HEART RATE: 54 BPM | HEIGHT: 64 IN | BODY MASS INDEX: 26.75 KG/M2 | TEMPERATURE: 98.5 F

## 2018-10-20 LAB
ACT BLD: 929 SECS (ref 70–128)
ANION GAP SERPL CALC-SCNC: 10 MMOL/L (ref 7–16)
APTT PPP: 110.2 SEC (ref 23.2–35.3)
APTT PPP: 113.3 SEC (ref 23.2–35.3)
BUN SERPL-MCNC: 16 MG/DL (ref 8–23)
CALCIUM SERPL-MCNC: 8.2 MG/DL (ref 8.3–10.4)
CHLORIDE SERPL-SCNC: 101 MMOL/L (ref 98–107)
CHOLEST SERPL-MCNC: 209 MG/DL
CO2 SERPL-SCNC: 27 MMOL/L (ref 21–32)
CREAT SERPL-MCNC: 0.91 MG/DL (ref 0.6–1)
ERYTHROCYTE [DISTWIDTH] IN BLOOD BY AUTOMATED COUNT: 14.3 %
GLUCOSE SERPL-MCNC: 92 MG/DL (ref 65–100)
HCT VFR BLD AUTO: 36.2 % (ref 35.8–46.3)
HDLC SERPL-MCNC: 81 MG/DL (ref 40–60)
HDLC SERPL: 2.6 {RATIO}
HGB BLD-MCNC: 11.8 G/DL (ref 11.7–15.4)
LDLC SERPL CALC-MCNC: 113.8 MG/DL
LIPID PROFILE,FLP: ABNORMAL
MCH RBC QN AUTO: 30.5 PG (ref 26.1–32.9)
MCHC RBC AUTO-ENTMCNC: 32.6 G/DL (ref 31.4–35)
MCV RBC AUTO: 93.5 FL (ref 79.6–97.8)
NRBC # BLD: 0 K/UL (ref 0–0.2)
PLATELET # BLD AUTO: 232 K/UL (ref 150–450)
PMV BLD AUTO: 9.7 FL (ref 9.4–12.3)
POTASSIUM SERPL-SCNC: 3.3 MMOL/L (ref 3.5–5.1)
RBC # BLD AUTO: 3.87 M/UL (ref 4.05–5.2)
SODIUM SERPL-SCNC: 138 MMOL/L (ref 136–145)
TRIGL SERPL-MCNC: 71 MG/DL (ref 35–150)
TROPONIN I SERPL-MCNC: <0.02 NG/ML (ref 0.02–0.05)
VLDLC SERPL CALC-MCNC: 14.2 MG/DL (ref 6–23)
WBC # BLD AUTO: 3.8 K/UL (ref 4.3–11.1)

## 2018-10-20 PROCEDURE — C8929 TTE W OR WO FOL WCON,DOPPLER: HCPCS

## 2018-10-20 PROCEDURE — 99153 MOD SED SAME PHYS/QHP EA: CPT

## 2018-10-20 PROCEDURE — 74011250637 HC RX REV CODE- 250/637: Performed by: NURSE PRACTITIONER

## 2018-10-20 PROCEDURE — 77030004559 HC CATH ANGI DX SUPT CARD -B

## 2018-10-20 PROCEDURE — 74011000258 HC RX REV CODE- 258: Performed by: INTERNAL MEDICINE

## 2018-10-20 PROCEDURE — 85027 COMPLETE CBC AUTOMATED: CPT

## 2018-10-20 PROCEDURE — 85730 THROMBOPLASTIN TIME PARTIAL: CPT

## 2018-10-20 PROCEDURE — 99152 MOD SED SAME PHYS/QHP 5/>YRS: CPT

## 2018-10-20 PROCEDURE — 93458 L HRT ARTERY/VENTRICLE ANGIO: CPT

## 2018-10-20 PROCEDURE — 96366 THER/PROPH/DIAG IV INF ADDON: CPT

## 2018-10-20 PROCEDURE — 80048 BASIC METABOLIC PNL TOTAL CA: CPT

## 2018-10-20 PROCEDURE — 77030004558 HC CATH ANGI DX SUPR TORQ CARD -A

## 2018-10-20 PROCEDURE — 99218 HC RM OBSERVATION: CPT

## 2018-10-20 PROCEDURE — C1760 CLOSURE DEV, VASC: HCPCS

## 2018-10-20 PROCEDURE — 74011250636 HC RX REV CODE- 250/636: Performed by: NURSE PRACTITIONER

## 2018-10-20 PROCEDURE — 77030020263 HC SOL INJ SOD CL0.9% LFCR 1000ML

## 2018-10-20 PROCEDURE — 74011636320 HC RX REV CODE- 636/320: Performed by: INTERNAL MEDICINE

## 2018-10-20 PROCEDURE — 93571 IV DOP VEL&/PRESS C FLO 1ST: CPT

## 2018-10-20 PROCEDURE — 74011250636 HC RX REV CODE- 250/636

## 2018-10-20 PROCEDURE — 85347 COAGULATION TIME ACTIVATED: CPT

## 2018-10-20 PROCEDURE — 36415 COLL VENOUS BLD VENIPUNCTURE: CPT

## 2018-10-20 PROCEDURE — 84484 ASSAY OF TROPONIN QUANT: CPT

## 2018-10-20 PROCEDURE — 80061 LIPID PANEL: CPT

## 2018-10-20 PROCEDURE — 74011250636 HC RX REV CODE- 250/636: Performed by: INTERNAL MEDICINE

## 2018-10-20 PROCEDURE — 74011000250 HC RX REV CODE- 250: Performed by: INTERNAL MEDICINE

## 2018-10-20 PROCEDURE — C1769 GUIDE WIRE: HCPCS

## 2018-10-20 PROCEDURE — 74011250637 HC RX REV CODE- 250/637: Performed by: INTERNAL MEDICINE

## 2018-10-20 PROCEDURE — C1887 CATHETER, GUIDING: HCPCS

## 2018-10-20 RX ORDER — HEPARIN SODIUM 200 [USP'U]/100ML
3 INJECTION, SOLUTION INTRAVENOUS CONTINUOUS
Status: DISCONTINUED | OUTPATIENT
Start: 2018-10-20 | End: 2018-10-20 | Stop reason: HOSPADM

## 2018-10-20 RX ORDER — MIDAZOLAM HYDROCHLORIDE 1 MG/ML
.5-2 INJECTION, SOLUTION INTRAMUSCULAR; INTRAVENOUS
Status: DISCONTINUED | OUTPATIENT
Start: 2018-10-20 | End: 2018-10-20 | Stop reason: HOSPADM

## 2018-10-20 RX ORDER — RANOLAZINE 500 MG/1
500 TABLET, EXTENDED RELEASE ORAL 2 TIMES DAILY
Status: DISCONTINUED | OUTPATIENT
Start: 2018-10-20 | End: 2018-10-20 | Stop reason: HOSPADM

## 2018-10-20 RX ORDER — MIDAZOLAM HYDROCHLORIDE 1 MG/ML
.5-5 INJECTION, SOLUTION INTRAMUSCULAR; INTRAVENOUS
Status: DISCONTINUED | OUTPATIENT
Start: 2018-10-20 | End: 2018-10-20 | Stop reason: HOSPADM

## 2018-10-20 RX ORDER — POTASSIUM CHLORIDE 20 MEQ/1
40 TABLET, EXTENDED RELEASE ORAL
Status: COMPLETED | OUTPATIENT
Start: 2018-10-20 | End: 2018-10-20

## 2018-10-20 RX ORDER — LIDOCAINE HYDROCHLORIDE 10 MG/ML
5-40 INJECTION INFILTRATION; PERINEURAL
Status: DISCONTINUED | OUTPATIENT
Start: 2018-10-20 | End: 2018-10-20 | Stop reason: HOSPADM

## 2018-10-20 RX ORDER — ISOSORBIDE MONONITRATE 30 MG/1
30 TABLET, EXTENDED RELEASE ORAL DAILY
Qty: 30 TAB | Refills: 11 | Status: SHIPPED | OUTPATIENT
Start: 2018-10-21 | End: 2019-04-03

## 2018-10-20 RX ORDER — SODIUM CHLORIDE 0.9 % (FLUSH) 0.9 %
5-10 SYRINGE (ML) INJECTION AS NEEDED
Status: DISCONTINUED | OUTPATIENT
Start: 2018-10-20 | End: 2018-10-20 | Stop reason: HOSPADM

## 2018-10-20 RX ORDER — ISOSORBIDE MONONITRATE 30 MG/1
30 TABLET, EXTENDED RELEASE ORAL DAILY
Status: DISCONTINUED | OUTPATIENT
Start: 2018-10-21 | End: 2018-10-20 | Stop reason: HOSPADM

## 2018-10-20 RX ORDER — RANOLAZINE 500 MG/1
500 TABLET, EXTENDED RELEASE ORAL 2 TIMES DAILY
Qty: 60 TAB | Refills: 11 | Status: SHIPPED | OUTPATIENT
Start: 2018-10-20 | End: 2018-11-01

## 2018-10-20 RX ORDER — FENTANYL CITRATE 50 UG/ML
25-50 INJECTION, SOLUTION INTRAMUSCULAR; INTRAVENOUS
Status: DISCONTINUED | OUTPATIENT
Start: 2018-10-20 | End: 2018-10-20 | Stop reason: HOSPADM

## 2018-10-20 RX ORDER — LIDOCAINE HYDROCHLORIDE 10 MG/ML
6 INJECTION INFILTRATION; PERINEURAL ONCE
Status: COMPLETED | OUTPATIENT
Start: 2018-10-20 | End: 2018-10-20

## 2018-10-20 RX ORDER — FENTANYL CITRATE 50 UG/ML
25-100 INJECTION, SOLUTION INTRAMUSCULAR; INTRAVENOUS
Status: DISCONTINUED | OUTPATIENT
Start: 2018-10-20 | End: 2018-10-20 | Stop reason: HOSPADM

## 2018-10-20 RX ORDER — SODIUM CHLORIDE 0.9 % (FLUSH) 0.9 %
5-10 SYRINGE (ML) INJECTION EVERY 8 HOURS
Status: DISCONTINUED | OUTPATIENT
Start: 2018-10-20 | End: 2018-10-20 | Stop reason: HOSPADM

## 2018-10-20 RX ORDER — SODIUM CHLORIDE 9 MG/ML
75 INJECTION, SOLUTION INTRAVENOUS CONTINUOUS
Status: DISPENSED | OUTPATIENT
Start: 2018-10-20 | End: 2018-10-20

## 2018-10-20 RX ADMIN — LIDOCAINE HYDROCHLORIDE 10 ML: 10 INJECTION, SOLUTION INFILTRATION; PERINEURAL at 11:40

## 2018-10-20 RX ADMIN — HEPARIN SODIUM AND DEXTROSE 12 UNITS/KG/HR: 5000; 5 INJECTION INTRAVENOUS at 06:01

## 2018-10-20 RX ADMIN — PERFLUTREN 1 ML: 6.52 INJECTION, SUSPENSION INTRAVENOUS at 07:00

## 2018-10-20 RX ADMIN — PANTOPRAZOLE SODIUM 40 MG: 40 TABLET, DELAYED RELEASE ORAL at 18:56

## 2018-10-20 RX ADMIN — CEFAZOLIN 1 G: 1 INJECTION, POWDER, FOR SOLUTION INTRAMUSCULAR; INTRAVENOUS at 12:16

## 2018-10-20 RX ADMIN — IOPAMIDOL 220 ML: 755 INJECTION, SOLUTION INTRAVENOUS at 12:24

## 2018-10-20 RX ADMIN — Medication 10 ML: at 16:01

## 2018-10-20 RX ADMIN — LISINOPRIL 20 MG: 20 TABLET ORAL at 08:17

## 2018-10-20 RX ADMIN — MIDAZOLAM HYDROCHLORIDE 1 MG: 1 INJECTION, SOLUTION INTRAMUSCULAR; INTRAVENOUS at 11:32

## 2018-10-20 RX ADMIN — ASPIRIN 81 MG: 81 TABLET, COATED ORAL at 08:17

## 2018-10-20 RX ADMIN — BIVALIRUDIN 1.75 MG/KG/HR: 250 INJECTION, POWDER, LYOPHILIZED, FOR SOLUTION INTRAVENOUS at 12:03

## 2018-10-20 RX ADMIN — SODIUM CHLORIDE 75 ML/HR: 900 INJECTION, SOLUTION INTRAVENOUS at 04:30

## 2018-10-20 RX ADMIN — PROPRANOLOL HYDROCHLORIDE 60 MG: 60 CAPSULE, EXTENDED RELEASE ORAL at 08:17

## 2018-10-20 RX ADMIN — FENTANYL CITRATE 25 MCG: 50 INJECTION, SOLUTION INTRAMUSCULAR; INTRAVENOUS at 11:32

## 2018-10-20 RX ADMIN — POTASSIUM CHLORIDE 40 MEQ: 20 TABLET, EXTENDED RELEASE ORAL at 08:39

## 2018-10-20 RX ADMIN — HEPARIN SODIUM 3 ML/HR: 200 INJECTION, SOLUTION INTRAVENOUS at 11:41

## 2018-10-20 RX ADMIN — PANTOPRAZOLE SODIUM 40 MG: 40 TABLET, DELAYED RELEASE ORAL at 08:17

## 2018-10-20 NOTE — PROGRESS NOTES
TRANSFER - IN REPORT: 
 
Verbal report received from Silvestre Jackson, RN(name) on Irma Arshad  being received from ER(unit) for routine progression of care Report consisted of patients Situation, Background, Assessment and  
Recommendations(SBAR). Information from the following report(s) SBAR, Kardex, ED Summary, Procedure Summary, Intake/Output, MAR, Recent Results and Cardiac Rhythm sb was reviewed with the receiving nurse. Opportunity for questions and clarification was provided.

## 2018-10-20 NOTE — PROGRESS NOTES
[facility] CARDIOLOGY PROGRESS NOTE 
 
10/20/2018 8:31 AM 
 
Admit Date: 10/19/2018 Admit Diagnosis: Unstable angina (HCC) Subjective:  
Stable overnight without angina, CHF, or palpitations. Vitals stable and controlled. No other complaints overnight. Tolerating meds well. Troponin serially negative. [facility] today. Objective:  
  
Vitals:  
 10/19/18 2207 10/20/18 0126 10/20/18 0627 10/20/18 7855 BP: 146/74 117/59 153/69 175/65 Pulse: (!) 58 67 65 (!) 59 Resp: 18 18 16 Temp: 98.2 °F (36.8 °C) 97.9 °F (36.6 °C) 97.6 °F (36.4 °C) SpO2: 99% 97% 99% Weight:   71.1 kg (156 lb 11.2 oz) Height:      
 
 
Physical Exam: 
Neck- supple, no JVD 
CV- regular rate and rhythm no MRG Lung- clear bilaterally Abd- soft, nontender, nondistended Ext- no edema Skin- warm and dry Data Review:  
Recent Labs 10/20/18 
0354 10/19/18 
1443  139  
K 3.3* 3.7 BUN 16 16 CREA 0.91 1.27* GLU 92 90 WBC 3.8* 5.1 HGB 11.8 11.7 HCT 36.2 36.1  241 CHOL 209*  --   
TRIGL 71  --   
HDL 81*  --   
 
 
Assessment and Plan:  
 
  Unstable angina ([address] (10/19/2018)- improved, no angina overnight, continue heparin gtt, [facility] today. Active Problems: 
  CAD (coronary artery disease) - no angina or CHF at present, [facility] today Overview: With CABG and 4 cardiac stents GERD (gastroesophageal reflux disease) () Essential hypertension, benign- stable, continue meds S/P CABG (coronary artery bypass graft) (3/12/2017) Diastolic CHF, chronic (HCC) - stable, continue meds Mixed hyperlipidemia - stable, continue meds Hypokalemia- replete today orally BRADY Pérez MD 
[facility] Cardiology Pager [phone]

## 2018-10-20 NOTE — PROGRESS NOTES
[facility]side [facility] Report included the following information SBAR, Kardex, Procedure Summary, Intake/Output, MAR and Cardiac Rhythm NSR.

## 2018-10-20 NOTE — PROGRESS NOTES
Cath ok without need for PCI, 3/4 grafts patent without obstruction Negative iFR to ostial LCX Add imdur 30mg daily, ranexa 500mg bid Continue BID protonix as taking at home Home later today if groin access site ok VELMA

## 2018-10-20 NOTE — DISCHARGE SUMMARY
[facility] Cardiology Discharge Summary     Patient ID:  Charmayne Simmers  [id number]  81 y.o.  1935    Admit date: 10/19/2018    Discharge date:  10/20/18    Admitting Physician: Alexandria Garcia MD     Discharge Physician: Anatoly Lawrence NP/Dr. Kalia Shaffer     Admission Diagnoses: Unstable angina [facility])    Discharge Diagnoses:   Patient Active Problem List    Diagnosis Date Noted    Unstable angina ([address] 10/19/2018    Halitosis 03/02/2018    Dyskinesia of esophagus 02/23/2018    Diaphragmatic hernia without obstruction or gangrene 02/23/2018    Macular pucker, left eye 02/23/2018    Deep vein thrombosis (DVT) of popliteal vein of left lower extremity ([address] 02/23/2018    Distorted taste 02/23/2018    Left-sided headache 02/23/2018    Mixed hyperlipidemia 91/62/8785    Diastolic CHF, chronic ([address] 08/29/2017    Diverticulosis of large intestine 08/06/2017    Idiopathic peripheral neuropathy 05/12/2017    Glucose intolerance (impaired glucose tolerance) 05/12/2017    Severe episode of recurrent major depressive disorder, without psychotic features ([address] 04/21/2017    S/P CABG (coronary artery bypass graft) 03/12/2017    S/P angioplasty with stent 03/12/2017    Age-related osteoporosis without current pathological fracture     Osteoarthrosis, shoulder region     Hypertensive CHF (congestive heart failure) ([address]     Keratoconjunctivitis sicca not specified as Sjogren's 02/10/2016    Constipation by delayed colonic transit 10/16/2015    Renal cell carcinoma of left kidney (HCC)--seen by DR Cristal Antony  UROLOGIST 10/16/2015    Essential hypertension, benign 09/24/2015    Irritable bowel syndrome 07/09/2015    GERD (gastroesophageal reflux disease)     CAD (coronary artery disease) 09/11/2014       Cardiology Procedures this admission:  Diagnostic left heart catheterization  Consults: None    Hospital Course: Patient was seen in [facility] ED for complaints of progressive CP over the past month with exertion but now occurs at rest. She was subsequently scheduled for a LHC at [facility] on 10/20/18. Patient underwent cardiac catheterization by Dr. Sara Cooper. The patient was found to have stable anatomy with 3/4 patent grafts without obstructions or need for PCI at this time. The patient also had a negative IFR to an ostial LCX artery. Patient tolerated the procedure well and was taken to the telemetry floor for recovery. The day of discharge the patient was up feeling well without any complaints of chest pain or shortness of breath. Patient's right femoral cath site was clean, dry and intact without hematoma or bruit. Patient's labs were WNL. Patient was seen and examined by Dr. Sara Cooper and determined stable and ready for discharge. Patient was instructed on the importance of medication compliance. For maximized medical therapy for CAD, patient will continue BB, ACE-I, and statin as well. The patient will follow up with [facility] Cardiology Dr. Jose Johnson and has been referred to cardiac rehab. DISPOSITION: The patient is being discharged home in stable condition on a low saturated fat, low cholesterol and low salt diet. The patient is instructed to advance activities as tolerated to the limit of fatigue or shortness of breath. The patient is instructed to avoid all heavy lifting, straining, stooping or squatting for 3-5 days. The patient is instructed to watch the cath site for bleeding/oozing; if seen, the patient is instructed to apply firm pressure with a clean cloth and call [facility] Cardiology at [phone]. The patient is instructed to watch for signs of infection which include: increasing area of redness, fever/hot to touch or purulent drainage at the catheterization site. The patient is instructed not to soak in a bathtub for 7-10 days, but is cleared to shower.  The patient is instructed to call the office or return to the ER for immediate evaluation for any shortness of breath or chest pain not relieved by NTG. Discharge Exam:   Visit Vitals  /67 (BP Patient Position: Supine)   Pulse 68   Temp 98.6 °F (37 °C)   Resp 16   Ht 5' 4\" (1.626 m)   Wt 71.1 kg (156 lb 11.2 oz)   SpO2 99%   BMI 26.90 kg/m²     Patient has been seen by Dr. Oscar Cough: see his progress note for exam details. Recent Results (from the past 24 hour(s))   EKG, 12 LEAD, INITIAL    Collection Time: 10/19/18  2:34 PM   Result Value Ref Range    Ventricular Rate 64 BPM    Atrial Rate 64 BPM    P-R Interval 160 ms    QRS Duration 76 ms    Q-T Interval 408 ms    QTC Calculation (Bezet) 420 ms    Calculated P Axis 68 degrees    Calculated R Axis 62 degrees    Calculated T Axis 99 degrees    Diagnosis       Normal sinus rhythm  Nonspecific ST and T wave abnormality  Abnormal ECG  When compared with ECG of 19-JAN-2018 13:45,  Electronic demand pacing is no longer Present  Premature ventricular complexes are no longer Present  T wave inversion no longer evident in Anterior leads  Confirmed by Jolayne Closs MD (), JUDE ALVES ([id number]) on 10/19/2018 5:38:00 PM     CBC WITH AUTOMATED DIFF    Collection Time: 10/19/18  2:43 PM   Result Value Ref Range    WBC 5.1 4.3 - 11.1 K/uL    RBC 3.85 (L) 4.05 - 5.2 M/uL    HGB 11.7 11.7 - 15.4 g/dL    HCT 36.1 35.8 - 46.3 %    MCV 93.8 79.6 - 97.8 FL    MCH 30.4 26.1 - 32.9 PG    MCHC 32.4 31.4 - 35.0 g/dL    RDW 14.2 %    PLATELET 387 728 - 101 K/uL    MPV 9.5 9.4 - 12.3 FL    ABSOLUTE NRBC 0.00 0.0 - 0.2 K/uL    DF AUTOMATED      NEUTROPHILS 60 43 - 78 %    LYMPHOCYTES 24 13 - 44 %    MONOCYTES 11 4.0 - 12.0 %    EOSINOPHILS 4 0.5 - 7.8 %    BASOPHILS 1 0.0 - 2.0 %    IMMATURE GRANULOCYTES 0 0.0 - 5.0 %    ABS. NEUTROPHILS 3.1 1.7 - 8.2 K/UL    ABS. LYMPHOCYTES 1.3 0.5 - 4.6 K/UL    ABS. MONOCYTES 0.6 0.1 - 1.3 K/UL    ABS. EOSINOPHILS 0.2 0.0 - 0.8 K/UL    ABS. BASOPHILS 0.0 0.0 - 0.2 K/UL    ABS. IMM.  GRANS. 0.0 0.0 - 0.5 K/UL   METABOLIC PANEL, COMPREHENSIVE    Collection Time: 10/19/18  2:43 PM Result Value Ref Range    Sodium 139 136 - 145 mmol/L    Potassium 3.7 3.5 - 5.1 mmol/L    Chloride 101 98 - 107 mmol/L    CO2 30 21 - 32 mmol/L    Anion gap 8 7 - 16 mmol/L    Glucose 90 65 - 100 mg/dL    BUN 16 8 - 23 MG/DL    Creatinine 1.27 (H) 0.6 - 1.0 MG/DL    GFR est AA 52 (L) >60 ml/min/1.73m2    GFR est non-AA 43 (L) >60 ml/min/1.73m2    Calcium 8.5 8.3 - 10.4 MG/DL    Bilirubin, total 0.4 0.2 - 1.1 MG/DL    ALT (SGPT) 12 12 - 65 U/L    AST (SGOT) 12 (L) 15 - 37 U/L    Alk.  phosphatase 67 50 - 136 U/L    Protein, total 6.8 6.3 - 8.2 g/dL    Albumin 3.5 3.2 - 4.6 g/dL    Globulin 3.3 2.3 - 3.5 g/dL    A-G Ratio 1.1 (L) 1.2 - 3.5     TROPONIN I    Collection Time: 10/19/18  2:43 PM   Result Value Ref Range    Troponin-I, Qt. <0.02 (L) 0.02 - 0.05 NG/ML   TROPONIN I    Collection Time: 10/19/18 10:15 PM   Result Value Ref Range    Troponin-I, Qt. <0.02 (L) 0.02 - 0.05 NG/ML   LIPID PANEL    Collection Time: 10/20/18  3:54 AM   Result Value Ref Range    LIPID PROFILE          Cholesterol, total 209 (H) <200 MG/DL    Triglyceride 71 35 - 150 MG/DL    HDL Cholesterol 81 (H) 40 - 60 MG/DL    LDL, calculated 113.8 (H) <100 MG/DL    VLDL, calculated 14.2 6.0 - 23.0 MG/DL    CHOL/HDL Ratio 2.6     CBC W/O DIFF    Collection Time: 10/20/18  3:54 AM   Result Value Ref Range    WBC 3.8 (L) 4.3 - 11.1 K/uL    RBC 3.87 (L) 4.05 - 5.2 M/uL    HGB 11.8 11.7 - 15.4 g/dL    HCT 36.2 35.8 - 46.3 %    MCV 93.5 79.6 - 97.8 FL    MCH 30.5 26.1 - 32.9 PG    MCHC 32.6 31.4 - 35.0 g/dL    RDW 14.3 %    PLATELET 489 914 - 172 K/uL    MPV 9.7 9.4 - 12.3 FL    ABSOLUTE NRBC 0.00 0.0 - 0.2 K/uL   PTT    Collection Time: 10/20/18  3:54 AM   Result Value Ref Range    aPTT 110.2 (HH) 23.2 - 39.3 SEC   METABOLIC PANEL, BASIC    Collection Time: 10/20/18  3:54 AM   Result Value Ref Range    Sodium 138 136 - 145 mmol/L    Potassium 3.3 (L) 3.5 - 5.1 mmol/L    Chloride 101 98 - 107 mmol/L    CO2 27 21 - 32 mmol/L    Anion gap 10 7 - 16 mmol/L    Glucose 92 65 - 100 mg/dL    BUN 16 8 - 23 MG/DL    Creatinine 0.91 0.6 - 1.0 MG/DL    GFR est AA >60 >60 ml/min/1.73m2    GFR est non-AA >60 >60 ml/min/1.73m2    Calcium 8.2 (L) 8.3 - 10.4 MG/DL   TROPONIN I    Collection Time: 10/20/18  3:54 AM   Result Value Ref Range    Troponin-I, Qt. <0.02 (L) 0.02 - 0.05 NG/ML   POC ACTIVATED CLOTTING TIME    Collection Time: 10/20/18 12:09 PM   Result Value Ref Range    Activated Clotting Time (POC) 929 (H) 70 - 128 SECS         Patient Instructions:     Current Discharge Medication List      START taking these medications    Details   ranolazine ER (RANEXA) 500 mg SR tablet Take 1 Tab by mouth two (2) times a day. Qty: 60 Tab, Refills: 11      isosorbide mononitrate ER (IMDUR) 30 mg tablet Take 1 Tab by mouth daily. Qty: 30 Tab, Refills: 11         CONTINUE these medications which have NOT CHANGED    Details   potassium chloride (KLOR-CON) 10 mEq tablet Take 2 Tabs by mouth daily. Qty: 30 Tab, Refills: 11    Associated Diagnoses: Essential hypertension, benign      magnesium oxide (MAG-OX) 400 mg tablet Take 400 mg by mouth daily. biotin 10,000 mcg cap Take  by mouth. furosemide (LASIX) 40 mg tablet Take 1 Tab by mouth two (2) times a day. Qty: 180 Tab, Refills: 3      aspirin delayed-release 81 mg tablet Take  by mouth daily. rosuvastatin (CRESTOR) 40 mg tablet Take 1 Tab by mouth nightly. Qty: 90 Tab, Refills: 1    Associated Diagnoses: Mixed hyperlipidemia      rivaroxaban (XARELTO) 20 mg tab tablet Take 1 Tab by mouth daily (with breakfast). Indications: deep venous thrombosis  Qty: 90 Tab, Refills: 3    Associated Diagnoses: Acute deep vein thrombosis (DVT) of popliteal vein of left lower extremity (HCC)      lisinopril (PRINIVIL, ZESTRIL) 20 mg tablet Take 1 Tab by mouth daily. Qty: 90 Tab, Refills: 3      propranolol LA (INDERAL LA) 60 mg SR capsule TAKE ONE CAPSULE BY MOUTH ONCE DAILY.   Qty: 90 Cap, Refills: 0    Associated Diagnoses: Essential hypertension with goal blood pressure less than 140/90      pantoprazole (PROTONIX) 40 mg tablet Take 1 Tab by mouth two (2) times a day. Indications: gastroesophageal reflux disease, samples  Qty: 90 Tab, Refills: 1    Associated Diagnoses: Gastroesophageal reflux disease with esophagitis; Dyskinesia of esophagus      DOCUSATE SODIUM (STOOL SOFTENER PO) Take  by mouth. CALCIUM CARB/MAGNESIUM OXID/D3 (CALCIUM MAGNESIUM + D PO) Take  by mouth.      lubiPROStone (AMITIZA) 8 mcg capsule Take  by mouth two (2) times daily (with meals). coenzyme q10 (CO Q-10) 10 mg cap Take  by mouth.      loratadine (CLARITIN) 10 mg tablet Take 10 mg by mouth.      cyanocobalamin (VITAMIN B-12) 1,000 mcg tablet Take 1,000 mcg by mouth daily. nitroglycerin (NITROLINGUAL) 400 mcg/spray spray 1 Spray by SubLINGual route every five (5) minutes as needed for Chest Pain. Qty: 1 Bottle, Refills: 5    Associated Diagnoses: Essential hypertension with goal blood pressure less than 140/90      VIT A/VIT C/VIT E/ZINC/COPPER (PRESERVISION AREDS PO) Take 1 Tab by mouth two (2) times a day. Stopped 1/3/17      acetaminophen (TYLENOL) 325 mg tablet Take  by mouth every four (4) hours as needed for Pain.              Signed:  KVNG Mott  10/20/2018  5:22 PM

## 2018-10-20 NOTE — PROGRESS NOTES
TRANSFER - OUT REPORT: 
 
Verbal report given to Delfino(name) on Karo Novel  being transferred to tele(unit) for routine progression of care Report consisted of patients Situation, Background, Assessment and  
Recommendations(SBAR). Information from the following report(s) SBAR was reviewed with the receiving nurse. Opportunity for questions and clarification was provided. Procedure: [facility]   Finding Summary: pressure  Wire negative. (cath/pci/pacer settings) Location: rgroin    Closure Device: 6F Mynx(yes/no/description) Post Site Assessment: no bleeding no hematoma Intra Procedure Meds: 
 
Versed: 1mg Fentanyl: 25mcg Angiomax Stop Time: 6698 Peripheral IV 10/19/18 Right Antecubital (Active) Site Assessment Clean, dry, & intact 10/20/2018  8:10 AM  
Phlebitis Assessment 0 10/20/2018  8:10 AM  
Infiltration Assessment 0 10/20/2018  8:10 AM  
Dressing Status Clean, dry, & intact 10/20/2018  8:10 AM  
Dressing Type Tape;Transparent 10/20/2018  8:10 AM  
Hub Color/Line Status Patent; Infusing 10/20/2018  8:10 AM  
   
Peripheral IV 10/19/18 Anterior; Left Antecubital (Active) Site Assessment Clean, dry, & intact 10/20/2018  8:10 AM  
Phlebitis Assessment 0 10/20/2018  8:10 AM  
Infiltration Assessment 0 10/20/2018  8:10 AM  
Dressing Status Clean, dry, & intact 10/20/2018  8:10 AM  
Dressing Type Tape;Transparent 10/20/2018  8:10 AM  
Hub Color/Line Status Patent; Infusing 10/20/2018  8:10 AM  
 
  
Post-Procedure Site Assessment (1) Wound Type: Catheter entry/exit Location: Groin Orientation : Right Closure Device: Mynx Site Assessment: No bleeding, No hematoma 
  
  
  
  
  
  
  
is allergic to augmentin [amoxicillin-pot clavulanate]; codeine; gabapentin; hydrochlorothiazide; other medication; prednisone; and prevnar 13 [pneumoc 13-chang conj-dip cr(pf)]. Past Medical History:  
Diagnosis Date  Abdominal pain 10/28/2014  Angina at rest [facility]) pt denies  Arthritis   
 osteo - low back,  shoulders, hips, low back  Bilateral carotid bruits  Bursitis  CAD (coronary artery disease) 4 vessel CABG 2005;--- stents x 4--- last one placed 2014-- followed by dr Home Guidry  Cervicalgia  Chronic pain   
 left shoulder  Coagulation defects   
 on plavix  Constipation  Cough 09/11/2014  
 10/8/14, Methacholine Challenge Study: The point at which the FEV1 fell by at least 20%, the PC20, occurred above a dose of 25mg/mL of methacholine. This rules out the diagnosis of asthma with an extremely high degree of sensitivity. No post-challenge FEV1 recovery was identified. Jose Bird MD      
 Depressive disorder  Dyspnea 09/11/2014 [facility]; 9/29/14: IMPRESSION: The flow volume loop is consistent restriction. Spirometry suggest restriction with concomitant obstruction at low lung volumes. There is not a significant bronchodilator response. Lung volumes reveal mild restriction. The unadjusted diffusion capacity is normal.  Rubia Guillen MD    
 GERD (gastroesophageal reflux disease)   
 controlled with med  Headache   
 Hoarseness or changing voice   
 thougfht to be related to gerd  Hyperlipidemia  Hypertension   
 controlled with med  Kidney stone   
 yrs ago-- no tx required  Lumbago  Macular degeneration  Nodule of left lung   
 dx'ed 4 years ago-watched for several months-no new growth-being watched-yearly CT scan----- followed by dr. Manju Loera  Pain in joint, ankle and foot   
 left 3rd toe and right 2nd toe  Pain in joint, shoulder region   
 left now bothering > right, right ok  Palpitations 09/24/2015  
 followed by dr Home Guidry  Panic disorder   
 panic attacks -- last one 2014  Renal mass 07/2016  
 ablation---left side--- followed by dr Jaky Cerna in [place]  Sciatica  Vascular headache Visit Vitals /67 (BP Patient Position: Supine) Pulse 68 Temp 98.6 °F (37 °C) Resp 16 Ht 5' 4\" (1.626 m) Wt 71.1 kg (156 lb 11.2 oz) SpO2 99% BMI 26.90 kg/m²

## 2018-10-20 NOTE — PROGRESS NOTES
TRANSFER - IN REPORT: 
 
Verbal report received from Beatriz Laboy RN on Vickey Valero  being received from [address] [place] [address] for routine progression of care. Report consisted of patients Situation, Background, Assessment and  
Recommendations(SBAR). Information from the following reports was reviewed: Kardex, Procedure Summary, MAR and Recent Results. Opportunity for questions and clarification was provided. Assessment completed upon patients arrival to unit and care assumed. Patient received to room 331 and assessment completed. Patient connected to telemetry monitor and eagle with BP cycling every 15 minutes. Patient oriented to room and plan of care reviewed. Patient voiced understanding of bedrest. Right groin site benign, dressing dry and intact, no hematoma. Patient provided with clear liquids. Patient voiced understanding to use call light to communicate needs.

## 2018-10-20 NOTE — DISCHARGE INSTRUCTIONS
HEART CATHETERIZATION/ANGIOGRAPHY DISCHARGE INSTRUCTIONS    1. Check puncture site frequently for swelling or bleeding. If there is any bleeding, lie down and apply pressure over the area with a clean towel or washcloth. Notify your doctor for any redness, swelling, drainage, or oozing from the puncture site. Notify your doctor for any fever or chills. 2. If the extremity becomes cold, numb, or painful call [facility] Cardiology office. 3. Activity should be limited for the next 48 hours. Climb stairs as little as possible and avoid any stooping, bending, or strenuous activity for 48 hours. No heavy lifting (anything over 10 pounds) for 3 days. 4. You may resume your usual diet. Drink more fluids than usual.  5. Have a responsible person drive you home and stay with you for at least 24 hours after your heart catheterization/angiography. 6. You may remove bandage from your groin in 24 hours. You may shower in 24 hours. No tub baths, hot tubs, or swimming for 1 week. Do not place any lotions, creams, powders, or ointments over puncture site for 1 week. You may place a clean band-aid over the puncture site each day for 5 days. Change daily. I have read the above instructions and have had the opportunity to ask questions. Patient: ________________________   Date: 10/20/2018    Witness: _______________________   Date: 10/20/2018 No lifting of 10 lbs or more for 7 days, no tub baths for a week, may shower, no creams, lotions powders, or ointments to site for a week. DISCHARGE SUMMARY from Nurse    PATIENT INSTRUCTIONS:    After general anesthesia or intravenous sedation, for 24 hours or while taking prescription Narcotics:  · Limit your activities  · Do not drive and operate hazardous machinery  · Do not make important personal or business decisions  · Do  not drink alcoholic beverages  · If you have not urinated within 8 hours after discharge, please contact your surgeon on call.     Report the following to your surgeon:  · Excessive pain, swelling, redness or odor of or around the surgical area  · Temperature over 100.5  · Nausea and vomiting lasting longer than 4 hours or if unable to take medications  · Any signs of decreased circulation or nerve impairment to extremity: change in color, persistent  numbness, tingling, coldness or increase pain  · Any questions    What to do at Home:  Recommended activity: No lifting, or Strenuous exercise for 5 days,     If you experience any of the following symptoms chest pain or shortness of breath, please follow up with [facility] cardiology. *  Please give a list of your current medications to your Primary Care Provider. *  Please update this list whenever your medications are discontinued, doses are      changed, or new medications (including over-the-counter products) are added. *  Please carry medication information at all times in case of emergency situations. These are general instructions for a healthy lifestyle:    No smoking/ No tobacco products/ Avoid exposure to second hand smoke  Surgeon General's Warning:  Quitting smoking now greatly reduces serious risk to your health. Obesity, smoking, and sedentary lifestyle greatly increases your risk for illness    A healthy diet, regular physical exercise & weight monitoring are important for maintaining a healthy lifestyle    You may be retaining fluid if you have a history of heart failure or if you experience any of the following symptoms:  Weight gain of 3 pounds or more overnight or 5 pounds in a week, increased swelling in our hands or feet or shortness of breath while lying flat in bed. Please call your doctor as soon as you notice any of these symptoms; do not wait until your next office visit.     Recognize signs and symptoms of STROKE:    F-face looks uneven    A-arms unable to move or move unevenly    S-speech slurred or non-existent    T-time-call 911 as soon as signs and symptoms begin-DO NOT go Back to bed or wait to see if you get better-TIME IS BRAIN. Warning Signs of HEART ATTACK     Call 911 if you have these symptoms:   Chest discomfort. Most heart attacks involve discomfort in the center of the chest that lasts more than a few minutes, or that goes away and comes back. It can feel like uncomfortable pressure, squeezing, fullness, or pain.  Discomfort in other areas of the upper body. Symptoms can include pain or discomfort in one or both arms, the back, neck, jaw, or stomach.  Shortness of breath with or without chest discomfort.  Other signs may include breaking out in a cold sweat, nausea, or lightheadedness. Don't wait more than five minutes to call 911 - MINUTES MATTER! Fast action can save your life. Calling 911 is almost always the fastest way to get lifesaving treatment. Emergency Medical Services staff can begin treatment when they arrive -- up to an hour sooner than if someone gets to the hospital by car. The discharge information has been reviewed with the {PATIENT PARENT GUARDIAN:97776}. The {PATIENT PARENT GUARDIAN:50332} verbalized understanding. Discharge medications reviewed with the {Dishcarge meds reviewed [facility]:19054} and appropriate educational materials and side effects teaching were provided.   ___________________________________________________________________________________________________________________________________

## 2018-10-20 NOTE — PROGRESS NOTES
Bedside and Verbal shift change report given to Jennifer Camacho RN (oncoming nurse) by self Brenda mares). Report included the following information SBAR, Kardex, ED Summary, Procedure Summary, Intake/Output, MAR, Recent Results and Cardiac Rhythm SR. heparin gtt verified at bedside and on MAR.

## 2018-10-20 NOTE — PROCEDURES
Brief Cardiac Procedure Note    Patient: Tato Ansari MRN: [id number]  SSN: [id number]    YOB: 1935  Age: 81 y.o. Sex: female      Date of Procedure: 10/20/2018     Pre-procedure Diagnosis: Typical Angina    Post-procedure Diagnosis: Coronary Artery Disease    Reason for Procedure: Suspected CAD    Procedure: Left Heart Catheterization    Brief Description of Procedure: PRESSURE WIRE LCX                                                                                                                                                                                                                                                                                                               Performed By: Parisa Bass MD     Assistants: NONE    Anesthesia: Moderate Sedation    Estimated Blood Loss: Less than 10 mL      Specimens: None    Implants: None    Findings:   LV NORMAL  LIMA TO LAD PATENT  SVG TO RAMUS PATENT  SVG TO RCA PATENT    LM STENT INTO LCX WITH SMOOTH 30-40% OSTIAL LCX ISR--- iFR 0.99 X 3  TREAT FOR SMALL VESSEL DISEASE, ADD IMDUR AND RANEXA    MYNX    Complications: None    Recommendations: Continue medical therapy.     Signed By: Parisa Bass MD     October 20, 2018

## 2018-10-20 NOTE — ED NOTES
TRANSFER - OUT REPORT: 
 
Verbal report given to [facility] (name) on Jacobo Corbett  being transferred to [facility](unit) for routine progression of care Report consisted of patients Situation, Background, Assessment and  
Recommendations(SBAR). Information from the following report(s) SBAR was reviewed with the receiving nurse. Lines:  
Peripheral IV 10/19/18 Right Antecubital (Active) Site Assessment Clean, dry, & intact 10/19/2018  9:17 PM  
Phlebitis Assessment 0 10/19/2018  9:17 PM  
Infiltration Assessment 0 10/19/2018  9:17 PM  
Dressing Status Clean, dry, & intact 10/19/2018  9:17 PM  
  
 
Opportunity for questions and clarification was provided. Patient transported with: 
 Registered Nurse

## 2018-10-20 NOTE — PROGRESS NOTES
Discharge instructions reviewed with Pt and Son. Prescriptions given for Ranexa and Imdur and med info sheets provided for all new medications. Opportunity for questions provided. Pt and Son voiced understanding of all discharge instructions.

## 2018-10-20 NOTE — PROCEDURES
[facility] CATH    Faby Cartwright  MR#: [id number]  : 1935  ACCOUNT #: [de-identified]   DATE OF SERVICE: 10/20/2018    PRIMARY CARE PHYSICIAN:  Kemi Conroy MD    PRIMARY CARDIOLOGIST:  Oliva Brandt    REASON FOR PROCEDURE:  Recurrent accelerating exertional angina consistent with accelerating unstable angina. The patient had resting chest pain on the day of admission, but negative troponins. She has a history of left main stenting into the circumflex and 3-vessel bypass grafting consisting of a LIMA to the LAD, a vein graft to the diagonal, a vein graft to a ramus, and a vein graft to the right coronary. 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 PROCEDURE PERFORMED:  Left heart catheterization with coronary angiography and left ventriculogram, selective arterial and venous bypass graft angiography, pressure wire interrogation to the ostial circumflex. TOTAL CONTRAST:  220 mL of Isovue. CONSCIOUS SEDATION:  The patient was sedated with 1 mg Versed, 25 mcg fentanyl  by Keshia Lawson RN, and monitored from 11:40-12:30 p.m. without complication. FRAILTY SCORE:  4. PROCEDURE TECHNIQUE:  After informed consent was obtained, the patient was brought to the cath lab, prepped and draped in the usual fashion. A 6-Micronesian sheath was placed in the right femoral artery via the modified Seldinger technique. Left heart catheterization was performed using standard 6-Micronesian catheters. We used an internal mammary to inject the mammary graft, but a JR4 was used to engage the other grafts. A Mynx  was deployed at the arteriotomy with excellent hemostasis. Pressure wire interrogation was performed through a 6-Micronesian XB 3.5 guiding catheter utilizing Angiomax for anticoagulation with a therapeutic periprocedural ACT. PRESSURE RESULTS:  Aorta 164/70 left ventricle 170-175/15.     There is a 5-10 mm gradient across the aortic valve, which is not stenotic and was easily crossed several times with a pigtail catheter. Left ventriculogram reveals normal left ventricular regional wall motion with ejection fraction greater than 60%. There is no mitral regurgitation and no aortic valve gradient on catheter pullback of hemodynamic significance. Left ventricular end-diastolic pressure is mildly elevated. CORONARY ANATOMY:  The left main has been previously stented into the circumflex. The left main portion of the stent appears widely patent with minimal irregularities of restenosis. The circumflex remains patent with a smooth ostial 30-40% focal narrowing at the ostium of the circumflex, which is restenotic. The vessel then bifurcates into 2 obtuse marginal branches, both of which have minimal luminal irregularities. A jailed ramus intermedius branch has competitive filling from a bypass graft with an ostial 95% stenosis and STACIE 2 flow antegrade. The left anterior descending is patent with mild ostial plaque narrowing at the site of jailing by the left main stent. There is good flow down the diagonal branch, which has mild irregularities at most.  There is competitive filling distally from the LIMA. The right coronary is a small but dominant vessel which has diffuse tubular calcified disease up to 70% throughout the proximal to mid vessel. There is competitive filling distally from a vein graft. BYPASS GRAFT ANGIOGRAPHY:  The saphenous vein graft to the first diagonal was chronically aorto-ostially occluded. The saphenous vein graft of the ramus intermedius has an ostial and proximal 20-30% narrowing, but intubation with a 6-Serbian diagnostic JR4 catheter demonstrated no significant ventricularization and only 5-10 mm of dampening. There does not appear to be a significant amount of plaque here and I think this is just anatomic narrowing of a vein graft.   The remainder of the vein graft has mild irregularity and saccular dilatation distally, but there is a clear anastomosis to a fairly large ramus intermedius branch which travels down the anterolateral wall with minimal irregularity. The saphenous vein graft to the right coronary is widely patent with a saccular dilatation in the proximal and distal portions of the vein graft itself probably at sites of degenerated valves. There is a clear anastomosis to the distal right coronary with good distal runoff and a mildly diseased native posterior descending and posterolateral branches. The left internal mammary to the LAD is widely patent. There is a saccular post-anastomotic dilatation of the LAD, but in multiple views, there is no high-grade stenosis at the anastomosis itself. The terminal LAD has minimal luminal irregularities as it wraps around the apex. PRESSURE WIRE INTERROGATION:  The only obvious source I could identify which could potentially be causing exertional angina from an epicardial coronary standpoint was the left main stent extending into the circumflex. After systemic anticoagulation with Angiomax and verification of therapeutic  mcg intracoronary nitroglycerin was administered and a pressure wire was advanced into both branches of the obtuse marginal system in the distal circumflex. The iFR in both vessels was 0.99 indicating hemodynamic insignificance of the ostial circumflex smooth restenosis which will be left to medical therapy. The patient will be treated for small vessel angina and potential gastric etiology for her symptoms. Imdur 30 mg daily, Ranexa 500 mg twice daily and Protonix 40 mg daily will be added. CONCLUSION:  1. Negative pressure wire interrogation to the ostial circumflex. 2.  3/4 patent bypass grafts. 3.  Normal left ventricular regional wall motion and ejection fraction.       Maria L Huffman MD       ATS / DN  D: 10/20/2018 12:49     T: 10/20/2018 14:04  JOB #: [id number]  CC: Inge Cook DO  [facility] CARDIOLOGY  [address] [place] [address], [address] [place] [address]  CC: Les Marsh MD  [facility]ann[facility] Hew[facility] [address]  CC: BRADY CARREON MD

## 2018-10-20 NOTE — PROGRESS NOTES
Verbal bedside report received from [address]. Assumed care of patient. Hepsrin IV drip verified at bedside with outgoing RN.

## 2018-10-20 NOTE — PROGRESS NOTES
Initial visit to assess pt's spiritual needs. Ministry of presence & prayer to demonstrate caring & concern, convey emotional & spiritual support.  
 
Chaplain Temo Fraga, MDiv,ThM,PhD

## 2018-11-01 PROBLEM — I20.0 UNSTABLE ANGINA (HCC): Status: RESOLVED | Noted: 2018-10-19 | Resolved: 2018-11-01

## 2018-12-26 ENCOUNTER — HOSPITAL ENCOUNTER (OUTPATIENT)
Dept: LAB | Age: 83
Discharge: HOME | End: 2018-12-26
Payer: MEDICARE

## 2018-12-26 LAB
ANION GAP SERPL CALC-SCNC: 7 MMOL/L (ref 7–16)
BNP SERPL-MCNC: 117 PG/ML
BUN SERPL-MCNC: 19 MG/DL (ref 8–23)
CALCIUM SERPL-MCNC: 8.8 MG/DL (ref 8.3–10.4)
CHLORIDE SERPL-SCNC: 100 MMOL/L (ref 98–107)
CO2 SERPL-SCNC: 30 MMOL/L (ref 21–32)
CREAT SERPL-MCNC: 1.04 MG/DL (ref 0.6–1)
GLUCOSE SERPL-MCNC: 94 MG/DL (ref 65–100)
POTASSIUM SERPL-SCNC: 4.1 MMOL/L (ref 3.5–5.1)
SODIUM SERPL-SCNC: 137 MMOL/L (ref 136–145)

## 2018-12-26 PROCEDURE — 80048 BASIC METABOLIC PNL TOTAL CA: CPT

## 2018-12-26 PROCEDURE — 36415 COLL VENOUS BLD VENIPUNCTURE: CPT

## 2018-12-26 PROCEDURE — 83880 ASSAY OF NATRIURETIC PEPTIDE: CPT

## 2018-12-27 NOTE — PROGRESS NOTES
Please call her, labs are good, no changes. Drink a little more water. We need CMP and BNP in 3 mos before seeing me back.   Thanks

## 2019-01-31 ENCOUNTER — HOSPITAL ENCOUNTER (OUTPATIENT)
Dept: CT IMAGING | Age: 84
Discharge: HOME | End: 2019-01-31
Attending: UROLOGY
Payer: MEDICARE

## 2019-01-31 VITALS — BODY MASS INDEX: 25.95 KG/M2 | HEIGHT: 64 IN | WEIGHT: 152 LBS

## 2019-01-31 DIAGNOSIS — C64.2 MALIGNANT NEOPLASM OF LEFT KIDNEY, EXCEPT RENAL PELVIS (HCC): ICD-10-CM

## 2019-01-31 LAB — CREAT BLD-MCNC: 1.1 MG/DL (ref 0.8–1.5)

## 2019-01-31 PROCEDURE — 74011636320 HC RX REV CODE- 636/320: Performed by: UROLOGY

## 2019-01-31 PROCEDURE — 74011000258 HC RX REV CODE- 258: Performed by: UROLOGY

## 2019-01-31 PROCEDURE — 82565 ASSAY OF CREATININE: CPT

## 2019-01-31 PROCEDURE — 74170 CT ABD WO CNTRST FLWD CNTRST: CPT

## 2019-01-31 RX ORDER — SODIUM CHLORIDE 0.9 % (FLUSH) 0.9 %
10 SYRINGE (ML) INJECTION
Status: COMPLETED | OUTPATIENT
Start: 2019-01-31 | End: 2019-01-31

## 2019-01-31 RX ADMIN — IOPAMIDOL 100 ML: 755 INJECTION, SOLUTION INTRAVENOUS at 11:04

## 2019-01-31 RX ADMIN — SODIUM CHLORIDE 100 ML: 900 INJECTION, SOLUTION INTRAVENOUS at 11:04

## 2019-01-31 RX ADMIN — Medication 10 ML: at 11:04

## 2019-01-31 NOTE — PROGRESS NOTES
Please call patient to let her know that her CT scan looks good. 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 Please schedule her to see me in 1 year with a chest x-ray, CT abdomen and CBC/CMP labs prior to appointment. I have already placed orders for these in the system.

## 2019-02-16 ENCOUNTER — HOSPITAL ENCOUNTER (EMERGENCY)
Age: 84
Discharge: HOME | End: 2019-02-16
Attending: EMERGENCY MEDICINE
Payer: MEDICARE

## 2019-02-16 ENCOUNTER — APPOINTMENT (OUTPATIENT)
Dept: CT IMAGING | Age: 84
End: 2019-02-16
Attending: EMERGENCY MEDICINE
Payer: MEDICARE

## 2019-02-16 VITALS
SYSTOLIC BLOOD PRESSURE: 151 MMHG | TEMPERATURE: 97.9 F | RESPIRATION RATE: 12 BRPM | BODY MASS INDEX: 26.12 KG/M2 | HEART RATE: 60 BPM | DIASTOLIC BLOOD PRESSURE: 69 MMHG | OXYGEN SATURATION: 99 % | WEIGHT: 153 LBS | HEIGHT: 64 IN

## 2019-02-16 DIAGNOSIS — J40 BRONCHITIS: Primary | ICD-10-CM

## 2019-02-16 LAB
ALBUMIN SERPL-MCNC: 3.7 G/DL (ref 3.2–4.6)
ALBUMIN/GLOB SERPL: 1.2 {RATIO} (ref 1.2–3.5)
ALP SERPL-CCNC: 81 U/L (ref 50–136)
ALT SERPL-CCNC: 12 U/L (ref 12–65)
ANION GAP SERPL CALC-SCNC: 8 MMOL/L (ref 7–16)
ARTERIAL PATENCY WRIST A: YES
AST SERPL-CCNC: 18 U/L (ref 15–37)
ATRIAL RATE: 58 BPM
BASE EXCESS BLD CALC-SCNC: 0 MMOL/L
BASOPHILS # BLD: 0.1 K/UL (ref 0–0.2)
BASOPHILS NFR BLD: 1 % (ref 0–2)
BDY SITE: ABNORMAL
BILIRUB SERPL-MCNC: 0.5 MG/DL (ref 0.2–1.1)
BNP SERPL-MCNC: 79 PG/ML
BODY TEMPERATURE: 98.6
BUN SERPL-MCNC: 16 MG/DL (ref 8–23)
CALCIUM SERPL-MCNC: 8.8 MG/DL (ref 8.3–10.4)
CALCULATED P AXIS, ECG09: 40 DEGREES
CALCULATED R AXIS, ECG10: 42 DEGREES
CALCULATED T AXIS, ECG11: 70 DEGREES
CHLORIDE SERPL-SCNC: 101 MMOL/L (ref 98–107)
CO2 BLD-SCNC: 23 MMOL/L
CO2 SERPL-SCNC: 27 MMOL/L (ref 21–32)
COLLECT TIME,HTIME: 1345
CREAT SERPL-MCNC: 1.05 MG/DL (ref 0.6–1)
DIAGNOSIS, 93000: NORMAL
DIFFERENTIAL METHOD BLD: NORMAL
EOSINOPHIL # BLD: 0.2 K/UL (ref 0–0.8)
EOSINOPHIL NFR BLD: 3 % (ref 0.5–7.8)
ERYTHROCYTE [DISTWIDTH] IN BLOOD BY AUTOMATED COUNT: 13.7 % (ref 11.9–14.6)
GAS FLOW.O2 O2 DELIVERY SYS: ABNORMAL L/MIN
GLOBULIN SER CALC-MCNC: 3.2 G/DL (ref 2.3–3.5)
GLUCOSE SERPL-MCNC: 103 MG/DL (ref 65–100)
HCO3 BLD-SCNC: 22.1 MMOL/L (ref 22–26)
HCT VFR BLD AUTO: 38.1 % (ref 35.8–46.3)
HGB BLD-MCNC: 12.3 G/DL (ref 11.7–15.4)
IMM GRANULOCYTES # BLD AUTO: 0 K/UL (ref 0–0.5)
IMM GRANULOCYTES NFR BLD AUTO: 0 % (ref 0–5)
LACTATE BLD-SCNC: 1.01 MMOL/L (ref 0.5–1.9)
LYMPHOCYTES # BLD: 2 K/UL (ref 0.5–4.6)
LYMPHOCYTES NFR BLD: 34 % (ref 13–44)
MCH RBC QN AUTO: 30 PG (ref 26.1–32.9)
MCHC RBC AUTO-ENTMCNC: 32.3 G/DL (ref 31.4–35)
MCV RBC AUTO: 92.9 FL (ref 79.6–97.8)
MONOCYTES # BLD: 0.5 K/UL (ref 0.1–1.3)
MONOCYTES NFR BLD: 9 % (ref 4–12)
NEUTS SEG # BLD: 3.1 K/UL (ref 1.7–8.2)
NEUTS SEG NFR BLD: 53 % (ref 43–78)
NRBC # BLD: 0 K/UL (ref 0–0.2)
P-R INTERVAL, ECG05: 156 MS
PCO2 BLD: 29.6 MMHG (ref 35–45)
PH BLD: 7.48 [PH] (ref 7.35–7.45)
PLATELET # BLD AUTO: 276 K/UL (ref 150–450)
PMV BLD AUTO: 9.4 FL (ref 9.4–12.3)
PO2 BLD: 85 MMHG (ref 75–100)
POTASSIUM SERPL-SCNC: 3.8 MMOL/L (ref 3.5–5.1)
PROT SERPL-MCNC: 6.9 G/DL (ref 6.3–8.2)
Q-T INTERVAL, ECG07: 418 MS
QRS DURATION, ECG06: 76 MS
QTC CALCULATION (BEZET), ECG08: 410 MS
RBC # BLD AUTO: 4.1 M/UL (ref 4.05–5.2)
SAO2 % BLD: 97 % (ref 95–98)
SERVICE CMNT-IMP: ABNORMAL
SERVICE CMNT-IMP: ABNORMAL
SODIUM SERPL-SCNC: 136 MMOL/L (ref 136–145)
SPECIMEN TYPE: ABNORMAL
TROPONIN I BLD-MCNC: 0 NG/ML (ref 0.02–0.05)
VENTRICULAR RATE, ECG03: 58 BPM
WBC # BLD AUTO: 5.8 K/UL (ref 4.3–11.1)

## 2019-02-16 PROCEDURE — 99285 EMERGENCY DEPT VISIT HI MDM: CPT | Performed by: EMERGENCY MEDICINE

## 2019-02-16 PROCEDURE — 80053 COMPREHEN METABOLIC PANEL: CPT

## 2019-02-16 PROCEDURE — 96361 HYDRATE IV INFUSION ADD-ON: CPT | Performed by: EMERGENCY MEDICINE

## 2019-02-16 PROCEDURE — 71260 CT THORAX DX C+: CPT

## 2019-02-16 PROCEDURE — 85025 COMPLETE CBC W/AUTO DIFF WBC: CPT

## 2019-02-16 PROCEDURE — 74011000258 HC RX REV CODE- 258: Performed by: EMERGENCY MEDICINE

## 2019-02-16 PROCEDURE — 36600 WITHDRAWAL OF ARTERIAL BLOOD: CPT

## 2019-02-16 PROCEDURE — 74011636320 HC RX REV CODE- 636/320: Performed by: EMERGENCY MEDICINE

## 2019-02-16 PROCEDURE — 93005 ELECTROCARDIOGRAM TRACING: CPT | Performed by: EMERGENCY MEDICINE

## 2019-02-16 PROCEDURE — 82803 BLOOD GASES ANY COMBINATION: CPT

## 2019-02-16 PROCEDURE — 83605 ASSAY OF LACTIC ACID: CPT

## 2019-02-16 PROCEDURE — 96374 THER/PROPH/DIAG INJ IV PUSH: CPT | Performed by: EMERGENCY MEDICINE

## 2019-02-16 PROCEDURE — 83880 ASSAY OF NATRIURETIC PEPTIDE: CPT

## 2019-02-16 PROCEDURE — 74011250636 HC RX REV CODE- 250/636: Performed by: EMERGENCY MEDICINE

## 2019-02-16 PROCEDURE — 84484 ASSAY OF TROPONIN QUANT: CPT

## 2019-02-16 RX ORDER — SODIUM CHLORIDE 0.9 % (FLUSH) 0.9 %
10 SYRINGE (ML) INJECTION
Status: COMPLETED | OUTPATIENT
Start: 2019-02-16 | End: 2019-02-16

## 2019-02-16 RX ORDER — DOXYCYCLINE HYCLATE 100 MG
100 TABLET ORAL 2 TIMES DAILY
Qty: 14 TAB | Refills: 0 | Status: SHIPPED | OUTPATIENT
Start: 2019-02-16 | End: 2019-02-23

## 2019-02-16 RX ORDER — DEXAMETHASONE SODIUM PHOSPHATE 100 MG/10ML
10 INJECTION INTRAMUSCULAR; INTRAVENOUS
Status: COMPLETED | OUTPATIENT
Start: 2019-02-16 | End: 2019-02-16

## 2019-02-16 RX ORDER — ALBUTEROL SULFATE 90 UG/1
2 AEROSOL, METERED RESPIRATORY (INHALATION)
Qty: 1 INHALER | Refills: 1 | Status: SHIPPED | OUTPATIENT
Start: 2019-02-16 | End: 2021-09-03

## 2019-02-16 RX ADMIN — DEXAMETHASONE SODIUM PHOSPHATE 10 MG: 10 INJECTION INTRAMUSCULAR; INTRAVENOUS at 16:03

## 2019-02-16 RX ADMIN — Medication 10 ML: at 13:55

## 2019-02-16 RX ADMIN — SODIUM CHLORIDE 100 ML: 900 INJECTION, SOLUTION INTRAVENOUS at 13:56

## 2019-02-16 RX ADMIN — SODIUM CHLORIDE 1000 ML: 900 INJECTION, SOLUTION INTRAVENOUS at 13:30

## 2019-02-16 RX ADMIN — IOPAMIDOL 100 ML: 755 INJECTION, SOLUTION INTRAVENOUS at 13:55

## 2019-02-16 NOTE — ED NOTES
Called MD [address] [phone]. Pt states flu swab was completed. Results not sent in transfer paperwork. Spoke to [facility] and states flu was negative.

## 2019-02-16 NOTE — DISCHARGE INSTRUCTIONS
Patient Education        Bronchitis: Care Instructions  Your Care Instructions    Bronchitis is inflammation of the bronchial tubes, which carry air to the lungs. The tubes swell and produce mucus, or phlegm. The mucus and inflamed bronchial tubes make you cough. You may have trouble breathing. Most cases of bronchitis are caused by viruses like those that cause colds. Antibiotics usually do not help and they may be harmful. Bronchitis usually develops rapidly and lasts about 2 to 3 weeks in otherwise healthy people. Follow-up care is a key part of your treatment and safety. Be sure to make and go to all appointments, and call your doctor if you are having problems. It's also a good idea to know your test results and keep a list of the medicines you take. How can you care for yourself at home? · Take all medicines exactly as prescribed. Call your doctor if you think you are having a problem with your medicine. · Get some extra rest.  · Take an over-the-counter pain medicine, such as acetaminophen (Tylenol), ibuprofen (Advil, Motrin), or naproxen (Aleve) to reduce fever and relieve body aches. Read and follow all instructions on the label. · Do not take two or more pain medicines at the same time unless the doctor told you to. Many pain medicines have acetaminophen, which is Tylenol. Too much acetaminophen (Tylenol) can be harmful. · Take an over-the-counter cough medicine that contains dextromethorphan to help quiet a dry, hacking cough so that you can sleep. Avoid cough medicines that have more than one active ingredient. Read and follow all instructions on the label. · Breathe moist air from a humidifier, hot shower, or sink filled with hot water. The heat and moisture will thin mucus so you can cough it out. · Do not smoke. Smoking can make bronchitis worse. If you need help quitting, talk to your doctor about stop-smoking programs and medicines.  These can increase your chances of quitting for good.  When should you call for help? Call 911 anytime you think you may need emergency care. For example, call if:    · You have severe trouble breathing.    Call your doctor now or seek immediate medical care if:    · You have new or worse trouble breathing.     · You cough up dark brown or bloody mucus (sputum).     · You have a new or higher fever.     · You have a new rash.    Watch closely for changes in your health, and be sure to contact your doctor if:    · You cough more deeply or more often, especially if you notice more mucus or a change in the color of your mucus.     · You are not getting better as expected. Where can you learn more? Go to http://dale-nora.info/. Enter H333 in the search box to learn more about \"Bronchitis: Care Instructions. \"  Current as of: September 5, 2018  Content Version: 11.9  © 9459-7927 [employer]. Care instructions adapted under license by [employer] (which disclaims liability or warranty for this information). If you have questions about a medical condition or this instruction, always ask your healthcare professional. [address] any warranty or liability for your use of this information.

## 2019-02-16 NOTE — ED NOTES
Patient report received from [facility]. Patient care to be assumed at this time.

## 2019-02-16 NOTE — ED NOTES
I have reviewed discharge instructions with the patient. The patient verbalized understanding. Patient left ED via Discharge Method: ambulatory to Home with transport from son. The patient is ambulatory upon exit and appears in no acute distress. The patient has been provided discharge instructions, prescriptions x2, and follow up information. The patient does not have any questions at this time. Opportunity for questions and clarification provided. Patient given 2 scripts. To continue your aftercare when you leave the hospital, you may receive an automated call from our care team to check in on how you are doing. This is a free service and part of our promise to provide the best care and service to meet your aftercare needs.  If you have questions, or wish to unsubscribe from this service please call [phone]. Thank you for Choosing our [facility] [employer] [facility] Emergency Department.

## 2019-02-16 NOTE — ED TRIAGE NOTES
Pt arrives via mobile care from [facility] and states she has been experiencing some SOB x 2-3 days and got worse today. Pt sent to rule out DVT per EMS. EMS states pt has been hypertensive in route. Pt began having expiratory wheezes after moved over to [facility]. [facility] admnistered in route. Pt has 20g in RAC placed by MD Garcia.  Pt denies fever, cp, HA.

## 2019-02-16 NOTE — ED PROVIDER NOTES
Patient with history of heart disease congestive heart failure and previous left lower extremity DVT. On xarelto. For the past couple weeks has had cough and congestion feel like she had bronchitis. Has not taken anything for it. Has had some progressive shortness of breath over the past 3 days. Went to urgent care today who was concerned about possible pulmonary embolism so sent here. Patient has chronic left lower extremity pain from her previous DVT. No increase in swelling. EMS gave breathing treatment in route due to some wheezing with some improvement in symptoms. The history is provided by the patient. No  was used. Shortness of Breath This is a new problem. The average episode lasts 3 days. The problem occurs continuously. The current episode started more than 2 days ago. The problem has been gradually worsening. Associated symptoms include cough, sputum production, wheezing and leg pain. Pertinent negatives include no fever, no headaches, no rhinorrhea, no sore throat, no neck pain, no orthopnea, no chest pain, no vomiting, no abdominal pain, no rash and no leg swelling. She has tried beta-agonist inhalers for the symptoms. The treatment provided mild relief. Associated medical issues include CAD, heart failure and DVT. Past Medical History:  
Diagnosis Date  Abdominal pain 10/28/2014  Angina at rest [facility])   
 pt denies  Arthritis   
 osteo - low back,  shoulders, hips, low back  Bilateral carotid bruits  Bursitis  CAD (coronary artery disease) 4 vessel CABG 2005;--- stents x 4--- last one placed 2014-- followed by dr Argentina Peña  Cervicalgia  Chronic pain   
 left shoulder  Coagulation defects   
 on plavix  Constipation  Cough 09/11/2014  
 10/8/14, Methacholine Challenge Study: The point at which the FEV1 fell by at least 20%, the PC20, occurred above a dose of 25mg/mL of methacholine. This rules out the diagnosis of asthma with an extremely high degree of sensitivity. No post-challenge FEV1 recovery was identified. Violetta Cedillo MD      
 Depressive disorder  Dyspnea 09/11/2014 [facility]; 9/29/14: IMPRESSION: The flow volume loop is consistent restriction. Spirometry suggest restriction with concomitant obstruction at low lung volumes. There is not a significant bronchodilator response. Lung volumes reveal mild restriction. The unadjusted diffusion capacity is normal.  Sepideh Rodriguez MD    
 GERD (gastroesophageal reflux disease)   
 controlled with med  Headache   
 Hoarseness or changing voice   
 thougfht to be related to gerd  Hyperlipidemia  Hypertension   
 controlled with med  Kidney stone   
 yrs ago-- no tx required  Lumbago  Macular degeneration  Nodule of left lung   
 dx'ed 4 years ago-watched for several months-no new growth-being watched-yearly CT scan----- followed by dr. Francisco Beal  Pain in joint, ankle and foot   
 left 3rd toe and right 2nd toe  Pain in joint, shoulder region   
 left now bothering > right, right ok  Palpitations 09/24/2015  
 followed by dr Renate Osler  Panic disorder   
 panic attacks -- last one 2014  Renal mass 07/2016  
 ablation---left side--- followed by dr Jose Rafael Patel in [place]  Sciatica  Unstable angina (HCC)  Vascular headache Past Surgical History:  
Procedure Laterality Date  ABDOMEN SURGERY PROC UNLISTED Left 07/2016  
 ablation for mass in left side of abd  
 HX CATARACT REMOVAL    
 left  HX CATARACT REMOVAL    
 HX COLONOSCOPY  12/15/2015  
 diverticulosis, internal hemorrhoid, colon polyp- no further colonoscopies needed  HX COLONOSCOPY  06/2016  HX CORONARY ARTERY BYPASS GRAFT    
 4 vessel CABG 2005  HX HEART CATHETERIZATION    
 stent 2014  HX HEART CATHETERIZATION    
 stent 2006  HX HEENT Left   
 macular pucker  HX HERNIA REPAIR Left 2017  HX ORTHOPAEDIC    
 finger, foot  HX PARTIAL HYSTERECTOMY    
 hyst  
 
   
Family History:  
Problem Relation Age of Onset  Diabetes Mother  Heart Surgery Mother  Heart Disease Mother  Heart Attack Father  Heart Disease Father  No Known Problems Sister  Cancer Sister 2 sisters w/ lung ca-neither one smoked  Heart Disease Sister  Cancer Brother   
     lung ca-smoker  Heart Attack Brother   
      age 22 of MI  
 Heart Disease Sister  Heart Surgery Sister  Heart Attack Sister  Heart Disease Sister  Heart Surgery Sister  Heart Attack Sister  Heart Surgery Brother   
      in OR during 2nd heart surgery  Heart Disease Brother  No Known Problems Maternal Grandmother  No Known Problems Maternal Grandfather  No Known Problems Paternal Grandmother  No Known Problems Paternal Grandfather Social History Socioeconomic History  Marital status:  Spouse name: Not on file  Number of children: Not on file  Years of education: Not on file  Highest education level: Not on file Social Needs  Financial resource strain: Not on file  Food insecurity - worry: Not on file  Food insecurity - inability: Not on file  Transportation needs - medical: Not on file  Transportation needs - non-medical: Not on file Occupational History  Occupation: [employer] Employer: RETIRED Comment: 12 years  Occupation: [employer] Employer: RETIRED Comment: 20 years  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  
 Drug use: No  
 Sexual activity: No  
Other Topics Concern  Not on file Social History Narrative  Lifelong nonsmoker with 20 year secondhand smoke exposure from her  cigarettes. 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 , 2 sons. Denies alcohol use. Has always lived in [place]. 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 ALLERGIES: Augmentin [amoxicillin-pot clavulanate]; Codeine; Gabapentin; Hydrochlorothiazide; Other medication; Prednisone; and Prevnar 13 [pneumoc 13-chang conj-dip cr(pf)] Review of Systems Constitutional: Negative for chills and fever. HENT: Negative for rhinorrhea and sore throat. Eyes: Negative for pain and redness. Respiratory: Positive for cough, sputum production, shortness of breath and wheezing. Negative for chest tightness. Cardiovascular: Negative for chest pain, orthopnea and leg swelling. Gastrointestinal: Negative for abdominal pain, diarrhea, nausea and vomiting. Genitourinary: Negative for dysuria and hematuria. Musculoskeletal: Negative for back pain, gait problem, neck pain and neck stiffness. Skin: Negative for color change and rash. Neurological: Negative for weakness, numbness and headaches. Vitals:  
 02/16/19 1230 BP: 192/81 Pulse: 66 Resp: 22 Temp: 97.9 °F (36.6 °C) SpO2: 100% Weight: 69.4 kg (153 lb) Height: 5' 4\" (1.626 m) Physical Exam  
Constitutional: She is oriented to person, place, and time. She appears well-developed and well-nourished. HENT:  
Head: Normocephalic and atraumatic. Neck: Normal range of motion. Neck supple. Cardiovascular: Normal rate and regular rhythm. Murmur heard. Pulmonary/Chest: She is in respiratory distress (mild increased effort). She has wheezes (slight expiratory). Abdominal: Soft. Bowel sounds are normal. There is no tenderness. Musculoskeletal: Normal range of motion. She exhibits tenderness (mild anterior LLE TTP with no swelling. ). She exhibits no edema. Neurological: She is alert and oriented to person, place, and time. Skin: Skin is warm and dry. MDM Number of Diagnoses or Management Options Diagnosis management comments: Patient with bronchitis. We'll treat at home. Amount and/or Complexity of Data Reviewed Clinical lab tests: ordered and reviewed Tests in the radiology section of CPT®: ordered and reviewed Tests in the medicine section of CPT®: ordered and reviewed Patient Progress Patient progress: stable Procedures EKG: normal sinus rhythm, nonspecific ST and T waves changes. Rate 58. CT CHEST W CONT (Final result) Result time 02/16/19 15:17:04 Final result by Say Kelly MD (02/16/19 15:17:04) Impression:  
 Impression: 1. No evidence of PE. No consolidation. 2. Previous CABG, atherosclerotic vascular disease. 3. Long-term stability of tiny right lung nodule, therefore benign. Narrative:  
 CT Chest with contrast 
 
Clinical Indication:  Acute worsening of severe dyspnea with wheezing, elevated BNP, hypertension. History of coronary artery disease and CABG, taking 
anticoagulation medication, left lung nodule, GERD, dyspnea and chronic cough, 
hyperlipidemia. Comparison:  Chest CT 1/19/2018 and radiograph 10/19/2018 Technique:  Automated exposure Control was used. Contiguous axial images were 
obtained from the neck base through the upper abdomen following the uneventful 
administration of 100 cc Isovue 370 intravenous contrast. Pulmonary embolus 
protocol was used.  Coronal reconstructions were done for further evaluation of 
vessels. Findings:  Pulmonary arteries are upper normal in caliber, opacified with 
contrast, demonstrating no filling defect. Aorta is unchanged in caliber with 
scattered atherosclerotic calcification of the aorta, its branches, and 
extensive coronary artery calcifications. Previous CABG noted, not entirely evaluated here. No pleural or pericardial effusion. Tiny hiatal hernia. Normal 
caliber esophagus otherwise. No developing lymphadenopathy. Lung windows 
demonstrate patent normal caliber central airways. Parenchymal detail slightly 
limited by breathing motion artifact. Heart is mildly enlarged. No evidence of a 
pneumothorax, consolidation, honeycombing, or suspicious mass. Tiny right middle 
lobe nodule is unchanged from 2015. 
 
 Limited upper abdominal views demonstrate no adrenal mass or acute abnormality. A well-defined 2 cm cystic structure in the left upper quadrant adjacent to the 
pancreas is unchanged and of unclear but doubtful significance. There is also an 
unchanged circumscribed 1.5 cm cystic structure adjacent to the aorta. Bone 
windows demonstrate no acute or aggressive osseous lesion, with sternotomy wires 
again noted Results Include: 
 
Recent Results (from the past 24 hour(s)) BNP Collection Time: 02/16/19 12:38 PM  
Result Value Ref Range BNP 79 (H) 0 pg/mL CBC WITH AUTOMATED DIFF Collection Time: 02/16/19 12:38 PM  
Result Value Ref Range WBC 5.8 4.3 - 11.1 K/uL  
 RBC 4.10 4.05 - 5.2 M/uL  
 HGB 12.3 11.7 - 15.4 g/dL HCT 38.1 35.8 - 46.3 % MCV 92.9 79.6 - 97.8 FL  
 MCH 30.0 26.1 - 32.9 PG  
 MCHC 32.3 31.4 - 35.0 g/dL  
 RDW 13.7 11.9 - 14.6 % PLATELET 624 797 - 611 K/uL MPV 9.4 9.4 - 12.3 FL ABSOLUTE NRBC 0.00 0.0 - 0.2 K/uL  
 DF AUTOMATED NEUTROPHILS 53 43 - 78 % LYMPHOCYTES 34 13 - 44 % MONOCYTES 9 4.0 - 12.0 % EOSINOPHILS 3 0.5 - 7.8 % BASOPHILS 1 0.0 - 2.0 % IMMATURE GRANULOCYTES 0 0.0 - 5.0 %  
 ABS. NEUTROPHILS 3.1 1.7 - 8.2 K/UL  
 ABS. LYMPHOCYTES 2.0 0.5 - 4.6 K/UL  
 ABS. MONOCYTES 0.5 0.1 - 1.3 K/UL  
 ABS. EOSINOPHILS 0.2 0.0 - 0.8 K/UL  
 ABS. BASOPHILS 0.1 0.0 - 0.2 K/UL  
 ABS. IMM. GRANS. 0.0 0.0 - 0.5 K/UL METABOLIC PANEL, COMPREHENSIVE Collection Time: 02/16/19 12:38 PM  
Result Value Ref Range Sodium 136 136 - 145 mmol/L Potassium 3.8 3.5 - 5.1 mmol/L Chloride 101 98 - 107 mmol/L  
 CO2 27 21 - 32 mmol/L Anion gap 8 7 - 16 mmol/L Glucose 103 (H) 65 - 100 mg/dL BUN 16 8 - 23 MG/DL Creatinine 1.05 (H) 0.6 - 1.0 MG/DL  
 GFR est AA >60 >60 ml/min/1.73m2 GFR est non-AA 53 (L) >60 ml/min/1.73m2 Calcium 8.8 8.3 - 10.4 MG/DL Bilirubin, total 0.5 0.2 - 1.1 MG/DL  
 ALT (SGPT) 12 12 - 65 U/L  
 AST (SGOT) 18 15 - 37 U/L Alk. phosphatase 81 50 - 136 U/L Protein, total 6.9 6.3 - 8.2 g/dL Albumin 3.7 3.2 - 4.6 g/dL Globulin 3.2 2.3 - 3.5 g/dL A-G Ratio 1.2 1.2 - 3.5 EKG, 12 LEAD, INITIAL Collection Time: 02/16/19 12:40 PM  
Result Value Ref Range Ventricular Rate 58 BPM  
 Atrial Rate 58 BPM  
 P-R Interval 156 ms QRS Duration 76 ms  
 Q-T Interval 418 ms QTC Calculation (Bezet) 410 ms Calculated P Axis 40 degrees Calculated R Axis 42 degrees Calculated T Axis 70 degrees Diagnosis    
  !! AGE AND GENDER SPECIFIC ECG ANALYSIS !! Sinus bradycardia Possible Left atrial enlargement Borderline ECG When compared with ECG of 16-FEB-2019 12:34, No significant change was found Confirmed by Laura Nieves ([id number]) on 2/16/2019 3:27:21 PM 
  
POC TROPONIN-I Collection Time: 02/16/19 12:43 PM  
Result Value Ref Range Troponin-I (POC) 0 (L) 0.02 - 0.05 ng/ml POC LACTIC ACID Collection Time: 02/16/19 12:45 PM  
Result Value Ref Range Lactic Acid (POC) 1.01 0.5 - 1.9 mmol/L  
POC G3 Collection Time: 02/16/19  1:52 PM  
Result Value Ref Range Device: ROOM AIR    
 pH (POC) 7.481 (H) 7.35 - 7.45    
 pCO2 (POC) 29.6 (L) 35 - 45 MMHG  
 pO2 (POC) 85 75 - 100 MMHG  
 HCO3 (POC) 22.1 22 - 26 MMOL/L  
 sO2 (POC) 97 95 - 98 % Base excess (POC) 0 mmol/L Allens test (POC) YES Site RIGHT RADIAL Patient temp. 98.6 Specimen type (POC) ARTERIAL  Performed by Kd   
 CO2, POC 23 MMOL/L  
 Critical value read back 00:01 COLLECT TIME 1345

## 2019-02-16 NOTE — ED NOTES
Called CT and spoke to pooja who states pre hydration not necessary just fluids running while here. Notified Shaun Delatorre RN .

## 2019-04-18 PROBLEM — I82.432 DEEP VEIN THROMBOSIS (DVT) OF POPLITEAL VEIN OF LEFT LOWER EXTREMITY (HCC): Status: RESOLVED | Noted: 2018-02-23 | Resolved: 2019-04-18

## 2019-05-07 ENCOUNTER — HOSPITAL ENCOUNTER (OUTPATIENT)
Dept: CT IMAGING | Age: 84
Discharge: HOME | End: 2019-05-07
Attending: NURSE PRACTITIONER
Payer: MEDICARE

## 2019-05-07 VITALS — HEIGHT: 64 IN | BODY MASS INDEX: 25.95 KG/M2 | WEIGHT: 152 LBS

## 2019-05-07 DIAGNOSIS — C64.2 RENAL CANCER, LEFT (HCC): ICD-10-CM

## 2019-05-07 PROCEDURE — 74011636320 HC RX REV CODE- 636/320: Performed by: NURSE PRACTITIONER

## 2019-05-07 PROCEDURE — 74011000258 HC RX REV CODE- 258: Performed by: NURSE PRACTITIONER

## 2019-05-07 PROCEDURE — 74170 CT ABD WO CNTRST FLWD CNTRST: CPT

## 2019-05-07 RX ORDER — SODIUM CHLORIDE 0.9 % (FLUSH) 0.9 %
10 SYRINGE (ML) INJECTION
Status: COMPLETED | OUTPATIENT
Start: 2019-05-07 | End: 2019-05-07

## 2019-05-07 RX ADMIN — Medication 10 ML: at 14:40

## 2019-05-07 RX ADMIN — SODIUM CHLORIDE 100 ML: 900 INJECTION, SOLUTION INTRAVENOUS at 14:40

## 2019-05-07 RX ADMIN — IOPAMIDOL 100 ML: 755 INJECTION, SOLUTION INTRAVENOUS at 14:40

## 2019-06-06 PROBLEM — I87.2 VENOUS (PERIPHERAL) INSUFFICIENCY: Status: ACTIVE | Noted: 2019-06-06

## 2019-06-06 PROBLEM — M79.605 BILATERAL LEG PAIN: Status: ACTIVE | Noted: 2019-06-06

## 2019-06-06 PROBLEM — I83.813 VARICOSE VEINS OF BILATERAL LOWER EXTREMITIES WITH PAIN: Status: ACTIVE | Noted: 2019-06-06

## 2019-06-06 PROBLEM — M79.604 BILATERAL LEG PAIN: Status: ACTIVE | Noted: 2019-06-06

## 2019-11-06 ENCOUNTER — HOSPITAL ENCOUNTER (OUTPATIENT)
Dept: CT IMAGING | Age: 84
Discharge: HOME | End: 2019-11-06
Attending: RADIOLOGY
Payer: MEDICARE

## 2019-11-06 DIAGNOSIS — C64.2 KIDNEY CARCINOMA, LEFT (HCC): ICD-10-CM

## 2019-11-06 LAB — CREAT BLD-MCNC: 1.2 MG/DL (ref 0.8–1.5)

## 2019-11-06 PROCEDURE — 82565 ASSAY OF CREATININE: CPT

## 2019-11-06 PROCEDURE — 74011636320 HC RX REV CODE- 636/320: Performed by: RADIOLOGY

## 2019-11-06 PROCEDURE — 74011000258 HC RX REV CODE- 258: Performed by: RADIOLOGY

## 2019-11-06 PROCEDURE — 74170 CT ABD WO CNTRST FLWD CNTRST: CPT

## 2019-11-06 RX ORDER — SODIUM CHLORIDE 0.9 % (FLUSH) 0.9 %
10 SYRINGE (ML) INJECTION
Status: COMPLETED | OUTPATIENT
Start: 2019-11-06 | End: 2019-11-06

## 2019-11-06 RX ADMIN — Medication 10 ML: at 09:45

## 2019-11-06 RX ADMIN — SODIUM CHLORIDE 100 ML: 900 INJECTION, SOLUTION INTRAVENOUS at 09:45

## 2019-11-06 RX ADMIN — IOPAMIDOL 100 ML: 755 INJECTION, SOLUTION INTRAVENOUS at 09:45

## 2019-12-08 ENCOUNTER — HOSPITAL ENCOUNTER (EMERGENCY)
Age: 84
Discharge: HOME | End: 2019-12-08
Attending: EMERGENCY MEDICINE
Payer: MEDICARE

## 2019-12-08 VITALS
BODY MASS INDEX: 26.46 KG/M2 | DIASTOLIC BLOOD PRESSURE: 69 MMHG | RESPIRATION RATE: 16 BRPM | WEIGHT: 155 LBS | SYSTOLIC BLOOD PRESSURE: 159 MMHG | TEMPERATURE: 98.4 F | HEIGHT: 64 IN | HEART RATE: 52 BPM | OXYGEN SATURATION: 98 %

## 2019-12-08 DIAGNOSIS — H93.12 TINNITUS OF LEFT EAR: Primary | ICD-10-CM

## 2019-12-08 PROCEDURE — 74011250637 HC RX REV CODE- 250/637: Performed by: EMERGENCY MEDICINE

## 2019-12-08 PROCEDURE — 81003 URINALYSIS AUTO W/O SCOPE: CPT | Performed by: EMERGENCY MEDICINE

## 2019-12-08 PROCEDURE — 99285 EMERGENCY DEPT VISIT HI MDM: CPT | Performed by: EMERGENCY MEDICINE

## 2019-12-08 RX ORDER — ALPRAZOLAM 0.25 MG/1
0.25 TABLET ORAL
Qty: 20 TAB | Refills: 0 | Status: SHIPPED | OUTPATIENT
Start: 2019-12-08 | End: 2019-12-19

## 2019-12-08 RX ORDER — ALPRAZOLAM 0.5 MG/1
0.25 TABLET ORAL
Status: COMPLETED | OUTPATIENT
Start: 2019-12-08 | End: 2019-12-08

## 2019-12-08 RX ADMIN — ALPRAZOLAM 0.25 MG: 0.5 TABLET ORAL at 10:07

## 2019-12-08 NOTE — ED TRIAGE NOTES
Pt arrives via EMS from home with c/o tinnitus x 2 weeks and was seen on 12/03/2019 r/t issues. Pt was also dx with UTI and is being tx for this. Pt became emotional when asking question. 160/70, HR 52, T 98.4. BGL 72. Pt was scheduled for MRI of the brain tomorrow but was cancelled and is to see cardiology instead. Pt started taking lexapro and melatonin on 12/04/2019 and states she still can't sleep at night either. Pt has not taken BP meds today. Pt states the ringing in her ears has to stop. Pt is on lasix and stopped taking it about a week ago. Pt denies cough, but reports SOB all of the time and denies any change in SOB currently.

## 2019-12-08 NOTE — DISCHARGE INSTRUCTIONS
Patient Education   Continue present medications  Follow-up with ENT  Trial on Xanax 0.25  2-3 times daily  Consider fan or white noise when trying to sleep  Continue Lexapro  Discuss medications that are related to ringing in ears with your cardiologist tomorrow when you see them  Continue work-up with MRI , as scheduled  Would recommend having you stay with family or family stay with you while you are on Xanax  Complete present steroid medication     Tinnitus: Care Instructions  Your Care Instructions    Many people have some ringing sounds in their ears once in a while. You may hear a roar, a hiss, a tinkle, or a buzz. The sound usually lasts only a few minutes. If it goes on all the time, you may have tinnitus. Tinnitus is usually caused by long-term exposure to loud noise. This damages the nerves in the inner ear. It can occur with all types of hearing loss. It may be a symptom of almost any ear problem. Tinnitus may be caused by a buildup of earwax. Or it may be caused by ear infections or certain medicines (especially antibiotics or large amounts of aspirin). You can also hear noises in your ears because of an injury to the ears, drinking too much alcohol or caffeine, or a medical condition. You may need tests to evaluate your hearing and to find causes of long-lasting tinnitus. Your doctor may suggest one or more treatments to help you cope with it. You can also do things at home to help reduce symptoms. Follow-up care is a key part of your treatment and safety. Be sure to make and go to all appointments, and call your doctor if you are having problems. It's also a good idea to know your test results and keep a list of the medicines you take. How can you care for yourself at home? Limit or cut out alcohol and caffeine. They can make your symptoms worse. Do not smoke or use other tobacco products. Nicotine reduces blood flow to the ear and makes tinnitus worse.  If you need help quitting, talk to your doctor about stop-smoking programs and medicines. These can increase your chances of quitting for good. Talk to your doctor about whether to stop taking aspirin and similar products such as ibuprofen or naproxen. Get exercise often. It can improve blood flow to the ear. Ways to cope with noise  Some tinnitus may last a long time. To cope with noise, try to: Avoid noises that you think caused your tinnitus. If you can't avoid loud noises, wear earplugs or earmuffs. Ignore the sound by paying attention to other things. Relax using biofeedback, meditation, or yoga. Feeling stressed and being tired can make tinnitus worse. Play music or white noise to help you sleep. Background noise may cover up the noise that you hear in your ears. You can buy a machine that makes soothing sounds, such as ocean waves. When should you call for help? Call 911 anytime you think you may need emergency care. For example, call if:    You have symptoms of a stroke. These may include:  Sudden numbness, tingling, weakness, or loss of movement in your face, arm, or leg, especially on only one side of your body. Sudden vision changes. Sudden trouble speaking. Sudden confusion or trouble understanding simple statements. Sudden problems with walking or balance. A sudden, severe headache that is different from past headaches. Call your doctor now or seek immediate medical care if:    You develop other symptoms. These may include hearing loss (or worse hearing loss), balance problems, dizziness, nausea, or vomiting. Watch closely for changes in your health, and be sure to contact your doctor if:    Your tinnitus moves from both ears to one ear. Your hearing loss gets worse within 1 day after an ear injury. Your tinnitus or hearing loss does not get better within 1 week after an ear injury. Your tinnitus bothers you enough that you want to take medicines to help you cope with it. Where can you learn more?   Go to http://dale-nora.info/. Enter S165 in the search box to learn more about \"Tinnitus: Care Instructions. \"  Current as of: October 21, 2018  Content Version: 12.2  © 9794-5455 [employer]. Care instructions adapted under license by [employer] (which disclaims liability or warranty for this information). If you have questions about a medical condition or this instruction, always ask your healthcare professional. [place] [address] any warranty or liability for your use of this information.

## 2019-12-08 NOTE — ED NOTES
I have reviewed discharge instructions with the patient. The patient verbalized understanding. Patient left ED via Discharge Method: ambulatory to Home with ). Opportunity for questions and clarification provided. Patient given 1 scripts. To continue your aftercare when you leave the hospital, you may receive an automated call from our care team to check in on how you are doing. This is a free service and part of our promise to provide the best care and service to meet your aftercare needs.  If you have questions, or wish to unsubscribe from this service please call [phone]. Thank you for Choosing our [facility] Emergency Department.

## 2019-12-10 NOTE — ED PROVIDER NOTES
Here with multiple weeks of ringing to her left ear. Went to Urgent Care and they treated/ also [facility] who put her on \"strong antibiotic\" for a possible UTI/ also seen by Dr Sherrie Sharma). Stated she had significant decreased hearing to her left ear and wanted her to take Prednisone which she did not do and consider hearing aid or ear sound generator. She did put a non-functional hearing aide of her sister in her left ear and \" this stopped it a short while but then it came back\". She stopped her Lasix and decided to take prednisone for last 7 days. Also been placed on Lexapro and melatonin. Not sleeping much and patient Andrews Crooked state this is a big issue. Tearful at times. No ear pain or recent/remote injury. No known carotid disease. Some left sided headache but no swelling/redness or rash    The history is provided by the patient and a relative. Ringing in Ear    The problem occurs constantly. The problem has not changed since onset. Patient complains that the left ear is affected. There has been no fever. The patient is experiencing no pain. Associated symptoms include headaches. Pertinent negatives include no ear discharge, no rhinorrhea, no sore throat, no neck pain and no cough. Her past medical history does not include chronic ear infection. Past Medical History:   Diagnosis Date    Abdominal pain 10/28/2014    Angina at rest [facility])     pt denies    Arthritis     osteo - low back,  shoulders, hips, low back    Bilateral carotid bruits     Bursitis     CAD (coronary artery disease)     4 vessel CABG 2005;--- stents x 4--- last one placed 2014-- followed by dr Lauren Khanna Chronic pain     left shoulder    Coagulation defects     on plavix    Constipation     Cough 09/11/2014    10/8/14, Methacholine Challenge Study: The point at which the FEV1 fell by at least 20%, the PC20, occurred above a dose of 25mg/mL of methacholine.  This rules out the diagnosis of asthma with an extremely high degree of sensitivity. No post-challenge FEV1 recovery was identified. Luz Gonzalez MD        Depressive disorder     Dyspnea 09/11/2014    [facility] [place]; 9/29/14: IMPRESSION: The flow volume loop is consistent restriction. Spirometry suggest restriction with concomitant obstruction at low lung volumes. There is not a significant bronchodilator response. Lung volumes reveal mild restriction.  The unadjusted diffusion capacity is normal.  Jacqueline Chatterjee MD      GERD (gastroesophageal reflux disease)     controlled with med    Headache     Hoarseness or changing voice     thougfht to be related to gerd    Hyperlipidemia     Hypertension     controlled with med    Kidney stone     yrs ago-- no tx required    Lumbago     Macular degeneration     Nodule of left lung     dx'ed 4 years ago-watched for several months-no new growth-being watched-yearly CT scan----- followed by dr. Ashlee Cruz Pain in joint, ankle and foot     left 3rd toe and right 2nd toe    Pain in joint, shoulder region     left now bothering > right, right ok    Palpitations 09/24/2015    followed by dr Rodolfo Dueñas Panic disorder     panic attacks -- last one 2014    Renal mass 07/2016    ablation---left side--- followed by dr Fallon Mcfarlane in [place]    Sciatica     Unstable angina ([facility] [address]     Vascular headache        Past Surgical History:   Procedure Laterality Date    ABDOMEN SURGERY PROC UNLISTED Left 07/2016    ablation for mass in left side of abd    HX CATARACT REMOVAL      left    HX CATARACT REMOVAL      HX COLONOSCOPY  12/15/2015    diverticulosis, internal hemorrhoid, colon polyp- no further colonoscopies needed    HX COLONOSCOPY  06/2016    Polyps    HX CORONARY ARTERY BYPASS GRAFT      4 vessel CABG 2005    HX HEART CATHETERIZATION      stent 2014    HX HEART CATHETERIZATION      stent 2006    HX HEENT Left     macular pucker    HX HERNIA REPAIR Left 2017    HX ORTHOPAEDIC      finger, foot    HX PARTIAL HYSTERECTOMY hyst         Family History:   Problem Relation Age of Onset    Diabetes Mother     Heart Surgery Mother     Heart Disease Mother     Heart Attack Father     Heart Disease Father     No Known Problems Sister     Cancer Sister         2 sisters w/ lung ca-neither one smoked    Heart Disease Sister     Cancer Brother         lung ca-smoker    Heart Attack Brother          age 22 of MI    Heart Disease Sister     Heart Surgery Sister     Heart Attack Sister     Heart Disease Sister     Heart Surgery Sister     Heart Attack Sister     Heart Surgery Brother          in [place] during 2nd heart surgery    Heart Disease Brother     No Known Problems Maternal Grandmother     No Known Problems Maternal Grandfather     No Known Problems Paternal Grandmother     No Known Problems Paternal Grandfather        Social History     Socioeconomic History    Marital status:      Spouse name: Not on file    Number of children: Not on file    Years of education: Not on file    Highest education level: Not on file   Occupational History    Occupation: [employer]     Employer: RETIRED     Comment: 15 years    Occupation: [employer]     Employer: RETIRED     Comment: 21 years    Occupation: Plant     Employer: RETIRED     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   Social Needs    Financial resource strain: Not on file    Food insecurity:     Worry: Not on file     Inability: Not on file   [employer] needs:     Medical: Not on file     Non-medical: Not on file   Tobacco Use    Smoking status: Never Smoker    Smokeless tobacco: Never Used   Substance and Sexual Activity    Alcohol use: No    Drug use: No    Sexual activity: Not Currently   Lifestyle    Physical activity:     Days per week: Not on file     Minutes per session: Not on file    Stress: Not on file   Relationships    Social connections:     Talks on phone: Not on file     Gets together: Not on file Attends Mormonism service: Not on file     Active member of club or organization: Not on file     Attends meetings of clubs or organizations: Not on file     Relationship status: Not on file    Intimate partner violence:     Fear of current or ex partner: Not on file     Emotionally abused: Not on file     Physically abused: Not on file     Forced sexual activity: Not on file   Other Topics Concern    Not on file   Social History Narrative    Lifelong nonsmoker with 20 year secondhand smoke exposure from her  cigarettes. 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 , 2 sons. Denies alcohol use. Has always lived in [place]. 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 ALLERGIES: Augmentin [amoxicillin-pot clavulanate]; Codeine; Gabapentin; Hydrochlorothiazide; Other medication; Prednisone; and Prevnar 13 [pneumoc 13-chang conj-dip cr(pf)]    Review of Systems   HENT: Positive for tinnitus. Negative for congestion, ear discharge, ear pain, rhinorrhea and sore throat. Eyes: Negative. Respiratory: Negative for cough. Cardiovascular: Negative for chest pain and palpitations. Musculoskeletal: Negative for neck pain. Neurological: Positive for headaches. Negative for facial asymmetry, light-headedness and numbness. Psychiatric/Behavioral: Positive for dysphoric mood. Negative for behavioral problems, confusion and decreased concentration. The patient is not nervous/anxious. All other systems reviewed and are negative. Vitals:    12/08/19 1010 12/08/19 1021 12/08/19 1040 12/08/19 1129   BP: 168/77 157/74 165/72 159/69   Pulse: (!) 48 (!) 48 (!) 48 (!) 52   Resp:       Temp:       SpO2: 99% 100% 98% 98%   Weight:       Height:                Physical Exam  Vitals signs and nursing note reviewed.    Constitutional:       General: She is not in acute distress. Appearance: She is well-developed and normal weight. She is not ill-appearing, toxic-appearing or diaphoretic. HENT:      Head: Atraumatic. Right Ear: Tympanic membrane, ear canal and external ear normal. There is no impacted cerumen. Left Ear: Tympanic membrane, ear canal and external ear normal. There is no impacted cerumen. Nose: No congestion or rhinorrhea. Mouth/Throat:      Mouth: Mucous membranes are moist.   Eyes:      General: No scleral icterus. Neck:      Musculoskeletal: Neck supple. Vascular: No carotid bruit. Cardiovascular:      Rate and Rhythm: Normal rate. Heart sounds: No friction rub. Pulmonary:      Effort: Pulmonary effort is normal. No respiratory distress. Abdominal:      General: Abdomen is flat. Palpations: Abdomen is soft. Musculoskeletal: Normal range of motion. Comments: No signif swelling   Skin:     General: Skin is warm and dry. Neurological:      Mental Status: She is alert. Sensory: No sensory deficit. Motor: No weakness. Coordination: Coordination normal.      Gait: Gait normal.      Deep Tendon Reflexes: Reflexes normal.   Psychiatric:         Thought Content: Thought content normal.          MDM  Number of Diagnoses or Management Options  Tinnitus of left ear:   Diagnosis management comments: Tinnitus for multiple weeks. No associated deficits other than decreased hearing. Very distraught over this. Feels no progress made and almost not sleeping by hx. Son will stay with her. After Up to Date review will cautiously add xanax 0.25 mg BID. Also encouraged to continue ENT follow up.        Amount and/or Complexity of Data Reviewed  Obtain history from someone other than the patient: yes  Review and summarize past medical records: yes    Risk of Complications, Morbidity, and/or Mortality  Presenting problems: moderate  Management options: moderate  General comments: Spent very significant time with patient/ son  Plus review of literature    Patient Progress  Patient progress: stable         Procedures

## 2019-12-26 ENCOUNTER — APPOINTMENT (OUTPATIENT)
Dept: GENERAL RADIOLOGY | Age: 84
End: 2019-12-26
Attending: EMERGENCY MEDICINE
Payer: MEDICARE

## 2019-12-26 ENCOUNTER — HOSPITAL ENCOUNTER (EMERGENCY)
Age: 84
Discharge: HOME | End: 2019-12-26
Attending: EMERGENCY MEDICINE
Payer: MEDICARE

## 2019-12-26 VITALS
SYSTOLIC BLOOD PRESSURE: 148 MMHG | HEIGHT: 63 IN | HEART RATE: 71 BPM | OXYGEN SATURATION: 99 % | DIASTOLIC BLOOD PRESSURE: 76 MMHG | BODY MASS INDEX: 27.46 KG/M2 | TEMPERATURE: 98 F | RESPIRATION RATE: 18 BRPM | WEIGHT: 155 LBS

## 2019-12-26 DIAGNOSIS — R60.9 PERIPHERAL EDEMA: Primary | ICD-10-CM

## 2019-12-26 LAB
ALBUMIN SERPL-MCNC: 3.9 G/DL (ref 3.2–4.6)
ALBUMIN/GLOB SERPL: 1.4 {RATIO} (ref 1.2–3.5)
ALP SERPL-CCNC: 66 U/L (ref 50–136)
ALT SERPL-CCNC: 17 U/L (ref 12–65)
ANION GAP SERPL CALC-SCNC: 5 MMOL/L (ref 7–16)
AST SERPL-CCNC: 18 U/L (ref 15–37)
BASOPHILS # BLD: 0 K/UL (ref 0–0.2)
BASOPHILS NFR BLD: 1 % (ref 0–2)
BILIRUB SERPL-MCNC: 0.6 MG/DL (ref 0.2–1.1)
BNP SERPL-MCNC: 581 PG/ML
BUN SERPL-MCNC: 10 MG/DL (ref 8–23)
CALCIUM SERPL-MCNC: 9.2 MG/DL (ref 8.3–10.4)
CHLORIDE SERPL-SCNC: 94 MMOL/L (ref 98–107)
CO2 SERPL-SCNC: 31 MMOL/L (ref 21–32)
CREAT SERPL-MCNC: 0.88 MG/DL (ref 0.6–1)
DIFFERENTIAL METHOD BLD: ABNORMAL
EOSINOPHIL # BLD: 0.1 K/UL (ref 0–0.8)
EOSINOPHIL NFR BLD: 2 % (ref 0.5–7.8)
ERYTHROCYTE [DISTWIDTH] IN BLOOD BY AUTOMATED COUNT: 14.5 % (ref 11.9–14.6)
GLOBULIN SER CALC-MCNC: 2.8 G/DL (ref 2.3–3.5)
GLUCOSE SERPL-MCNC: 137 MG/DL (ref 65–100)
HCT VFR BLD AUTO: 36.7 % (ref 35.8–46.3)
HGB BLD-MCNC: 12.5 G/DL (ref 11.7–15.4)
IMM GRANULOCYTES # BLD AUTO: 0 K/UL (ref 0–0.5)
IMM GRANULOCYTES NFR BLD AUTO: 0 % (ref 0–5)
LYMPHOCYTES # BLD: 1.4 K/UL (ref 0.5–4.6)
LYMPHOCYTES NFR BLD: 30 % (ref 13–44)
MCH RBC QN AUTO: 31.7 PG (ref 26.1–32.9)
MCHC RBC AUTO-ENTMCNC: 34.1 G/DL (ref 31.4–35)
MCV RBC AUTO: 93.1 FL (ref 79.6–97.8)
MONOCYTES # BLD: 0.6 K/UL (ref 0.1–1.3)
MONOCYTES NFR BLD: 12 % (ref 4–12)
NEUTS SEG # BLD: 2.7 K/UL (ref 1.7–8.2)
NEUTS SEG NFR BLD: 56 % (ref 43–78)
NRBC # BLD: 0 K/UL (ref 0–0.2)
PLATELET # BLD AUTO: 252 K/UL (ref 150–450)
PMV BLD AUTO: 9.1 FL (ref 9.4–12.3)
POTASSIUM SERPL-SCNC: 4.2 MMOL/L (ref 3.5–5.1)
PROT SERPL-MCNC: 6.7 G/DL (ref 6.3–8.2)
RBC # BLD AUTO: 3.94 M/UL (ref 4.05–5.2)
SODIUM SERPL-SCNC: 130 MMOL/L (ref 136–145)
WBC # BLD AUTO: 4.8 K/UL (ref 4.3–11.1)

## 2019-12-26 PROCEDURE — 99283 EMERGENCY DEPT VISIT LOW MDM: CPT | Performed by: EMERGENCY MEDICINE

## 2019-12-26 PROCEDURE — 83880 ASSAY OF NATRIURETIC PEPTIDE: CPT

## 2019-12-26 PROCEDURE — 74011250636 HC RX REV CODE- 250/636: Performed by: EMERGENCY MEDICINE

## 2019-12-26 PROCEDURE — 85025 COMPLETE CBC W/AUTO DIFF WBC: CPT

## 2019-12-26 PROCEDURE — 96374 THER/PROPH/DIAG INJ IV PUSH: CPT | Performed by: EMERGENCY MEDICINE

## 2019-12-26 PROCEDURE — 71046 X-RAY EXAM CHEST 2 VIEWS: CPT

## 2019-12-26 PROCEDURE — 80053 COMPREHEN METABOLIC PANEL: CPT

## 2019-12-26 RX ORDER — FUROSEMIDE 10 MG/ML
40 INJECTION INTRAMUSCULAR; INTRAVENOUS
Status: COMPLETED | OUTPATIENT
Start: 2019-12-26 | End: 2019-12-26

## 2019-12-26 RX ADMIN — FUROSEMIDE 40 MG: 40 INJECTION, SOLUTION INTRAMUSCULAR; INTRAVENOUS at 18:41

## 2019-12-26 NOTE — ED PROVIDER NOTES
Pt reports increase swelling to bilateral legs. Pt has an appointment with ENT. Pt has hx of CHF, pt has been off lasix for 1 week because it causes her ears to ring. Pt reports increased sob. Denies wearing 02 at home. Pt denies chest pain. Pt reports being on a blood thinner and has brusing to her lower right leg    The history is provided by the patient, a relative and medical records. Leg Swelling    This is a recurrent problem. The current episode started more than 2 days ago. The problem occurs constantly. The problem has been gradually worsening. The pain is present in the left lower leg and right lower leg. The quality of the pain is described as aching. The pain is mild. Pertinent negatives include no numbness, full range of motion, no stiffness, no tingling, no itching, no back pain and no neck pain. She has tried nothing for the symptoms. The treatment provided no relief. There has been no history of extremity trauma. Past Medical History:   Diagnosis Date    Abdominal pain 10/28/2014    [facility] rest [facility])     pt denies    Arthritis     osteo - low back,  shoulders, hips, low back    Bilateral carotid bruits     Bursitis     CAD (coronary artery disease)     4 vessel CABG 2005;--- stents x 4--- last one placed 2014-- followed by dr Anand Robledo Chronic pain     left shoulder    Coagulation defects     on plavix    Constipation     Cough 09/11/2014    10/8/14, Methacholine Challenge Study: The point at which the FEV1 fell by at least 20%, the PC20, occurred above a dose of 25mg/mL of methacholine. This rules out the diagnosis of asthma with an extremely high degree of sensitivity. No post-challenge FEV1 recovery was identified. Luz Gonzalez MD        Depressive disorder     Dyspnea 09/11/2014    [facility]; 9/29/14: IMPRESSION: The flow volume loop is consistent restriction. Spirometry suggest restriction with concomitant obstruction at low lung volumes.  There is not a significant bronchodilator response. Lung volumes reveal mild restriction.  The unadjusted diffusion capacity is normal.  Martin Mckeon MD      GERD (gastroesophageal reflux disease)     controlled with med    Headache     Hoarseness or changing voice     thougfht to be related to gerd    Hyperlipidemia     Hypertension     controlled with med    Kidney stone     yrs ago-- no tx required    Lumbago     Macular degeneration     Nodule of left lung     dx'ed 4 years ago-watched for several months-no new growth-being watched-yearly CT scan----- followed by dr. Kim Rico    Pain in joint, ankle and foot     left 3rd toe and right 2nd toe    Pain in joint, shoulder region     left now bothering > right, right ok    Palpitations 09/24/2015    followed by dr Hortencia Gonzalez Panic disorder     panic attacks -- last one 2014    Renal mass 07/2016    ablation---left side--- followed by dr Deric Osorio in [place]    Sciatica     Unstable angina ([facility] [address]     Vascular headache        Past Surgical History:   Procedure Laterality Date    ABDOMEN SURGERY PROC UNLISTED Left 07/2016    ablation for mass in left side of abd    HX CATARACT REMOVAL      left    HX CATARACT REMOVAL      HX COLONOSCOPY  12/15/2015    diverticulosis, internal hemorrhoid, colon polyp- no further colonoscopies needed    HX COLONOSCOPY  06/2016    Polyps    HX CORONARY ARTERY BYPASS GRAFT      4 vessel CABG 2005    HX HEART CATHETERIZATION      stent 2014    HX HEART CATHETERIZATION      stent 2006    HX HEENT Left     macular pucker    HX HERNIA REPAIR Left 2017    HX ORTHOPAEDIC      finger, foot    HX PARTIAL HYSTERECTOMY      hyst         Family History:   Problem Relation Age of Onset    Diabetes Mother     Heart Surgery Mother     Heart Disease Mother     Heart Attack Father     Heart Disease Father     No Known Problems Sister     Cancer Sister         2 sisters w/ lung ca-neither one smoked    Heart Disease Sister     Cancer Brother         lung ca-smoker    Heart Attack Brother          age 22 of [place]    Heart Disease Sister     Heart Surgery Sister     Heart Attack Sister     Heart Disease Sister     Heart Surgery Sister     Heart Attack Sister     Heart Surgery Brother          in [place] during 2nd heart surgery    Heart Disease Brother     No Known Problems Maternal Grandmother     No Known Problems Maternal Grandfather     No Known Problems Paternal Grandmother     No Known Problems Paternal Grandfather        Social History     Socioeconomic History    Marital status:      Spouse name: Not on file    Number of children: Not on file    Years of education: Not on file    Highest education level: Not on file   Occupational History    Occupation: "[employer]"     Employer: RETIRED     Comment: 15 years    Occupation: [employer]     Employer: RETIRED     Comment: 21 years    Occupation: Plant     Employer: RETIRED     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   Social Needs    Financial resource strain: Not on file    Food insecurity:     Worry: Not on file     Inability: Not on file   Mogi needs:     Medical: Not on file     Non-medical: Not on file   Tobacco Use    Smoking status: Never Smoker    Smokeless tobacco: Never Used   Substance and Sexual Activity    Alcohol use: No    Drug use: No    Sexual activity: Not Currently   Lifestyle    Physical activity:     Days per week: Not on file     Minutes per session: Not on file    Stress: Not on file   Relationships    Social connections:     Talks on phone: Not on file     Gets together: Not on file     Attends Restoration service: Not on file     Active member of club or organization: Not on file     Attends meetings of clubs or organizations: Not on file     Relationship status: Not on file    Intimate partner violence:     Fear of current or ex partner: Not on file     Emotionally abused: Not on file Physically abused: Not on file     Forced sexual activity: Not on file   Other Topics Concern    Not on file   Social History Narrative    Lifelong nonsmoker with 20 year secondhand smoke exposure from her  cigarettes. 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 , 2 sons. Denies alcohol use. Has always lived in [place]. 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 ALLERGIES: Augmentin [amoxicillin-pot clavulanate]; Codeine; Gabapentin; Hydrochlorothiazide; Other medication; Prednisone; and Prevnar 13 [pneumoc 13-chang conj-dip cr(pf)]    Review of Systems   Musculoskeletal: Negative for back pain, neck pain and stiffness. Skin: Negative for itching. Neurological: Negative for tingling and numbness. All other systems reviewed and are negative. Vitals:    12/26/19 1654   BP: 183/80   Pulse: 67   Resp: 18   Temp: 97.9 °F (36.6 °C)   SpO2: 96%   Weight: 70.3 kg (155 lb)   Height: 5' 3\" (1.6 m)            Physical Exam  Vitals signs and nursing note reviewed. Constitutional:       Appearance: Normal appearance. HENT:      Head: Normocephalic and atraumatic. Mouth/Throat:      Mouth: Mucous membranes are moist.   Eyes:      Extraocular Movements: Extraocular movements intact. Conjunctiva/sclera: Conjunctivae normal.      Pupils: Pupils are equal, round, and reactive to light. Neck:      Musculoskeletal: Normal range of motion and neck supple. Comments: No JVD  Cardiovascular:      Rate and Rhythm: Normal rate and regular rhythm. Pulmonary:      Effort: Pulmonary effort is normal.      Breath sounds: Normal breath sounds. Abdominal:      General: Abdomen is flat. Bowel sounds are normal.   Musculoskeletal: Normal range of motion. Right lower leg: Edema present. Left lower leg: Edema present.    Skin:     General: Skin is warm and dry. Capillary Refill: Capillary refill takes less than 2 seconds. Findings: Bruising present. Neurological:      General: No focal deficit present. Mental Status: She is alert and oriented to person, place, and time.    Psychiatric:         Mood and Affect: Mood normal.         Behavior: Behavior normal.          MDM  Number of Diagnoses or Management Options  Peripheral edema:      Amount and/or Complexity of Data Reviewed  Clinical lab tests: ordered and reviewed  Tests in the radiology section of CPT®: ordered and reviewed  Review and summarize past medical records: yes  Independent visualization of images, tracings, or specimens: yes    Risk of Complications, Morbidity, and/or Mortality  Presenting problems: moderate  Diagnostic procedures: moderate  Management options: moderate    Patient Progress  Patient progress: stable         Procedures

## 2019-12-26 NOTE — ED TRIAGE NOTES
Pt reports increase swelling to bilateral legs. Pt has an appointment with ENT. Pt has hx of CHF, pt has been off lasix for 1 week because it causes her ears to ring. Pt reports increased sob. Denies wearing 02 at home. Pt denies chest pain. Pt reports being on a blood thinner and has brusing to her lower right leg.

## 2019-12-26 NOTE — DISCHARGE INSTRUCTIONS
Patient Education        Leg and Ankle Edema: Care Instructions  Your Care Instructions  Swelling in the legs, ankles, and feet is called edema. It is common after you sit or stand for a while. Long plane flights or car rides often cause swelling in the legs and feet. You may also have swelling if you have to stand for long periods of time at your job. Problems with the veins in the legs (varicose veins) and changes in hormones can also cause swelling. Sometimes the swelling in the ankles and feet is caused by a more serious problem, such as heart failure, infection, blood clots, or liver or kidney disease. Follow-up care is a key part of your treatment and safety. Be sure to make and go to all appointments, and call your doctor if you are having problems. It's also a good idea to know your test results and keep a list of the medicines you take. How can you care for yourself at home? · If your doctor gave you medicine, take it as prescribed. Call your doctor if you think you are having a problem with your medicine. · Whenever you are resting, raise your legs up. Try to keep the swollen area higher than the level of your heart. · Take breaks from standing or sitting in one position. ? Walk around to increase the blood flow in your lower legs. ? Move your feet and ankles often while you stand, or tighten and relax your leg muscles. · Wear support stockings. Put them on in the morning, before swelling gets worse. · Eat a balanced diet. Lose weight if you need to. · Limit the amount of salt (sodium) in your diet. Salt holds fluid in the body and may increase swelling. When should you call for help? Call 911 anytime you think you may need emergency care. For example, call if:    · You have symptoms of a blood clot in your lung (called a pulmonary embolism). These may include:  ? Sudden chest pain. ? Trouble breathing. ?  Coughing up blood.    Call your doctor now or seek immediate medical care if:    · You have signs of a blood clot, such as:  ? Pain in your calf, back of the knee, thigh, or groin. ? Redness and swelling in your leg or groin.     · You have symptoms of infection, such as:  ? Increased pain, swelling, warmth, or redness. ? Red streaks or pus. ? A fever.    Watch closely for changes in your health, and be sure to contact your doctor if:    · Your swelling is getting worse.     · You have new or worsening pain in your legs.     · You do not get better as expected. Where can you learn more? Go to http://dale-nora.info/. Enter R343 in the search box to learn more about \"Leg and Ankle Edema: Care Instructions. \"  Current as of: June 26, 2019  Content Version: 12.2  © 7064-2997 [employer]. Care instructions adapted under license by [employer] (which disclaims liability or warranty for this information). If you have questions about a medical condition or this instruction, always ask your healthcare professional. [place] [address] any warranty or liability for your use of this information.

## 2019-12-27 NOTE — ED NOTES
I have reviewed discharge instructions with the patient. The patient verbalized understanding. Patient left ED via Discharge Method: ambulatory to Home with son. Opportunity for questions and clarification provided. Patient given 0 scripts. To continue your aftercare when you leave the hospital, you may receive an automated call from our care team to check in on how you are doing. This is a free service and part of our promise to provide the best care and service to meet your aftercare needs.  If you have questions, or wish to unsubscribe from this service please call [phone]. Thank you for Choosing our [facility] Emergency Department.

## 2019-12-28 ENCOUNTER — HOSPITAL ENCOUNTER (EMERGENCY)
Age: 84
Discharge: HOME | End: 2019-12-29
Attending: EMERGENCY MEDICINE
Payer: MEDICARE

## 2019-12-28 DIAGNOSIS — H93.19 TINNITUS, UNSPECIFIED LATERALITY: ICD-10-CM

## 2019-12-28 DIAGNOSIS — F41.1 ANXIETY STATE: ICD-10-CM

## 2019-12-28 DIAGNOSIS — I10 HYPERTENSION, UNSPECIFIED TYPE: ICD-10-CM

## 2019-12-28 DIAGNOSIS — G47.00 INSOMNIA, UNSPECIFIED TYPE: Primary | ICD-10-CM

## 2019-12-28 DIAGNOSIS — F33.2 SEVERE EPISODE OF RECURRENT MAJOR DEPRESSIVE DISORDER, WITHOUT PSYCHOTIC FEATURES (HCC): Chronic | ICD-10-CM

## 2019-12-28 LAB — SALICYLATES SERPL-MCNC: <1.7 MG/DL (ref 2.8–20)

## 2019-12-28 PROCEDURE — 80307 DRUG TEST PRSMV CHEM ANLYZR: CPT

## 2019-12-28 PROCEDURE — 99284 EMERGENCY DEPT VISIT MOD MDM: CPT | Performed by: EMERGENCY MEDICINE

## 2019-12-28 NOTE — ED TRIAGE NOTES
EMS called to home for psych pt. Pt c/o ringing in her ears. Per EMS bruises are present on forehead where she has been hitting her self. Stated multiple times she wished she would die but denies HI/SI. Has hx of CHF and anxiety. bp 183/78, hr 67, rr16, 98% on RA.  bgl 121.

## 2019-12-29 VITALS
TEMPERATURE: 97.7 F | BODY MASS INDEX: 27.46 KG/M2 | DIASTOLIC BLOOD PRESSURE: 83 MMHG | WEIGHT: 155 LBS | HEART RATE: 60 BPM | HEIGHT: 63 IN | OXYGEN SATURATION: 97 % | RESPIRATION RATE: 17 BRPM | SYSTOLIC BLOOD PRESSURE: 148 MMHG

## 2019-12-29 PROCEDURE — 74011250637 HC RX REV CODE- 250/637: Performed by: EMERGENCY MEDICINE

## 2019-12-29 RX ORDER — CITALOPRAM 10 MG/1
20 TABLET ORAL DAILY
Qty: 60 TAB | Refills: 1 | Status: SHIPPED | OUTPATIENT
Start: 2019-12-29 | End: 2020-01-22 | Stop reason: SDUPTHER

## 2019-12-29 RX ORDER — HYDROXYZINE PAMOATE 25 MG/1
25 CAPSULE ORAL
Status: COMPLETED | OUTPATIENT
Start: 2019-12-29 | End: 2019-12-29

## 2019-12-29 RX ORDER — HYDROXYZINE PAMOATE 25 MG/1
CAPSULE ORAL
Status: DISCONTINUED
Start: 2019-12-29 | End: 2019-12-29 | Stop reason: HOSPADM

## 2019-12-29 RX ORDER — HYDROXYZINE 25 MG/1
25 TABLET, FILM COATED ORAL
Qty: 30 TAB | Refills: 1 | Status: SHIPPED | OUTPATIENT
Start: 2019-12-29 | End: 2020-01-22 | Stop reason: SDUPTHER

## 2019-12-29 RX ADMIN — HYDROXYZINE PAMOATE 25 MG: 25 CAPSULE ORAL at 01:30

## 2019-12-29 NOTE — ED NOTES
I have reviewed discharge instructions with the patient. The patient verbalized understanding. Patient left ED via Discharge Method: ambulatory to Home with family. Opportunity for questions and clarification provided. Patient given 2 scripts. To continue your aftercare when you leave the hospital, you may receive an automated call from our care team to check in on how you are doing. This is a free service and part of our promise to provide the best care and service to meet your aftercare needs.  If you have questions, or wish to unsubscribe from this service please call [phone]. Thank you for Choosing our [facility] Emergency Department.

## 2019-12-29 NOTE — ED PROVIDER NOTES
Patient is complaining of ringing in her ears. She states this has been going on for 8 weeks constantly. She is having trouble sleeping because of it, states she cannot eat as well. She is getting very anxious and feeling depressed about this. She has made comments that she does not want to keep on living like this though she denies any suicidal ideations. The family states that this is been a great stress to her and her symptoms seem to be getting worse. She was admitted at [address] earlier this month, had extensive work-up including MRI of her brain and a temporal artery ultrasound, both of which were unremarkable. She saw ENT as an outpatient 2 weeks ago, had audiology testing at that time. She was fitted with a hearing aid to attempt to improve her symptoms. The patient states that the hearing aid did nothing to improve her symptoms. The family brings her back because they state that she is getting more despondent and frustrated and anxious about her tenderness. Past Medical History:   Diagnosis Date    Abdominal pain 10/28/2014    Angina at rest [facility])     pt denies    Arthritis     osteo - low back,  shoulders, hips, low back    Bilateral carotid bruits     Bursitis     CAD (coronary artery disease)     4 vessel CABG 2005;--- stents x 4--- last one placed 2014-- followed by dr Mandi Ross Chronic pain     left shoulder    Coagulation defects     on plavix    Constipation     Cough 09/11/2014    10/8/14, Methacholine Challenge Study: The point at which the FEV1 fell by at least 20%, the PC20, occurred above a dose of 25mg/mL of methacholine. This rules out the diagnosis of asthma with an extremely high degree of sensitivity. No post-challenge FEV1 recovery was identified. Radhika Alonzo MD        Depressive disorder     Dyspnea 09/11/2014    [facility]; 9/29/14: IMPRESSION: The flow volume loop is consistent restriction.  Spirometry suggest restriction with concomitant obstruction at low lung volumes. There is not a significant bronchodilator response. Lung volumes reveal mild restriction.  The unadjusted diffusion capacity is normal.  Yola Aleman MD      GERD (gastroesophageal reflux disease)     controlled with med    Headache     Hoarseness or changing voice     thougfht to be related to gerd    Hyperlipidemia     Hypertension     controlled with med    Kidney stone     yrs ago-- no tx required    Lumbago     Macular degeneration     Nodule of left lung     dx'ed 4 years ago-watched for several months-no new growth-being watched-yearly CT scan----- followed by dr. Belinda Rodriguez Pain in joint, ankle and foot     left 3rd toe and right 2nd toe    Pain in joint, shoulder region     left now bothering > right, right ok    Palpitations 09/24/2015    followed by dr Ariadna Molina Panic disorder     panic attacks -- last one 2014    Renal mass 07/2016    ablation---left side--- followed by dr Kenia Calabrese in [place]    Sciatica     Unstable angina ([address]     Vascular headache        Past Surgical History:   Procedure Laterality Date    ABDOMEN SURGERY PROC UNLISTED Left 07/2016    ablation for mass in left side of abd    HX CATARACT REMOVAL      left    HX CATARACT REMOVAL      HX COLONOSCOPY  12/15/2015    diverticulosis, internal hemorrhoid, colon polyp- no further colonoscopies needed    HX COLONOSCOPY  06/2016    Polyps    HX CORONARY ARTERY BYPASS GRAFT      4 vessel CABG 2005    HX HEART CATHETERIZATION      stent 2014    HX HEART CATHETERIZATION      stent 2006    HX HEENT Left     macular pucker    HX HERNIA REPAIR Left 2017    HX ORTHOPAEDIC      finger, foot    HX PARTIAL HYSTERECTOMY      hyst         Family History:   Problem Relation Age of Onset    Diabetes Mother     Heart Surgery Mother     Heart Disease Mother     Heart Attack Father     Heart Disease Father     No Known Problems Sister     Cancer Sister         2 sisters w/ lung ca-neither one smoked    Heart Disease Sister     Cancer Brother         lung ca-smoker    Heart Attack Brother          age 22 of MI    Heart Disease Sister     Heart Surgery Sister     Heart Attack Sister     Heart Disease Sister     Heart Surgery Sister     Heart Attack Sister     Heart Surgery Brother          in [place] during 2nd heart surgery    Heart Disease Brother     No Known Problems Maternal Grandmother     No Known Problems Maternal Grandfather     No Known Problems Paternal Grandmother     No Known Problems Paternal Grandfather        Social History     Socioeconomic History    Marital status:      Spouse name: Not on file    Number of children: Not on file    Years of education: Not on file    Highest education level: Not on file   Occupational History    Occupation: [employer]     Employer: RETIRED     Comment: 15 years    Occupation: [email or web address]     Employer: RETIRED     Comment: 21 years    Occupation: Plant     Employer: RETIRED     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   Social Needs    Financial resource strain: Not on file    Food insecurity:     Worry: Not on file     Inability: Not on file   [employer] needs:     Medical: Not on file     Non-medical: Not on file   Tobacco Use    Smoking status: Never Smoker    Smokeless tobacco: Never Used   Substance and Sexual Activity    Alcohol use: No    Drug use: No    Sexual activity: Not Currently   Lifestyle    Physical activity:     Days per week: Not on file     Minutes per session: Not on file    Stress: Not on file   Relationships    Social connections:     Talks on phone: Not on file     Gets together: Not on file     Attends Christianity service: Not on file     Active member of club or organization: Not on file     Attends meetings of clubs or organizations: Not on file     Relationship status: Not on file    Intimate partner violence:     Fear of current or ex partner: Not on file Emotionally abused: Not on file     Physically abused: Not on file     Forced sexual activity: Not on file   Other Topics Concern    Not on file   Social History Narrative    Lifelong nonsmoker with 20 year secondhand smoke exposure from her  cigarettes. 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 , 2 sons. Denies alcohol use. Has always lived in [place]. 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 ALLERGIES: Augmentin [amoxicillin-pot clavulanate]; Codeine; Gabapentin; Hydrochlorothiazide; Other medication; Prednisone; and Prevnar 13 [pneumoc 13-chang conj-dip cr(pf)]    Review of Systems   Constitutional: Negative for chills and fever. Gastrointestinal: Negative for nausea and vomiting. All other systems reviewed and are negative. Vitals:    12/28/19 1849   BP: 113/68   Pulse: 73   Resp: 16   Temp: 97.8 °F (36.6 °C)   SpO2: 99%   Weight: 70.3 kg (155 lb)   Height: 5' 3\" (1.6 m)            Physical Exam  Vitals signs and nursing note reviewed. Constitutional:       Appearance: She is well-developed. HENT:      Head: Normocephalic and atraumatic. Eyes:      Conjunctiva/sclera: Conjunctivae normal.      Pupils: Pupils are equal, round, and reactive to light. Neck:      Musculoskeletal: Normal range of motion and neck supple. Cardiovascular:      Heart sounds: Normal heart sounds. No murmur. Pulmonary:      Effort: Pulmonary effort is normal. No respiratory distress. Musculoskeletal:         General: No tenderness. Skin:     General: Skin is warm and dry. Neurological:      Mental Status: She is alert and oriented to person, place, and time. Psychiatric:         Behavior: Behavior normal.          MDM  Number of Diagnoses or Management Options  Anxiety state: established and worsening  Hypertension, unspecified type:    Insomnia, unspecified type: established and worsening  Tinnitus, unspecified laterality: established and worsening  Diagnosis management comments: 10:17 PM per old records, patient was admitted at [facility], had MRI of her brain and a temporal artery ultrasound on 12/1/2019, all of which were unremarkable. 1:38 AM psychiatry recommended increasing Celexa to 20 mg daily and adding hydroxyzine 25 mg nightly. Had long discussion with family about this, need for close outpatient follow-up.        Amount and/or Complexity of Data Reviewed  Decide to obtain previous medical records or to obtain history from someone other than the patient: yes  Obtain history from someone other than the patient: yes  Review and summarize past medical records: yes  Discuss the patient with other providers: yes    Risk of Complications, Morbidity, and/or Mortality  Presenting problems: moderate  Diagnostic procedures: moderate  Management options: moderate    Patient Progress  Patient progress: stable         Procedures

## 2019-12-29 NOTE — DISCHARGE INSTRUCTIONS
Follow-up with your doctor at [facility] practice. Return to the emergency department if your symptoms worsen despite the new medicines.

## 2020-01-15 ENCOUNTER — HOSPITAL ENCOUNTER (OUTPATIENT)
Dept: CT IMAGING | Age: 85
Discharge: HOME | End: 2020-01-15
Attending: INTERNAL MEDICINE
Payer: MEDICARE

## 2020-01-15 DIAGNOSIS — R07.89 OTHER CHEST PAIN: ICD-10-CM

## 2020-01-15 PROCEDURE — 71260 CT THORAX DX C+: CPT

## 2020-01-15 PROCEDURE — 74011000258 HC RX REV CODE- 258: Performed by: INTERNAL MEDICINE

## 2020-01-15 PROCEDURE — 74011636320 HC RX REV CODE- 636/320: Performed by: INTERNAL MEDICINE

## 2020-01-15 RX ORDER — SODIUM CHLORIDE 0.9 % (FLUSH) 0.9 %
10 SYRINGE (ML) INJECTION
Status: COMPLETED | OUTPATIENT
Start: 2020-01-15 | End: 2020-01-15

## 2020-01-15 RX ADMIN — IOPAMIDOL 100 ML: 755 INJECTION, SOLUTION INTRAVENOUS at 15:40

## 2020-01-15 RX ADMIN — SODIUM CHLORIDE 100 ML: 900 INJECTION, SOLUTION INTRAVENOUS at 15:40

## 2020-01-15 RX ADMIN — Medication 10 ML: at 15:40

## 2020-02-18 ENCOUNTER — HOSPITAL ENCOUNTER (OUTPATIENT)
Dept: CT IMAGING | Age: 85
Discharge: HOME | End: 2020-02-18
Attending: UROLOGY
Payer: MEDICARE

## 2020-02-18 VITALS — BODY MASS INDEX: 26.58 KG/M2 | WEIGHT: 150 LBS | HEIGHT: 63 IN

## 2020-02-18 DIAGNOSIS — C64.2 MALIGNANT NEOPLASM OF LEFT KIDNEY (HCC): ICD-10-CM

## 2020-02-18 PROCEDURE — 74170 CT ABD WO CNTRST FLWD CNTRST: CPT

## 2020-02-18 PROCEDURE — 74011636320 HC RX REV CODE- 636/320: Performed by: UROLOGY

## 2020-02-18 PROCEDURE — 74011000258 HC RX REV CODE- 258: Performed by: UROLOGY

## 2020-02-18 RX ORDER — SODIUM CHLORIDE 0.9 % (FLUSH) 0.9 %
10 SYRINGE (ML) INJECTION
Status: COMPLETED | OUTPATIENT
Start: 2020-02-18 | End: 2020-02-18

## 2020-02-18 RX ADMIN — SODIUM CHLORIDE 100 ML: 900 INJECTION, SOLUTION INTRAVENOUS at 10:33

## 2020-02-18 RX ADMIN — IOPAMIDOL 100 ML: 755 INJECTION, SOLUTION INTRAVENOUS at 10:33

## 2020-02-18 RX ADMIN — Medication 10 ML: at 10:33

## 2020-10-13 ENCOUNTER — TRANSCRIBE ORDER (OUTPATIENT)
Dept: SCHEDULING | Age: 85
End: 2020-10-13

## 2020-10-13 DIAGNOSIS — N28.89 LEFT KIDNEY MASS: Primary | ICD-10-CM

## 2020-11-12 ENCOUNTER — HOSPITAL ENCOUNTER (OUTPATIENT)
Dept: LAB | Age: 85
Discharge: HOME | End: 2020-11-12
Payer: MEDICARE

## 2020-11-12 DIAGNOSIS — I50.32 DIASTOLIC CHF, CHRONIC (HCC): Chronic | ICD-10-CM

## 2020-11-12 LAB
ANION GAP SERPL CALC-SCNC: 4 MMOL/L (ref 7–16)
BNP SERPL-MCNC: 390 PG/ML
BUN SERPL-MCNC: 13 MG/DL (ref 8–23)
CALCIUM SERPL-MCNC: 9 MG/DL (ref 8.3–10.4)
CHLORIDE SERPL-SCNC: 102 MMOL/L (ref 98–107)
CO2 SERPL-SCNC: 29 MMOL/L (ref 21–32)
CREAT SERPL-MCNC: 1.17 MG/DL (ref 0.6–1)
GLUCOSE SERPL-MCNC: 93 MG/DL (ref 65–100)
MAGNESIUM SERPL-MCNC: 2.4 MG/DL (ref 1.8–2.4)
POTASSIUM SERPL-SCNC: 4.7 MMOL/L (ref 3.5–5.1)
SODIUM SERPL-SCNC: 135 MMOL/L (ref 138–145)

## 2020-11-12 PROCEDURE — 83735 ASSAY OF MAGNESIUM: CPT

## 2020-11-12 PROCEDURE — 36415 COLL VENOUS BLD VENIPUNCTURE: CPT

## 2020-11-12 PROCEDURE — 80048 BASIC METABOLIC PNL TOTAL CA: CPT

## 2020-11-12 PROCEDURE — 83880 ASSAY OF NATRIURETIC PEPTIDE: CPT

## 2020-11-17 ENCOUNTER — HOSPITAL ENCOUNTER (OUTPATIENT)
Dept: CT IMAGING | Age: 85
Discharge: HOME | End: 2020-11-17
Attending: RADIOLOGY
Payer: MEDICARE

## 2020-11-17 DIAGNOSIS — N28.89 LEFT KIDNEY MASS: ICD-10-CM

## 2020-11-17 LAB — CREAT BLD-MCNC: 1 MG/DL (ref 0.8–1.5)

## 2020-11-17 PROCEDURE — 82565 ASSAY OF CREATININE: CPT

## 2020-11-17 PROCEDURE — 74011000636 HC RX REV CODE- 636: Performed by: RADIOLOGY

## 2020-11-17 PROCEDURE — 74011000258 HC RX REV CODE- 258: Performed by: RADIOLOGY

## 2020-11-17 PROCEDURE — 74170 CT ABD WO CNTRST FLWD CNTRST: CPT

## 2020-11-17 RX ORDER — SODIUM CHLORIDE 0.9 % (FLUSH) 0.9 %
10 SYRINGE (ML) INJECTION
Status: COMPLETED | OUTPATIENT
Start: 2020-11-17 | End: 2020-11-17

## 2020-11-17 RX ADMIN — SODIUM CHLORIDE 100 ML: 900 INJECTION, SOLUTION INTRAVENOUS at 11:21

## 2020-11-17 RX ADMIN — IOPAMIDOL 100 ML: 755 INJECTION, SOLUTION INTRAVENOUS at 11:21

## 2020-11-17 RX ADMIN — Medication 10 ML: at 11:21

## 2021-02-10 ENCOUNTER — HOSPITAL ENCOUNTER (OUTPATIENT)
Dept: CT IMAGING | Age: 86
Discharge: HOME | End: 2021-02-10
Attending: UROLOGY
Payer: MEDICARE

## 2021-02-10 DIAGNOSIS — N28.89 LEFT KIDNEY MASS: ICD-10-CM

## 2021-02-10 PROCEDURE — 74011000636 HC RX REV CODE- 636: Performed by: UROLOGY

## 2021-02-10 PROCEDURE — 74170 CT ABD WO CNTRST FLWD CNTRST: CPT

## 2021-02-10 PROCEDURE — 74011000258 HC RX REV CODE- 258: Performed by: UROLOGY

## 2021-02-10 RX ORDER — SODIUM CHLORIDE 0.9 % (FLUSH) 0.9 %
10 SYRINGE (ML) INJECTION
Status: COMPLETED | OUTPATIENT
Start: 2021-02-10 | End: 2021-02-10

## 2021-02-10 RX ADMIN — Medication 10 ML: at 12:12

## 2021-02-10 RX ADMIN — SODIUM CHLORIDE 100 ML: 900 INJECTION, SOLUTION INTRAVENOUS at 12:12

## 2021-02-10 RX ADMIN — IOPAMIDOL 100 ML: 755 INJECTION, SOLUTION INTRAVENOUS at 12:12

## 2021-02-10 NOTE — PROGRESS NOTES
Chris Jeffers, please let patient know that her CT showed no recurrence of her kidney cancer. Keep follow up next year as scheduled.

## 2021-02-18 PROBLEM — H93.12 TINNITUS OF LEFT EAR: Status: ACTIVE | Noted: 2021-02-18

## 2021-02-18 PROBLEM — G89.29 CHRONIC LEFT-SIDED HEADACHES: Status: ACTIVE | Noted: 2018-02-23

## 2021-02-18 PROBLEM — F41.9 ANXIETY: Status: ACTIVE | Noted: 2021-02-18

## 2021-03-16 ENCOUNTER — HOSPITAL ENCOUNTER (OUTPATIENT)
Dept: MRI IMAGING | Age: 86
Discharge: HOME | End: 2021-03-16
Attending: FAMILY MEDICINE
Payer: MEDICARE

## 2021-03-16 DIAGNOSIS — H93.12 TINNITUS OF LEFT EAR: ICD-10-CM

## 2021-03-16 DIAGNOSIS — R51.9 CHRONIC LEFT-SIDED HEADACHES: ICD-10-CM

## 2021-03-16 DIAGNOSIS — G89.29 CHRONIC LEFT-SIDED HEADACHES: ICD-10-CM

## 2021-03-16 DIAGNOSIS — Q28.3 OTHER MALFORMATIONS OF CEREBRAL VESSELS: ICD-10-CM

## 2021-03-16 PROCEDURE — 70544 MR ANGIOGRAPHY HEAD W/O DYE: CPT

## 2021-03-22 NOTE — PROGRESS NOTES
Tell Mrs. Eloise Lyon that her MRA of her brain is normal.  Tell her that unfortunately we still do not have a cause for her tinnitus and headaches. Tell her that I would like to refer her to neurology to discuss further options. If she is agreeable, put in a referral to neurology.

## 2021-05-27 ENCOUNTER — HOSPITAL ENCOUNTER (OUTPATIENT)
Dept: CT IMAGING | Age: 86
Discharge: HOME | End: 2021-05-27
Attending: PSYCHIATRY & NEUROLOGY
Payer: MEDICARE

## 2021-05-27 DIAGNOSIS — H93.12 TINNITUS AURIUM, LEFT: ICD-10-CM

## 2021-05-27 PROCEDURE — 74011000636 HC RX REV CODE- 636: Performed by: PSYCHIATRY & NEUROLOGY

## 2021-05-27 PROCEDURE — 70482 CT ORBIT/EAR/FOSSA W/O&W/DYE: CPT

## 2021-05-27 PROCEDURE — 74011000258 HC RX REV CODE- 258: Performed by: PSYCHIATRY & NEUROLOGY

## 2021-05-27 RX ORDER — SODIUM CHLORIDE 0.9 % (FLUSH) 0.9 %
10 SYRINGE (ML) INJECTION
Status: COMPLETED | OUTPATIENT
Start: 2021-05-27 | End: 2021-05-27

## 2021-05-27 RX ADMIN — IOPAMIDOL 100 ML: 755 INJECTION, SOLUTION INTRAVENOUS at 12:07

## 2021-05-27 RX ADMIN — SODIUM CHLORIDE 100 ML: 900 INJECTION, SOLUTION INTRAVENOUS at 12:07

## 2021-05-27 RX ADMIN — Medication 10 ML: at 12:07

## 2021-08-26 PROBLEM — F13.99 SEDATIVE, HYPNOTIC OR ANXIOLYTIC USE, UNSPECIFIED WITH UNSPECIFIED SEDATIVE, HYPNOTIC OR ANXIOLYTIC-INDUCED DISORDER (HCC): Status: ACTIVE | Noted: 2021-08-26

## 2021-11-02 ENCOUNTER — TRANSCRIBE ORDER (OUTPATIENT)
Dept: SCHEDULING | Age: 86
End: 2021-11-02

## 2021-11-03 ENCOUNTER — TRANSCRIBE ORDER (OUTPATIENT)
Dept: SCHEDULING | Age: 86
End: 2021-11-03

## 2021-11-03 DIAGNOSIS — C64.2 RENAL CANCER, LEFT (HCC): Primary | ICD-10-CM

## 2021-11-24 ENCOUNTER — HOSPITAL ENCOUNTER (OUTPATIENT)
Dept: CT IMAGING | Age: 86
Discharge: HOME | End: 2021-11-24
Attending: RADIOLOGY
Payer: MEDICARE

## 2021-11-24 DIAGNOSIS — C64.2 RENAL CANCER, LEFT (HCC): ICD-10-CM

## 2021-11-24 PROCEDURE — 74170 CT ABD WO CNTRST FLWD CNTRST: CPT

## 2021-11-24 PROCEDURE — 74011000636 HC RX REV CODE- 636: Performed by: RADIOLOGY

## 2021-11-24 RX ADMIN — IOPAMIDOL 100 ML: 755 INJECTION, SOLUTION INTRAVENOUS at 09:02

## 2022-03-08 ENCOUNTER — TRANSCRIBE ORDER (OUTPATIENT)
Dept: SCHEDULING | Age: 87
End: 2022-03-08

## 2022-03-08 ENCOUNTER — HOSPITAL ENCOUNTER (OUTPATIENT)
Dept: CT IMAGING | Age: 87
Discharge: HOME | End: 2022-03-08
Attending: PHYSICIAN ASSISTANT
Payer: MEDICARE

## 2022-03-08 DIAGNOSIS — R10.32 LLQ PAIN: ICD-10-CM

## 2022-03-08 DIAGNOSIS — R10.32 LLQ PAIN: Primary | ICD-10-CM

## 2022-03-08 LAB — CREAT BLD-MCNC: 0.95 MG/DL (ref 0.8–1.5)

## 2022-03-08 PROCEDURE — 74177 CT ABD & PELVIS W/CONTRAST: CPT

## 2022-03-08 PROCEDURE — 82565 ASSAY OF CREATININE: CPT

## 2022-03-08 PROCEDURE — 74011000258 HC RX REV CODE- 258: Performed by: PHYSICIAN ASSISTANT

## 2022-03-08 PROCEDURE — 74011000636 HC RX REV CODE- 636: Performed by: PHYSICIAN ASSISTANT

## 2022-03-08 RX ORDER — SODIUM CHLORIDE 0.9 % (FLUSH) 0.9 %
10 SYRINGE (ML) INJECTION
Status: COMPLETED | OUTPATIENT
Start: 2022-03-08 | End: 2022-03-08

## 2022-03-08 RX ADMIN — SODIUM CHLORIDE 100 ML: 9 INJECTION, SOLUTION INTRAVENOUS at 17:02

## 2022-03-08 RX ADMIN — Medication 10 ML: at 17:03

## 2022-03-08 RX ADMIN — DIATRIZOATE MEGLUMINE AND DIATRIZOATE SODIUM 15 ML: 660; 100 LIQUID ORAL; RECTAL at 16:59

## 2022-03-08 RX ADMIN — IOPAMIDOL 100 ML: 755 INJECTION, SOLUTION INTRAVENOUS at 16:59

## 2022-03-18 PROBLEM — I87.2 VENOUS (PERIPHERAL) INSUFFICIENCY: Status: ACTIVE | Noted: 2019-06-06

## 2022-03-18 PROBLEM — R43.2: Status: ACTIVE | Noted: 2018-02-23

## 2022-03-18 PROBLEM — F41.9 ANXIETY: Status: ACTIVE | Noted: 2021-02-18

## 2022-03-18 PROBLEM — F33.2 SEVERE EPISODE OF RECURRENT MAJOR DEPRESSIVE DISORDER, WITHOUT PSYCHOTIC FEATURES (HCC): Status: ACTIVE | Noted: 2017-04-21

## 2022-03-18 PROBLEM — H93.12 TINNITUS OF LEFT EAR: Status: ACTIVE | Noted: 2021-02-18

## 2022-03-19 PROBLEM — I83.813 VARICOSE VEINS OF BILATERAL LOWER EXTREMITIES WITH PAIN: Status: ACTIVE | Noted: 2019-06-06

## 2022-03-19 PROBLEM — Z95.1 S/P CABG (CORONARY ARTERY BYPASS GRAFT): Status: ACTIVE | Noted: 2017-03-12

## 2022-03-19 PROBLEM — M79.604 BILATERAL LEG PAIN: Status: ACTIVE | Noted: 2019-06-06

## 2022-03-19 PROBLEM — I50.32 DIASTOLIC CHF, CHRONIC (HCC): Status: ACTIVE | Noted: 2017-08-29

## 2022-03-19 PROBLEM — K57.30 DIVERTICULOSIS OF LARGE INTESTINE: Status: ACTIVE | Noted: 2017-08-06

## 2022-03-19 PROBLEM — H35.372 MACULAR PUCKER, LEFT EYE: Status: ACTIVE | Noted: 2018-02-23

## 2022-03-19 PROBLEM — K22.4 DYSKINESIA OF ESOPHAGUS: Status: ACTIVE | Noted: 2018-02-23

## 2022-03-19 PROBLEM — R51.9 CHRONIC LEFT-SIDED HEADACHES: Status: ACTIVE | Noted: 2018-02-23

## 2022-03-19 PROBLEM — G89.29 CHRONIC LEFT-SIDED HEADACHES: Status: ACTIVE | Noted: 2018-02-23

## 2022-03-19 PROBLEM — M79.605 BILATERAL LEG PAIN: Status: ACTIVE | Noted: 2019-06-06

## 2022-03-19 PROBLEM — K44.9 DIAPHRAGMATIC HERNIA WITHOUT OBSTRUCTION OR GANGRENE: Status: ACTIVE | Noted: 2018-02-23

## 2022-03-19 PROBLEM — R73.02 GLUCOSE INTOLERANCE (IMPAIRED GLUCOSE TOLERANCE): Status: ACTIVE | Noted: 2017-05-12

## 2022-03-20 PROBLEM — Z95.820 S/P ANGIOPLASTY WITH STENT: Status: ACTIVE | Noted: 2017-03-12

## 2022-03-20 PROBLEM — R19.6 HALITOSIS: Status: ACTIVE | Noted: 2018-03-02

## 2022-03-20 PROBLEM — G60.9 IDIOPATHIC PERIPHERAL NEUROPATHY: Status: ACTIVE | Noted: 2017-05-12

## 2022-03-20 PROBLEM — F13.99 SEDATIVE, HYPNOTIC OR ANXIOLYTIC USE, UNSPECIFIED WITH UNSPECIFIED SEDATIVE, HYPNOTIC OR ANXIOLYTIC-INDUCED DISORDER (HCC): Status: ACTIVE | Noted: 2021-08-26

## 2022-03-20 PROBLEM — E78.2 MIXED HYPERLIPIDEMIA: Status: ACTIVE | Noted: 2017-12-12

## 2022-04-01 PROBLEM — E11.9 CONTROLLED TYPE 2 DIABETES MELLITUS WITHOUT COMPLICATION, WITHOUT LONG-TERM CURRENT USE OF INSULIN (HCC): Status: ACTIVE | Noted: 2022-04-01

## 2022-04-01 PROBLEM — R07.9 CHEST PAIN: Status: ACTIVE | Noted: 2022-04-01

## 2022-04-04 ENCOUNTER — HOSPITAL ENCOUNTER (OUTPATIENT)
Dept: LAB | Age: 87
Discharge: HOME | End: 2022-04-04
Payer: MEDICARE

## 2022-04-04 DIAGNOSIS — I25.118 CORONARY ARTERY DISEASE OF NATIVE ARTERY OF NATIVE HEART WITH STABLE ANGINA PECTORIS (HCC): ICD-10-CM

## 2022-04-04 DIAGNOSIS — Z95.1 S/P CABG (CORONARY ARTERY BYPASS GRAFT): Chronic | ICD-10-CM

## 2022-04-04 LAB
ANION GAP SERPL CALC-SCNC: 5 MMOL/L (ref 7–16)
BASOPHILS # BLD: 0 K/UL (ref 0–0.2)
BASOPHILS NFR BLD: 1 % (ref 0–2)
BUN SERPL-MCNC: 15 MG/DL (ref 8–23)
CALCIUM SERPL-MCNC: 9.1 MG/DL (ref 8.3–10.4)
CHLORIDE SERPL-SCNC: 101 MMOL/L (ref 98–107)
CO2 SERPL-SCNC: 30 MMOL/L (ref 21–32)
CREAT SERPL-MCNC: 1 MG/DL (ref 0.6–1)
DIFFERENTIAL METHOD BLD: NORMAL
EOSINOPHIL # BLD: 0.2 K/UL (ref 0–0.8)
EOSINOPHIL NFR BLD: 3 % (ref 0.5–7.8)
ERYTHROCYTE [DISTWIDTH] IN BLOOD BY AUTOMATED COUNT: 14.1 % (ref 11.9–14.6)
GLUCOSE SERPL-MCNC: 97 MG/DL (ref 65–100)
HCT VFR BLD AUTO: 40.8 % (ref 35.8–46.3)
HGB BLD-MCNC: 13.1 G/DL (ref 11.7–15.4)
IMM GRANULOCYTES # BLD AUTO: 0 K/UL (ref 0–0.5)
IMM GRANULOCYTES NFR BLD AUTO: 0 % (ref 0–5)
INR PPP: 2.7
LYMPHOCYTES # BLD: 1.5 K/UL (ref 0.5–4.6)
LYMPHOCYTES NFR BLD: 30 % (ref 13–44)
MCH RBC QN AUTO: 31.3 PG (ref 26.1–32.9)
MCHC RBC AUTO-ENTMCNC: 32.1 G/DL (ref 31.4–35)
MCV RBC AUTO: 97.6 FL (ref 79.6–97.8)
MONOCYTES # BLD: 0.5 K/UL (ref 0.1–1.3)
MONOCYTES NFR BLD: 10 % (ref 4–12)
NEUTS SEG # BLD: 2.8 K/UL (ref 1.7–8.2)
NEUTS SEG NFR BLD: 56 % (ref 43–78)
NRBC # BLD: 0 K/UL (ref 0–0.2)
PLATELET # BLD AUTO: 280 K/UL (ref 150–450)
PMV BLD AUTO: 9.4 FL (ref 9.4–12.3)
POTASSIUM SERPL-SCNC: 4.2 MMOL/L (ref 3.5–5.1)
PROTHROMBIN TIME: 29.5 SEC (ref 12.6–14.5)
RBC # BLD AUTO: 4.18 M/UL (ref 4.05–5.2)
SODIUM SERPL-SCNC: 136 MMOL/L (ref 136–145)
WBC # BLD AUTO: 5 K/UL (ref 4.3–11.1)

## 2022-04-04 PROCEDURE — 80048 BASIC METABOLIC PNL TOTAL CA: CPT

## 2022-04-04 PROCEDURE — 85025 COMPLETE CBC W/AUTO DIFF WBC: CPT

## 2022-04-04 PROCEDURE — 36415 COLL VENOUS BLD VENIPUNCTURE: CPT

## 2022-04-04 PROCEDURE — 85610 PROTHROMBIN TIME: CPT

## 2022-04-06 ENCOUNTER — HOSPITAL ENCOUNTER (OUTPATIENT)
Age: 87
Setting detail: OUTPATIENT SURGERY
Discharge: HOME | End: 2022-04-06
Attending: INTERNAL MEDICINE | Admitting: INTERNAL MEDICINE
Payer: MEDICARE

## 2022-04-06 VITALS — HEART RATE: 71 BPM | OXYGEN SATURATION: 97 % | DIASTOLIC BLOOD PRESSURE: 50 MMHG | SYSTOLIC BLOOD PRESSURE: 115 MMHG

## 2022-04-06 DIAGNOSIS — R07.2 PRECORDIAL PAIN: ICD-10-CM

## 2022-04-06 DIAGNOSIS — R07.9 CHEST PAIN: ICD-10-CM

## 2022-04-06 DIAGNOSIS — I25.118 CORONARY ARTERY DISEASE OF NATIVE ARTERY OF NATIVE HEART WITH STABLE ANGINA PECTORIS (HCC): ICD-10-CM

## 2022-04-06 LAB
ATRIAL RATE: 62 BPM
CALCULATED P AXIS, ECG09: 55 DEGREES
CALCULATED R AXIS, ECG10: 42 DEGREES
CALCULATED T AXIS, ECG11: 84 DEGREES
DIAGNOSIS, 93000: NORMAL
P-R INTERVAL, ECG05: 158 MS
Q-T INTERVAL, ECG07: 416 MS
QRS DURATION, ECG06: 76 MS
QTC CALCULATION (BEZET), ECG08: 422 MS
VENTRICULAR RATE, ECG03: 62 BPM

## 2022-04-06 PROCEDURE — 93459 L HRT ART/GRFT ANGIO: CPT | Performed by: INTERNAL MEDICINE

## 2022-04-06 PROCEDURE — 99152 MOD SED SAME PHYS/QHP 5/>YRS: CPT | Performed by: INTERNAL MEDICINE

## 2022-04-06 PROCEDURE — 77030029997 HC DEV COM RDL R BND TELE -B: Performed by: INTERNAL MEDICINE

## 2022-04-06 PROCEDURE — 74011000636 HC RX REV CODE- 636: Performed by: INTERNAL MEDICINE

## 2022-04-06 PROCEDURE — 74011000250 HC RX REV CODE- 250: Performed by: INTERNAL MEDICINE

## 2022-04-06 PROCEDURE — C1894 INTRO/SHEATH, NON-LASER: HCPCS | Performed by: INTERNAL MEDICINE

## 2022-04-06 PROCEDURE — 74011250636 HC RX REV CODE- 250/636: Performed by: INTERNAL MEDICINE

## 2022-04-06 PROCEDURE — 99153 MOD SED SAME PHYS/QHP EA: CPT | Performed by: INTERNAL MEDICINE

## 2022-04-06 PROCEDURE — 93005 ELECTROCARDIOGRAM TRACING: CPT | Performed by: INTERNAL MEDICINE

## 2022-04-06 PROCEDURE — 77030016699 HC CATH ANGI DX INFN1 CARD -A: Performed by: INTERNAL MEDICINE

## 2022-04-06 PROCEDURE — 85610 PROTHROMBIN TIME: CPT

## 2022-04-06 PROCEDURE — C1769 GUIDE WIRE: HCPCS | Performed by: INTERNAL MEDICINE

## 2022-04-06 RX ORDER — MIDAZOLAM HYDROCHLORIDE 1 MG/ML
INJECTION, SOLUTION INTRAMUSCULAR; INTRAVENOUS AS NEEDED
Status: DISCONTINUED | OUTPATIENT
Start: 2022-04-06 | End: 2022-04-06 | Stop reason: HOSPADM

## 2022-04-06 RX ORDER — SODIUM CHLORIDE 0.9 % (FLUSH) 0.9 %
5-40 SYRINGE (ML) INJECTION EVERY 8 HOURS
Status: DISCONTINUED | OUTPATIENT
Start: 2022-04-06 | End: 2022-04-06 | Stop reason: HOSPADM

## 2022-04-06 RX ORDER — FENTANYL CITRATE 50 UG/ML
INJECTION, SOLUTION INTRAMUSCULAR; INTRAVENOUS AS NEEDED
Status: DISCONTINUED | OUTPATIENT
Start: 2022-04-06 | End: 2022-04-06 | Stop reason: HOSPADM

## 2022-04-06 RX ORDER — SODIUM CHLORIDE 0.9 % (FLUSH) 0.9 %
5-40 SYRINGE (ML) INJECTION AS NEEDED
Status: DISCONTINUED | OUTPATIENT
Start: 2022-04-06 | End: 2022-04-06 | Stop reason: HOSPADM

## 2022-04-06 RX ORDER — ISOSORBIDE MONONITRATE 30 MG/1
30 TABLET, EXTENDED RELEASE ORAL DAILY
Qty: 30 TABLET | Refills: 11 | Status: SHIPPED | OUTPATIENT
Start: 2022-04-06 | End: 2022-05-04 | Stop reason: SINTOL

## 2022-04-06 RX ORDER — LIDOCAINE HYDROCHLORIDE 10 MG/ML
INJECTION INFILTRATION; PERINEURAL AS NEEDED
Status: DISCONTINUED | OUTPATIENT
Start: 2022-04-06 | End: 2022-04-06 | Stop reason: HOSPADM

## 2022-04-06 RX ORDER — SODIUM CHLORIDE 9 MG/ML
250 INJECTION, SOLUTION INTRAVENOUS CONTINUOUS
Status: ACTIVE | OUTPATIENT
Start: 2022-04-06 | End: 2022-04-06

## 2022-04-06 RX ORDER — SODIUM CHLORIDE 9 MG/ML
75 INJECTION, SOLUTION INTRAVENOUS CONTINUOUS
Status: DISCONTINUED | OUTPATIENT
Start: 2022-04-06 | End: 2022-04-06 | Stop reason: HOSPADM

## 2022-04-06 NOTE — DISCHARGE INSTRUCTIONS
HEART CATHETERIZATION/ANGIOGRAPHY DISCHARGE INSTRUCTIONS    1. Check puncture site frequently for swelling or bleeding. If there is any bleeding, apply pressure over the area with a clean towel or washcloth and call 911. Notify your doctor for any redness, swelling, drainage, or oozing from the puncture site. Notify your doctor for any fever or chills. 2. If the extremity becomes cold, numb, or painful call [facility] Cardiology at [phone].  3. Activity should be limited for the next 48 hours. No heavy lifting, pushing, pulling  or strenuous activity for 48 hours. No heavy lifting (anything over 10 pounds) for 3 days. 4. You may resume your usual diet. Drink more fluids than usual.  5. Have a responsible person drive you home and stay with you for at least 24 hours after your heart catheterization/angiography. 6. You may remove bandage from your Right wrist in 24 hours. You may shower in 24 hours. No tub baths, hot tubs, or swimming for 1 week. Do not place any lotions, creams, powders, or ointments over puncture site for 1 week. You may place a clean band-aid over the puncture site each day for 5 days. Change daily. Sedation for a Medical Procedure: Care Instructions     You were given a sedative medication during your visit. While many of the effects will have worn   off before you leave; you may continue to feel some effects for several hours. Common side effects from sedation include:  · Feeling sleepy. (Your doctors and nurses will make sure you are not too sleepy to go home.)  · Nausea and vomiting. This usually does not last long. · Feeling tired. How can you care for yourself at home? Activity    · Don't do anything for 24 hours that requires attention to detail. It takes time for the medicine effects to completely wear off. · Do not make important legal decisions for 24 hours. · Do not sign any legal documents for 24 hours.      · Do not drink alcohol today     · For your safety, you should not drive or operate heavy machinery for the remainder of the day     · Rest when you feel tired. Getting enough sleep will help you recover. Diet    · You can eat your normal diet, unless your doctor gives you other instructions. If your stomach is upset, try clear liquids and bland, low-fat foods like plain toast or rice. · Drink plenty of fluids (unless your doctor tells you not to). · Don't drink alcohol for 24 hours. Medicines    · Be safe with medicines. Read and follow all instructions on the label. · If the doctor gave you a prescription medicine for pain, take it as prescribed. · If you are not taking a prescription pain medicine, ask your doctor if you can take an over-the-counter medicine. · If you think your pain medicine is making you sick to your stomach:  · Take your medicine after meals (unless your doctor has told you not to). · Ask your doctor for a different pain medicine. I have read the above instructions and have had the opportunity to ask questions.       Patient: ________________________   Date: _____________    Witness: _______________________   Date: _____________

## 2022-04-06 NOTE — PROGRESS NOTES
TRANSFER - IN REPORT:    Verbal report received from [facility]) on [facility]  being received from cath lab(unit) for routine progression of care      Report consisted of patients Situation, Background, Assessment and   Recommendations(SBAR). Information from the following report(s) Procedure Summary was reviewed with the receiving nurse. Opportunity for questions and clarification was provided. Assessment completed upon patients arrival to unit and care assumed.

## 2022-04-06 NOTE — PROGRESS NOTES
Patient received to [facility] room # 10  Ambulatory from [facility]. Patient scheduled for [facility] today with Dr Val Vance. Procedure reviewed & questions answered, voiced good understanding consent obtained & placed on chart. All medications and medical history reviewed. Will prep patient per orders. Patient & family updated on plan of care.       The patient has a fraility score of 3-MANAGING WELL, based on age and abilities

## 2022-04-06 NOTE — PROGRESS NOTES
TRANSFER - OUT REPORT:    Left radial LHC with Grafts with Dr Sandra Castro 2 mg  Fentanyl 50 mcg  Medical Management   TR band 12 ml  No bleeding or hematoma noted at site. Site soft    Verbal report given to Jaylon(name) faiza Toribio  being transferred to CPRU(unit) for routine progression of care       Report consisted of patients Situation, Background, Assessment and   Recommendations(SBAR). Information from the following report(s) Procedure Summary and MAR was reviewed with the receiving nurse. Lines:   Peripheral IV 04/06/22 Anterior;Proximal;Right Forearm (Active)        Opportunity for questions and clarification was provided.       Patient transported with:   Registered Nurse

## 2022-04-06 NOTE — PROGRESS NOTES
R band deflation completed. Left radial dressed with gauze and teagderm using sterile technique. No bleeding or hematoma.  Tolerated without difficulty

## 2022-04-08 LAB
INR BLD: 1.1 (ref 0.9–1.2)
PT BLD: 13.7 SECS (ref 9.6–11.6)

## 2022-04-20 PROBLEM — I65.23 CAROTID STENOSIS, ASYMPTOMATIC, BILATERAL: Status: ACTIVE | Noted: 2022-04-20

## 2022-04-20 PROBLEM — I73.9 PAD (PERIPHERAL ARTERY DISEASE) (HCC): Status: ACTIVE | Noted: 2017-04-25

## 2022-05-23 ENCOUNTER — TELEPHONE (OUTPATIENT)
Dept: PRIMARY CARE CLINIC | Facility: CLINIC | Age: 87
End: 2022-05-23

## 2022-05-23 DIAGNOSIS — F41.9 ANXIETY: Primary | ICD-10-CM

## 2022-05-23 DIAGNOSIS — F33.2 SEVERE EPISODE OF RECURRENT MAJOR DEPRESSIVE DISORDER, WITHOUT PSYCHOTIC FEATURES (HCC): ICD-10-CM

## 2022-05-23 RX ORDER — ESCITALOPRAM OXALATE 20 MG/1
20 TABLET ORAL DAILY
Qty: 30 TABLET | Refills: 0 | Status: SHIPPED | OUTPATIENT
Start: 2022-05-23 | End: 2022-09-29 | Stop reason: SDUPTHER

## 2022-05-23 RX ORDER — POTASSIUM CHLORIDE 750 MG/1
10 CAPSULE, EXTENDED RELEASE ORAL DAILY
COMMUNITY
End: 2022-06-29 | Stop reason: SDUPTHER

## 2022-05-23 RX ORDER — MEMANTINE HYDROCHLORIDE 10 MG/1
10 TABLET ORAL DAILY
Qty: 30 TABLET | Refills: 0 | Status: SHIPPED | OUTPATIENT
Start: 2022-05-23 | End: 2022-07-07 | Stop reason: SDUPTHER

## 2022-05-23 NOTE — TELEPHONE ENCOUNTER
----- Message from Santiago Alina sent at 5/23/2022 11:17 AM EDT -----  Subject: Refill Request    QUESTIONS  Name of Medication? memantine (NAMENDA) 10 MG tablet  Patient-reported dosage and instructions? one tablet daily   How many days do you have left? 7  Preferred Pharmacy? [facility]/PHARMACY #[id number]  Pharmacy phone number (if available)? [phone]  ---------------------------------------------------------------------------  --------------,  Name of Medication? Other - potassium chloride (MICRO-K PO) 10 mEq, Oral  Patient-reported dosage and instructions? one tablet daily  How many days do you have left? 0  Preferred Pharmacy? [facility]/PHARMACY #[id number]  Pharmacy phone number (if available)? [phone]  Additional Information for Provider? Pt states she needs the potassium   filled due to her taking Lasix. States pharmacy said it was removed from   her meds list. Please advise.   ---------------------------------------------------------------------------  --------------,  Name of Medication? escitalopram (LEXAPRO) 20 MG tablet  Patient-reported dosage and instructions? one tablet daily  How many days do you have left? 1  Preferred Pharmacy? [facility]/PHARMACY #[id number]  Pharmacy phone number (if available)? [phone]  ---------------------------------------------------------------------------  --------------  Kristen MCDONNELL  What is the best way for the office to contact you? OK to leave message on   voicemail  Preferred Call Back Phone Number? [phone]  ---------------------------------------------------------------------------  --------------  SCRIPT ANSWERS  Relationship to Patient?  Self

## 2022-05-23 NOTE — TELEPHONE ENCOUNTER
Last OV was 3/31/22, Next OV scheduled for 9/29/22. Med refill for lexapro and namenda sent.  Potassium was not on \"current med list.\" I added it from reconcile list, Is the potasium 10 MEQ okay to send

## 2022-05-24 RX ORDER — POTASSIUM CHLORIDE 750 MG/1
10 TABLET, FILM COATED, EXTENDED RELEASE ORAL DAILY
COMMUNITY
Start: 2022-02-16 | End: 2022-05-24 | Stop reason: SDUPTHER

## 2022-05-24 RX ORDER — POTASSIUM CHLORIDE 750 MG/1
10 TABLET, FILM COATED, EXTENDED RELEASE ORAL DAILY
Qty: 30 TABLET | Refills: 0 | Status: SHIPPED | OUTPATIENT
Start: 2022-05-24 | End: 2022-06-15

## 2022-05-25 ENCOUNTER — TELEPHONE (OUTPATIENT)
Dept: PRIMARY CARE CLINIC | Facility: CLINIC | Age: 87
End: 2022-05-25

## 2022-06-03 ENCOUNTER — OFFICE VISIT (OUTPATIENT)
Dept: ORTHOPEDIC SURGERY | Age: 87
End: 2022-06-03
Payer: MEDICARE

## 2022-06-03 DIAGNOSIS — M25.552 LEFT HIP PAIN: Primary | ICD-10-CM

## 2022-06-03 PROCEDURE — 99213 OFFICE O/P EST LOW 20 MIN: CPT | Performed by: ORTHOPAEDIC SURGERY

## 2022-06-03 PROCEDURE — 1123F ACP DISCUSS/DSCN MKR DOCD: CPT | Performed by: ORTHOPAEDIC SURGERY

## 2022-06-03 NOTE — PROGRESS NOTES
Name: Soheila Race  YOB: 1935  Gender: female  MRN: [id number]    CC:   Chief Complaint   Patient presents with    Hip Pain    Knee Pain       HPI:  The pain ws moderately relieved with the IA hip and left knee cortisone injections for approximately 4 weeks. Unfortunately, the injections wore off and she began experiencing worsening pain primarily in left groin area. She reports difficulty putting on her shoes and socks. She reports occasional pain in low back and occasional pain in her left lower leg. She reports left hip pain is moderate in severity. She states symptoms also came worse after she had an adjustment by her chiropractor. No other new complaints. She has extensive cardiac history including bypass surgery and stenting. She is on anticoagulation and management of this. ROS:  As per HPI. Pertinent postives and negatives are addressed with the patient, particularly those related to musculoskeletal concerns. Non-orthopaedic concerns were referred back to the primary care physician.       [address] [place] [address]:    Current Outpatient Medications:     potassium chloride (KLOR-CON) 10 MEQ extended release tablet, Take 1 tablet by mouth daily, Disp: 30 tablet, Rfl: 0    potassium chloride (MICRO-K) 10 MEQ extended release capsule, Take 10 mEq by mouth daily, Disp: , Rfl:     memantine (NAMENDA) 10 MG tablet, Take 1 tablet by mouth daily, Disp: 30 tablet, Rfl: 0    escitalopram (LEXAPRO) 20 MG tablet, Take 1 tablet by mouth daily TAKE 1 TABLET BY MOUTH EVERY DAY, Disp: 30 tablet, Rfl: 0    acetaminophen (TYLENOL) 325 MG tablet, Take 500 mg by mouth every 6 hours as needed, Disp: , Rfl:     amitriptyline (ELAVIL) 25 MG tablet, Take by mouth, Disp: , Rfl:     aspirin 81 MG EC tablet, Take by mouth daily, Disp: , Rfl:     carvedilol (COREG) 12.5 MG tablet, Take 12.5 mg by mouth 2 times daily (with meals), Disp: , Rfl:     coenzyme Q10 100 MG CAPS capsule, Take 100 mg by mouth daily, Disp: , Rfl:     cyanocobalamin 1000 MCG tablet, Take 1,000 mcg by mouth daily, Disp: , Rfl:     diazePAM (VALIUM) 2 MG tablet, Take 2 mg by mouth every 6 hours as needed. , Disp: , Rfl:     famotidine (PEPCID) 20 MG tablet, Take 20 mg by mouth, Disp: , Rfl:     furosemide (LASIX) 40 MG tablet, Take 40 mg by mouth 2 times daily, Disp: , Rfl:     hydrALAZINE (APRESOLINE) 25 MG tablet, Take 25 mg by mouth 2 times daily, Disp: , Rfl:     isosorbide mononitrate (IMDUR) 30 MG extended release tablet, Take 30 mg by mouth daily, Disp: , Rfl:     loratadine (CLARITIN) 10 MG tablet, Take 10 mg by mouth, Disp: , Rfl:     magnesium oxide (MAG-OX) 400 MG tablet, Take 400 mg by mouth daily, Disp: , Rfl:     nitroGLYCERIN (NITROLINGUAL) 0.4 MG/SPRAY 0.4 mg spray, Place 1 spray under the tongue, Disp: , Rfl:     rivaroxaban (XARELTO) 20 MG TABS tablet, Take 20 mg by mouth daily (with breakfast), Disp: , Rfl:     rosuvastatin (CRESTOR) 40 MG tablet, Take 40 mg by mouth, Disp: , Rfl:     spironolactone (ALDACTONE) 25 MG tablet, Take 25 mg by mouth daily, Disp: , Rfl:   Allergies   Allergen Reactions    Amoxicillin-Pot Clavulanate Other (See Comments)     Causes yeast infection    Codeine Other (See Comments)     Made my heart go crazy    Gabapentin Other (See Comments)    Hydrochlorothiazide Other (See Comments)     Hyponatremia    Nitrofurantoin Other (See Comments)    Prednisone Other (See Comments)     Visual loss and headache    Sulfamethoxazole-Trimethoprim Nausea Only     Past Medical History:   Diagnosis Date    Abdominal pain 10/28/2014    [facility] rest [facility])     pt denies    Arthritis     osteo - low back,  shoulders, hips, low back    Bilateral carotid bruits     Bursitis     CAD (coronary artery disease)     4 vessel CABG 2005;--- stents x 4--- last one placed 2014-- followed by dr Benjamin Briggs Chronic pain     left shoulder    Coagulation defects     on plavix    Constipation     Cough 09/11/2014    10/8/14, Methacholine Challenge Study: The point at which the FEV1 fell by at least 20%, the PC20, occurred above a dose of 25mg/mL of methacholine. This rules out the diagnosis of asthma with an extremely high degree of sensitivity. No post-challenge FEV1 recovery was identified. Hernando Kaye MD        Depressive disorder     Dyspnea 09/11/2014    [facility]; 9/29/14: IMPRESSION: The flow volume loop is consistent restriction. Spirometry suggest restriction with concomitant obstruction at low lung volumes. There is not a significant bronchodilator response. Lung volumes reveal mild restriction. The unadjusted diffusion capacity is normal.  Mika Escobar MD      GERD (gastroesophageal reflux disease)     controlled with med    Headache     Hoarseness or changing voice     thougfht to be related to gerd    Hyperlipidemia     Hypertension     controlled with med    Kidney stone     yrs ago-- no tx required    Lumbago     Macular degeneration     Nodule of left lung     dx'ed 4 years ago-watched for several months-no new growth-being watched-yearly CT scan----- followed by dr. Margaret Sharma Pain in joint, ankle and foot     left 3rd toe and right 2nd toe    Pain in joint, shoulder region     left now bothering > right, right ok    Palpitations 09/24/2015    followed by dr Audra Duran Panic disorder     panic attacks -- last one 2014    Renal mass 07/2016    ablation---left side--- followed by dr Divya Dacosta in [place]    Sciatica     Unstable angina ([facility] [address]     Vascular headache      . pmh  Past Surgical History:   Procedure Laterality Date    CARDIAC CATHETERIZATION      stent 2014    CARDIAC CATHETERIZATION      stent 2006    CATARACT REMOVAL      CATARACT REMOVAL      left    COLONOSCOPY  06/2016    Polyps    COLONOSCOPY  12/15/2015    diverticulosis, internal hemorrhoid, colon polyp- no further colonoscopies needed    CORONARY ARTERY BYPASS GRAFT      4 vessel CABG 2005    HEENT Left     macular pucker    HERNIA REPAIR Left 2017    ORTHOPEDIC SURGERY      finger, foot    PARTIAL HYSTERECTOMY      hyst    IN ABDOMEN SURGERY PROC UNLISTED Left 2016    ablation for mass in left side of abd    UPPER GASTROINTESTINAL ENDOSCOPY      Esophagus stretch     Family History   Problem Relation Age of Onset    No Known Problems Maternal Grandfather     No Known Problems Paternal Grandmother     No Known Problems Paternal Grandfather     Diabetes Mother     Heart Surgery Mother     Heart Disease Mother     Heart Attack Father     Heart Disease Father     No Known Problems Sister     Cancer Sister         2 sisters w/ lung ca-neither one smoked    Heart Disease Sister     Cancer Brother         lung ca-smoker    Heart Attack Brother          age 22 of [place]    Heart Disease Sister     Heart Surgery Sister     Heart Attack Sister     Heart Disease Sister     Heart Surgery Sister     Heart Attack Sister     No Known Problems Maternal Grandmother     Heart Surgery Brother          in [place] during 2nd heart surgery    Heart Disease Brother      Social History     Socioeconomic History    Marital status:      Spouse name: Not on file    Number of children: Not on file    Years of education: Not on file    Highest education level: Not on file   Occupational History    Not on file   Tobacco Use    Smoking status: Never Smoker    Smokeless tobacco: Never Used   Substance and Sexual Activity    Alcohol use: No    Drug use: No    Sexual activity: Not on file   Other Topics Concern    Not on file   Social History Narrative    Worked in the [employer] in the street for 12 years, worked in a [employer] 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 , 2 sons. Denies alcohol use. Has always lived in [place]. 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     Lifelong nonsmoker with 20 year secondhand smoke exposure from her  cigarettes. Social Determinants of Health     Financial Resource Strain:     Difficulty of Paying Living Expenses: Not on file   Food Insecurity:     Worried About Running Out of Food in the Last Year: Not on file    Santa of Food in the Last Year: Not on file   Transportation Needs:     Lack of Transportation (Medical): Not on file    Lack of Transportation (Non-Medical): Not on file   Physical Activity:     Days of Exercise per Week: Not on file    Minutes of Exercise per Session: Not on file   Stress:     Feeling of Stress : Not on file   Social Connections:     Frequency of Communication with Friends and Family: Not on file    Frequency of Social Gatherings with Friends and Family: Not on file    Attends Jew Services: Not on file    Active Member of Clubs or Organizations: Not on file    Attends Club or Organization Meetings: Not on file    Marital Status: Not on file   Intimate Partner Violence:     Fear of Current or Ex-Partner: Not on file    Emotionally Abused: Not on file    Physically Abused: Not on file    Sexually Abused: Not on file   Housing Stability:     Unable to Pay for Housing in the Last Year: Not on file    Number of [place] in the Last Year: Not on file    Unstable Housing in the Last Year: Not on file       Physical examination:  Alert and oriented, in no acute distress. Groin pain is present on terminal ROM of the left Hip. No tenderness about the Knee, Hip today. No dramatic antalgia. Impression: Left hip and knee pain    Recommendations:   As noted, patient got initial relief from left IA hip injection but symptoms are worsening. She is becoming more limited by her left hip pain. Plain film x-rays from April revealed minimal degenerative changes. At this time, it is felt beneficial to obtain MRI of left hip to further evaluate for possible labral tear and/or AVN. For now they will function as tolerated.   Avoid activities that cause pain and swelling. We discussed the possibility that this could eventually progress to a surgical issue, if symptoms become more limiting. She will return after MRI to review results and discuss further management. May possibly consider repeating left hip injection at that time based on MRI findings as she is averse to considering ADRIANA surgery due to her cardiac history. Approximately 20 minutes was spent reviewing the medical record, imaging, performing history and physical examination, discussing the diagnosis and treatment plan with the patient, and completing documentation for this visit.

## 2022-06-15 RX ORDER — POTASSIUM CHLORIDE 750 MG/1
TABLET, FILM COATED, EXTENDED RELEASE ORAL
Qty: 30 TABLET | Refills: 0 | Status: ON HOLD | OUTPATIENT
Start: 2022-06-15 | End: 2022-08-03 | Stop reason: SDUPTHER

## 2022-06-15 NOTE — TELEPHONE ENCOUNTER
Patient needs her Potassium sent in but it interferes with a couple of medications, sending it Dr. Kaushal Mayfield to approve.

## 2022-06-17 RX ORDER — ROSUVASTATIN CALCIUM 40 MG/1
40 TABLET, COATED ORAL EVERY EVENING
Qty: 90 TABLET | Refills: 3 | Status: SHIPPED | OUTPATIENT
Start: 2022-06-17 | End: 2022-10-04 | Stop reason: SDUPTHER

## 2022-06-17 NOTE — TELEPHONE ENCOUNTER
Requested Prescriptions     Signed Prescriptions Disp Refills    rosuvastatin (CRESTOR) 40 MG tablet 90 tablet 3     Sig: Take 1 tablet by mouth every evening     Authorizing Provider: Ifeoma Perez     Ordering User: Brendon Bowman

## 2022-06-20 ENCOUNTER — OFFICE VISIT (OUTPATIENT)
Dept: ORTHOPEDIC SURGERY | Age: 87
End: 2022-06-20
Payer: MEDICARE

## 2022-06-20 DIAGNOSIS — I50.22 CHRONIC SYSTOLIC (CONGESTIVE) HEART FAILURE (HCC): ICD-10-CM

## 2022-06-20 DIAGNOSIS — M16.12 ARTHRITIS OF LEFT HIP: Primary | ICD-10-CM

## 2022-06-20 PROBLEM — N18.30 CHRONIC RENAL DISEASE, STAGE III (HCC): Status: ACTIVE | Noted: 2022-06-20

## 2022-06-20 PROCEDURE — G8427 DOCREV CUR MEDS BY ELIG CLIN: HCPCS | Performed by: ORTHOPAEDIC SURGERY

## 2022-06-20 PROCEDURE — 1090F PRES/ABSN URINE INCON ASSESS: CPT | Performed by: ORTHOPAEDIC SURGERY

## 2022-06-20 PROCEDURE — 1036F TOBACCO NON-USER: CPT | Performed by: ORTHOPAEDIC SURGERY

## 2022-06-20 PROCEDURE — 1123F ACP DISCUSS/DSCN MKR DOCD: CPT | Performed by: ORTHOPAEDIC SURGERY

## 2022-06-20 PROCEDURE — G8417 CALC BMI ABV UP PARAM F/U: HCPCS | Performed by: ORTHOPAEDIC SURGERY

## 2022-06-20 PROCEDURE — 99214 OFFICE O/P EST MOD 30 MIN: CPT | Performed by: ORTHOPAEDIC SURGERY

## 2022-06-20 RX ORDER — TRAMADOL HYDROCHLORIDE 50 MG/1
50 TABLET ORAL EVERY 6 HOURS PRN
Qty: 20 TABLET | Refills: 0 | Status: SHIPPED | OUTPATIENT
Start: 2022-06-20 | End: 2022-06-25

## 2022-06-20 NOTE — PROGRESS NOTES
Name: Dallas Lara  YOB: 1935  Gender: female  MRN: [id number]    CC:   Chief Complaint   Patient presents with    Hip Pain     MRI left hip          HPI:   The left hip pain has been present for mamny months and is becoming worse. It hurts at night when sleeping. There was not an acute injury to the hip. The pain is located over the hip. It hurts to walk and pain worsens with increased distance. The pain does not radiate down the leg. Numbness and tingling are not noted. The patient is now having difficulty putting socks and shoes on. Treatment so far has been anti-inflammatory medications Naprosyn which she should not be on because she is on Xarelto for a blood clot 2 years prior, as well as a cardiac history. Was her first blood clot experience. This was in the calf. She may not need to be on this drug now. .    Review of Systems  As per HPI. Pertinent positives and negatives are addressed with the patient, particularly those related to musculoskeletal concerns. Non-orthopaedic concerns were referred back to the primary care physician.       [address]:    Current Outpatient Medications:     rosuvastatin (CRESTOR) 40 MG tablet, Take 1 tablet by mouth every evening, Disp: 90 tablet, Rfl: 3    potassium chloride (KLOR-CON) 10 MEQ extended release tablet, TAKE 1 TABLET BY MOUTH EVERY DAY, Disp: 30 tablet, Rfl: 0    potassium chloride (MICRO-K) 10 MEQ extended release capsule, Take 10 mEq by mouth daily, Disp: , Rfl:     memantine (NAMENDA) 10 MG tablet, Take 1 tablet by mouth daily, Disp: 30 tablet, Rfl: 0    escitalopram (LEXAPRO) 20 MG tablet, Take 1 tablet by mouth daily TAKE 1 TABLET BY MOUTH EVERY DAY, Disp: 30 tablet, Rfl: 0    acetaminophen (TYLENOL) 325 MG tablet, Take 500 mg by mouth every 6 hours as needed, Disp: , Rfl:     amitriptyline (ELAVIL) 25 MG tablet, Take by mouth, Disp: , Rfl:     aspirin 81 MG EC tablet, Take by mouth daily, Disp: , Rfl:     carvedilol (COREG) 12.5 MG tablet, Take 12.5 mg by mouth 2 times daily (with meals), Disp: , Rfl:     coenzyme Q10 100 MG CAPS capsule, Take 100 mg by mouth daily, Disp: , Rfl:     cyanocobalamin 1000 MCG tablet, Take 1,000 mcg by mouth daily, Disp: , Rfl:     diazePAM (VALIUM) 2 MG tablet, Take 2 mg by mouth every 6 hours as needed. , Disp: , Rfl:     famotidine (PEPCID) 20 MG tablet, Take 20 mg by mouth, Disp: , Rfl:     furosemide (LASIX) 40 MG tablet, Take 40 mg by mouth 2 times daily, Disp: , Rfl:     hydrALAZINE (APRESOLINE) 25 MG tablet, Take 25 mg by mouth 2 times daily, Disp: , Rfl:     isosorbide mononitrate (IMDUR) 30 MG extended release tablet, Take 30 mg by mouth daily, Disp: , Rfl:     loratadine (CLARITIN) 10 MG tablet, Take 10 mg by mouth, Disp: , Rfl:     magnesium oxide (MAG-OX) 400 MG tablet, Take 400 mg by mouth daily, Disp: , Rfl:     nitroGLYCERIN (NITROLINGUAL) 0.4 MG/SPRAY 0.4 mg spray, Place 1 spray under the tongue, Disp: , Rfl:     rivaroxaban (XARELTO) 20 MG TABS tablet, Take 20 mg by mouth daily (with breakfast), Disp: , Rfl:     spironolactone (ALDACTONE) 25 MG tablet, Take 25 mg by mouth daily, Disp: , Rfl:   Allergies   Allergen Reactions    Amoxicillin-Pot Clavulanate Other (See Comments)     Causes yeast infection    Codeine Other (See Comments)     Made my heart go crazy    Gabapentin Other (See Comments)    Hydrochlorothiazide Other (See Comments)     Hyponatremia    Nitrofurantoin Other (See Comments)    Prednisone Other (See Comments)     Visual loss and headache    Sulfamethoxazole-Trimethoprim Nausea Only     Past Medical History:   Diagnosis Date    Abdominal pain 10/28/2014    Angina at rest [facility])     pt denies    Arthritis     osteo - low back,  shoulders, hips, low back    Bilateral carotid bruits     Bursitis     CAD (coronary artery disease)     4 vessel CABG 2005;--- stents x 4--- last one placed 2014-- followed by dr Jacobo Myers Chronic pain left shoulder    Coagulation defects     on plavix    Constipation     Cough 09/11/2014    10/8/14, Methacholine Challenge Study: The point at which the FEV1 fell by at least 20%, the PC20, occurred above a dose of 25mg/mL of methacholine. This rules out the diagnosis of asthma with an extremely high degree of sensitivity. No post-challenge FEV1 recovery was identified. Rossy Taylor MD        Depressive disorder     Dyspnea 09/11/2014    [facility]; 9/29/14: IMPRESSION: The flow volume loop is consistent restriction. Spirometry suggest restriction with concomitant obstruction at low lung volumes. There is not a significant bronchodilator response. Lung volumes reveal mild restriction. The unadjusted diffusion capacity is normal.  Irais Basilio MD      GERD (gastroesophageal reflux disease)     controlled with med    Headache     Hoarseness or changing voice     thougfht to be related to gerd    Hyperlipidemia     Hypertension     controlled with med    Kidney stone     yrs ago-- no tx required    Lumbago     Macular degeneration     Nodule of left lung     dx'ed 4 years ago-watched for several months-no new growth-being watched-yearly CT scan----- followed by dr. James Lawrence Pain in joint, ankle and foot     left 3rd toe and right 2nd toe    Pain in joint, shoulder region     left now bothering > right, right ok    Palpitations 09/24/2015    followed by dr Jaden Palm Panic disorder     panic attacks -- last one 2014    Renal mass 07/2016    ablation---left side--- followed by dr Los Padron in [place]    Sciatica     Unstable angina ([facility] [address]     Vascular headache      . pmh  Past Surgical History:   Procedure Laterality Date    CARDIAC CATHETERIZATION      stent 2014    CARDIAC CATHETERIZATION      stent 2006    CATARACT REMOVAL      CATARACT REMOVAL      left    COLONOSCOPY  06/2016    Polyps    COLONOSCOPY  12/15/2015    diverticulosis, internal hemorrhoid, colon polyp- no further colonoscopies needed    CORONARY ARTERY BYPASS GRAFT      4 vessel CABG 2005    HEENT Left     macular pucker    HERNIA REPAIR Left 2017    ORTHOPEDIC SURGERY      finger, foot    PARTIAL HYSTERECTOMY      hyst    [place] ABDOMEN SURGERY PROC UNLISTED Left 2016    ablation for mass in left side of abd    UPPER GASTROINTESTINAL ENDOSCOPY      Esophagus stretch     Family History   Problem Relation Age of Onset    No Known Problems Maternal Grandfather     No Known Problems Paternal Grandmother     No Known Problems Paternal Grandfather     Diabetes Mother     Heart Surgery Mother     Heart Disease Mother     Heart Attack Father     Heart Disease Father     No Known Problems Sister     Cancer Sister         2 sisters w/ lung ca-neither one smoked    Heart Disease Sister     Cancer Brother         lung ca-smoker    Heart Attack Brother          age 22 of [place]    Heart Disease Sister     Heart Surgery Sister     Heart Attack Sister     Heart Disease Sister     Heart Surgery Sister     Heart Attack Sister     No Known Problems Maternal Grandmother     Heart Surgery Brother          in [place] during 2nd heart surgery    Heart Disease Brother      Social History     Socioeconomic History    Marital status:      Spouse name: Not on file    Number of children: Not on file    Years of education: Not on file    Highest education level: Not on file   Occupational History    Not on file   Tobacco Use    Smoking status: Never Smoker    Smokeless tobacco: Never Used   Substance and Sexual Activity    Alcohol use: No    Drug use: No    Sexual activity: Not on file   Other Topics Concern    Not on file   Social History Narrative    Worked in the [employer] in the street for 12 years, worked in a [employer] 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 , 2 sons. Denies alcohol use. Has always lived in [place].     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 Lifelong nonsmoker with 20 year secondhand smoke exposure from her  cigarettes. Social Determinants of Health     Financial Resource Strain:     Difficulty of Paying Living Expenses: Not on file   Food Insecurity:     Worried About Running Out of Food in the Last Year: Not on file    Santa of Food in the Last Year: Not on file   Transportation Needs:     Lack of Transportation (Medical): Not on file    Lack of Transportation (Non-Medical): Not on file   Physical Activity:     Days of Exercise per Week: Not on file    Minutes of Exercise per Session: Not on file   Stress:     Feeling of Stress : Not on file   Social Connections:     Frequency of Communication with Friends and Family: Not on file    Frequency of Social Gatherings with Friends and Family: Not on file    Attends Hinduism Services: Not on file    Active Member of [address] [place] [address] [employer] or Organizations: Not on file    Attends Club or Organization Meetings: Not on file    Marital Status: Not on file   Intimate Partner Violence:     Fear of Current or Ex-Partner: Not on file    Emotionally Abused: Not on file    Physically Abused: Not on file    Sexually Abused: Not on file   Housing Stability:     Unable to Pay for Housing in the Last Year: Not on file    Number of [place] in the Last Year: Not on file    Unstable Housing in the Last Year: Not on file                  PHYSICAL EXAMINATION:   The patient is alert and oriented, in no distress. The lower extremities are as described below. Circulation is normal with palpable pedal pulses bilaterally and no edema. Skin of the leg is normal with no chronic stasis disease bilaterally. Sensation is intact to light touch bilaterally. Gait is noted to be with a slight trendelenburg and antalgia. left hip range of motion is 0-90 degrees of flexion and 20 degrees on abduction and adduction.   There is 15 degrees internal rotation and 25 degrees of external rotation with pain in groin     Limb lengths are equal.  Straight leg test is negative bilaterally. Stability is normal bilaterally. Muscular strength is intact bilaterally. There is no lymphadenopathy in the groin or popliteal regions bilaterally. Their judgment and insight are normal.  They are oriented to time, place and person. Their memory is good and the mood and affect appropriate. XRAYS:   AP pelvis and lateral views of left hip are reviewed. There is joint space loss, eburnated bone and osteophyte formation of the hip. X-ray impression: Osteoarthritis of the left hip     MRI for review suggested subchondral insufficiency fracture. He has degenerative changes and she has some atrophy and loss of the abductor musculature. 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 RECOMMENDATION:    X-rays were reviewed with the patient today. Aced upon her clinical response to the injections her x-ray and MRI report I think she needs hip replacement surgery for relief. She seems to agree with this. She is a little worried about her age and her cardiac history. She is also been on Xarelto for a calf clot from 2 years ago. Working to get a ultrasound today to see if there is any clot that remains. I think we may be able to talk to the cardiology office as she just had a cath in April and see what their thoughts are. Review of her consultation with the vascular group suggests no substantial arterial disease. The limits now experienced with the hip are extreme and mobility is compromised with even simple daily activities. The risk of fall and additional injury is noted. The need for stabilization is urgent for maintenance of independence. We discussed specific risks associated with hip replacement. This includes a risk of infection, dislocation, or general medical risks of surgery such as stroke, heart attack, and blood clot.       ADRIANA - Today

## 2022-06-23 ENCOUNTER — TELEPHONE (OUTPATIENT)
Dept: ORTHOPEDIC SURGERY | Age: 87
End: 2022-06-23

## 2022-06-23 DIAGNOSIS — M79.662 PAIN AND SWELLING OF LEFT LOWER LEG: Primary | ICD-10-CM

## 2022-06-23 DIAGNOSIS — M79.89 PAIN AND SWELLING OF LEFT LOWER LEG: Primary | ICD-10-CM

## 2022-06-23 NOTE — TELEPHONE ENCOUNTER
Spoke with paige lucius they have her order sent over a new order also , tried calling pt but vm not set up, paige has tried to lucius her but she didn't answer .

## 2022-06-24 ENCOUNTER — HOSPITAL ENCOUNTER (OUTPATIENT)
Dept: ULTRASOUND IMAGING | Age: 87
Discharge: HOME | End: 2022-06-27
Payer: MEDICARE

## 2022-06-24 ENCOUNTER — TELEPHONE (OUTPATIENT)
Dept: ORTHOPEDIC SURGERY | Age: 87
End: 2022-06-24

## 2022-06-24 DIAGNOSIS — M79.662 PAIN AND SWELLING OF LEFT LOWER LEG: ICD-10-CM

## 2022-06-24 DIAGNOSIS — M79.89 PAIN AND SWELLING OF LEFT LOWER LEG: ICD-10-CM

## 2022-06-24 PROCEDURE — 93971 EXTREMITY STUDY: CPT

## 2022-06-27 ENCOUNTER — OFFICE VISIT (OUTPATIENT)
Dept: ORTHOPEDIC SURGERY | Age: 87
End: 2022-06-27
Payer: MEDICARE

## 2022-06-27 DIAGNOSIS — M16.12 ARTHRITIS OF LEFT HIP: Primary | ICD-10-CM

## 2022-06-27 PROCEDURE — 1090F PRES/ABSN URINE INCON ASSESS: CPT | Performed by: ORTHOPAEDIC SURGERY

## 2022-06-27 PROCEDURE — 1123F ACP DISCUSS/DSCN MKR DOCD: CPT | Performed by: ORTHOPAEDIC SURGERY

## 2022-06-27 PROCEDURE — G8417 CALC BMI ABV UP PARAM F/U: HCPCS | Performed by: ORTHOPAEDIC SURGERY

## 2022-06-27 PROCEDURE — 99213 OFFICE O/P EST LOW 20 MIN: CPT | Performed by: ORTHOPAEDIC SURGERY

## 2022-06-27 PROCEDURE — G8428 CUR MEDS NOT DOCUMENT: HCPCS | Performed by: ORTHOPAEDIC SURGERY

## 2022-06-27 PROCEDURE — 1036F TOBACCO NON-USER: CPT | Performed by: ORTHOPAEDIC SURGERY

## 2022-06-27 NOTE — PROGRESS NOTES
In today for her follow-up. She was worried about having a clot. The ultrasound did not reveal any suggestion of any DVT. She is on Xarelto. Were not sure if she is on it for coronary artery disease managed by Dr. Brittani Erickson. Does have significant enough limitation with her insufficiency fracture and degenerative changes. She does not want to wait to see if this gets better as she is been uncomfortable for over a months time. She hurts on any attempted terminal range of motion. We will contact the cardiologist office. We will arrange for a left total hip arthroplasty as she is so limited. Does not appear that the insufficiency fractures have been much of a stabilizing effect. She does have a significant amount of edema throughout the femoral head and her pain suggest this could be a progressive issue.   20 minutes was spent today in discussion of the concerns discussion of the previous tests and evaluations in preparation for hip replacement

## 2022-06-28 DIAGNOSIS — M16.12 ARTHRITIS OF LEFT HIP: Primary | ICD-10-CM

## 2022-06-28 RX ORDER — HYDRALAZINE HYDROCHLORIDE 25 MG/1
TABLET, FILM COATED ORAL
Qty: 180 TABLET | Refills: 3 | Status: SHIPPED | OUTPATIENT
Start: 2022-06-28 | End: 2022-10-04 | Stop reason: SDUPTHER

## 2022-06-29 ENCOUNTER — OFFICE VISIT (OUTPATIENT)
Dept: CARDIOLOGY CLINIC | Age: 87
End: 2022-06-29
Payer: MEDICARE

## 2022-06-29 VITALS
DIASTOLIC BLOOD PRESSURE: 64 MMHG | HEIGHT: 63 IN | HEART RATE: 67 BPM | SYSTOLIC BLOOD PRESSURE: 128 MMHG | BODY MASS INDEX: 27.11 KG/M2 | WEIGHT: 153 LBS

## 2022-06-29 DIAGNOSIS — I50.22 CHRONIC SYSTOLIC (CONGESTIVE) HEART FAILURE (HCC): ICD-10-CM

## 2022-06-29 DIAGNOSIS — I10 ESSENTIAL HYPERTENSION, BENIGN: Primary | ICD-10-CM

## 2022-06-29 DIAGNOSIS — I73.9 PAD (PERIPHERAL ARTERY DISEASE) (HCC): ICD-10-CM

## 2022-06-29 DIAGNOSIS — I50.22 HYPERTENSIVE HEART DISEASE WITH CHRONIC SYSTOLIC CONGESTIVE HEART FAILURE (HCC): ICD-10-CM

## 2022-06-29 DIAGNOSIS — I25.118 CORONARY ARTERY DISEASE OF NATIVE ARTERY OF NATIVE HEART WITH STABLE ANGINA PECTORIS (HCC): ICD-10-CM

## 2022-06-29 DIAGNOSIS — I11.0 HYPERTENSIVE HEART DISEASE WITH CHRONIC SYSTOLIC CONGESTIVE HEART FAILURE (HCC): ICD-10-CM

## 2022-06-29 PROCEDURE — G8417 CALC BMI ABV UP PARAM F/U: HCPCS | Performed by: INTERNAL MEDICINE

## 2022-06-29 PROCEDURE — 1123F ACP DISCUSS/DSCN MKR DOCD: CPT | Performed by: INTERNAL MEDICINE

## 2022-06-29 PROCEDURE — 1090F PRES/ABSN URINE INCON ASSESS: CPT | Performed by: INTERNAL MEDICINE

## 2022-06-29 PROCEDURE — 1036F TOBACCO NON-USER: CPT | Performed by: INTERNAL MEDICINE

## 2022-06-29 PROCEDURE — G8427 DOCREV CUR MEDS BY ELIG CLIN: HCPCS | Performed by: INTERNAL MEDICINE

## 2022-06-29 PROCEDURE — 93000 ELECTROCARDIOGRAM COMPLETE: CPT | Performed by: INTERNAL MEDICINE

## 2022-06-29 PROCEDURE — 99214 OFFICE O/P EST MOD 30 MIN: CPT | Performed by: INTERNAL MEDICINE

## 2022-06-29 RX ORDER — POTASSIUM CHLORIDE 750 MG/1
10 CAPSULE, EXTENDED RELEASE ORAL DAILY
Qty: 180 CAPSULE | Refills: 3 | Status: SHIPPED | OUTPATIENT
Start: 2022-06-29 | End: 2022-10-04

## 2022-06-29 RX ORDER — TRAMADOL HYDROCHLORIDE 50 MG/1
50 TABLET ORAL EVERY 6 HOURS PRN
Status: ON HOLD | COMMUNITY
End: 2022-08-03 | Stop reason: HOSPADM

## 2022-06-29 RX ORDER — UBIDECARENONE 100 MG
100 CAPSULE ORAL DAILY
Qty: 90 CAPSULE | Refills: 3 | Status: SHIPPED | OUTPATIENT
Start: 2022-06-29 | End: 2022-08-03

## 2022-06-29 RX ORDER — MECLIZINE HYDROCHLORIDE CHEWABLE TABLETS 25 MG/1
25 TABLET, CHEWABLE ORAL 3 TIMES DAILY PRN
COMMUNITY
End: 2022-07-28 | Stop reason: SDUPTHER

## 2022-06-29 RX ORDER — CARVEDILOL 12.5 MG/1
12.5 TABLET ORAL 2 TIMES DAILY WITH MEALS
Qty: 180 TABLET | Refills: 3 | Status: SHIPPED | OUTPATIENT
Start: 2022-06-29

## 2022-06-29 RX ORDER — ESCITALOPRAM OXALATE 20 MG/1
20 TABLET ORAL DAILY
Status: ON HOLD | COMMUNITY
End: 2022-08-03 | Stop reason: HOSPADM

## 2022-06-29 NOTE — PROGRESS NOTES
[address], [address] [facility] [facility], [address] [place] [address]  PHONE: [phone]     22    NAME:  Tisha Trejo  : 1935  MRN: [id number]       SUBJECTIVE:   Tisha Trejo is a 81 y.o. female seen for a follow up visit regarding the following:     Chief Complaint   Patient presents with    Congestive Heart Failure     Surgical Clearance needed for hip surgery        HPI: Here for DHF, CAD s/p 4v CABG in   NO PVD on duplex 2017. LLE DVT 2018  Carotid US 2018: mild dz   Echo 3/2022: normal EF  [facility] 2022:  grafts patent.  Small vessel disease involving ramus intermedius which is the only change in comparison to 2018.  Recommend medical therapy for small vessel coronary disease.     Imdur added after above [facility]. More HAs, could not take. 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      On lasix daily now and aldactone. Edema ok now. AWAN the same, no CP/pressure now. AWAN ok now. More hip pains, needs hip surgery in August.      Doing well on anticoagulation treatment as reviewed today, no bleeding issues or excessive bruising noted.         HAs on imdur in the past.  Did not feel well on hyd. Past Medical History, Past Surgical History, Family history, Social History, and Medications were all reviewed with the patient today and updated as necessary. Current Outpatient Medications   Medication Sig Dispense Refill    meclizine (ANTIVERT) 25 MG CHEW Take 25 mg by mouth 3 times daily as needed      escitalopram (LEXAPRO) 20 MG tablet Take 20 mg by mouth daily      traMADol (ULTRAM) 50 MG tablet Take 50 mg by mouth every 6 hours as needed for Pain.       Multiple Vitamins-Minerals (OCUVITE ADULT FORMULA PO) Take by mouth      Probiotic Product (PROBIOTIC-10 PO) Take by mouth      carvedilol (COREG) 12.5 MG tablet Take 1 tablet by mouth 2 times daily (with meals) 180 tablet 3    coenzyme Q10 100 MG CAPS capsule Take 1 capsule by mouth daily 90 capsule 3    potassium chloride disease, stage III [facility]) [[id number]] 06/20/2022     Priority: Medium    Chronic systolic (congestive) heart failure 06/20/2022     Priority: Medium    Arthritis of left hip 06/28/2022     Added automatically from request for surgery [id number]      Carotid stenosis, asymptomatic, bilateral 04/20/2022    Chest pain 04/01/2022     Added automatically from request for surgery [id number]        Controlled type 2 diabetes mellitus without complication, without long-term current use of insulin ([facility] 04/01/2022    Sedative, hypnotic or anxiolytic use, unspecified with unspecified sedative, hypnotic or anxiolytic-induced disorder ([facility] 08/26/2021    Tinnitus of left ear 02/18/2021    Anxiety 02/18/2021    Venous (peripheral) insufficiency 06/06/2019    Bilateral leg pain 06/06/2019    Varicose veins of bilateral lower extremities with pain 06/06/2019    GERD (gastroesophageal reflux disease)     Halitosis 03/02/2018     Last Assessment & Plan:   I reassured the patient that I did not see any issues with her tonsils and   specifically no evidence of any tonsillar cancer which was one of her   bigger concerns. She does have a carious tooth on the right lower mandible   which may be a potential source for issues and she does have problems with   reflux which is another potential source for problems. I encouraged her to   follow-up with her dentist to have that tooth treated and when she is off   her anticoagulants I encouraged her to follow through with the the EGD as   suggested by her family doctor.  She'll plan to see me as needed        Distorted taste 02/23/2018    Diaphragmatic hernia without obstruction or gangrene 02/23/2018    Chronic left-sided headaches 02/23/2018    Dyskinesia of esophagus 02/23/2018    Macular pucker, left eye 02/23/2018    Mixed hyperlipidemia 20/09/8155    Diastolic CHF, chronic (HCC) 08/29/2017    Diverticulosis of large intestine 08/06/2017    Glucose intolerance (impaired glucose tolerance) 05/12/2017    Idiopathic peripheral neuropathy 05/12/2017    PAD (peripheral artery disease) ([address] 04/25/2017    Severe episode of recurrent major depressive disorder, without psychotic features ([address] 04/21/2017    Age-related osteoporosis without current pathological fracture     Hypertensive CHF (congestive heart failure) ([address]     Osteoarthrosis, shoulder region     S/P CABG (coronary artery bypass graft) 03/12/2017    S/P angioplasty with stent 03/12/2017    Keratoconjunctivitis sicca not specified as Sjogren's 02/10/2016    Constipation by delayed colonic transit 10/16/2015    Renal cell carcinoma of left kidney ([address] 10/16/2015    Essential hypertension, benign 09/24/2015    Irritable bowel syndrome 07/09/2015    Coronary artery disease of native artery of native heart with stable angina pectoris ([address] 09/11/2014     With CABG and 4 cardiac stents          Past Surgical History:   Procedure Laterality Date    CARDIAC CATHETERIZATION      stent 2014    CARDIAC CATHETERIZATION      stent 2006    CATARACT REMOVAL      CATARACT REMOVAL      left    COLONOSCOPY  06/2016    Polyps    COLONOSCOPY  12/15/2015    diverticulosis, internal hemorrhoid, colon polyp- no further colonoscopies needed    CORONARY ARTERY BYPASS GRAFT      4 vessel CABG 2005    HEENT Left     macular pucker    HERNIA REPAIR Left 2017    ORTHOPEDIC SURGERY      finger, foot    PARTIAL HYSTERECTOMY (CERVIX NOT REMOVED)      hyst    DE ABDOMEN SURGERY PROC UNLISTED Left 07/2016    ablation for mass in left side of abd    UPPER GASTROINTESTINAL ENDOSCOPY      Esophagus stretch     Family History   Problem Relation Age of Onset    No Known Problems Maternal Grandfather     No Known Problems Paternal Grandmother     No Known Problems Paternal Grandfather     Diabetes Mother     Heart Surgery Mother     Heart Disease Mother     Heart Attack Father     Heart Disease Father     No Known Problems Sister    [facility] Cancer Sister         2 sisters w/ lung ca-neither one smoked    Heart Disease Sister     Cancer Brother         lung ca-smoker    Heart Attack Brother          age 22 of MI    Heart Disease Sister     Heart Surgery Sister     Heart Attack Sister     Heart Disease Sister     Heart Surgery Sister     Heart Attack Sister     No Known Problems Maternal Grandmother     Heart Surgery Brother          in OR during 2nd heart surgery    Heart Disease Brother      Social History     Tobacco Use    Smoking status: Never Smoker    Smokeless tobacco: Never Used   Substance Use Topics    Alcohol use: No         ROS:    No obvious pertinent positives on review of systems except for what was outlined in the HPI today. PHYSICAL EXAM:     /64   Pulse 67 Comment: per EKG  Ht 5' 3\" (1.6 m)   Wt 153 lb (69.4 kg)   BMI 27.10 kg/m²    General/Constitutional:   Alert and oriented x 3, no acute distress  HEENT:   normocephalic, atraumatic, moist mucous membranes  Neck:   No JVD or carotid bruits bilaterally  Cardiovascular:   regular rate and rhythm, no murmur/rub/gallop appreciated  Pulmonary:   clear to auscultation bilaterally, no respiratory distress  Abdomen:   soft, non-tender, non-distended  Ext:   No sig LE edema bilaterally  Skin:  warm and dry, no obvious rashes seen  Neuro:   no obvious sensory or motor deficits  Psychiatric:   normal mood and affect    EKG Today and independently reviewed by me: sinus rhythm, normal intervals and non-specific ST/T wave changes.       Lab Results   Component Value Date     2022    K 4.2 2022     2022    CO2 30 2022    BUN 15 2022    CREATININE 1.00 2022    GLUCOSE 97 2022    CALCIUM 9.1 2022        Lab Results   Component Value Date    WBC 5.0 2022    HGB 13.1 2022    HCT 40.8 2022    MCV 97.6 2022     2022       Lab Results   Component Value Date    TSH 3.090 02/11/2021       Lab Results   Component Value Date    LABA1C 5.7 (H) 03/24/2022     Lab Results   Component Value Date     03/24/2022       Lab Results   Component Value Date    CHOL 208 (H) 03/24/2022    CHOL 206 (H) 11/22/2021    CHOL 226 (H) 08/20/2021     Lab Results   Component Value Date    TRIG 117 03/24/2022    TRIG 71 11/22/2021    TRIG 84 08/20/2021     Lab Results   Component Value Date    HDL 58 03/24/2022    HDL 73 11/22/2021    HDL 67 08/20/2021     Lab Results   Component Value Date    LDLCALC 129 (H) 03/24/2022    LDLCALC 120 (H) 11/22/2021    LDLCALC 144 (H) 08/20/2021     Lab Results   Component Value Date    LABVLDL 20 12/09/2019    VLDL 21 03/24/2022    VLDL 13 11/22/2021    VLDL 15 08/20/2021     No results found for: CHOLHDBARBY        I have Independently reviewed prior care notes, any ER records available, cardiac testing, labs and results with the patient and before seeing the patient today. Also independently reviewed outside records when available. ASSESSMENT:    Bjorn Pineda was seen today for congestive heart failure. Diagnoses and all orders for this visit:    Essential hypertension, benign  -     EKG 12 lead    Chronic systolic (congestive) heart failure    Coronary artery disease of native artery of native heart with stable angina pectoris (HCC)    Hypertensive heart disease with chronic systolic congestive heart failure ([facility]    PAD (peripheral artery disease) ([facility]    Other orders  -     carvedilol (COREG) 12.5 MG tablet; Take 1 tablet by mouth 2 times daily (with meals)  -     coenzyme Q10 100 MG CAPS capsule; Take 1 capsule by mouth daily  -     potassium chloride (MICRO-K) 10 MEQ extended release capsule; Take 1 capsule by mouth daily  -     rivaroxaban (XARELTO) 20 MG TABS tablet; Take 1 tablet by mouth daily (with breakfast)          PLAN:     1. Chronic DHF:  On lasix 40 and aldactone. avg risk for surgery, no NST needed.      The patient is average risk for a catherine-operative cardiac event, the patient understands this risk and no cardiac test will lower the risk at this time. The patient has no unstable cardiac symptoms at this time. Fine to proceed with surgery, recommend optimal BP control in the catherine-op period. We will be available and can follow along with you if needed. Please call as needed, thank you. All questions were answered with the patient voicing understanding.       2. CAD:  Could not tolerate imdur. On BB, crestor and ASA.      3. DVT: Follow on NOAC now. Monitor with more falls. Off for few days for surgery.      4. HPL: NO MORE REPATHA, she blames this for prior admission. On crestor, diet has been good. Lipids poor in the past, re-address in the future.      5. HTN: Remain on coreg, aldactone. Labs ok.       6. Carotid Dz:  Remain on ASA and statin, US ok. No more testing needed now.           Patient has been instructed and agrees to call our office with any issues or other concerns related to their cardiac condition(s) and/or complaint(s). Return in about 3 months (around 9/29/2022).        GEN PETIT, DO  6/29/2022

## 2022-07-05 ENCOUNTER — TELEPHONE (OUTPATIENT)
Dept: CARDIOLOGY CLINIC | Age: 87
End: 2022-07-05

## 2022-07-05 NOTE — TELEPHONE ENCOUNTER
----- Message from Milton Yun MD sent at 7/5/2022 10:11 AM EDT -----  You please turn this into a patient phone call so that I can address this normally. ----- Message -----  From: Jen Carlton  Sent: 6/20/2022  11:42 AM EDT  To: Milton Yun MD    Hi, Dr. Gris Alcala saw this patient in the office today. She needs a total hip replacement and is almost unable to walk. We see that she had a heart cath 2 months ago and she states she is taking Xeralto. Is it possible for her to D/C this for hip replacement? She is unsure if she had stents placed during the cath. Thank you!

## 2022-07-06 ENCOUNTER — TELEPHONE (OUTPATIENT)
Dept: PRIMARY CARE CLINIC | Facility: CLINIC | Age: 87
End: 2022-07-06

## 2022-07-06 NOTE — TELEPHONE ENCOUNTER
Please returned your call and asked that you please call her back about her medication. She can be reached at [phone].

## 2022-07-06 NOTE — TELEPHONE ENCOUNTER
Did a pre-op note already on her. Remain on ASA, avg risk. Can be off xarelto for 2-3 days. No stent placed recently.     Thanks

## 2022-07-07 ENCOUNTER — TELEPHONE (OUTPATIENT)
Dept: PRIMARY CARE CLINIC | Facility: CLINIC | Age: 87
End: 2022-07-07

## 2022-07-07 RX ORDER — MEMANTINE HYDROCHLORIDE 10 MG/1
10 TABLET ORAL DAILY
Qty: 30 TABLET | Refills: 5 | Status: SHIPPED | OUTPATIENT
Start: 2022-07-07

## 2022-07-07 RX ORDER — FUROSEMIDE 40 MG/1
40 TABLET ORAL 2 TIMES DAILY
Qty: 60 TABLET | Refills: 5 | Status: ON HOLD | OUTPATIENT
Start: 2022-07-07 | End: 2022-08-03 | Stop reason: SDUPTHER

## 2022-07-07 RX ORDER — MAGNESIUM OXIDE 400 MG/1
400 TABLET ORAL DAILY
Qty: 30 TABLET | Refills: 5 | Status: SHIPPED | OUTPATIENT
Start: 2022-07-07

## 2022-07-07 NOTE — TELEPHONE ENCOUNTER
Patient is calling again about her medication refills. She can be reached at [phone].   Please call patient back

## 2022-07-08 RX ORDER — MEMANTINE HYDROCHLORIDE 10 MG/1
TABLET ORAL
Qty: 30 TABLET | Refills: 0 | OUTPATIENT
Start: 2022-07-08

## 2022-07-08 RX ORDER — MAGNESIUM OXIDE 400 MG/1
TABLET ORAL
Qty: 90 TABLET | Refills: 3 | OUTPATIENT
Start: 2022-07-08

## 2022-07-09 RX ORDER — POTASSIUM CHLORIDE 750 MG/1
TABLET, FILM COATED, EXTENDED RELEASE ORAL
Qty: 30 TABLET | Refills: 0 | OUTPATIENT
Start: 2022-07-09

## 2022-07-18 ENCOUNTER — HOSPITAL ENCOUNTER (OUTPATIENT)
Dept: REHABILITATION | Age: 87
Discharge: HOME | End: 2022-07-21
Payer: MEDICARE

## 2022-07-18 ENCOUNTER — HOSPITAL ENCOUNTER (OUTPATIENT)
Dept: SURGERY | Age: 87
Discharge: HOME | End: 2022-07-21
Payer: MEDICARE

## 2022-07-18 LAB
ANION GAP SERPL CALC-SCNC: 4 MMOL/L (ref 7–16)
APTT PPP: 45 SEC (ref 24.1–35.1)
BACTERIA SPEC CULT: NORMAL
BASOPHILS # BLD: 0 K/UL (ref 0–0.2)
BASOPHILS NFR BLD: 1 % (ref 0–2)
BUN SERPL-MCNC: 15 MG/DL (ref 8–23)
CALCIUM SERPL-MCNC: 8.5 MG/DL (ref 8.3–10.4)
CHLORIDE SERPL-SCNC: 104 MMOL/L (ref 98–107)
CO2 SERPL-SCNC: 29 MMOL/L (ref 21–32)
CREAT SERPL-MCNC: 0.89 MG/DL (ref 0.6–1)
DIFFERENTIAL METHOD BLD: ABNORMAL
EOSINOPHIL # BLD: 0.1 K/UL (ref 0–0.8)
EOSINOPHIL NFR BLD: 2 % (ref 0.5–7.8)
ERYTHROCYTE [DISTWIDTH] IN BLOOD BY AUTOMATED COUNT: 13.8 % (ref 11.9–14.6)
EST. AVERAGE GLUCOSE BLD GHB EST-MCNC: 108 MG/DL
GLUCOSE SERPL-MCNC: 95 MG/DL (ref 65–100)
HBA1C MFR BLD: 5.4 % (ref 4.8–5.6)
HCT VFR BLD AUTO: 37.6 % (ref 35.8–46.3)
HGB BLD-MCNC: 12.2 G/DL (ref 11.7–15.4)
IMM GRANULOCYTES # BLD AUTO: 0 K/UL (ref 0–0.5)
IMM GRANULOCYTES NFR BLD AUTO: 0 % (ref 0–5)
INR PPP: 3.5
LYMPHOCYTES # BLD: 1.5 K/UL (ref 0.5–4.6)
LYMPHOCYTES NFR BLD: 24 % (ref 13–44)
MCH RBC QN AUTO: 31.6 PG (ref 26.1–32.9)
MCHC RBC AUTO-ENTMCNC: 32.4 G/DL (ref 31.4–35)
MCV RBC AUTO: 97.4 FL (ref 79.6–97.8)
MONOCYTES # BLD: 0.6 K/UL (ref 0.1–1.3)
MONOCYTES NFR BLD: 9 % (ref 4–12)
NEUTS SEG # BLD: 3.8 K/UL (ref 1.7–8.2)
NEUTS SEG NFR BLD: 63 % (ref 43–78)
NRBC # BLD: 0 K/UL (ref 0–0.2)
PLATELET # BLD AUTO: 251 K/UL (ref 150–450)
PMV BLD AUTO: 9.5 FL (ref 9.4–12.3)
POTASSIUM SERPL-SCNC: 4.3 MMOL/L (ref 3.5–5.1)
PROTHROMBIN TIME: 35.7 SEC (ref 12.6–14.5)
RBC # BLD AUTO: 3.86 M/UL (ref 4.05–5.2)
SERVICE CMNT-IMP: NORMAL
SODIUM SERPL-SCNC: 137 MMOL/L (ref 136–145)
WBC # BLD AUTO: 6 K/UL (ref 4.3–11.1)

## 2022-07-18 PROCEDURE — 85610 PROTHROMBIN TIME: CPT

## 2022-07-18 PROCEDURE — 85025 COMPLETE CBC W/AUTO DIFF WBC: CPT

## 2022-07-18 PROCEDURE — 83036 HEMOGLOBIN GLYCOSYLATED A1C: CPT

## 2022-07-18 PROCEDURE — 80048 BASIC METABOLIC PNL TOTAL CA: CPT

## 2022-07-18 PROCEDURE — 87641 MR-STAPH DNA AMP PROBE: CPT

## 2022-07-18 PROCEDURE — 85730 THROMBOPLASTIN TIME PARTIAL: CPT

## 2022-07-18 PROCEDURE — 97161 PT EVAL LOW COMPLEX 20 MIN: CPT

## 2022-07-18 RX ORDER — AMITRIPTYLINE HYDROCHLORIDE 25 MG/1
25 TABLET, FILM COATED ORAL NIGHTLY
COMMUNITY
End: 2022-08-23 | Stop reason: SDUPTHER

## 2022-07-18 ASSESSMENT — PAIN DESCRIPTION - ORIENTATION: ORIENTATION: LEFT;ANTERIOR;MID

## 2022-07-18 ASSESSMENT — HOOS JR
HOOS JR RAW SCORE: 15
WALKING ON UNEVEN SURFACE: 3
RISING FROM SITTING: 2
SITTING: 1
GOING UP OR DOWN STAIRS: 3
BENDING TO THE FLOOR TO PICK UP OBJECT: 3
LYING IN BED (TURNING OVER, MAINTAINING HIP POSITION): 3
HOOS JR TOTAL INTERVAL SCORE: 43.335
HOOS JR RAW SCORE: 15

## 2022-07-18 ASSESSMENT — PAIN DESCRIPTION - LOCATION: LOCATION: GROIN;HIP

## 2022-07-18 ASSESSMENT — PAIN DESCRIPTION - DESCRIPTORS: DESCRIPTORS: ACHING

## 2022-07-18 ASSESSMENT — PAIN SCALES - GENERAL: PAINLEVEL_OUTOF10: 8

## 2022-07-18 NOTE — PROGRESS NOTES
Vi He  : 1935  Primary: Medicare Part A And B  Secondary: [address] [place] [id number] [facility], [place] [address].  [phone]        Physical Therapy Prehab Evaluation Summary:2022   Time In/Out   PT Charge Capture  Episode     MEDICAL/REFERRING DIAGNOSIS: Unilateral primary osteoarthritis, left hip [M16.12]  REFERRING PHYSICIAN: Khang Coello,*    ICD-10: Treatment Diagnosis:   Pain in left hip (M25.552)  Stiffness of Left Hip, Not elsewhere classified (M25.652)    DATE OF SURGERY: 22  Assessment:   COMMENTS:  She is here with her sister and is having a L ADRIANA. She is asking to go to rehab at [facility] because \"no one has the time to help me. They're all busy\". We discussed the pros of going home vs STR. She has a RW, cane, shower chair and BSC. PROBLEM LIST:   (Impacting functional limitations):  Ms. Sheryle Ege presents with the following lower extremity(s) problems:  Strength  Range of Motion  Home Exercise Program  Pain INTERVENTIONS PLANNED:   (Benefits and precautions of physical therapy have been discussed with the patient.)  Home Exercise Program  Educational Discussion       GOALS: (Goals have been discussed and agreed upon with patient.)  Discharge Goals: Time Frame: 1 Day  Patient will demonstrate independence with a home exercise program designed to increase strength, range of motion, and pain control to minimize functional deficits and optimize patient for total joint replacement.     Subjective:   Past Medical History/Comorbidities:   Ms. Sheryle Ege  has a past medical history of Abdominal pain, Angina at rest [facility]), Arthritis, Bilateral carotid bruits, Bursitis, CAD (coronary artery disease), Cervicalgia, Chronic pain, Coagulation defects, Constipation, Cough, Depressive disorder, Dyspnea, GERD (gastroesophageal reflux disease), Headache, Hoarseness or changing voice, Hyperlipidemia, Hypertension, Kidney stone, Lumbago, Macular degeneration, Nodule of left lung, Pain in joint, ankle and foot, Pain in joint, shoulder region, Palpitations, Panic disorder, Renal mass, Sciatica, Unstable angina ([address] and Vascular headache. Ms. Migue Victor  has a past surgical history that includes Coronary artery bypass graft; Cataract removal; Cataract removal; heent (Left); Cardiac catheterization; Cardiac catheterization; Partial hysterectomy; Colonoscopy (06/2016); Colonoscopy (12/15/2015); hernia repair (Left, 2017); pr abdomen surgery proc unlisted (Left, 07/2016); orthopedic surgery; and Upper gastrointestinal endoscopy.     Allergies:  Amoxicillin-pot clavulanate, Codeine, Gabapentin, Hydrochlorothiazide, Nitrofurantoin, Prednisone, and Sulfamethoxazole-trimethoprim    Current Medications:  Refer to pre-assessment nursing note    Home Set-Up/Prior Level of Function:  Lives With: Alone (has 2 sons and a  sister)  Type of Home: House  Home Layout: One level  Home Access: Stairs to enter with rails  Entrance Stairs - Number of Steps: 4  Entrance Stairs - Rails: Left  Bathroom Shower/Tub: Tub/Shower unit  Bathroom Toilet: Standard  Bathroom Equipment: Shower chair, 3-in-1 commode  Home Equipment: Rosy Goldmann, rolling  ADL Assistance: Independent  Homemaking Assistance: Independent  Ambulation Assistance: Independent  Transfer Assistance: Independent  Occupation: Retired    Dominant Side:  Dominant Hand: : Right    Current Functional Status:   Ambulation:  [x] Independent  [] Walk Indoors Only  [x] Walk Outdoors  [] Use Assistive Device  [] Use Wheelchair Only Dressing:  [x] [address] from Someone for:  [] Sock/Shoes  [] Pants  [] Everything   Bathing/Showering:   [x] Independent  [] Requires Assistance from Someone  [] [address]:  [x] Routine house and yard work  [] Light Housework Only  [] None     Objective:   PAIN:   Pre-Treatment Pain  Pain Assessment: 0-10  Pain Level: 8 (at its worst)  Pain Location: Groin, Hip  Pain Orientation: Left, Anterior, Mid  Pain Descriptors: Aching    Post Treatment: 2    Outcome Measure:   HOOS-JR:  Total Raw Score (0-24 Scale): 15    KOOS-JR:       LOWER EXTREMITY GROSS EVALUATION:  RIGHT LE   Within Functional Limits   Abnormal   Comments   Strength [] []  3+/5   Range of Motion [x] []        LEFT LE Within Functional Limits   Abnormal   Comments   Strength [] [] Strength LLE  Strength LLE: Exception   Range of Motion [] [] AROM LLE (degrees)  LLE AROM : Exceptions  L Hip Flexion 0-125: 90  L Hip ABduction 0-45: 20     UPPER EXTREMITY GROSS EVALUATION:     Within Functional Limits   Abnormal   Comments   Strength [x] []    Range of Motion [x] []      SENSATION  Sensation [x]       COGNITION/  PERCEPTION: Intact Impaired (Comments):   Orientation [x]     Vision [x]     Hearing [x]     Cognition  [x]       TRANSFERS: I Mod I S SBA CGA Min Mod Max Total  NT x2 Comments:   Sit to Stand [x] [] [] [] [] [] [] [] [] [] []    Stand to Sit [x] [] [] [] [] [] [] [] [] [] []    Stand pivot [x] [] [] [] [] [] [] [] [] [] []     [] [] [] [] [] [] [] [] [] [] []    I=Independent, Mod I=Modified Independent, S=Supervision, SBA=Standby Assistance, CGA=Contact Guard Assistance,   Min=Minimal Assistance, Mod=Moderate Assistance, Max=Maximal Assistance, Total=Total Assistance, NT=Not Tested    BALANCE: Good Fair+ Fair Fair- Poor NT Comments   Sitting Static [x] [] [] [] [] []    Sitting Dynamic [x] [] [] [] [] []              Standing Static [] [x] [] [] [] []    Standing Dynamic [] [x] [] [] [] []      Postural Assessment:  Genu Valgus Right  Genu Valgus Left    GAIT: I Mod I S SBA CGA Min Mod Max Total  NT x2 Comments:   Level of Assistance [x] [] [] [] [] [] [] [] [] [] []    Weightbearing Status       Distance  300 feet    Gait Quality Antalgic    DME None     Stairs      Ramp     I=Independent, Mod I=Modified Independent, S=Supervision, SBA=Standby Assistance, CGA=Contact Guard Assistance,   Min=Minimal Assistance, Mod=Moderate Assistance, Max=Maximal Assistance, Total=Total Assistance, NT=Not Tested    TREATMENT:   EVALUATION: LOW COMPLEXITY: (Untimed Charge)    TREATMENT PLAN:   Effective Dates: 7/18/2022 TO 7/18/2022. Treatment/Session Assessment:  Patient was instructed in PT- HEP to increase strength and ROM in LEs. Answered all questions. Frequency/Duration: Patient to continue to perform home exercise program at least twice per day up until her surgery. EDUCATION: Education Given To: Patient, Family  Education Provided: Role of Therapy, Plan of Care, Home Exercise Program, Precautions  Education Method: Demonstration, Verbal, Teach Back, Printed Information/Hand-outs, Video  Education Outcome: Demonstrated understanding, Verbalized understanding    MEDICAL NECESSITY: Ms. Leigh Ann Bagley is expected to optimize herlower extremity strength and ROM in preparation for joint replacement surgery. REASON FOR CONTINUED SERVICES: Achieve baseline assesment of musculoskeletal system, functional mobility and home environment. , educate in PT HEP in preparation for surgery, educate in hospital plan of care. COMPLIANCE WITH PROGRAM/EXERCISE: compliant most of the time. TOTAL TREATMENT DURATION:  Time In: 1015  Time Out: 56  Minutes: 15    Regarding Muriel Barajas's therapy, I certify that the treatment plan above will be carried out by a therapist or under their direction.   Thank you for this referral,  Patience Toney, PT

## 2022-07-18 NOTE — PERIOP NOTE
Your patient recently had labs drawn during a hospital appointment due to an upcoming surgery. The results are attached. If you have any questions or concerns please reach out to your patient for a follow-up in your office. Please do not respond to this message as my mailbox is not monitored. You may call [phone] with questions or concerns.      Latest Reference Range & Units 7/18/22 10:35   Sodium 136 - 145 mmol/L 137   Potassium 3.5 - 5.1 mmol/L 4.3   Chloride 98 - 107 mmol/L 104   CO2 21 - 32 mmol/L 29   BUN,BUNPL 8 - 23 MG/DL 15   Creatinine 0.6 - 1.0 MG/DL 0.89   Anion Gap 7 - 16 mmol/L 4 (L)   GFR Non-African American >60 ml/min/1.73m2 >60   GFR  >60 ml/min/1.73m2 >60   GLUCOSE, FASTING,GF 65 - 100 mg/dL 95   CALCIUM, SERUM, 276695 8.3 - 10.4 MG/DL 8.5   Hemoglobin A1C 4.8 - 5.6 % 5.4   eAG (mg/dL) mg/dL 108   WBC 4.3 - 11.1 K/uL 6.0   RBC 4.05 - 5.2 M/uL 3.86 (L)   Hemoglobin Quant 11.7 - 15.4 g/dL 12.2   Hematocrit 35.8 - 46.3 % 37.6   MCV 79.6 - 97.8 FL 97.4   MCH 26.1 - 32.9 PG 31.6   MCHC 31.4 - 35.0 g/dL 32.4   MPV 9.4 - 12.3 FL 9.5   RDW 11.9 - 14.6 % 13.8   Platelet Count 446 - 450 K/uL 251   Absolute Mono # 0.1 - 1.3 K/UL 0.6   Eosinophils % 0.5 - 7.8 % 2   Basophils Absolute 0.0 - 0.2 K/UL 0.0   Differential Type -   AUTOMATED   Seg Neutrophils 43 - 78 % 63   Segs Absolute 1.7 - 8.2 K/UL 3.8   Lymphocytes 13 - 44 % 24   Absolute Lymph # 0.5 - 4.6 K/UL 1.5   Monocytes 4.0 - 12.0 % 9   Absolute Eos # 0.0 - 0.8 K/UL 0.1   Basophils 0.0 - 2.0 % 1   Immature Granulocytes 0.0 - 5.0 % 0   Nucleated Red Blood Cells 0.0 - 0.2 K/uL 0.00   Absolute Immature Granulocyte 0.0 - 0.5 K/UL 0.0   Prothrombin Time 12.6 - 14.5 sec 35.7 (H)   INR -   3.5   PTT 24.1 - 35.1 SEC 45.0 (H)   (L): Data is abnormally low  (H): Data is abnormally high

## 2022-07-18 NOTE — CARE COORDINATION
[facility] Case Management note:  Patient screened in Prehab for discharge planning needs. Patient scheduled for a future total joint replacement. We discussed Home Health and equipment needed after surgery. List of Home Health providers offered. Patient w/o preference towards provider. Pt asking about rehab in [place]. She lives alone and does not want to be a burden on her family. Area NH list given. First choice is [facility]. We discussed the positive and neg to STR. She will try and ask her son if he will stay with her on dc. She has a 3-1 BSC but will need a walker. [facility] ok if she goes home.

## 2022-07-18 NOTE — PERIOP NOTE
Labs done today within [facility] protocols except PT/PTT - will have anesthesia review.       Latest Reference Range & Units 7/18/22 10:35   Sodium 136 - 145 mmol/L 137   Potassium 3.5 - 5.1 mmol/L 4.3   Chloride 98 - 107 mmol/L 104   CO2 21 - 32 mmol/L 29   BUN,BUNPL 8 - 23 MG/DL 15   Creatinine 0.6 - 1.0 MG/DL 0.89   Anion Gap 7 - 16 mmol/L 4 (L)   GFR Non-African American >60 ml/min/1.73m2 >60   GFR  >60 ml/min/1.73m2 >60   GLUCOSE, FASTING,GF 65 - 100 mg/dL 95   CALCIUM, SERUM, [id number] 8.3 - 10.4 MG/DL 8.5   Hemoglobin A1C 4.8 - 5.6 % 5.4   eAG (mg/dL) mg/dL 108   WBC 4.3 - 11.1 K/uL 6.0   RBC 4.05 - 5.2 M/uL 3.86 (L)   Hemoglobin Quant 11.7 - 15.4 g/dL 12.2   Hematocrit 35.8 - 46.3 % 37.6   MCV 79.6 - 97.8 FL 97.4   MCH 26.1 - 32.9 PG 31.6   MCHC 31.4 - 35.0 g/dL 32.4   MPV 9.4 - 12.3 FL 9.5   RDW 11.9 - 14.6 % 13.8   Platelet Count 541 - 450 K/uL 251   Absolute Mono # 0.1 - 1.3 K/UL 0.6   Eosinophils % 0.5 - 7.8 % 2   Basophils Absolute 0.0 - 0.2 K/UL 0.0   Differential Type -   AUTOMATED   Seg Neutrophils 43 - 78 % 63   Segs Absolute 1.7 - 8.2 K/UL 3.8   Lymphocytes 13 - 44 % 24   Absolute Lymph # 0.5 - 4.6 K/UL 1.5   Monocytes 4.0 - 12.0 % 9   Absolute Eos # 0.0 - 0.8 K/UL 0.1   Basophils 0.0 - 2.0 % 1   Immature Granulocytes 0.0 - 5.0 % 0   Nucleated Red Blood Cells 0.0 - 0.2 K/uL 0.00   Absolute Immature Granulocyte 0.0 - 0.5 K/UL 0.0   Prothrombin Time 12.6 - 14.5 sec 35.7 (H)   INR -   3.5   PTT 24.1 - 35.1 SEC 45.0 (H)   (L): Data is abnormally low  (H): Data is abnormally high

## 2022-07-18 NOTE — PERIOP NOTE
Patient verified name and . Order for consent is found in EHR and matches case posting; patient verified. Type 3 surgery, Joint camp assessment complete. Labs per surgeon: CBC,BMP, PT/PTT, mrsa swab, hgba1c ; results (processing)in EMR dated 22 (WNL)  Labs per anesthesia protocol: no additional  EKG:in EMR dated (22) WNL. Most recent card note with med clearance, echo, cath and stress all in EMR if needed DOS. MRSA/MSSA swab collected; pharmacy to review and dose antibiotic as appropriate. Hospital approved surgical skin cleanser and instructions to return bottle on DOS given per hospital policy. Patient provided with handouts including Guide to Surgery, Pain Management, Hand Hygiene, Blood Transfusion Education, and [facility] Brochure. Patient answered medical/surgical history questions at their best of ability. All prior to admission medications documented in [facility]. Original medication prescription bottle not visualized during patient appointment. Patient instructed to hold all vitamins 3 weeks prior to surgery and NSAIDS 5 days prior to surgery. Patient teach back successful and patient demonstrates knowledge of instruction.

## 2022-07-19 VITALS
SYSTOLIC BLOOD PRESSURE: 134 MMHG | HEART RATE: 61 BPM | OXYGEN SATURATION: 98 % | DIASTOLIC BLOOD PRESSURE: 59 MMHG | TEMPERATURE: 97.3 F | BODY MASS INDEX: 25.9 KG/M2 | HEIGHT: 64 IN | WEIGHT: 151.7 LBS

## 2022-07-19 PROCEDURE — 94760 N-INVAS EAR/PLS OXIMETRY 1: CPT

## 2022-07-19 PROCEDURE — 98960 EDU&TRN PT SELF-MGMT NQHP 1: CPT

## 2022-07-19 NOTE — PROGRESS NOTES
Dr. Nicci Gonzalez reviewed chart. New order received \"repeat PT/INR DOS\" - order signed/held for preop. Ok to proceed.

## 2022-07-19 NOTE — PROGRESS NOTES
Total Joint Surgery Preoperative Chart Review      Patient ID:  Kelly Segal  [id number]  81 y.o.  1935  Surgeon: Dr. Lam Avendaño  Date of Surgery: [unfilled]  Procedure: Total Left Hip Arthroplasty  Primary Care Physician: Heavenly Landin [phone]  Specialty Physician(s):      Subjective:   Kelly Segal is a 81 y.o. White (non-) female who presents for preoperative evaluation for Total Left Hip arthroplasty. This is a preoperative chart review note based on data collected by the nurse at the surgical Pre-Assessment visit. Past Medical History:   Diagnosis Date    Abdominal pain 10/28/2014    Angina at rest [facility])     pt denies    Arthritis     osteo - low back,  shoulders, hips, low back    Bilateral carotid bruits     Bursitis     CAD (coronary artery disease)     4 vessel CABG 2005;--- stents x 4--- last one placed 2014-- followed by dr Isaiah Shipley     Chronic pain     left shoulder    Coagulation defects     Xarelto    Constipation     Cough 09/11/2014    10/8/14, Methacholine Challenge Study: The point at which the FEV1 fell by at least 20%, the PC20, occurred above a dose of 25mg/mL of methacholine. This rules out the diagnosis of asthma with an extremely high degree of sensitivity. No post-challenge FEV1 recovery was identified. Leonila Delgadillo MD        Depressive disorder     Dyspnea 09/11/2014    [facility]; 9/29/14: IMPRESSION: The flow volume loop is consistent restriction. Spirometry suggest restriction with concomitant obstruction at low lung volumes. There is not a significant bronchodilator response. Lung volumes reveal mild restriction.  The unadjusted diffusion capacity is normal.  Terence Peraza MD      GERD (gastroesophageal reflux disease)     controlled with med    Headache     Hoarseness or changing voice     thougfht to be related to gerd    Hyperlipidemia     Hypertension     controlled with med    Kidney stone     yrs ago-- no tx required Lumbago     Macular degeneration     Nodule of left lung     dx'ed 4 years ago-watched for several months-no new growth-being watched-yearly CT scan----- followed by dr. Nidia Zaidi    Pain in joint, ankle and foot     left 3rd toe and right 2nd toe    Pain in joint, shoulder region     left now bothering > right, right ok    Palpitations 2015    followed by dr Jese Chung    Panic disorder     panic attacks -- last one     Renal mass 2016    ablation---left side--- followed by dr Lory Barker in [place]    Sciatica     Unstable angina [facility])     Vascular headache       Past Surgical History:   Procedure Laterality Date    CARDIAC CATHETERIZATION      stent     CARDIAC CATHETERIZATION      stent     CATARACT REMOVAL      CATARACT REMOVAL      left    COLONOSCOPY  2016    Polyps    COLONOSCOPY  12/15/2015    diverticulosis, internal hemorrhoid, colon polyp- no further colonoscopies needed    CORONARY ARTERY BYPASS GRAFT      4 vessel CABG     HEENT Left     macular pucker    HERNIA REPAIR Left 2017    ORTHOPEDIC SURGERY      finger, foot    PARTIAL HYSTERECTOMY (CERVIX NOT REMOVED)      hyst    UT ABDOMEN SURGERY PROC UNLISTED Left 2016    ablation for mass in left side of abd    UPPER GASTROINTESTINAL ENDOSCOPY      Esophagus stretch     Family History   Problem Relation Age of Onset    No Known Problems Maternal Grandfather     No Known Problems Paternal Grandmother     No Known Problems Paternal Grandfather     Diabetes Mother     Heart Surgery Mother     Heart Disease Mother     Heart Attack Father     Heart Disease Father     No Known Problems Sister     Cancer Sister         2 sisters w/ lung ca-neither one smoked    Heart Disease Sister     Cancer Brother         lung ca-smoker    Heart Attack Brother          age 22 of MI    Heart Disease Sister     Heart Surgery Sister     Heart Attack Sister     Heart Disease Sister     Heart Surgery Sister     Heart Attack Sister     No Known Problems Maternal Grandmother     Heart Surgery Brother          in [place] during 2nd heart surgery    Heart Disease Brother       Social History     Tobacco Use    Smoking status: Never    Smokeless tobacco: Never   Substance Use Topics    Alcohol use: No       Prior to Admission medications    Medication Sig Start Date End Date Taking? Authorizing Provider   amitriptyline (ELAVIL) 25 MG tablet Take 25 mg by mouth nightly   Yes Historical Provider, MD   furosemide (LASIX) 40 MG tablet Take 1 tablet by mouth 2 times daily  Patient taking differently: Take 40 mg by mouth in the morning. 22   Juan Theodore MD   memantine [facility]) 10 MG tablet Take 1 tablet by mouth daily 22   Juan Theodore MD   magnesium oxide (MAG-OX) 400 MG tablet Take 1 tablet by mouth daily 22   Juan Theodore MD   meclizine (ANTIVERT) 25 MG CHEW Take 25 mg by mouth 3 times daily as needed    Historical Provider, MD   escitalopram (LEXAPRO) 20 MG tablet Take 20 mg by mouth daily    Historical Provider, MD   traMADol (ULTRAM) 50 MG tablet Take 50 mg by mouth every 6 hours as needed for Pain.     Historical Provider, MD   Multiple Vitamins-Minerals (OCUVITE ADULT FORMULA PO) Take by mouth    Historical Provider, MD   Probiotic Product (PROBIOTIC-10 PO) Take by mouth    Historical Provider, MD   carvedilol (COREG) 12.5 MG tablet Take 1 tablet by mouth 2 times daily (with meals) 22   Uday Lynne DO   coenzyme Q10 100 MG CAPS capsule Take 1 capsule by mouth daily 22   Uday Lynne DO   potassium chloride (MICRO-K) 10 MEQ extended release capsule Take 1 capsule by mouth daily 22   Uday Lynne DO   rivaroxaban (XARELTO) 20 MG TABS tablet Take 1 tablet by mouth daily (with breakfast) 22   Uday Lynne DO   hydrALAZINE (APRESOLINE) 25 MG tablet TAKE 1 TABLET BY MOUTH TWICE A DAY 22   Juan Theodore MD   rosuvastatin (CRESTOR) 40 MG tablet Take 1 tablet by mouth every evening 6/17/22   Sophie Gallagher,    potassium chloride (KLOR-CON) 10 MEQ extended release tablet TAKE 1 TABLET BY MOUTH EVERY DAY  Patient taking differently: Take 10 mEq by mouth three times a week Every Monday Wednesday and Friday 6/15/22   Spike Patel MD   escitalopram (LEXAPRO) 20 MG tablet Take 1 tablet by mouth daily TAKE 1 TABLET BY MOUTH EVERY DAY 5/23/22 6/22/22  Juan Mason MD   acetaminophen (TYLENOL) 325 MG tablet Take 500 mg by mouth every 6 hours as needed    Ar Automatic Reconciliation   aspirin 81 MG EC tablet Take 81 mg by mouth every evening    Ar Automatic Reconciliation   cyanocobalamin 1000 MCG tablet Take 1,000 mcg by mouth daily    Ar Automatic Reconciliation   diazePAM (VALIUM) 2 MG tablet Take 2 mg by mouth every 6 hours as needed.  3/31/22   Ar Automatic Reconciliation   famotidine (PEPCID) 20 MG tablet Take 20 mg by mouth every evening 3/31/22   Ar Automatic Reconciliation   loratadine (CLARITIN) 10 MG tablet Take 10 mg by mouth every evening    Ar Automatic Reconciliation   nitroGLYCERIN (NITROLINGUAL) 0.4 MG/SPRAY 0.4 mg spray Place 1 spray under the tongue 4/18/19   Ar Automatic Reconciliation   spironolactone (ALDACTONE) 25 MG tablet Take 25 mg by mouth daily 8/26/21   Ar Automatic Reconciliation     Allergies   Allergen Reactions    Amoxicillin-Pot Clavulanate Other (See Comments)     Causes yeast infection    Codeine Other (See Comments)     Made my heart go crazy    Gabapentin Other (See Comments)    Hydrochlorothiazide Other (See Comments)     Hyponatremia    Nitrofurantoin Other (See Comments)    Prednisone Other (See Comments)     Visual loss and headache    Sulfamethoxazole-Trimethoprim Nausea Only          Objective:     Physical Exam:   Patient Vitals for the past 24 hrs:   Temp Pulse BP SpO2   07/18/22 1052 97.3 °F (36.3 °C) 57 (!) 134/59 98 %       ECG:    No results found for this or any previous visit (from the past 4464 hour(s)).     Data Review:   Labs:   Recent Results (from the past 24 hour(s))   CBC with Auto Differential    Collection Time: 07/18/22 10:35 AM   Result Value Ref Range    WBC 6.0 4.3 - 11.1 K/uL    RBC 3.86 (L) 4.05 - 5.2 M/uL    Hemoglobin 12.2 11.7 - 15.4 g/dL    Hematocrit 37.6 35.8 - 46.3 %    MCV 97.4 79.6 - 97.8 FL    MCH 31.6 26.1 - 32.9 PG    MCHC 32.4 31.4 - 35.0 g/dL    RDW 13.8 11.9 - 14.6 %    Platelets 793 280 - 354 K/uL    MPV 9.5 9.4 - 12.3 FL    nRBC 0.00 0.0 - 0.2 K/uL    Differential Type AUTOMATED      Seg Neutrophils 63 43 - 78 %    Lymphocytes 24 13 - 44 %    Monocytes 9 4.0 - 12.0 %    Eosinophils % 2 0.5 - 7.8 %    Basophils 1 0.0 - 2.0 %    Immature Granulocytes 0 0.0 - 5.0 %    Segs Absolute 3.8 1.7 - 8.2 K/UL    Absolute Lymph # 1.5 0.5 - 4.6 K/UL    Absolute Mono # 0.6 0.1 - 1.3 K/UL    Absolute Eos # 0.1 0.0 - 0.8 K/UL    Basophils Absolute 0.0 0.0 - 0.2 K/UL    Absolute Immature Granulocyte 0.0 0.0 - 0.5 K/UL   Basic Metabolic Panel    Collection Time: 07/18/22 10:35 AM   Result Value Ref Range    Sodium 137 136 - 145 mmol/L    Potassium 4.3 3.5 - 5.1 mmol/L    Chloride 104 98 - 107 mmol/L    CO2 29 21 - 32 mmol/L    Anion Gap 4 (L) 7 - 16 mmol/L    Glucose 95 65 - 100 mg/dL    BUN 15 8 - 23 MG/DL    CREATININE 0.89 0.6 - 1.0 MG/DL    GFR African American >60 >60 ml/min/1.73m2    GFR Non- >60 >60 ml/min/1.73m2    Calcium 8.5 8.3 - 10.4 MG/DL   Protime-INR    Collection Time: 07/18/22 10:35 AM   Result Value Ref Range    Protime 35.7 (H) 12.6 - 14.5 sec    INR 3.5     APTT    Collection Time: 07/18/22 10:35 AM   Result Value Ref Range    PTT 45.0 (H) 24.1 - 35.1 SEC   Hemoglobin A1C    Collection Time: 07/18/22 10:35 AM   Result Value Ref Range    Hemoglobin A1C 5.4 4.8 - 5.6 %    eAG 108 mg/dL   MSSA/MRSA Screen BY PCR    Collection Time: 07/18/22 10:36 AM    Specimen: Blood   Result Value Ref Range    Special Requests NO SPECIAL REQUESTS      Culture        SA target not detected. A MRSA NEGATIVE, SA NEGATIVE test result does not preclude MRSA or SA nasal colonization.          Problem List:  )  Patient Active Problem List   Diagnosis    Irritable bowel syndrome    Distorted taste    Tinnitus of left ear    Anxiety    Severe episode of recurrent major depressive disorder, without psychotic features ([facility])    Venous (peripheral) insufficiency    Glucose intolerance (impaired glucose tolerance)    Age-related osteoporosis without current pathological fracture    Constipation by delayed colonic transit    Diaphragmatic hernia without obstruction or gangrene    Chronic left-sided headaches    Dyskinesia of esophagus    Macular pucker, left eye    Renal cell carcinoma of left kidney ([facility])    Essential hypertension, benign    Coronary artery disease of native artery of native heart with stable angina pectoris ([facility])    Diastolic CHF, chronic ([facility])    S/P CABG (coronary artery bypass graft)    Diverticulosis of large intestine    Hypertensive CHF (congestive heart failure) ([facility])    Bilateral leg pain    Varicose veins of bilateral lower extremities with pain    Keratoconjunctivitis sicca not specified as Sjogren's    Osteoarthrosis, shoulder region    Idiopathic peripheral neuropathy    GERD (gastroesophageal reflux disease)    Sedative, hypnotic or anxiolytic use, unspecified with unspecified sedative, hypnotic or anxiolytic-induced disorder ([address]    Mixed hyperlipidemia    S/P angioplasty with stent    Halitosis    Chest pain    Controlled type 2 diabetes mellitus without complication, without long-term current use of insulin ([facility])    PAD (peripheral artery disease) ([address]    Carotid stenosis, asymptomatic, bilateral    Chronic renal disease, stage III ([facility]) [163342]    Chronic systolic (congestive) heart failure    Arthritis of left hip       Total Joint Surgery Pre-Assessment Recommendations:           Continuous saturation monitoring during hospitalization. O2 prn per respiratory protocol.      Signed By: SHARON Ho - CNP-C    July 19, 2022

## 2022-07-28 RX ORDER — FAMOTIDINE 20 MG/1
20 TABLET, FILM COATED ORAL EVERY EVENING
Qty: 60 TABLET | Refills: 5 | Status: SHIPPED | OUTPATIENT
Start: 2022-07-28

## 2022-07-28 RX ORDER — MECLIZINE HYDROCHLORIDE CHEWABLE TABLETS 25 MG/1
25 TABLET, CHEWABLE ORAL 3 TIMES DAILY PRN
Qty: 90 TABLET | Refills: 2 | Status: SHIPPED | OUTPATIENT
Start: 2022-07-28

## 2022-07-29 ENCOUNTER — OFFICE VISIT (OUTPATIENT)
Dept: ORTHOPEDIC SURGERY | Age: 87
End: 2022-07-29

## 2022-07-29 DIAGNOSIS — M16.12 PRIMARY OSTEOARTHRITIS OF LEFT HIP: Primary | ICD-10-CM

## 2022-07-29 PROCEDURE — PREOPEXAM PRE-OP EXAM: Performed by: ORTHOPAEDIC SURGERY

## 2022-07-29 NOTE — PROGRESS NOTES
Name: Vianey Young  YOB: 1935  Gender: female  MRN: [id number]      Current Outpatient Medications:     famotidine (PEPCID) 20 MG tablet, Take 1 tablet by mouth every evening, Disp: 60 tablet, Rfl: 5    meclizine (ANTIVERT) 25 MG CHEW, Take 1 tablet by mouth 3 times daily as needed (PRN for dizziness), Disp: 90 tablet, Rfl: 2    amitriptyline (ELAVIL) 25 MG tablet, Take 25 mg by mouth nightly, Disp: , Rfl:     furosemide (LASIX) 40 MG tablet, Take 1 tablet by mouth 2 times daily (Patient taking differently: Take 40 mg by mouth in the morning.), Disp: 60 tablet, Rfl: 5    memantine (NAMENDA) 10 MG tablet, Take 1 tablet by mouth daily, Disp: 30 tablet, Rfl: 5    magnesium oxide (MAG-OX) 400 MG tablet, Take 1 tablet by mouth daily, Disp: 30 tablet, Rfl: 5    escitalopram (LEXAPRO) 20 MG tablet, Take 20 mg by mouth daily, Disp: , Rfl:     traMADol (ULTRAM) 50 MG tablet, Take 50 mg by mouth every 6 hours as needed for Pain., Disp: , Rfl:     Multiple Vitamins-Minerals (OCUVITE ADULT FORMULA PO), Take by mouth, Disp: , Rfl:     Probiotic Product (PROBIOTIC-10 PO), Take by mouth, Disp: , Rfl:     carvedilol (COREG) 12.5 MG tablet, Take 1 tablet by mouth 2 times daily (with meals), Disp: 180 tablet, Rfl: 3    coenzyme Q10 100 MG CAPS capsule, Take 1 capsule by mouth daily, Disp: 90 capsule, Rfl: 3    potassium chloride (MICRO-K) 10 MEQ extended release capsule, Take 1 capsule by mouth daily, Disp: 180 capsule, Rfl: 3    rivaroxaban (XARELTO) 20 MG TABS tablet, Take 1 tablet by mouth daily (with breakfast), Disp: 90 tablet, Rfl: 3    hydrALAZINE (APRESOLINE) 25 MG tablet, TAKE 1 TABLET BY MOUTH TWICE A DAY, Disp: 180 tablet, Rfl: 3    rosuvastatin (CRESTOR) 40 MG tablet, Take 1 tablet by mouth every evening, Disp: 90 tablet, Rfl: 3    potassium chloride (KLOR-CON) 10 MEQ extended release tablet, TAKE 1 TABLET BY MOUTH EVERY DAY (Patient taking differently: Take 10 mEq by mouth three times a week Every

## 2022-07-29 NOTE — H&P
[id number] [facility]  History and Physical Exam    Patient ID:  Harjinder Colon  [id number]    68 y.o.  1935    Today: July 29, 2022    Vitals Signs: Reviewed as noted in medical record. Allergies: Allergies   Allergen Reactions    Amoxicillin-Pot Clavulanate Other (See Comments)     Causes yeast infection    Codeine Other (See Comments)     Made my heart go crazy    Gabapentin Other (See Comments)    Hydrochlorothiazide Other (See Comments)     Hyponatremia    Nitrofurantoin Other (See Comments)    Prednisone Other (See Comments)     Visual loss and headache    Sulfamethoxazole-Trimethoprim Nausea Only       CC: Left hip pain    HPI:  Pt complains of  left hip pain and difficulty ambulating. Relevant PMH:   Past Medical History:   Diagnosis Date    Abdominal pain 10/28/2014    Angina at rest [facility])     pt denies    Arthritis     osteo - low back,  shoulders, hips, low back    Bilateral carotid bruits     Bursitis     CAD (coronary artery disease)     4 vessel CABG 2005;--- stents x 4--- last one placed 2014-- followed by dr Alissa Krishnamurthy     Chronic pain     left shoulder    Coagulation defects     Xarelto    Constipation     Cough 09/11/2014    10/8/14, Methacholine Challenge Study: The point at which the FEV1 fell by at least 20%, the PC20, occurred above a dose of 25mg/mL of methacholine. This rules out the diagnosis of asthma with an extremely high degree of sensitivity. No post-challenge FEV1 recovery was identified. Candie Edgar MD        Depressive disorder     Dyspnea 09/11/2014    [facility]; 9/29/14: IMPRESSION: The flow volume loop is consistent restriction. Spirometry suggest restriction with concomitant obstruction at low lung volumes. There is not a significant bronchodilator response. Lung volumes reveal mild restriction.  The unadjusted diffusion capacity is normal.  Anish Antonio MD      GERD (gastroesophageal reflux disease)     controlled with med    Headache Hoarseness or changing voice     thougfht to be related to gerd    Hyperlipidemia     Hypertension     controlled with med    Kidney stone     yrs ago-- no tx required    Lumbago     Macular degeneration     Nodule of left lung     dx'ed 4 years ago-watched for several months-no new growth-being watched-yearly CT scan----- followed by dr. David Melendez    Pain in joint, ankle and foot     left 3rd toe and right 2nd toe    Pain in joint, shoulder region     left now bothering > right, right ok    Palpitations 09/24/2015    followed by dr Nando Malone    Panic disorder     panic attacks -- last one 2014    Renal mass 07/2016    ablation---left side--- followed by dr Kris Garrido in [place]    Sciatica     Unstable angina ([address]     Vascular headache        Objective:                    HEENT: NC/AT                   Lungs:  clear                   Heart:   rrr                   Abdomen: soft                   Extremities:  Pain with rom of the left hip joint    Radiographs: reveal left hip osteoarthritis with loss of joint space and bone spurs. Assessment: Arthritis of left hip [M16.12]    Plan:  Proceed with scheduled Procedure(s) (LRB):  LEFT HIP TOTAL ARTHROPLASTY (Left) . The patient has failed conservative treatment including NSAIDS, and injections. Total joint replacement surgery and associated risks and benefits have been discussed with the patient along with implant selection including but not limited to Slovan and DePuy total joint systems. Due to the amount of pain the patient is experiencing we will proceed with the scheduled procedure. It is also felt that the patient is high risk for postoperative complication due to history of multiple chronic medical problems.   The patient may potentially spend 2 nights in the hospital.      Signed By: Dominic Calvert  July 29, 2022

## 2022-08-01 ENCOUNTER — ANESTHESIA EVENT (OUTPATIENT)
Dept: SURGERY | Age: 87
End: 2022-08-01
Payer: MEDICARE

## 2022-08-02 ENCOUNTER — HOSPITAL ENCOUNTER (OUTPATIENT)
Age: 87
Discharge: HOME | End: 2022-08-03
Attending: ORTHOPAEDIC SURGERY | Admitting: ORTHOPAEDIC SURGERY
Payer: MEDICARE

## 2022-08-02 ENCOUNTER — APPOINTMENT (OUTPATIENT)
Dept: GENERAL RADIOLOGY | Age: 87
End: 2022-08-02
Attending: ORTHOPAEDIC SURGERY
Payer: MEDICARE

## 2022-08-02 ENCOUNTER — ANESTHESIA (OUTPATIENT)
Dept: SURGERY | Age: 87
End: 2022-08-02
Payer: MEDICARE

## 2022-08-02 DIAGNOSIS — Z01.818 PRE-OP TESTING: Primary | ICD-10-CM

## 2022-08-02 DIAGNOSIS — M16.12 ARTHRITIS OF LEFT HIP: ICD-10-CM

## 2022-08-02 DIAGNOSIS — M16.12 OSTEOARTHRITIS OF LEFT HIP, UNSPECIFIED OSTEOARTHRITIS TYPE: ICD-10-CM

## 2022-08-02 LAB
APTT PPP: 28.3 SEC (ref 24.1–35.1)
INR PPP: 1
PROTHROMBIN TIME: 13.4 SEC (ref 12.6–14.5)

## 2022-08-02 PROCEDURE — 97165 OT EVAL LOW COMPLEX 30 MIN: CPT

## 2022-08-02 PROCEDURE — 85730 THROMBOPLASTIN TIME PARTIAL: CPT

## 2022-08-02 PROCEDURE — 3700000000 HC ANESTHESIA ATTENDED CARE: Performed by: ORTHOPAEDIC SURGERY

## 2022-08-02 PROCEDURE — 6370000000 HC RX 637 (ALT 250 FOR IP): Performed by: ANESTHESIOLOGY

## 2022-08-02 PROCEDURE — 2500000003 HC RX 250 WO HCPCS: Performed by: ORTHOPAEDIC SURGERY

## 2022-08-02 PROCEDURE — 2500000003 HC RX 250 WO HCPCS: Performed by: ANESTHESIOLOGY

## 2022-08-02 PROCEDURE — 6360000002 HC RX W HCPCS: Performed by: PHYSICIAN ASSISTANT

## 2022-08-02 PROCEDURE — 2580000003 HC RX 258: Performed by: ANESTHESIOLOGY

## 2022-08-02 PROCEDURE — 7100000000 HC PACU RECOVERY - FIRST 15 MIN: Performed by: ORTHOPAEDIC SURGERY

## 2022-08-02 PROCEDURE — 7100000001 HC PACU RECOVERY - ADDTL 15 MIN: Performed by: ORTHOPAEDIC SURGERY

## 2022-08-02 PROCEDURE — 97535 SELF CARE MNGMENT TRAINING: CPT

## 2022-08-02 PROCEDURE — 6360000002 HC RX W HCPCS: Performed by: ANESTHESIOLOGY

## 2022-08-02 PROCEDURE — 85610 PROTHROMBIN TIME: CPT

## 2022-08-02 PROCEDURE — 3700000001 HC ADD 15 MINUTES (ANESTHESIA): Performed by: ORTHOPAEDIC SURGERY

## 2022-08-02 PROCEDURE — C1776 JOINT DEVICE (IMPLANTABLE): HCPCS | Performed by: ORTHOPAEDIC SURGERY

## 2022-08-02 PROCEDURE — 94761 N-INVAS EAR/PLS OXIMETRY MLT: CPT

## 2022-08-02 PROCEDURE — 97530 THERAPEUTIC ACTIVITIES: CPT

## 2022-08-02 PROCEDURE — 6370000000 HC RX 637 (ALT 250 FOR IP): Performed by: ORTHOPAEDIC SURGERY

## 2022-08-02 PROCEDURE — 6360000002 HC RX W HCPCS: Performed by: ORTHOPAEDIC SURGERY

## 2022-08-02 PROCEDURE — 27130 TOTAL HIP ARTHROPLASTY: CPT | Performed by: PHYSICIAN ASSISTANT

## 2022-08-02 PROCEDURE — 3600000015 HC SURGERY LEVEL 5 ADDTL 15MIN: Performed by: ORTHOPAEDIC SURGERY

## 2022-08-02 PROCEDURE — 2720000010 HC SURG SUPPLY STERILE: Performed by: ORTHOPAEDIC SURGERY

## 2022-08-02 PROCEDURE — 72170 X-RAY EXAM OF PELVIS: CPT

## 2022-08-02 PROCEDURE — U0005 INFEC AGEN DETEC AMPLI PROBE: HCPCS

## 2022-08-02 PROCEDURE — 3600000005 HC SURGERY LEVEL 5 BASE: Performed by: ORTHOPAEDIC SURGERY

## 2022-08-02 PROCEDURE — 6360000002 HC RX W HCPCS: Performed by: NURSE ANESTHETIST, CERTIFIED REGISTERED

## 2022-08-02 PROCEDURE — 2709999900 HC NON-CHARGEABLE SUPPLY: Performed by: ORTHOPAEDIC SURGERY

## 2022-08-02 PROCEDURE — 2500000003 HC RX 250 WO HCPCS: Performed by: NURSE ANESTHETIST, CERTIFIED REGISTERED

## 2022-08-02 PROCEDURE — 2500000003 HC RX 250 WO HCPCS: Performed by: PHYSICIAN ASSISTANT

## 2022-08-02 PROCEDURE — 6370000000 HC RX 637 (ALT 250 FOR IP): Performed by: PHYSICIAN ASSISTANT

## 2022-08-02 PROCEDURE — 97161 PT EVAL LOW COMPLEX 20 MIN: CPT

## 2022-08-02 PROCEDURE — 27130 TOTAL HIP ARTHROPLASTY: CPT | Performed by: ORTHOPAEDIC SURGERY

## 2022-08-02 PROCEDURE — 2580000003 HC RX 258: Performed by: PHYSICIAN ASSISTANT

## 2022-08-02 PROCEDURE — 94760 N-INVAS EAR/PLS OXIMETRY 1: CPT

## 2022-08-02 DEVICE — STEM FEM SZ 6 L107MM 12/14 TAPR STD OFFSET HIP DUOFIX CLLRD: Type: IMPLANTABLE DEVICE | Site: HIP | Status: FUNCTIONAL

## 2022-08-02 DEVICE — HIP H2 TOT ADV OTHER HD IMPL CAPPED SYNTHES: Type: IMPLANTABLE DEVICE | Site: HIP | Status: FUNCTIONAL

## 2022-08-02 DEVICE — PINNACLE HIP SOLUTIONS ALTRX POLYETHYLENE ACETABULAR LINER NEUTRAL 36MM ID 52MM OD
Type: IMPLANTABLE DEVICE | Site: HIP | Status: FUNCTIONAL
Brand: PINNACLE ALTRX

## 2022-08-02 DEVICE — PINNACLE CANCELLOUS BONE SCREW 6.5MM X 25MM
Type: IMPLANTABLE DEVICE | Site: HIP | Status: FUNCTIONAL
Brand: PINNACLE

## 2022-08-02 DEVICE — PINNACLE GRIPTION ACETABULAR SHELL MULTI-HOLE 52MM OD
Type: IMPLANTABLE DEVICE | Site: HIP | Status: FUNCTIONAL
Brand: PINNACLE GRIPTION

## 2022-08-02 DEVICE — HEAD FEM DIA36MM +5MM OFFSET 12/14 TAPR HIP CERAMIC BIOLOX: Type: IMPLANTABLE DEVICE | Site: HIP | Status: FUNCTIONAL

## 2022-08-02 RX ORDER — AMITRIPTYLINE HYDROCHLORIDE 25 MG/1
25 TABLET, FILM COATED ORAL NIGHTLY
Status: DISCONTINUED | OUTPATIENT
Start: 2022-08-02 | End: 2022-08-03 | Stop reason: HOSPADM

## 2022-08-02 RX ORDER — ONDANSETRON 2 MG/ML
4 INJECTION INTRAMUSCULAR; INTRAVENOUS EVERY 6 HOURS PRN
Status: DISCONTINUED | OUTPATIENT
Start: 2022-08-02 | End: 2022-08-03 | Stop reason: HOSPADM

## 2022-08-02 RX ORDER — FENTANYL CITRATE 50 UG/ML
25 INJECTION, SOLUTION INTRAMUSCULAR; INTRAVENOUS
Status: DISCONTINUED | OUTPATIENT
Start: 2022-08-02 | End: 2022-08-02 | Stop reason: HOSPADM

## 2022-08-02 RX ORDER — SODIUM CHLORIDE 9 MG/ML
INJECTION, SOLUTION INTRAVENOUS PRN
Status: DISCONTINUED | OUTPATIENT
Start: 2022-08-02 | End: 2022-08-02 | Stop reason: SDUPTHER

## 2022-08-02 RX ORDER — SPIRONOLACTONE 25 MG/1
25 TABLET ORAL DAILY
Status: DISCONTINUED | OUTPATIENT
Start: 2022-08-03 | End: 2022-08-03 | Stop reason: HOSPADM

## 2022-08-02 RX ORDER — SODIUM CHLORIDE 0.9 % (FLUSH) 0.9 %
5-40 SYRINGE (ML) INJECTION PRN
Status: DISCONTINUED | OUTPATIENT
Start: 2022-08-02 | End: 2022-08-02 | Stop reason: SDUPTHER

## 2022-08-02 RX ORDER — FAMOTIDINE 20 MG/1
20 TABLET, FILM COATED ORAL EVERY EVENING
Status: DISCONTINUED | OUTPATIENT
Start: 2022-08-02 | End: 2022-08-03 | Stop reason: HOSPADM

## 2022-08-02 RX ORDER — SODIUM CHLORIDE 9 MG/ML
INJECTION, SOLUTION INTRAVENOUS PRN
Status: DISCONTINUED | OUTPATIENT
Start: 2022-08-02 | End: 2022-08-03 | Stop reason: HOSPADM

## 2022-08-02 RX ORDER — ROPIVACAINE HYDROCHLORIDE 2 MG/ML
INJECTION, SOLUTION EPIDURAL; INFILTRATION; PERINEURAL PRN
Status: DISCONTINUED | OUTPATIENT
Start: 2022-08-02 | End: 2022-08-02 | Stop reason: ALTCHOICE

## 2022-08-02 RX ORDER — ONDANSETRON 2 MG/ML
4 INJECTION INTRAMUSCULAR; INTRAVENOUS
Status: DISCONTINUED | OUTPATIENT
Start: 2022-08-02 | End: 2022-08-02 | Stop reason: HOSPADM

## 2022-08-02 RX ORDER — FENTANYL CITRATE 50 UG/ML
100 INJECTION, SOLUTION INTRAMUSCULAR; INTRAVENOUS
Status: DISCONTINUED | OUTPATIENT
Start: 2022-08-02 | End: 2022-08-02 | Stop reason: HOSPADM

## 2022-08-02 RX ORDER — OXYCODONE HYDROCHLORIDE 5 MG/1
5 TABLET ORAL EVERY 4 HOURS PRN
Status: DISCONTINUED | OUTPATIENT
Start: 2022-08-02 | End: 2022-08-03 | Stop reason: HOSPADM

## 2022-08-02 RX ORDER — OXYCODONE HYDROCHLORIDE 5 MG/1
10 TABLET ORAL EVERY 4 HOURS PRN
Status: DISCONTINUED | OUTPATIENT
Start: 2022-08-02 | End: 2022-08-03 | Stop reason: HOSPADM

## 2022-08-02 RX ORDER — HYDROMORPHONE HYDROCHLORIDE 1 MG/ML
0.5 INJECTION, SOLUTION INTRAMUSCULAR; INTRAVENOUS; SUBCUTANEOUS EVERY 5 MIN PRN
Status: DISCONTINUED | OUTPATIENT
Start: 2022-08-02 | End: 2022-08-02 | Stop reason: HOSPADM

## 2022-08-02 RX ORDER — HYDROMORPHONE HYDROCHLORIDE 1 MG/ML
0.25 INJECTION, SOLUTION INTRAMUSCULAR; INTRAVENOUS; SUBCUTANEOUS EVERY 5 MIN PRN
Status: DISCONTINUED | OUTPATIENT
Start: 2022-08-02 | End: 2022-08-02 | Stop reason: HOSPADM

## 2022-08-02 RX ORDER — SENNA AND DOCUSATE SODIUM 50; 8.6 MG/1; MG/1
1 TABLET, FILM COATED ORAL 2 TIMES DAILY
Status: DISCONTINUED | OUTPATIENT
Start: 2022-08-02 | End: 2022-08-03 | Stop reason: HOSPADM

## 2022-08-02 RX ORDER — SODIUM CHLORIDE 0.9 % (FLUSH) 0.9 %
5-40 SYRINGE (ML) INJECTION EVERY 12 HOURS SCHEDULED
Status: DISCONTINUED | OUTPATIENT
Start: 2022-08-02 | End: 2022-08-02 | Stop reason: SDUPTHER

## 2022-08-02 RX ORDER — SODIUM CHLORIDE 9 MG/ML
INJECTION, SOLUTION INTRAVENOUS PRN
Status: DISCONTINUED | OUTPATIENT
Start: 2022-08-02 | End: 2022-08-02 | Stop reason: HOSPADM

## 2022-08-02 RX ORDER — SODIUM CHLORIDE 0.9 % (FLUSH) 0.9 %
5-40 SYRINGE (ML) INJECTION EVERY 12 HOURS SCHEDULED
Status: DISCONTINUED | OUTPATIENT
Start: 2022-08-02 | End: 2022-08-03 | Stop reason: HOSPADM

## 2022-08-02 RX ORDER — LABETALOL HYDROCHLORIDE 5 MG/ML
10 INJECTION, SOLUTION INTRAVENOUS
Status: DISCONTINUED | OUTPATIENT
Start: 2022-08-02 | End: 2022-08-02 | Stop reason: HOSPADM

## 2022-08-02 RX ORDER — ESCITALOPRAM OXALATE 10 MG/1
20 TABLET ORAL DAILY
Status: DISCONTINUED | OUTPATIENT
Start: 2022-08-03 | End: 2022-08-03 | Stop reason: HOSPADM

## 2022-08-02 RX ORDER — ASPIRIN 81 MG/1
81 TABLET ORAL 2 TIMES DAILY
Status: DISCONTINUED | OUTPATIENT
Start: 2022-08-02 | End: 2022-08-03 | Stop reason: HOSPADM

## 2022-08-02 RX ORDER — LANOLIN ALCOHOL/MO/W.PET/CERES
400 CREAM (GRAM) TOPICAL DAILY
Status: DISCONTINUED | OUTPATIENT
Start: 2022-08-03 | End: 2022-08-03 | Stop reason: HOSPADM

## 2022-08-02 RX ORDER — KETOROLAC TROMETHAMINE 30 MG/ML
INJECTION, SOLUTION INTRAMUSCULAR; INTRAVENOUS PRN
Status: DISCONTINUED | OUTPATIENT
Start: 2022-08-02 | End: 2022-08-02 | Stop reason: ALTCHOICE

## 2022-08-02 RX ORDER — LIDOCAINE HYDROCHLORIDE 10 MG/ML
1 INJECTION, SOLUTION INFILTRATION; PERINEURAL
Status: COMPLETED | OUTPATIENT
Start: 2022-08-02 | End: 2022-08-02

## 2022-08-02 RX ORDER — ACETAMINOPHEN 325 MG/1
650 TABLET ORAL EVERY 6 HOURS
Status: DISCONTINUED | OUTPATIENT
Start: 2022-08-02 | End: 2022-08-03 | Stop reason: HOSPADM

## 2022-08-02 RX ORDER — POLYETHYLENE GLYCOL 3350 17 G/17G
17 POWDER, FOR SOLUTION ORAL DAILY PRN
Status: DISCONTINUED | OUTPATIENT
Start: 2022-08-02 | End: 2022-08-03 | Stop reason: HOSPADM

## 2022-08-02 RX ORDER — POTASSIUM CHLORIDE 750 MG/1
10 TABLET, EXTENDED RELEASE ORAL
Status: DISCONTINUED | OUTPATIENT
Start: 2022-08-03 | End: 2022-08-03 | Stop reason: HOSPADM

## 2022-08-02 RX ORDER — DEXTROSE MONOHYDRATE 100 MG/ML
INJECTION, SOLUTION INTRAVENOUS CONTINUOUS PRN
Status: DISCONTINUED | OUTPATIENT
Start: 2022-08-02 | End: 2022-08-02 | Stop reason: HOSPADM

## 2022-08-02 RX ORDER — EPHEDRINE SULFATE/0.9% NACL/PF 50 MG/5 ML
SYRINGE (ML) INTRAVENOUS PRN
Status: DISCONTINUED | OUTPATIENT
Start: 2022-08-02 | End: 2022-08-02 | Stop reason: SDUPTHER

## 2022-08-02 RX ORDER — MEMANTINE HYDROCHLORIDE 5 MG/1
10 TABLET ORAL DAILY
Status: DISCONTINUED | OUTPATIENT
Start: 2022-08-03 | End: 2022-08-03 | Stop reason: HOSPADM

## 2022-08-02 RX ORDER — SODIUM CHLORIDE 0.9 % (FLUSH) 0.9 %
5-40 SYRINGE (ML) INJECTION PRN
Status: DISCONTINUED | OUTPATIENT
Start: 2022-08-02 | End: 2022-08-02 | Stop reason: HOSPADM

## 2022-08-02 RX ORDER — MAGNESIUM HYDROXIDE/ALUMINUM HYDROXICE/SIMETHICONE 120; 1200; 1200 MG/30ML; MG/30ML; MG/30ML
30 SUSPENSION ORAL EVERY 6 HOURS PRN
Status: DISCONTINUED | OUTPATIENT
Start: 2022-08-02 | End: 2022-08-03 | Stop reason: HOSPADM

## 2022-08-02 RX ORDER — DEXAMETHASONE SODIUM PHOSPHATE 10 MG/ML
INJECTION INTRAMUSCULAR; INTRAVENOUS PRN
Status: DISCONTINUED | OUTPATIENT
Start: 2022-08-02 | End: 2022-08-02 | Stop reason: SDUPTHER

## 2022-08-02 RX ORDER — ONDANSETRON 4 MG/1
4 TABLET, ORALLY DISINTEGRATING ORAL EVERY 8 HOURS PRN
Status: DISCONTINUED | OUTPATIENT
Start: 2022-08-02 | End: 2022-08-03 | Stop reason: HOSPADM

## 2022-08-02 RX ORDER — SODIUM CHLORIDE, SODIUM LACTATE, POTASSIUM CHLORIDE, CALCIUM CHLORIDE 600; 310; 30; 20 MG/100ML; MG/100ML; MG/100ML; MG/100ML
INJECTION, SOLUTION INTRAVENOUS CONTINUOUS
Status: DISCONTINUED | OUTPATIENT
Start: 2022-08-02 | End: 2022-08-02 | Stop reason: HOSPADM

## 2022-08-02 RX ORDER — HYDROMORPHONE HYDROCHLORIDE 2 MG/ML
0.5 INJECTION, SOLUTION INTRAMUSCULAR; INTRAVENOUS; SUBCUTANEOUS
Status: DISCONTINUED | OUTPATIENT
Start: 2022-08-02 | End: 2022-08-03 | Stop reason: HOSPADM

## 2022-08-02 RX ORDER — ACETAMINOPHEN 500 MG
1000 TABLET ORAL ONCE
Status: COMPLETED | OUTPATIENT
Start: 2022-08-02 | End: 2022-08-02

## 2022-08-02 RX ORDER — CARVEDILOL 6.25 MG/1
12.5 TABLET ORAL 2 TIMES DAILY WITH MEALS
Status: DISCONTINUED | OUTPATIENT
Start: 2022-08-02 | End: 2022-08-03 | Stop reason: HOSPADM

## 2022-08-02 RX ORDER — MIDAZOLAM HYDROCHLORIDE 2 MG/2ML
2 INJECTION, SOLUTION INTRAMUSCULAR; INTRAVENOUS
Status: DISCONTINUED | OUTPATIENT
Start: 2022-08-02 | End: 2022-08-02 | Stop reason: HOSPADM

## 2022-08-02 RX ORDER — VANCOMYCIN HYDROCHLORIDE 1 G/20ML
INJECTION, POWDER, LYOPHILIZED, FOR SOLUTION INTRAVENOUS PRN
Status: DISCONTINUED | OUTPATIENT
Start: 2022-08-02 | End: 2022-08-02 | Stop reason: ALTCHOICE

## 2022-08-02 RX ORDER — HYDRALAZINE HYDROCHLORIDE 25 MG/1
25 TABLET, FILM COATED ORAL 2 TIMES DAILY
Status: DISCONTINUED | OUTPATIENT
Start: 2022-08-02 | End: 2022-08-03 | Stop reason: HOSPADM

## 2022-08-02 RX ORDER — SODIUM CHLORIDE 0.9 % (FLUSH) 0.9 %
5-40 SYRINGE (ML) INJECTION PRN
Status: DISCONTINUED | OUTPATIENT
Start: 2022-08-02 | End: 2022-08-03 | Stop reason: HOSPADM

## 2022-08-02 RX ORDER — ONDANSETRON 2 MG/ML
INJECTION INTRAMUSCULAR; INTRAVENOUS PRN
Status: DISCONTINUED | OUTPATIENT
Start: 2022-08-02 | End: 2022-08-02 | Stop reason: SDUPTHER

## 2022-08-02 RX ORDER — MAGNESIUM OXIDE 400 MG/1
400 TABLET ORAL DAILY
Status: DISCONTINUED | OUTPATIENT
Start: 2022-08-02 | End: 2022-08-02 | Stop reason: SDUPTHER

## 2022-08-02 RX ORDER — PROPOFOL 10 MG/ML
INJECTION, EMULSION INTRAVENOUS CONTINUOUS PRN
Status: DISCONTINUED | OUTPATIENT
Start: 2022-08-02 | End: 2022-08-02 | Stop reason: SDUPTHER

## 2022-08-02 RX ORDER — DIAZEPAM 2 MG/1
2 TABLET ORAL EVERY 8 HOURS PRN
Status: DISCONTINUED | OUTPATIENT
Start: 2022-08-02 | End: 2022-08-03 | Stop reason: HOSPADM

## 2022-08-02 RX ORDER — FUROSEMIDE 40 MG/1
40 TABLET ORAL DAILY
Status: DISCONTINUED | OUTPATIENT
Start: 2022-08-03 | End: 2022-08-03 | Stop reason: HOSPADM

## 2022-08-02 RX ORDER — OXYCODONE HYDROCHLORIDE 5 MG/1
10 TABLET ORAL PRN
Status: DISCONTINUED | OUTPATIENT
Start: 2022-08-02 | End: 2022-08-02 | Stop reason: HOSPADM

## 2022-08-02 RX ORDER — TRANEXAMIC ACID 100 MG/ML
INJECTION, SOLUTION INTRAVENOUS PRN
Status: DISCONTINUED | OUTPATIENT
Start: 2022-08-02 | End: 2022-08-02 | Stop reason: ALTCHOICE

## 2022-08-02 RX ORDER — OXYCODONE HYDROCHLORIDE 5 MG/1
5 TABLET ORAL PRN
Status: DISCONTINUED | OUTPATIENT
Start: 2022-08-02 | End: 2022-08-02 | Stop reason: HOSPADM

## 2022-08-02 RX ORDER — SODIUM CHLORIDE 9 MG/ML
INJECTION, SOLUTION INTRAVENOUS CONTINUOUS
Status: DISCONTINUED | OUTPATIENT
Start: 2022-08-02 | End: 2022-08-03 | Stop reason: HOSPADM

## 2022-08-02 RX ORDER — SODIUM CHLORIDE 0.9 % (FLUSH) 0.9 %
5-40 SYRINGE (ML) INJECTION EVERY 12 HOURS SCHEDULED
Status: DISCONTINUED | OUTPATIENT
Start: 2022-08-02 | End: 2022-08-02 | Stop reason: HOSPADM

## 2022-08-02 RX ADMIN — MEPIVACAINE HYDROCHLORIDE 60 MG: 20 INJECTION, SOLUTION EPIDURAL; INFILTRATION at 07:20

## 2022-08-02 RX ADMIN — ONDANSETRON 4 MG: 2 INJECTION INTRAMUSCULAR; INTRAVENOUS at 07:43

## 2022-08-02 RX ADMIN — PHENYLEPHRINE HYDROCHLORIDE 50 MCG: 0.1 INJECTION, SOLUTION INTRAVENOUS at 08:09

## 2022-08-02 RX ADMIN — HYDRALAZINE HYDROCHLORIDE 25 MG: 25 TABLET, FILM COATED ORAL at 20:39

## 2022-08-02 RX ADMIN — PHENYLEPHRINE HYDROCHLORIDE 100 MCG: 0.1 INJECTION, SOLUTION INTRAVENOUS at 08:31

## 2022-08-02 RX ADMIN — PHENYLEPHRINE HYDROCHLORIDE 50 MCG: 0.1 INJECTION, SOLUTION INTRAVENOUS at 08:15

## 2022-08-02 RX ADMIN — SODIUM CHLORIDE, PRESERVATIVE FREE 10 ML: 5 INJECTION INTRAVENOUS at 20:50

## 2022-08-02 RX ADMIN — SENNOSIDES AND DOCUSATE SODIUM 1 TABLET: 50; 8.6 TABLET ORAL at 20:39

## 2022-08-02 RX ADMIN — SODIUM CHLORIDE, PRESERVATIVE FREE 10 ML: 5 INJECTION INTRAVENOUS at 11:36

## 2022-08-02 RX ADMIN — SODIUM CHLORIDE, SODIUM LACTATE, POTASSIUM CHLORIDE, AND CALCIUM CHLORIDE: 600; 310; 30; 20 INJECTION, SOLUTION INTRAVENOUS at 08:24

## 2022-08-02 RX ADMIN — SODIUM CHLORIDE, SODIUM LACTATE, POTASSIUM CHLORIDE, AND CALCIUM CHLORIDE: 600; 310; 30; 20 INJECTION, SOLUTION INTRAVENOUS at 06:26

## 2022-08-02 RX ADMIN — Medication 10 MG: at 07:58

## 2022-08-02 RX ADMIN — Medication 10 MG: at 08:29

## 2022-08-02 RX ADMIN — OXYCODONE 5 MG: 5 TABLET ORAL at 21:09

## 2022-08-02 RX ADMIN — AMITRIPTYLINE HYDROCHLORIDE 25 MG: 25 TABLET, FILM COATED ORAL at 20:39

## 2022-08-02 RX ADMIN — CARVEDILOL 12.5 MG: 6.25 TABLET, FILM COATED ORAL at 17:16

## 2022-08-02 RX ADMIN — Medication 10 MG: at 08:26

## 2022-08-02 RX ADMIN — OXYCODONE 10 MG: 5 TABLET ORAL at 12:03

## 2022-08-02 RX ADMIN — PHENYLEPHRINE HYDROCHLORIDE 50 MCG: 0.1 INJECTION, SOLUTION INTRAVENOUS at 07:58

## 2022-08-02 RX ADMIN — DEXAMETHASONE SODIUM PHOSPHATE 10 MG: 10 INJECTION INTRAMUSCULAR; INTRAVENOUS at 07:43

## 2022-08-02 RX ADMIN — PHENYLEPHRINE HYDROCHLORIDE 100 MCG: 0.1 INJECTION, SOLUTION INTRAVENOUS at 08:13

## 2022-08-02 RX ADMIN — Medication 10 MG: at 08:05

## 2022-08-02 RX ADMIN — PHENYLEPHRINE HYDROCHLORIDE 50 MCG: 0.1 INJECTION, SOLUTION INTRAVENOUS at 07:51

## 2022-08-02 RX ADMIN — ACETAMINOPHEN 1000 MG: 500 TABLET, FILM COATED ORAL at 06:09

## 2022-08-02 RX ADMIN — PHENYLEPHRINE HYDROCHLORIDE 50 MCG: 0.1 INJECTION, SOLUTION INTRAVENOUS at 08:08

## 2022-08-02 RX ADMIN — ASPIRIN 81 MG: 81 TABLET, COATED ORAL at 20:39

## 2022-08-02 RX ADMIN — TUBERCULIN PURIFIED PROTEIN DERIVATIVE 5 UNITS: 5 INJECTION, SOLUTION INTRADERMAL at 17:17

## 2022-08-02 RX ADMIN — PHENYLEPHRINE HYDROCHLORIDE 50 MCG: 0.1 INJECTION, SOLUTION INTRAVENOUS at 07:45

## 2022-08-02 RX ADMIN — Medication 10 MG: at 07:53

## 2022-08-02 RX ADMIN — ACETAMINOPHEN 650 MG: 325 TABLET, FILM COATED ORAL at 17:16

## 2022-08-02 RX ADMIN — PROPOFOL 100 MCG/KG/MIN: 10 INJECTION, EMULSION INTRAVENOUS at 07:35

## 2022-08-02 RX ADMIN — ACETAMINOPHEN 650 MG: 325 TABLET, FILM COATED ORAL at 23:30

## 2022-08-02 RX ADMIN — LIDOCAINE HYDROCHLORIDE 1 ML: 10 INJECTION, SOLUTION INFILTRATION; PERINEURAL at 06:26

## 2022-08-02 RX ADMIN — CEFAZOLIN SODIUM 2000 MG: 100 INJECTION, POWDER, LYOPHILIZED, FOR SOLUTION INTRAVENOUS at 23:30

## 2022-08-02 RX ADMIN — Medication 10 MG: at 08:08

## 2022-08-02 RX ADMIN — Medication 10 MG: at 07:51

## 2022-08-02 RX ADMIN — PHENYLEPHRINE HYDROCHLORIDE 50 MCG: 0.1 INJECTION, SOLUTION INTRAVENOUS at 08:24

## 2022-08-02 RX ADMIN — ACETAMINOPHEN 650 MG: 325 TABLET, FILM COATED ORAL at 11:36

## 2022-08-02 RX ADMIN — FAMOTIDINE 20 MG: 20 TABLET, FILM COATED ORAL at 17:16

## 2022-08-02 RX ADMIN — Medication 2000 MG: at 07:15

## 2022-08-02 RX ADMIN — PHENYLEPHRINE HYDROCHLORIDE 50 MCG: 0.1 INJECTION, SOLUTION INTRAVENOUS at 07:53

## 2022-08-02 RX ADMIN — CEFAZOLIN SODIUM 2000 MG: 100 INJECTION, POWDER, LYOPHILIZED, FOR SOLUTION INTRAVENOUS at 14:55

## 2022-08-02 RX ADMIN — PHENYLEPHRINE HYDROCHLORIDE 100 MCG: 0.1 INJECTION, SOLUTION INTRAVENOUS at 08:10

## 2022-08-02 RX ADMIN — ALUMINUM HYDROXIDE, MAGNESIUM HYDROXIDE, DIMETHICONE 30 ML: 200; 200; 20 LIQUID ORAL at 14:54

## 2022-08-02 ASSESSMENT — HOOS JR
WALKING ON UNEVEN SURFACE: 2
HOOS JR RAW SCORE: 12
HOOS JR TOTAL INTERVAL SCORE: 52.965
SITTING: 2
HOOS JR RAW SCORE: 12
GOING UP OR DOWN STAIRS: 2
BENDING TO THE FLOOR TO PICK UP OBJECT: 2
RISING FROM SITTING: 2
LYING IN BED (TURNING OVER, MAINTAINING HIP POSITION): 2

## 2022-08-02 ASSESSMENT — PAIN - FUNCTIONAL ASSESSMENT
PAIN_FUNCTIONAL_ASSESSMENT: 0-10

## 2022-08-02 ASSESSMENT — PAIN DESCRIPTION - ORIENTATION
ORIENTATION: RIGHT
ORIENTATION: LEFT

## 2022-08-02 ASSESSMENT — PAIN DESCRIPTION - LOCATION
LOCATION: INCISION;HIP
LOCATION: HIP

## 2022-08-02 ASSESSMENT — PAIN DESCRIPTION - PAIN TYPE: TYPE: SURGICAL PAIN

## 2022-08-02 ASSESSMENT — PAIN SCALES - GENERAL
PAINLEVEL_OUTOF10: 6
PAINLEVEL_OUTOF10: 0
PAINLEVEL_OUTOF10: 4

## 2022-08-02 ASSESSMENT — PAIN DESCRIPTION - DESCRIPTORS
DESCRIPTORS: ACHING
DESCRIPTORS: ACHING

## 2022-08-02 ASSESSMENT — PAIN DESCRIPTION - FREQUENCY: FREQUENCY: INTERMITTENT

## 2022-08-02 ASSESSMENT — PAIN DESCRIPTION - ONSET: ONSET: GRADUAL

## 2022-08-02 NOTE — CARE COORDINATION
Pt requesting NH for short term rehab and requesting [facility] ([address] [facility] requiring 3night Inpt stay but [facility] is honoring the L[facility] waiver so they can accept with her observation status. They have offered her a bed. I will discuss with pt and find out when they can accept. PPD and covid ordered. 08/02/22 4572   Service Assessment   Patient Orientation Alert and Oriented   Cognition Alert   History Provided By Patient   Primary Caregiver Self   Discharge Planning   Patient expects to be discharged to: [facility]   Transition of Care Consult (CM Consult) SNF   Partner SNF Yes   Services At/After Discharge [facility] (SNF)   Mode of Transport at Discharge Self   Condition of Participation: Discharge Planning   The Plan for Transition of Care is related to the following treatment goals: improve mobility   The Patient and/or Patient Representative was provided with a Choice of Provider? Patient   The Patient and/Or Patient Representative agree with the Discharge Plan? Yes   Freedom of Choice list was provided with basic dialogue that supports the patient's individualized plan of care/goals, treatment preferences, and shares the quality data associated with the providers?   Yes

## 2022-08-02 NOTE — ANESTHESIA PRE PROCEDURE
Department of Anesthesiology  Preprocedure Note       Name:  Marina Alcantara   Age:  81 y.o.  :  1935                                          MRN:  [id number]         Date:  2022      Surgeon: Ad Colon):  Irineo Arnold MD    Procedure: Procedure(s):  LEFT HIP TOTAL ARTHROPLASTY    Medications prior to admission:   Prior to Admission medications    Medication Sig Start Date End Date Taking? Authorizing Provider   famotidine (PEPCID) 20 MG tablet Take 1 tablet by mouth every evening 22   Juan Connors MD   meclizine (ANTIVERT) 25 MG CHEW Take 1 tablet by mouth 3 times daily as needed (PRN for dizziness) 22   Juan Connors MD   amitriptyline (ELAVIL) 25 MG tablet Take 25 mg by mouth nightly    Historical Provider, MD   furosemide (LASIX) 40 MG tablet Take 1 tablet by mouth 2 times daily  Patient taking differently: Take 40 mg by mouth in the morning. 22   Juan Connors MD   memantine [facility]) 10 MG tablet Take 1 tablet by mouth daily 22   Dallas Shaw MD   magnesium oxide (MAG-OX) 400 MG tablet Take 1 tablet by mouth daily 22   Dallas Shaw MD   escitalopram (LEXAPRO) 20 MG tablet Take 20 mg by mouth daily    Historical Provider, MD   traMADol (ULTRAM) 50 MG tablet Take 50 mg by mouth every 6 hours as needed for Pain.     Historical Provider, MD   Multiple Vitamins-Minerals (OCUVITE ADULT FORMULA PO) Take by mouth    Historical Provider, MD   Probiotic Product (PROBIOTIC-10 PO) Take by mouth    Historical Provider, MD   carvedilol (COREG) 12.5 MG tablet Take 1 tablet by mouth 2 times daily (with meals) 22   Sherlyn Ramos DO   coenzyme Q10 100 MG CAPS capsule Take 1 capsule by mouth daily 22   Sherlyn Ramos DO   potassium chloride (MICRO-K) 10 MEQ extended release capsule Take 1 capsule by mouth daily 22   Sherlyn Ramos DO   rivaroxaban (XARELTO) 20 MG TABS tablet Take 1 tablet by mouth daily (with breakfast) 6/29/22   Natalya Davis DO   hydrALAZINE (APRESOLINE) 25 MG tablet TAKE 1 TABLET BY MOUTH TWICE A DAY 6/28/22   Juan Rm MD   rosuvastatin (CRESTOR) 40 MG tablet Take 1 tablet by mouth every evening 6/17/22   Natalya Davis DO   potassium chloride (KLOR-CON) 10 MEQ extended release tablet TAKE 1 TABLET BY MOUTH EVERY DAY  Patient taking differently: Take 10 mEq by mouth three times a week Every Monday Wednesday and Friday 6/15/22   Silvestre Chung MD   escitalopram (LEXAPRO) 20 MG tablet Take 1 tablet by mouth daily TAKE 1 TABLET BY MOUTH EVERY DAY 5/23/22 6/22/22  Juan Rm MD   acetaminophen (TYLENOL) 325 MG tablet Take 500 mg by mouth every 6 hours as needed    Ar Automatic Reconciliation   aspirin 81 MG EC tablet Take 81 mg by mouth every evening    Ar Automatic Reconciliation   cyanocobalamin 1000 MCG tablet Take 1,000 mcg by mouth daily    Ar Automatic Reconciliation   diazePAM (VALIUM) 2 MG tablet Take 2 mg by mouth every 6 hours as needed.  3/31/22   Ar Automatic Reconciliation   loratadine (CLARITIN) 10 MG tablet Take 10 mg by mouth every evening    Ar Automatic Reconciliation   nitroGLYCERIN (NITROLINGUAL) 0.4 MG/SPRAY 0.4 mg spray Place 1 spray under the tongue 4/18/19   Ar Automatic Reconciliation   spironolactone (ALDACTONE) 25 MG tablet Take 25 mg by mouth daily 8/26/21   Ar Automatic Reconciliation       Current medications:    Current Facility-Administered Medications   Medication Dose Route Frequency Provider Last Rate Last Admin    sodium chloride flush 0.9 % injection 5-40 mL  5-40 mL IntraVENous 2 times per day [employer], [place]        sodium chloride flush 0.9 % injection 5-40 mL  5-40 mL IntraVENous PRN [employer], [place]        0.9 % sodium chloride infusion   IntraVENous PRN [employer] KIERSTEN Wagner        ceFAZolin (ANCEF) 2000 mg in sterile water 20 mL IV syringe  2,000 mg IntraVENous On Call to [address] [place] [address] fentaNYL (SUBLIMAZE) injection 100 mcg  100 mcg IntraVENous Q1H PRN Daisy Poole MD        Or    fentaNYL (SUBLIMAZE) injection 25 mcg  25 mcg IntraVENous Q1H PRN Daisy Poole MD        lactated ringers infusion   IntraVENous Continuous Daisy Poole  mL/hr at 08/02/22 0626 New Bag at 08/02/22 0626    midazolam PF (VERSED) injection 2 mg  2 mg IntraVENous Once PRN Daisy Poole MD           Allergies:     Allergies   Allergen Reactions    Amoxicillin-Pot Clavulanate Other (See Comments)     Causes yeast infection    Codeine Other (See Comments)     Made my heart go crazy    Gabapentin Other (See Comments)    Hydrochlorothiazide Other (See Comments)     Hyponatremia    Nitrofurantoin Other (See Comments)    Prednisone Other (See Comments)     Visual loss and headache    Sulfamethoxazole-Trimethoprim Nausea Only       Problem List:    Patient Active Problem List   Diagnosis Code    Irritable bowel syndrome K58.9    Distorted taste R43.2    Tinnitus of left ear H93.12    Anxiety F41.9    Severe episode of recurrent major depressive disorder, without psychotic features ([facility] [address] F33.2    Venous (peripheral) insufficiency I87.2    Glucose intolerance (impaired glucose tolerance) R73.02    Age-related osteoporosis without current pathological fracture M81.0    Constipation by delayed colonic transit K59.01    Diaphragmatic hernia without obstruction or gangrene K44.9    Chronic left-sided headaches R51.9, G89.29    Dyskinesia of esophagus K22.4    Macular pucker, left eye H35.372    Renal cell carcinoma of left kidney ([facility]) C64.2    Essential hypertension, benign I10    Coronary artery disease of native artery of native heart with stable angina pectoris ([facility]) J85.383    Diastolic CHF, chronic ([facility]) I50.32    S/P CABG (coronary artery bypass graft) Z95.1    Diverticulosis of large intestine K57.30    Hypertensive CHF (congestive heart failure) ([facility]) I11.0    Bilateral leg pain M79.604, M79.605    Varicose veins of bilateral lower extremities with pain I83.813    Keratoconjunctivitis sicca not specified as Sjogren's H16.229    Osteoarthrosis, shoulder region M19.019    Idiopathic peripheral neuropathy G60.9    GERD (gastroesophageal reflux disease) K21.9    Sedative, hypnotic or anxiolytic use, unspecified with unspecified sedative, hypnotic or anxiolytic-induced disorder ([address] F13.99    Mixed hyperlipidemia E78.2    S/P angioplasty with stent Z95.820    Halitosis R19.6    Chest pain R07.9    Controlled type 2 diabetes mellitus without complication, without long-term current use of insulin ([facility]) E11.9    PAD (peripheral artery disease) ([facility]) I73.9    Carotid stenosis, asymptomatic, bilateral I65.23    Chronic renal disease, stage III ([facility]) [[id number]] N18.30    Chronic systolic (congestive) heart failure I50.22    Arthritis of left hip M16.12    Osteoarthritis of left hip, unspecified osteoarthritis type M16.12       Past Medical History:        Diagnosis Date    Abdominal pain 10/28/2014    Angina at rest [facility])     pt denies    Arthritis     osteo - low back,  shoulders, hips, low back    Bilateral carotid bruits     Bursitis     CAD (coronary artery disease)     4 vessel CABG 2005;--- stents x 4--- last one placed 2014-- followed by dr Radha Givens Chronic pain     left shoulder    Coagulation defects     Xarelto    Constipation     Cough 09/11/2014    10/8/14, Methacholine Challenge Study: The point at which the FEV1 fell by at least 20%, the PC20, occurred above a dose of 25mg/mL of methacholine. This rules out the diagnosis of asthma with an extremely high degree of sensitivity. No post-challenge FEV1 recovery was identified. Leonila Delgadillo MD        Depressive disorder     Dyspnea 09/11/2014    \[facility] [place] [facility]\""; 9/29/14: IMPRESSION: The flow volume loop is consistent restriction.  Spirometry suggest restriction with concomitant obstruction at low lung volumes. There is not a significant bronchodilator response. Lung volumes reveal mild restriction. The unadjusted diffusion capacity is normal.  Jose Mckay MD      GERD (gastroesophageal reflux disease)     controlled with med    Headache     Hoarseness or changing voice     thougfht to be related to gerd    Hyperlipidemia     Hypertension     controlled with med    Kidney stone     yrs ago-- no tx required    Lumbago     Macular degeneration     Nodule of left lung     dx'ed 4 years ago-watched for several months-no new growth-being watched-yearly CT scan----- followed by dr. Karlie Hampton Pain in joint, ankle and foot     left 3rd toe and right 2nd toe    Pain in joint, shoulder region     left now bothering > right, right ok    Palpitations 09/24/2015    followed by dr Mandy Esposito Panic disorder     panic attacks -- last one 2014    Renal mass 07/2016    ablation---left side--- followed by dr Saranya Pena in [place]    Sciatica     Unstable angina ([facility] [address]     Vascular headache        Past Surgical History:        Procedure Laterality Date    CARDIAC CATHETERIZATION      stent 2014    CARDIAC CATHETERIZATION      stent 2006    CATARACT REMOVAL      CATARACT REMOVAL      left    COLONOSCOPY  06/2016    Polyps    COLONOSCOPY  12/15/2015    diverticulosis, internal hemorrhoid, colon polyp- no further colonoscopies needed    CORONARY ARTERY BYPASS GRAFT      4 vessel CABG 2005    HEENT Left     macular pucker    HERNIA REPAIR Left 2017    ORTHOPEDIC SURGERY      finger, foot    PARTIAL HYSTERECTOMY (CERVIX NOT REMOVED)      hyst    [place] ABDOMEN SURGERY PROC UNLISTED Left 07/2016    ablation for mass in left side of abd    UPPER GASTROINTESTINAL ENDOSCOPY      Esophagus stretch       Social History:    Social History     Tobacco Use    Smoking status: Never    Smokeless tobacco: Never   Substance Use Topics    Alcohol use:  No                                Counseling given: Not Answered      Vital Signs (Current):   Vitals:    08/02/22 0537   BP: (!) 147/69   Pulse: 65   Resp: 18   Temp: 97 °F (36.1 °C)   TempSrc: Temporal   SpO2: 98%   Weight: 153 lb 9.6 oz (69.7 kg)                                              BP Readings from Last 3 Encounters:   08/02/22 (!) 147/69   07/18/22 (!) 134/59   06/29/22 128/64       NPO Status: Time of last liquid consumption: 2100                        Time of last solid consumption: 2100                        Date of last liquid consumption: 08/01/22                        Date of last solid food consumption: 08/01/22    BMI:   Wt Readings from Last 3 Encounters:   08/02/22 153 lb 9.6 oz (69.7 kg)   07/18/22 151 lb 11.2 oz (68.8 kg)   06/29/22 153 lb (69.4 kg)     Body mass index is 26.78 kg/m². CBC:   Lab Results   Component Value Date/Time    WBC 6.0 07/18/2022 10:35 AM    RBC 3.86 07/18/2022 10:35 AM    HGB 12.2 07/18/2022 10:35 AM    HCT 37.6 07/18/2022 10:35 AM    MCV 97.4 07/18/2022 10:35 AM    RDW 13.8 07/18/2022 10:35 AM     07/18/2022 10:35 AM       CMP:   Lab Results   Component Value Date/Time     07/18/2022 10:35 AM    K 4.3 07/18/2022 10:35 AM     07/18/2022 10:35 AM    CO2 29 07/18/2022 10:35 AM    BUN 15 07/18/2022 10:35 AM    CREATININE 0.89 07/18/2022 10:35 AM    GFRAA >60 07/18/2022 10:35 AM    AGRATIO 1.8 03/24/2022 09:25 AM    LABGLOM >60 07/18/2022 10:35 AM    GLUCOSE 95 07/18/2022 10:35 AM    PROT 5.9 03/24/2022 09:25 AM    CALCIUM 8.5 07/18/2022 10:35 AM    BILITOT 0.3 03/24/2022 09:25 AM    ALKPHOS 71 03/24/2022 09:25 AM    AST 20 03/24/2022 09:25 AM    ALT 6 03/24/2022 09:25 AM       POC Tests: No results for input(s): POCGLU, POCNA, POCK, POCCL, POCBUN, POCHEMO, POCHCT in the last 72 hours.     Coags:   Lab Results   Component Value Date/Time    PROTIME 35.7 07/18/2022 10:35 AM    INR 3.5 07/18/2022 10:35 AM    APTT 45.0 07/18/2022 10:35 AM       HCG (If Applicable): No results found for: PREGTESTUR, PREGSERUM, HCG, HCGQUANT     ABGs: No results found for: PHART, PO2ART, FMT9LBQ, KRV9NLP, BEART, F1AMTDIQ     Type & Screen (If Applicable):  No results found for: LABABO, LABRH    Drug/Infectious Status (If Applicable):  No results found for: HIV, HEPCAB    COVID-19 Screening (If Applicable): No results found for: COVID19        Anesthesia Evaluation  Patient summary reviewed and Nursing notes reviewed  Airway: Mallampati: II  TM distance: <3 FB   Neck ROM: full  Mouth opening: > = 3 FB   Dental:    (+) partials      Pulmonary: breath sounds clear to auscultation                             Cardiovascular:    (+) hypertension:, CAD:, CABG/stent:,         Rhythm: regular  Rate: normal                 ROS comment: Echo 2022 -  Left Ventricle: Left ventricle size is normal. Normal wall thickness. Normal wall motion. Normal diastolic function. The EF by visual approximation is 55 - 60%. [facility] 4/2022 -  2/4 grafts patent. Small vessel disease involving ramus intermedius which is the only change in comparison to 2018. Recommend medical therapy for small vessel coronary disease. Consider intensification of therapy for poorly controlled hypertension and elevated EDP. Imdur added prior to discharge today     Neuro/Psych:   (+) headaches:, depression/anxiety             GI/Hepatic/Renal:   (+) GERD: well controlled,           Endo/Other:    (+) DiabetesType II DM, , : arthritis:., malignancy/cancer (Renal cell carcinoma). Abdominal:             Vascular: Other Findings:           Anesthesia Plan      spinal     ASA 3       Induction: intravenous. MIPS: Prophylactic antiemetics administered. Anesthetic plan and risks discussed with patient and child/children.                         Rodrigo Robertson MD   8/2/2022

## 2022-08-02 NOTE — PROGRESS NOTES
OCCUPATIONAL THERAPY Initial Assessment, Daily Note, and PM      (Link to Caseload Tracking: OT Visit Days: 1  OT Orders   Time  OT Charge Capture  Rehab Caseload Tracker  Episode     Jaimie Armenta is a 81 y.o. female   PRIMARY DIAGNOSIS: Arthritis of left hip  Arthritis of left hip [M16.12]  Osteoarthritis of left hip, unspecified osteoarthritis type [M16.12]  Procedure(s) (LRB):  LEFT HIP TOTAL ARTHROPLASTY (Left)  Day of Surgery  Reason for Referral: Pain in left hip (M25.552)  Stiffness of Left Hip, Not elsewhere classified (M25.652)  Outpatient in a bed: Payor: MEDICARE / Plan: MEDICARE PART A AND B / Product Type: *No Product type* /     ASSESSMENT:     REHAB RECOMMENDATIONS:   Recommendation to date pending progress:  Setting:  Short-term Rehab    Equipment:    To Be Determined     ASSESSMENT:  Ms. Michaela Oliver is s/p left ADRIANA and presents with decreased independence with functional mobility and activities of daily living as compared to baseline level of function and safety. Patient would benefit from skilled Occupational Therapy to maximize independence and safety with self-care task and functional mobility. Patient able to complete lower body dressing, upper body dressing, toileting, and grooming at bathroom with moderate assist (for LB dressing). Mobilized from recliner/chair to bathroom, out in the ivey and then back to recliner for exercises with PT using a rolling walker with assist.  Patient is hopeful to [facility] prior to returning home. She has family in the area, but none of them will be able to stay with her full time upon discharge. She should progress well with ADL session in AM.     Of note, during session, patient reported that the L side of her face has been feeling \"funny\" and \"some numbness\" since surgery. RN made aware and at bedside to assess. No weakness or decreased sensation in upper or lower extremities. No deficits or drooping noted with facial movements.  Will reports feeling more shaky than baseline     Posture / Balance []  Cues for safe management of RW during mobility   Sensation [x]  Aside from L facial numbness \"funny\" feeling as noted above   Coordination [x]       Tone [x]       Edema [x]    Activity Tolerance []  Decreased from baseline     Hand Dominance R [] L []      COGNITION/  PERCEPTION: INTACT IMPAIRED   (See Comments)   Orientation [x]     Vision [x]     Hearing [x]     Cognition  [x]  Patient reports concerns remembering hip precautions during ADLs and mobility   Perception [x]       MOBILITY: I Mod I S SBA CGA Min Mod Max Total  NT x2 Comments:   Bed Mobility    Rolling [] [] [] [] [] [] [] [] [] [x] []    Supine to Sit [] [] [] [] [] [] [] [] [] [x] []    Scooting [] [] [] [] [] [] [] [] [] [x] []    Sit to Supine [] [] [] [] [] [] [] [] [] [x] []    Transfers    Sit to Stand [] [] [] [] [x] [] [] [] [] [] []    Bed to Chair [] [] [] [] [] [] [] [] [] [x] []    Stand to Sit [] [] [] [] [x] [] [] [] [] [] []    Tub/Shower [] [] [] [] [] [] [] [] [] [x] []       Toilet [] [] [] [] [x] [x] [] [] [] [] []  BSC over standard commode      [] [] [] [] [] [] [] [] [] [] []    I=Independent, Mod I=Modified Independent, S=Supervision/Setup, SBA=Standby Assistance, CGA=Contact Guard Assistance, Min=Minimal Assistance, Mod=Moderate Assistance, Max=Maximal Assistance, Total=Total Assistance, NT=Not Tested    ACTIVITIES OF DAILY LIVING: I Mod I S SBA CGA Min Mod Max Total NT Comments   BASIC ADLs:              Upper Body Bathing [] [] [] [] [] [] [] [] [] []    Lower Body Bathing [] [] [] [] [] [] [] [] [] []    Toileting [] [] [] [] [x] [] [] [] [] []    Upper Body Dressing [] [] [x] [] [] [] [] [] [] []    Lower Body Dressing [] [] [] [] [] [] [x] [] [] []    Feeding [] [] [] [] [] [] [] [] [] []    Grooming [] [] [] [] [x] [] [] [] [] []    Personal Device Care [] [] [] [] [] [] [] [] [] []    Functional Mobility [] [] [] [x] [x] [] [] [] [] []    I=Independent, Mod I=Modified Independent, S=Supervision/Setup, SBA=Standby Assistance, CGA=Contact Guard Assistance, Min=Minimal Assistance, Mod=Moderate Assistance, Max=Maximal Assistance, Total=Total Assistance, NT=Not Tested    PLAN:     FREQUENCY/DURATION     for duration of hospital stay or until stated goals are met, whichever comes first.    ACUTE OCCUPATIONAL THERAPY GOALS:   (Developed with and agreed upon by patient and/or caregiver.)    GOALS:   DISCHARGE GOALS (in preparation for going home/rehab):  3 days  1. Ms. Carolina Sharpe will perform lower body dressing activity with minimal assist required to demonstrate improved functional mobility and safety. 2.  Ms. Carolina Sharpe will perform bathing activity with minimal assist required to demonstrate improved functional mobility and safety. 3.  Ms. Carolina Sharpe will perform toileting activity with  contact guard assist to demonstrate improved functional mobility and safety. 4.  Ms. Carolina Sharpe will perform all functional transfers transfer with contact guard assist to demonstrate improved functional mobility and safety.       PROBLEM LIST:   (Skilled intervention is medically necessary to address:)  Decreased ADL/Functional Activities  Decreased Activity Tolerance  Decreased Balance  Decreased Cognition  Decreased Coordination  Decreased Gait Ability  Decreased Safety Awareness  Decreased Strength  Decreased Transfer Abilities   INTERVENTIONS PLANNED:   (Benefits and precautions of occupational therapy have been discussed with the patient.)  Self Care Training  Therapeutic Activity  Therapeutic Exercise/HEP  Neuromuscular Re-education  Manual Therapy  Education         TREATMENT:     EVALUATION: LOW COMPLEXITY: (Untimed Charge)    TREATMENT:   Co-Treatment PT/OT necessary due to patient's decreased overall endurance/tolerance levels, as well as need for high level skilled assistance to complete functional transfers/mobility and functional tasks  Self Care (30 minutes): Patient participated in toileting, upper body dressing, lower body dressing, and grooming ADLs in unsupported sitting and standing with moderate verbal, manual, and tactile cueing to increase independence, increase activity tolerance, and increase safety awareness. Patient also participated in functional mobility, functional transfer, energy conservation, and adaptive equipment training to increase independence, increase activity tolerance, and increase safety awareness.      AFTER TREATMENT PRECAUTIONS: Bed/Chair Locked, Call light within reach, Chair, Needs within reach, and RN notified    INTERDISCIPLINARY COLLABORATION:  RN/ PCT and PT/ PTA    EDUCATION:  Education Given To: Patient, Family  Education Provided: Role of Therapy, Plan of Care, Precautions, ADL Adaptive Strategies, Transfer Training, Energy Conservation, Equipment, Fall Prevention Strategies  Education Method: Demonstration, Verbal  Barriers to Learning: None  Education Outcome: Verbalized understanding, Demonstrated understanding, Continued education needed  [] Safe And Effective Hygiene  [x] Fall Precautions  [x] Hip Precautions  [] D/C Instruction Review [] Prosthesis Review  [x] Walker Management/Safety  [] Adaptive Equipment as Needed  [x] Therapeutic Resting Position of Joint       TOTAL TREATMENT DURATION AND TIME:  Time In: 1218  Time Out: 9823  Minutes: [address] [place], [place]

## 2022-08-02 NOTE — PERIOP NOTE
TRANSFER - OUT REPORT:    Verbal report given to [facility] on Marina Alcantara  being transferred to [id number] for routine post-op       Report consisted of patients Situation, Background, Assessment and   Recommendations(SBAR). Information from the following report(s) Surgery Report, MAR, and Cardiac Rhythm NSR  was reviewed with the receiving nurse. Lines:   Peripheral IV 08/02/22 Right;Posterior Hand (Active)   Site Assessment Clean, dry & intact 08/02/22 0843   Line Status Infusing 08/02/22 0843   Line Care Connections checked and tightened 08/02/22 0843   Phlebitis Assessment No symptoms 08/02/22 0843   Infiltration Assessment 0 08/02/22 0843   Alcohol Cap Used No 08/02/22 0843   Dressing Status Clean, dry & intact 08/02/22 0843   Dressing Type Transparent 08/02/22 0843        Opportunity for questions and clarification was provided. VTE prophylaxis orders have been written for Marina Alcantara. Patient and family given floor number and nurses name. Family updated re: pt status after security code verified.

## 2022-08-02 NOTE — OP NOTE
Total Hip Procedure Note               Satish Gilliland, [id number], 1935    Pre-operative Diagnosis: left Hip Arthritis Arthritis of left hip [M16.12]      Post-operative Diagnosis: Same     Procedure: Total Hip Arthroplasty     BMI: Body mass index is 26.78 kg/m². [facility] Location: [address] [place] [address]      Surgeon: King Aguilar MD      Assistant: KIERSTEN Dennison    Anesthesia: Spinal  BMI:Body mass index is 26.78 kg/m². EBL: 150 cc     Procedure: Total Hip Arthroplasty    The complexity of total joint surgery requires the use of a skilled first assistant for positioning, retraction and assistance in closure. The patient's size and surgical complexity makes implantation and proper insertion of total joint implants more difficult requiring a skilled first assistant Satish Gilliland was brought to the operating room and positioned on the operating table. Anesthesia was administered. IV antibiotics were administered, along with IV transexamic acid. A time out identifying the patient, procedure, operative side and surgeon was administered and charted by the OR Nurse. Satish Gilliland was positioned in the lateral decubitus position. The limb was prepped and draped in the usual sterile fashion. The Posterior approach was utilized to expose the hip. The incision was carried through the subcutaneous tissue and underlying fascia with hemostasis obtained using the bovie cauterization. A Charmley retractor was inserted. The short external rotators were divided at their insertion. The sciatic nerve was palpated and identified. Next, a T-shaped capsular incision was accomplished and femoral head was dislocated. The articular surface revealed loss of cartilage, exposed bone and bone spurring. The neck was osteotomized in the appropriate position just above the lesser trochanteric region. The neck cut was measured to be 15 mm.     Acetabular retractors were placed and the appropriate capsulotomy performed. Soft tissue was removed from the acetabulum. The acetabular surface revealed loss of cartilage with exposed bone. The acetabulum was sequentially reamed. Using trial components, the acetabulum was sized to a 52 mm [place] acetabular cup. The acetabular component was impacted to achieve appropriate horizontal tilt, anteversion, coverage, and stability. 1 screw was  used to stabilize the cup. The polyethelene liner was impacted into position. Utilizing the femoral retractor, the canal was prepared with appropriate lateralization with the starter rasp. The canal was then broached progressively. The broach was positioned with the appropriate anatomic femoral anteversion. A calcar planar was utilized. A trial reduction with a +5 neck length was utilized. This was found to be the most stable to flexion greater than 90 degrees and on internal and external rotation. Limb lengths were thought to be equilibrated and with appropriate stability as mentioned above. All trial components were removed. The cementless permanent size 6 std offset ACTIS  was impacted into place. A trial reduction was performed and the above neck length was selected. The permanent Biolox femoral head was impacted into place. Muriel Barajas's hip was reduced and stability was as mentioned above. Copious irrigation was accomplished about the surgical site. The sciatic nerve was palpated and noted to be intact. The capsule was repaired with a #1 PDS and the external rotators were reattached with a #5 Mersilene. A lavage betadine was allowed to soak in the wound for 3 minutes after implanting of the prosthesis. The wound was irrigated with Saline again before closure. The joint and skin areas were sprinkled with 1 gm of vancomycin powder. Prior to the final skin closure, full strength betadine was applied to the skin surrounding the skin incision.      The operative hip was injected prior to closure for post operative pain

## 2022-08-02 NOTE — PROGRESS NOTES
08/02/22 1046   Oxygen Therapy/Pulse Ox   O2 Therapy Room air   O2 Device None (Room air)   Heart Rate 73   SpO2 93 %   Pulse Oximeter Device Mode Intermittent   Pulse Oximeter Device Location Finger   $Pulse Oximeter $Spot check (single)   Incentive Spirometry Tx   Treatment Effort Assisted by RT   Achieved Volume (mL) 1000 mL Smoking cessation discussed.

## 2022-08-02 NOTE — PROGRESS NOTES
TRANSFER - IN REPORT:    Verbal report received from [facility] on Chika Still  being received from PACU for routine post-op      Report consisted of patient's Situation, Background, Assessment and   Recommendations(SBAR). Information from the following report(s) Surgery Report, Intake/Output, MAR, and Recent Results was reviewed with the receiving nurse. Opportunity for questions and clarification was provided. Assessment completed upon patient's arrival to unit and care assumed.

## 2022-08-02 NOTE — ANESTHESIA PROCEDURE NOTES
Spinal Block    Patient location during procedure: OR  End time: 8/2/2022 7:28 AM  Reason for block: primary anesthetic  Staffing  Performed: anesthesiologist   Anesthesiologist: Daisy Poole MD  Spinal Block  Patient position: sitting  Prep: ChloraPrep  Patient monitoring: cardiac monitor, continuous pulse ox and frequent blood pressure checks  Approach: midline  Location: L3/L4  Provider prep: mask and sterile gloves  Needle  Needle type: Susan   Needle gauge: 22 G  Needle length: 3.5 in  Assessment  Events: SAB placement uncomplicated. No paresthesia. Swirl obtained: Yes  CSF: clear  Attempts: 2  Hemodynamics: stable  Additional Notes  3 cc 1% lidocaine local injected at needle insertion site. Risks discussed including damage to muscle or nerve.   Preanesthetic Checklist  Completed: patient identified, IV checked, site marked, risks and benefits discussed, surgical/procedural consents, equipment checked, pre-op evaluation, timeout performed, anesthesia consent given, oxygen available and monitors applied/VS acknowledged

## 2022-08-02 NOTE — DISCHARGE INSTRUCTIONS
[facility]      Patient Discharge Instructions    Ye Daniel / [id number] : 1935    Admitted 2022 Discharged: 2022     IF YOU HAVE ANY PROBLEMS ONCE YOU ARE AT HOME CALL THE FOLLOWING NUMBERS:   Main office number: [phone]      Medications    The medications you are to continue on are listed on the medication reconciliation sheet. Narcotic pain medications as well as supplemental iron can cause constipation. If this occurs try stopping the narcotic pain medication and/or the iron. It is important that you take the medication exactly as they are prescribed. Medications which increase your risk of blood clots are listed to stop for 5 weeks after surgery as well as medications or supplements which increase your risk of bleeding complications. Keep your medication in the bottles provided by the pharmacist and keep a list of the medication names, dosages, and times to be taken in your wallet. Do not take other medications without consulting your doctor. Important Information    Do NOT smoke as this will greatly increase your risk of infection! Resume your prehospital diet. If you have excessive nausea or vomitting call your doctor's office     Leg swelling and warmth is normal for 6 months after surgery. If you experience swelling in your leg elevate you leg while laying down with your toes above your heart. If you have sudden onset severe swelling with leg pain call our office. Use Addy Hose stockings until we see you in the office for your follow up appointment. The stitches deep inside take approximately 6 months to dissolve. There will be sharp shooting, stinging and burning pain. This is normal and will resolve between 3-6 months after surgery. Difficulty sleeping is normal following total Knee and Hip replacement. You may try melatonin, an over-the-counter sleep aid or benadryl to help with sleep.  Most patients will resume sleeping through the night 8 weeks after surgery. Home Physical Therapy is arranged. Home Health will contact you within 48 hrs of discharge that you have chosen. If you have not received a call within this time frame please contact that provider you chose. You should be given this information before you leave the hospital.     You are at a risk for falls. Use the rolling walker when walking. Patients who have had a joint replacement should not drive if they are still taking narcotic pain mediation during the daytime hours. Most patients wean themselves off of pain medication within 2-5 weeks after surgery. When to Call the office    - If you have a temperature greater then 101  - Uncontrolled vomiting   - Loose control of your bladder or bowel function  - Are unable to bear any wieght   - Need a pain medication refill     Information obtained by :  I understand that if any problems occur once I am at home I am to contact my physician. I understand and acknowledge receipt of the instructions indicated above. Physician's or R.N.'s Signature                                                                  Date/Time                                                                                                                                                   Hip Replacement Surgery (Posterior): What to Expect at Home  Your Recovery  Hip replacement surgery replaces the worn parts of your hip joint. When you leave the hospital, you will probably be walking with crutches or a walker. You may be able to climb a few stairs and get in and out of bed and chairs. But you will need someone to help you at home until you have more energy and can movearound better. You will go home with a bandage and stitches, staples, skin glue, or tape strips. You can remove the bandage when your doctor tells you to. If you have stitches or staples, your doctor will remove them about 2 weeks after your surgery. Glue or tape strips will fall off on their own over time. You may still have some mild pain, and the area may be swollen for 3 to 4 months aftersurgery. Your doctor may give you medicine for the pain. You will continue the rehabilitation program (rehab) you started in the hospital. The better you do with your rehab exercises, the sooner you will get your strength and movement back. Most people are able to return to work 4 weeksto 4 months after surgery. This care sheet gives you a general idea about how long it will take for you to recover. But each person recovers at a different pace. Follow the steps belowto get better as quickly as possible. How can you care for yourself at home? Activity    Your doctor may not want your affected leg to cross the center of your body toward the other leg. If so, your therapist may suggest these ideas:  Do not cross your legs. Be very careful as you get in or out of bed or a car so your leg does not cross the imaginary line in the middle of your body. Go slowly when you climb stairs. Make sure the lights are on. Have someone watch you, if you can. When you climb stairs:  Step up first with your unaffected leg. Then bring the affected leg up to the same step. Bring your crutches or cane up. To go down stairs, reverse the order. First, put your crutches or cane on the lower step. Then bring the affected leg down to that step. Finally, step down with the unaffected leg. You can ride in a car, but stop at least once every hour to get out and walk around. You may want to sleep on your back. Don't reach down too far to pull up blankets when you lie in bed. If your doctor recommends exercises, do them as directed. You can cut back on your exercises if your muscles start to ache, but don't stop doing them. Rest when you feel tired.  You may take a nap, but don't stay in bed all day. Work with your physical therapist to learn the best way to exercise. You will probably have to use a walker, crutches, or a cane for at least 4 to 6 weeks. Your doctor may advise you to stay away from activities that put stress on the joint. This includes sports such as tennis, football, and jogging. Try not to sit for too long at one time. You will feel less stiff if you take a short walk about every hour. When you sit, use chairs with arms, and don't sit in low chairs. Do not bend over more than 90 degrees (like the angle in a letter \"L\"). Sleep on your back with your legs slightly apart or on your side with a pillow between your knees for about 6 weeks or as your doctor tells you. Do not sleep on your stomach or affected leg. Ask your doctor when you can drive again. Most people are able to return to work 4 weeks to 4 months after surgery. Ask your doctor when it is okay for you to have sex. Diet    By the time you leave the hospital, you will probably be eating your normal diet. Your doctor may recommend that you take iron and vitamin supplements. Drink plenty of fluids (unless your doctor tells you not to). Eat healthy foods, and watch your portion sizes. Try to stay at your ideal weight. Too much weight puts more stress on your new hip joint. You may notice that your bowel movements are not regular right after your surgery. This is common. Try to avoid constipation and straining with bowel movements. You may want to take a fiber supplement every day. If you have not had a bowel movement after a couple of days, ask your doctor about taking a mild laxative. Medicines    Your doctor will tell you if and when you can restart your medicines. You will also get instructions about taking any new medicines. If you take aspirin or some other blood thinner, ask your doctor if and when to start taking it again.  Make sure that you understand exactly what your doctor wants you to do. Your doctor may give you a blood-thinning medicine to prevent blood clots. If you take a blood thinner, be sure you get instructions about how to take your medicine safely. Blood thinners can cause serious bleeding problems. This medicine could be in pill form or as a shot (injection). If a shot is necessary, your doctor will tell you how to do this. Be safe with medicines. Take pain medicines exactly as directed. If the doctor gave you a prescription medicine for pain, take it as prescribed. If you are not taking a prescription pain medicine, ask your doctor if you can take an over-the-counter medicine. If you think your pain medicine is making you sick to your stomach: Take your medicine after meals (unless your doctor has told you not to). Ask your doctor for a different pain medicine. If your doctor prescribed antibiotics, take them as directed. Do not stop taking them just because you feel better. You need to take the full course of antibiotics. Incision care    If your doctor told you how to care for your cut (incision), follow your doctor's instructions. You will have a dressing over the cut. A dressing helps the incision heal and protects it. Your doctor will tell you how to take care of this. If you did not get instructions, follow this general advice: If you have strips of tape on the cut the doctor made, leave the tape on for a week or until it falls off. If you have stitches or staples, your doctor will tell you when to come back to have them removed. If you have skin glue on the cut, leave it on until it falls off. Skin glue is also called skin adhesive or liquid stitches. Change the bandage every day. Wash the area daily with warm water, and pat it dry. Don't use hydrogen peroxide or alcohol. They can slow healing. You may cover the area with a gauze bandage if it oozes fluid or rubs against clothing. You may shower 24 to 48 hours after surgery. Pat the incision dry. Don't swim or take a bath for the first 2 weeks, or until your doctor tells you it is okay. Exercise    Your physical therapist will teach you exercises to do at home. Always do them as your therapist tells you. Avoid activities where you might fall. Ice and elevation    For pain, put ice or a cold pack on the area for 10 to 20 minutes at a time. Put a thin cloth between the ice and your skin. If your doctor recommended cold therapy using a portable machine, follow the instructions that came with the machine. Your ankle may swell for about 3 months. Prop up your ankle when you ice it or anytime you sit or lie down. Try to keep it above the level of your heart. This will help reduce swelling. Other instructions    Wear compression stockings if your doctor told you to. These may help to prevent blood clots. Your doctor will tell you how long you need to keep wearing the compression stockings. Try to prevent falls. To avoid falling:  Arrange furniture so that you will not trip on it. Get rid of throw rugs, and move electrical cords out of the way. Walk only in areas with plenty of light. Put grab bars in showers and bathtubs. Try to avoid icy or snowy sidewalks. Choose shoes with good traction, or consider using traction devices that attach to your shoes. Wear shoes with sturdy, flat soles. Follow-up care is a key part of your treatment and safety. Be sure to make and go to all appointments, and call your doctor if you are having problems. It's also a good idea to know your test results and keep alist of the medicines you take. When should you call for help? Call 911 anytime you think you may need emergency care. For example, call if:    You passed out (lost consciousness). You have severe trouble breathing. You have sudden chest pain and shortness of breath, or you cough up blood.    Call your doctor now or seek immediate medical care if:    You have signs that your hip may be dislocated, including:  Severe pain and not being able to stand. A crooked leg that looks like your hip is out of position. Not being able to bend or straighten your leg. Your leg or foot is cool or pale or changes color. You cannot feel or move your leg. You have signs of a blood clot, such as:  Pain in your calf, back of the knee, thigh, or groin. Redness and swelling in your leg or groin. Your incision comes open and begins to bleed, or the bleeding increases. You feel like your heart is racing or beating irregularly. You have signs of infection, such as: Increased pain, swelling, warmth, or redness. Red streaks leading from the incision. Pus draining from the incision. A fever. Watch closely for changes in your health, and be sure to contact your doctor if:    You do not have a bowel movement after taking a laxative. You do not get better as expected. Where can you learn more? Go to [email or web address]://[email or web address]. org and sign in to your [employer] account. Enter Z771 in the Impinj box to learn more about \"Hip Replacement Surgery (Posterior): What to Expect at Home. \"     If you do not have an account, please click on the \"Sign Up Now\" link. Current as of: March 9, 2022               Content Version: 13.3  © 5379-0950 [employer], [employer]. Care instructions adapted under license by [facility] ([facility]). If you have questions about a medical condition or this instruction, always ask your healthcare professional. [place] [address] any warranty or liability for your use of this information.

## 2022-08-02 NOTE — H&P
The patient has end stage arthritis of the left hip. The patient was see and examined and there are no changes to the patient's orthopedic condition. They have tried conservative treatment for this condition; including antiinflammatories and lifestyle modifications and have failed. The necessity for the joint replacement is still present, and the H&P from the office is still current. The patient is admitted today forProcedure(s) (LRB):  LEFT HIP TOTAL ARTHROPLASTY (Left) .

## 2022-08-02 NOTE — PROGRESS NOTES
PHYSICAL THERAPY JOINT CAMP: TOTAL HIP ARTHROPLASTY Initial Assessment, Daily Note, and PM  (Link to Caseload Tracking: PT Visit Days : 1  Acknowledge Orders  Time In/Out  PT Charge Capture  Rehab Caseload Tracker  Episode   Jose Bowers is a 81 y.o. female   PRIMARY DIAGNOSIS: Arthritis of left hip  Arthritis of left hip [M16.12]  Osteoarthritis of left hip, unspecified osteoarthritis type [M16.12]  Procedure(s) (LRB):  LEFT HIP TOTAL ARTHROPLASTY (Left)  Day of Surgery  Reason for Referral: Pain in left hip (M25.552)  Stiffness of Left Hip, Not elsewhere classified (M25.652)  Difficulty in walking, Not elsewhere classified (R26.2)  Other abnormalities of gait and mobility (R26.89)  History of falling (Z91.81)  Outpatient in a bed: Payor: MEDICARE / Plan: MEDICARE PART A AND B / Product Type: *No Product type* /     REHAB RECOMMENDATIONS:   Recommendation to date pending progress:  Setting:  Short-term Rehab    Equipment:    To Be Determined     GAIT: I Mod I S SBA CGA Min Mod Max Total  NT x2 Comments:   Level of Assistance [] [] [] [x] [x] [] [] [] [] [] []    Weightbearing Status  Right Lower Extremity Weight Bearing: Weight Bearing As Tolerated    Distance  50 feet    Gait Quality Antalgic, Decreased lizett , Decreased step clearance, Decreased step length, and Decreased stance    DME Rolling Walker     Stairs      Ramp     I=Independent, Mod I=Modified Independent, S=Supervision, SBA=Standby Assistance, CGA=Contact Guard Assistance,   Min=Minimal Assistance, Mod=Moderate Assistance, Max=Maximal Assistance, Total=Total Assistance, NT=Not Tested      ASSESSMENT:   ASSESSMENT:  Ms. Daniella Petty presents s/p L ADRIANA. At baseline she lives alone and is independent with all mobility without use of an AD. She has had a few falls and states that she ambulates looking down now for improved safety.  She is hoping to go to rehab post DC due to not having assistance at home and some memory concerns for her precautions. Today she demonstrates decreased activity tolerance, functional mobility, strength and ROM due to her post operative state.  She would continue to benefit from skilled acute care PT to address the above stated deficits and maximize rehab potential.      Outcome Measure:   HOOS-JR:  Total Raw Score (0-24 Scale): 12    SUBJECTIVE:   Ms. Dawne Aase states, \"I am number 5 of 18 kids\"     Home Environment/Prior Level of Function Lives With: Alone  Type of Home: House  Home Layout: One level  Home Access: Stairs to enter with rails  Entrance Stairs - Number of Steps: 4  Entrance Stairs - Rails: Left  Home Equipment: Trula Dry, rolling  Has the patient had two or more falls in the past year or any fall with injury in the past year?: Yes  Receives Help From: Family  ADL Assistance: Independent  Ambulation Assistance: Independent  Transfer Assistance: Independent    OBJECTIVE:     PAIN: VITAL SIGNS: LINES/DRAINS:   Pre Treatment:  Pain Assessment: None - Denies Pain      Post Treatment: 0/10 Vitals        Oxygen        None    RESTRICTIONS/PRECAUTIONS:  Restrictions/Precautions: Weight Bearing, Fall Risk  Right Lower Extremity Weight Bearing: Weight Bearing As Tolerated        Restrictions/Precautions: Weight Bearing, Fall Risk        LOWER EXTREMITY GROSS EVALUATION:  RIGHT LE   Within Functional Limits   Abnormal   Comments   Strength [x] []     Range of Motion [x] []        LEFT LE Within Functional Limits   Abnormal   Comments   Strength [] [x]  Limed due to post operative state   Range of Motion [] [x]  Limited due to post operative state     UPPER EXTREMITY GROSS EVALUATION:     Within Functional Limits   Abnormal   Comments   Strength [x] []    Range of Motion [x] []      SENSATION  Sensation []  Decreased sensation in L side of face, RN notified and made aware     COGNITION/  PERCEPTION: Intact Impaired (Comments):   Orientation [x]     Vision [x]     Hearing [x]  Typically 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 hearing aides   Cognition  [x]       MOBILITY: I Mod I S SBA CGA Min Mod Max Total  NT x2 Comments:   Bed Mobility    Rolling [] [] [] [] [] [] [] [] [] [x] []    Supine to Sit [] [] [] [] [] [] [] [] [] [x] []    Scooting [] [] [] [] [] [] [] [] [] [x] []    Sit to Supine [] [] [] [] [] [] [] [] [] [x] []    Transfers    Sit to Stand [] [] [] [] [x] [] [] [] [] [] []    Bed to Chair [] [] [] [] [] [] [] [] [] [x] []    Stand to Sit [] [] [] [] [x] [] [] [] [] [] []    Stand Pivot [] [] [] [] [] [] [] [] [] [x] []    Toilet [] [] [] [] [x] [x] [] [] [] [] []     [] [] [] [] [] [] [] [] [] [] []    I=Independent, Mod I=Modified Independent, S=Supervision, SBA=Standby Assistance, CGA=Contact Guard Assistance,   Min=Minimal Assistance, Mod=Moderate Assistance, Max=Maximal Assistance, Total=Total Assistance, NT=Not Tested    BALANCE: Good Fair+ Fair Fair- Poor NT Comments   Sitting Static [x] [] [] [] [] []    Sitting Dynamic [] [x] [] [] [] []              Standing Static [] [] [x] [] [] []    Standing Dynamic [] [] [x] [x] [] []      PLAN:   ACUTE PHYSICAL THERAPY GOALS:   (Developed with and agreed upon by patient and/or caregiver.)    (1.)Ms. Nadia Delgado will move from supine to sit and sit to supine , scoot up and down, and roll side to side in bed with INDEPENDENCE within 5 treatment day(s). (2.)Ms. Nadia Delgado will transfer from bed to chair and chair to bed with MODIFIED INDEPENDENCE using the least restrictive device within 5 treatment day(s). (3.)Ms. Nadia Delgado will ambulate with MODIFIED INDEPENDENCE for a minimum of 350 feet with the least restrictive device within 5 treatment day(s). (4.)Ms. aNdia Delgado will be able to ambulate up/down 4 steps with L HR and least restrictive device in order to safely enter her home within 7 treatment days. (5.)Ms. Nadia Delgado will be able to verbalize and maintain 3/3 hip precautions with no cueing within 7 treatment days. ________________________________________________________________________________________________     FREQUENCY AND DURATION: BID for duration of hospital stay or until stated goals are met, whichever comes first.    THERAPY PROGNOSIS: Good    PROBLEM LIST:   (Skilled intervention is medically necessary to address:)  Decreased ADL/Functional Activities, Decreased Activity Tolerance, Decreased AROM/PROM, Decreased Gait Ability, Decreased Strength, Decreased Transfer Abilities, and Increased Pain   INTERVENTIONS PLANNED:   (Benefits and precautions of physical therapy have been discussed with the patient.)  Therapeutic Activity, Therapeutic Exercise/HEP, Gait Training, Modalities, and Education       TREATMENT:   EVALUATION: LOW COMPLEXITY: (Untimed Charge)    TREATMENT:   Therapeutic Activity (25 Minutes): Therapeutic activity included Transfer Training, Ambulation on level ground, Sitting balance , Standing balance, and seated therex to improve functional Activity tolerance, Balance, Coordination, Mobility, and Strength.     TREATMENT GRID:  THERAPEUTIC  EXERCISES: DATE:  8/2/22 DATE:   DATE:      AM PM AM PM AM PM    [] [x] [] [] [] []   Ankle Pumps  x10       Quad Sets  X10 L       Gluteal Sets  x10       Hip Abd/ADduction  X10 L       Knee Slides  X10 L       Short Arc Quads  X10 L       Long Arc Quads                                    B = bilateral; AA = active assistive; A = active; P = passive      EDUCATION: Education Given To: Patient, Family  Education Provided: Role of Therapy, Home Exercise Program, Transfer Training, Plan of Care, Precautions  Education Method: Demonstration, Verbal  Barriers to Learning: None  Education Outcome: Verbalized understanding, Continued education needed  EDUCATION:  [x] Home Exercises  [x] Fall Precautions  [x] Hip Precautions  [] D/C Instruction Review [] Hip Prosthesis Review  [x] Walker Management/Safety  [] Adaptive Equipment as Needed     AFTER TREATMENT PRECAUTIONS: Bed/Chair Locked, Call light within reach, Chair, Needs within reach, and Visitors at bedside    INTERDISCIPLINARY COLLABORATION:  RN/ PCT, PT/ PTA, and OT/ ESCOTO    Compliance with Program/Exercises: Will assess as treatment progresses. Recommendations/Intent for next treatment session:  Treatment next visit will focus on increasing Ms. Barajas's independence with bed mobility, transfers, gait training, strength/ROM exercises, modalities for pain, and patient education.      TIME IN/OUT:  Time In: 1218  Time Out: [address]  Minutes: [address], PT

## 2022-08-03 VITALS
RESPIRATION RATE: 16 BRPM | WEIGHT: 153.6 LBS | OXYGEN SATURATION: 98 % | DIASTOLIC BLOOD PRESSURE: 54 MMHG | SYSTOLIC BLOOD PRESSURE: 120 MMHG | BODY MASS INDEX: 26.78 KG/M2 | HEART RATE: 73 BPM | TEMPERATURE: 97.9 F

## 2022-08-03 LAB
ERYTHROCYTE [DISTWIDTH] IN BLOOD BY AUTOMATED COUNT: 13.5 % (ref 11.9–14.6)
HCT VFR BLD AUTO: 36.7 % (ref 35.8–46.3)
HGB BLD-MCNC: 12.1 G/DL (ref 11.7–15.4)
MCH RBC QN AUTO: 31.8 PG (ref 26.1–32.9)
MCHC RBC AUTO-ENTMCNC: 33 G/DL (ref 31.4–35)
MCV RBC AUTO: 96.3 FL (ref 79.6–97.8)
MM INDURATION, POC: NORMAL (ref 0–5)
NRBC # BLD: 0 K/UL (ref 0–0.2)
PLATELET # BLD AUTO: 252 K/UL (ref 150–450)
PMV BLD AUTO: 9.5 FL (ref 9.4–12.3)
PPD, POC: NORMAL
RBC # BLD AUTO: 3.81 M/UL (ref 4.05–5.2)
SARS-COV-2 RNA RESP QL NAA+PROBE: NOT DETECTED
SOURCE: NORMAL
WBC # BLD AUTO: 13.1 K/UL (ref 4.3–11.1)

## 2022-08-03 PROCEDURE — 97530 THERAPEUTIC ACTIVITIES: CPT

## 2022-08-03 PROCEDURE — 6370000000 HC RX 637 (ALT 250 FOR IP): Performed by: ORTHOPAEDIC SURGERY

## 2022-08-03 PROCEDURE — 97535 SELF CARE MNGMENT TRAINING: CPT

## 2022-08-03 PROCEDURE — 85027 COMPLETE CBC AUTOMATED: CPT

## 2022-08-03 PROCEDURE — 36415 COLL VENOUS BLD VENIPUNCTURE: CPT

## 2022-08-03 PROCEDURE — 6370000000 HC RX 637 (ALT 250 FOR IP): Performed by: PHYSICIAN ASSISTANT

## 2022-08-03 PROCEDURE — 2580000003 HC RX 258: Performed by: PHYSICIAN ASSISTANT

## 2022-08-03 RX ORDER — ONDANSETRON 4 MG/1
4 TABLET, ORALLY DISINTEGRATING ORAL EVERY 8 HOURS PRN
Qty: 30 TABLET | Refills: 0 | Status: SHIPPED | OUTPATIENT
Start: 2022-08-03

## 2022-08-03 RX ORDER — OXYCODONE HYDROCHLORIDE 5 MG/1
5-10 TABLET ORAL EVERY 4 HOURS PRN
Qty: 60 TABLET | Refills: 0 | Status: SHIPPED | OUTPATIENT
Start: 2022-08-03 | End: 2022-08-08

## 2022-08-03 RX ORDER — POTASSIUM CHLORIDE 750 MG/1
10 TABLET, FILM COATED, EXTENDED RELEASE ORAL
Qty: 1 TABLET | Refills: 0
Start: 2022-08-03

## 2022-08-03 RX ORDER — DIAZEPAM 2 MG/1
2 TABLET ORAL EVERY 6 HOURS PRN
Qty: 20 TABLET | Refills: 0 | Status: SHIPPED | OUTPATIENT
Start: 2022-08-03 | End: 2022-08-08

## 2022-08-03 RX ORDER — ASPIRIN 81 MG/1
81 TABLET ORAL 2 TIMES DAILY
Qty: 70 TABLET | Refills: 0 | Status: SHIPPED | OUTPATIENT
Start: 2022-08-03 | End: 2022-08-03 | Stop reason: SDUPTHER

## 2022-08-03 RX ORDER — ASPIRIN 81 MG/1
81 TABLET ORAL DAILY
Qty: 30 TABLET | Refills: 0
Start: 2022-08-03 | End: 2022-09-07

## 2022-08-03 RX ORDER — FUROSEMIDE 40 MG/1
40 TABLET ORAL DAILY
Qty: 1 TABLET | Refills: 0
Start: 2022-08-03

## 2022-08-03 RX ADMIN — ESCITALOPRAM OXALATE 20 MG: 10 TABLET ORAL at 09:04

## 2022-08-03 RX ADMIN — ACETAMINOPHEN 650 MG: 325 TABLET, FILM COATED ORAL at 06:03

## 2022-08-03 RX ADMIN — Medication 400 MG: at 09:05

## 2022-08-03 RX ADMIN — OXYCODONE 10 MG: 5 TABLET ORAL at 12:21

## 2022-08-03 RX ADMIN — MEMANTINE HYDROCHLORIDE 10 MG: 5 TABLET, FILM COATED ORAL at 09:04

## 2022-08-03 RX ADMIN — HYDRALAZINE HYDROCHLORIDE 25 MG: 25 TABLET, FILM COATED ORAL at 09:05

## 2022-08-03 RX ADMIN — ACETAMINOPHEN 650 MG: 325 TABLET, FILM COATED ORAL at 12:21

## 2022-08-03 RX ADMIN — SODIUM CHLORIDE, PRESERVATIVE FREE 10 ML: 5 INJECTION INTRAVENOUS at 09:05

## 2022-08-03 RX ADMIN — FUROSEMIDE 40 MG: 40 TABLET ORAL at 09:04

## 2022-08-03 RX ADMIN — OXYCODONE 10 MG: 5 TABLET ORAL at 06:06

## 2022-08-03 RX ADMIN — SPIRONOLACTONE 25 MG: 25 TABLET ORAL at 09:05

## 2022-08-03 RX ADMIN — CARVEDILOL 12.5 MG: 6.25 TABLET, FILM COATED ORAL at 09:04

## 2022-08-03 RX ADMIN — SENNOSIDES AND DOCUSATE SODIUM 1 TABLET: 50; 8.6 TABLET ORAL at 09:04

## 2022-08-03 RX ADMIN — ASPIRIN 81 MG: 81 TABLET, COATED ORAL at 09:04

## 2022-08-03 RX ADMIN — POTASSIUM CHLORIDE 10 MEQ: 10 TABLET, EXTENDED RELEASE ORAL at 09:05

## 2022-08-03 ASSESSMENT — PAIN SCALES - GENERAL
PAINLEVEL_OUTOF10: 3
PAINLEVEL_OUTOF10: 7
PAINLEVEL_OUTOF10: 7

## 2022-08-03 ASSESSMENT — PAIN DESCRIPTION - LOCATION
LOCATION: INCISION;HIP
LOCATION: HIP

## 2022-08-03 ASSESSMENT — PAIN DESCRIPTION - DESCRIPTORS
DESCRIPTORS: ACHING
DESCRIPTORS: ACHING

## 2022-08-03 ASSESSMENT — PAIN DESCRIPTION - ORIENTATION
ORIENTATION: LEFT
ORIENTATION: LEFT

## 2022-08-03 NOTE — PROGRESS NOTES
August 3, 2022         Post Op day: 1 Day Post-Op   Admit Diagnosis: Arthritis of left hip [M16.12]  Osteoarthritis of left hip, unspecified osteoarthritis type [M16.12]  LAB:    Recent Results (from the past 24 hour(s))   COVID-19    Collection Time: 22  5:33 PM    Specimen: Nasopharyngeal   Result Value Ref Range    Source Nasopharyngeal      SARS-CoV-2, PCR Not detected NOTD     CBC    Collection Time: 22  5:35 AM   Result Value Ref Range    WBC 13.1 (H) 4.3 - 11.1 K/uL    RBC 3.81 (L) 4.05 - 5.2 M/uL    Hemoglobin 12.1 11.7 - 15.4 g/dL    Hematocrit 36.7 35.8 - 46.3 %    MCV 96.3 79.6 - 97.8 FL    MCH 31.8 26.1 - 32.9 PG    MCHC 33.0 31.4 - 35.0 g/dL    RDW 13.5 11.9 - 14.6 %    Platelets 285 692 - 133 K/uL    MPV 9.5 9.4 - 12.3 FL    nRBC 0.00 0.0 - 0.2 K/uL     Vital Signs:    Patient Vitals for the past 8 hrs:   BP Temp Temp src Pulse Resp SpO2   22 0800 131/60 -- Temporal 72 -- 98 %   22 0242 126/62 97.7 °F (36.5 °C) Oral 76 16 96 %     Temp (24hrs), Av.5 °F (36.4 °C), Min:97.1 °F (36.2 °C), Max:97.7 °F (36.5 °C)    Pain Control:      Subjective: Doing well, normal recovery experienced.      Objective:  No Acute Distress, Alert and Oriented, Neurovascular exam is normal       Assessment:   Patient Active Problem List   Diagnosis    Irritable bowel syndrome    Distorted taste    Tinnitus of left ear    Anxiety    Severe episode of recurrent major depressive disorder, without psychotic features (HCC)    Venous (peripheral) insufficiency    Glucose intolerance (impaired glucose tolerance)    Age-related osteoporosis without current pathological fracture    Constipation by delayed colonic transit    Diaphragmatic hernia without obstruction or gangrene    Chronic left-sided headaches    Dyskinesia of esophagus    Macular pucker, left eye    Renal cell carcinoma of left kidney (HCC)    Essential hypertension, benign    Coronary artery disease of native artery of native heart with stable angina pectoris (HCC)    Diastolic CHF, chronic ([facility])    S/P CABG (coronary artery bypass graft)    Diverticulosis of large intestine    Hypertensive CHF (congestive heart failure) ([facility])    Bilateral leg pain    Varicose veins of bilateral lower extremities with pain    Keratoconjunctivitis sicca not specified as Sjogren's    Osteoarthrosis, shoulder region    Idiopathic peripheral neuropathy    GERD (gastroesophageal reflux disease)    Sedative, hypnotic or anxiolytic use, unspecified with unspecified sedative, hypnotic or anxiolytic-induced disorder ([facility] [address]    Mixed hyperlipidemia    S/P angioplasty with stent    Halitosis    Chest pain    Controlled type 2 diabetes mellitus without complication, without long-term current use of insulin ([facility])    PAD (peripheral artery disease) ([facility] [address]    Carotid stenosis, asymptomatic, bilateral    Chronic renal disease, stage III ([facility]) [201394]    Chronic systolic (congestive) heart failure    Arthritis of left hip    Osteoarthritis of left hip, unspecified osteoarthritis type    Pre-op testing       Status Post Procedure(s) (LRB):  LEFT HIP TOTAL ARTHROPLASTY (Left)        Plan: Continue Physical Therapy, discharge is desired now to a nursing home.    Signed By: Farrukh Vaz MD

## 2022-08-03 NOTE — PROGRESS NOTES
PHYSICAL THERAPY JOINT CAMP: TOTAL HIP ARTHROPLASTY Daily Note and AM  (Link to Caseload Tracking: PT Visit Days : 2  Acknowledge Orders  Time In/Out  PT Charge Capture  Rehab Caseload Tracker  Episode   Chanell Malave is a 81 y.o. female   PRIMARY DIAGNOSIS: Arthritis of left hip  Arthritis of left hip [M16.12]  Osteoarthritis of left hip, unspecified osteoarthritis type [M16.12]  Procedure(s) (LRB):  LEFT HIP TOTAL ARTHROPLASTY (Left)  1 Day Post-Op  Reason for Referral: Pain in left hip (M25.552)  Stiffness of Left Hip, Not elsewhere classified (M25.652)  Difficulty in walking, Not elsewhere classified (R26.2)  Other abnormalities of gait and mobility (R26.89)  History of falling (Z91.81)  Outpatient in a bed: Payor: MEDICARE / Plan: MEDICARE PART A AND B / Product Type: *No Product type* /     REHAB RECOMMENDATIONS:   Recommendation to date pending progress:  Setting:  Short-term Rehab    Equipment:    To Be Determined     GAIT: I Mod I S SBA CGA Min Mod Max Total  NT x2 Comments:   Level of Assistance [] [] [] [] [x] [] [] [] [] [] []    Weightbearing Status  Left Lower Extremity Weight Bearing: Weight Bearing As Tolerated    Distance  50 (+ 50) feet    Gait Quality Antalgic, Decreased lizett , Decreased step clearance, Decreased step length, and Decreased stance    DME Rolling Walker     Stairs Going to rehab     Ramp     I=Independent, Mod I=Modified Independent, S=Supervision, SBA=Standby Assistance, CGA=Contact Guard Assistance,   Min=Minimal Assistance, Mod=Moderate Assistance, Max=Maximal Assistance, Total=Total Assistance, NT=Not Tested      ASSESSMENT:   ASSESSMENT:  Ms. Fredy Bright presents s/p L ADRAINA. At baseline she lives alone and is independent with all mobility without use of an AD. She has had a few falls and states that she ambulates looking down now for improved safety. She is hoping to go to rehab post DC due to not having assistance at home and some memory concerns for her precautions. Today she demonstrates decreased activity tolerance, functional mobility, strength and ROM due to her post operative state. She would continue to benefit from skilled acute care PT to address the above stated deficits and maximize rehab potential.     8/3  Participated well. Sitting in the recliner upon arrival.  Performs and review L hip HEP with guidance to stay within the hip precaution. Review hip precaution with wood understanding. Sit>stand with walker in front and pushing up from the recliner with CGA. Ambulated 50 ft to the hallway using RW with CGA and working on step length and stride. Rested in the ivey few min. Then ambulated another 50 ft back to the room. Return to the recliner with needs in reach, ice to L hip and instructed to call for assists, before getting up. Pt is planning on going to rehab. Outcome Measure:   HOOS-JR:  Total Raw Score (0-24 Scale): 12    SUBJECTIVE:   Ms. Michaela Oliver states, \"I am doing good this morning.   I just took a pain pill\"    Home Environment/Prior Level of Function Lives With: Alone (has 2 sons and a  sister)  Type of Home: House  Home Layout: One level  Home Access: Stairs to enter with rails  Entrance Stairs - Number of Steps: 4  Entrance Stairs - Rails: Left  Bathroom Shower/Tub: Tub/Shower unit  Bathroom Toilet: Standard  Bathroom Equipment: Shower chair, 3-in-1 commode  Home Equipment: Vinny [employer], rolling  Has the patient had two or more falls in the past year or any fall with injury in the past year?: Yes  Receives Help From: Family  ADL Assistance: Independent  Homemaking Assistance: Independent  Ambulation Assistance: Independent  Transfer Assistance: Independent  Occupation: Retired    OBJECTIVE:     PAIN: VITAL SIGNS: LINES/DRAINS:   Pre Treatment:none just took a pain pill         Post Treatment: ice to L hip Vitals        Oxygen        None    RESTRICTIONS/PRECAUTIONS:  Restrictions/Precautions: Weight Bearing, Fall Risk  Right Lower Extremity Weight Bearing: Weight Bearing As Tolerated  Left Lower Extremity Weight Bearing: Weight Bearing As Tolerated        Restrictions/Precautions: Weight Bearing, Fall Risk        LOWER EXTREMITY GROSS EVALUATION:  RIGHT LE   Within Functional Limits   Abnormal   Comments   Strength [x] []     Range of Motion [x] []        LEFT LE Within Functional Limits   Abnormal   Comments   Strength [] [x]  Limed due to post operative state   Range of Motion [] [x]  Limited due to post operative state     UPPER EXTREMITY GROSS EVALUATION:     Within Functional Limits   Abnormal   Comments   Strength [x] []    Range of Motion [x] []      SENSATION  Sensation []  Decreased sensation in L side of face, RN notified and made aware     COGNITION/  PERCEPTION: Intact Impaired (Comments):   Orientation [x]     Vision [x]     Hearing [x]  Typically 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 hearing aides   Cognition  [x]       MOBILITY: I Mod I S SBA CGA Min Mod Max Total  NT x2 Comments:   Bed Mobility    Rolling [] [] [] [] [] [] [] [] [] [x] []    Supine to Sit [] [] [] [] [] [] [] [] [] [x] []    Scooting [] [] [] [] [] [] [] [] [] [x] []    Sit to Supine [] [] [] [] [] [] [] [] [] [x] []    Transfers    Sit to Stand [] [] [] [] [x] [] [] [] [] [] []    Bed to Chair [] [] [] [] [x] [] [] [] [] [] []    Stand to Sit [] [] [] [] [x] [] [] [] [] [] []    Stand Pivot [] [] [] [] [x] [] [] [] [] [] []    Toilet [] [] [] [] [] [] [] [] [] [] []     [] [] [] [] [] [] [] [] [] [] []    I=Independent, Mod I=Modified Independent, S=Supervision, SBA=Standby Assistance, CGA=Contact Guard Assistance,   Min=Minimal Assistance, Mod=Moderate Assistance, Max=Maximal Assistance, Total=Total Assistance, NT=Not Tested    BALANCE: Good Fair+ Fair Fair- Poor NT Comments   Sitting Static [x] [] [] [] [] []    Sitting Dynamic [] [x] [] [] [] []              Standing Static [] [] [x] [] [] []    Standing Dynamic [] [] [x] [x] [] []      PLAN:   ACUTE PHYSICAL THERAPY GOALS:   (Developed with and agreed upon by patient and/or caregiver.)    (1.)Ms. Jose Elias Ortiz will move from supine to sit and sit to supine , scoot up and down, and roll side to side in bed with INDEPENDENCE within 5 treatment day(s). (2.)Ms. Jose Elias Ortiz will transfer from bed to chair and chair to bed with MODIFIED INDEPENDENCE using the least restrictive device within 5 treatment day(s). (3.)Ms. Jose Elias Ortiz will ambulate with MODIFIED INDEPENDENCE for a minimum of 350 feet with the least restrictive device within 5 treatment day(s). (4.)Ms. Jose Elias Ortiz will be able to ambulate up/down 4 steps with L HR and least restrictive device in order to safely enter her home within 7 treatment days. Going to rehab  (5.)Ms. Jose Elias Ortiz will be able to verbalize and maintain 3/3 hip precautions with no cueing within 7 treatment days. ________________________________________________________________________________________________     FREQUENCY AND DURATION: BID for duration of hospital stay or until stated goals are met, whichever comes first.    THERAPY PROGNOSIS: Good    PROBLEM LIST:   (Skilled intervention is medically necessary to address:)  Decreased ADL/Functional Activities, Decreased Activity Tolerance, Decreased AROM/PROM, Decreased Gait Ability, Decreased Strength, Decreased Transfer Abilities, and Increased Pain   INTERVENTIONS PLANNED:   (Benefits and precautions of physical therapy have been discussed with the patient.)  Therapeutic Activity, Therapeutic Exercise/HEP, Gait Training, Modalities, and Education       TREATMENT:   EVALUATION: LOW COMPLEXITY: (Untimed Charge)    TREATMENT:   Therapeutic Activity (40 Minutes): Therapeutic activity included Transfer Training, Ambulation on level ground, Sitting balance , and Standing balance to improve functional Activity tolerance, Balance, Coordination, Mobility, Strength, and ROM.     TREATMENT GRID:  THERAPEUTIC  EXERCISES: DATE:  8/2/22 DATE:  8/3   DATE:      AM PM AM PM AM PM    [] [x] [] [] [] []   Ankle Pumps  x10 15      Quad Sets  X10 L 15      Gluteal Sets  x10 15      Hip Abd/ADduction  X10 L 15      Knee Slides  X10 L 15      Short Arc Quads  X10 L 15      Long Arc Quads   15                                 B = bilateral; AA = active assistive; A = active; P = passive      EDUCATION: Education Given To: Patient  Education Provided: Role of Therapy, Home Exercise Program, Precautions  Education Method: Demonstration, Verbal  EDUCATION:  [x] Home Exercises  [x] Fall Precautions  [x] Hip Precautions  [] D/C Instruction Review [] Hip Prosthesis Review  [x] Walker Management/Safety  [] Adaptive Equipment as Needed     AFTER TREATMENT PRECAUTIONS: Bed/Chair Locked, Call light within reach, Chair, Needs within reach, and RN notified    INTERDISCIPLINARY COLLABORATION:  RN/ PCT and PT/ PTA    Compliance with Program/Exercises: Will assess as treatment progresses. Recommendations/Intent for next treatment session:  Treatment next visit will focus on increasing Ms. Barajas's independence with bed mobility, transfers, gait training, strength/ROM exercises, modalities for pain, and patient education.      TIME IN/OUT:  Time In: 0700  Time Out: 0740  Minutes: [facility]

## 2022-08-03 NOTE — DISCHARGE SUMMARY
[facility]  Total Joint Discharge Summary      Patient ID:  Satish Gilliland  [id number]  81 y.o.  1935    Admit date: 8/2/2022  Discharge date and time: 8/3/2022  Admitting Physician: King Aguilar MD  Surgeon: King Aguilar  Admission Diagnoses: Arthritis of left hip [M16.12]  Osteoarthritis of left hip, unspecified osteoarthritis type [M16.12]  Discharge Diagnoses: Principal Problem:    Arthritis of left hip  Active Problems:    Osteoarthritis of left hip, unspecified osteoarthritis type    Pre-op testing    Procedure:  Left total hip arthroplasty performed on 8/0/1737 without complication. Perioperative Antibiotics: Ancef 1 to 2 mg was given depending on patients weight. If allergic to Ancef or due to other indications, patient was given Vancomycin.       Hospital Medications given:   amitriptyline, 25 mg, Nightly  carvedilol, 12.5 mg, BID WC  escitalopram, 20 mg, Daily  famotidine, 20 mg, QPM  furosemide, 40 mg, Daily  hydrALAZINE, 25 mg, BID  memantine, 10 mg, Daily  potassium chloride, 10 mEq, Once per day on Mon Wed Fri  spironolactone, 25 mg, Daily  sodium chloride flush, 5-40 mL, 2 times per day  acetaminophen, 650 mg, Q6H  sennosides-docusate sodium, 1 tablet, BID  aspirin, 81 mg, BID  magnesium oxide, 400 mg, Daily  tuberculin, 5 Units, Once      sodium chloride, Last Rate: Stopped (08/02/22 1137)  sodium chloride      diazePAM, 2 mg, Q8H PRN  sodium chloride flush, 5-40 mL, PRN  sodium chloride, , PRN  HYDROmorphone, 0.5 mg, Q3H PRN  polyethylene glycol, 17 g, Daily PRN  ondansetron, 4 mg, Q8H PRN   Or  ondansetron, 4 mg, Q6H PRN  oxyCODONE, 5 mg, Q4H PRN   Or  oxyCODONE, 10 mg, Q4H PRN  aluminum & magnesium hydroxide-simethicone, 30 mL, Q6H PRN        Additional DVT Prophylaxis:  ELIER Hose and Plexi-Pulse    Postoperative transfusions:   none    Post Op complications: none    Hemoglobin at discharge:   Lab Results   Component Value Date/Time    HGB 12.1 08/03/2022 05:35 AM       Wound appears to be healing without any evidence of infection. Physical Therapy started on the day of surgery and progressed. PT/OT:      Discharged to: Rehab facility    Discharge instructions:  - Rx pain medication given  - Anticoagulate with: Ecotrin 81 mg PO BID x 4 days (August 5th), then reduce aspirin dosage back to 81 mg daily when restarted on Xarelto on Friday, August 5th.    Resume pre hospital diet            - Resume home medications per medical continuation form     - Ambulate with walker, appropriate total joint protocol  - Follow up in office as scheduled       Signed:  Dannial Dance, PA  8/3/2022  10:50 AM

## 2022-08-03 NOTE — PROGRESS NOTES
OCCUPATIONAL THERAPY: Daily Note AM   OT Visit Days: 2   Time  OT Charge Capture  Rehab Caseload Tracker  OT Orders    June Pac is a 81 y.o. female   PRIMARY DIAGNOSIS: Arthritis of left hip  Arthritis of left hip [M16.12]  Osteoarthritis of left hip, unspecified osteoarthritis type [M16.12]  Procedure(s) (LRB):  LEFT HIP TOTAL ARTHROPLASTY (Left)  1 Day Post-Op  Outpatient in a bed: Payor: MEDICARE / Plan: MEDICARE PART A AND B / Product Type: *No Product type* /     ASSESSMENT:     REHAB RECOMMENDATIONS: CURRENT LEVEL OF FUNCTION:  (Most Recently Demonstrated)   Recommendation to date pending progress:  Setting:  Short-term Rehab    Equipment:    None--pt has RW, SPC, BSC and SC at home Bathing:  Minimal Assist  Dressing:  Minimal Assist  Feeding/Grooming:  Stand by Assist  Toileting:  Not Tested  Functional Mobility:  Contact Guard Assist     ASSESSMENT:  Ms. Brittani Cabrera is s/p left ADRIANA and presents with decreased independence with functional mobility and activities of daily living as compared to baseline level of function and safety. Today pt received in bedside chair and agreeable to ADL. Pt overall SBA-CGA RW for functional transfers (shower transfer) and mobility of household distances. Pt completed total body bathing, dressing and grooming ADLs with assistance levels noted below. Pt required mod verbal and tactile cues for safety awareness, balance and RW management throughout. Pt is progressing well toward goals, goals updated. Continue POC. Pt plans for rehab following acute care stay to increase independence with functional ADLs, balance and safety upon returning home alone.         SUBJECTIVE:     Ms. Brittani Cabrera states, \"I have one of those [facility] I put in the shower\"     Social/Functional Lives With: Alone (has 2 sons and a  sister)  Type of Home: House  Home Layout: One level  Home Access: Stairs to enter with rails  Entrance Stairs - Number of Steps: 4  Entrance Stairs - Rails: Left  Bathroom [] [] [] [] [] [] [] [] Sitting in shower   Lower Body Bathing [] [] [] [] [] [x] [] [] [] [] For balance standing utilizing grab bars    Toileting [] [] [] [] [] [] [] [] [] [x]    Upper Body Dressing [] [] [x] [] [] [] [] [] [] [] Set up donning/doffing pullover shirt   Lower Body Dressing [] [] [] [] [] [x] [] [] [] [] Donning/doffing socks and pants sitting/standing from chair   Feeding [] [] [] [] [] [] [] [] [] [x]    Grooming [] [] [] [x] [] [] [] [] [] [] Sitting on shower chair   Personal Device Care [] [] [] [] [] [] [] [] [] [x]    Functional Mobility [] [] [] [x] [x] [] [] [] [] [] RW   I=Independent, Mod I=Modified Independent, S=Supervision/Setup, SBA=Standby Assistance, CGA=Contact Guard Assistance, Min=Minimal Assistance, Mod=Moderate Assistance, Max=Maximal Assistance, Total=Total Assistance, NT=Not Tested    BALANCE: Good Fair+ Fair Fair- Poor NT Comments   Sitting Static [x] [] [] [] [] []    Sitting Dynamic [x] [] [] [] [] []              Standing Static [] [x] [] [] [] []    Standing Dynamic [] [] [x] [] [] []        PLAN:     FREQUENCY/DURATION     for duration of hospital stay or until stated goals are met, whichever comes first.    ACUTE OCCUPATIONAL THERAPY GOALS:   (Developed with and agreed upon by patient and/or caregiver.)  GOALS:  DISCHARGE GOALS (in preparation for going home/rehab):  3 days  1. Ms. Sheri Amor will perform lower body dressing activity with minimal assist required to demonstrate improved functional mobility and safety. GOAL MET 8/3/22  2. Ms. Sheri Amor will perform bathing activity with minimal assist required to demonstrate improved functional mobility and safety. GOAL MET 8/3/22  3. Ms. Sheri Amor will perform toileting activity with  contact guard assist to demonstrate improved functional mobility and safety. 4.  Ms. Sheri Amor will perform all functional transfers transfer with contact guard assist to demonstrate improved functional mobility and safety.  GOAL MET 8/3/22         TREATMENT:     TREATMENT:   Self Care (40 minutes): Patient participated in upper body bathing, lower body bathing, upper body dressing, lower body dressing, and grooming ADLs in unsupported sitting and standing with moderate verbal, manual, and tactile cueing to increase independence, decrease assistance required, and increase safety awareness. Patient also participated in functional mobility, functional transfer, and adaptive equipment training to increase independence, decrease assistance required, increase activity tolerance, and increase safety awareness.      TREATMENT GRID:  N/A    AFTER TREATMENT PRECAUTIONS: Call light within reach, Chair, Needs within reach, and RN notified    INTERDISCIPLINARY COLLABORATION:  RN/ PCT and OT/ ESCOTO    EDUCATION:       TOTAL TREATMENT DURATION AND TIME:  Time In: 0900  Time Out: 6776  Minutes: 7700 Meera Montano OT

## 2022-08-03 NOTE — PROGRESS NOTES
Pt report called to [address] [place] [address] at [facility]. All goals met. Pt leaving hospital via w/c with staff member and son to facility.

## 2022-08-03 NOTE — PROGRESS NOTES
PHYSICAL THERAPY JOINT CAMP: TOTAL HIP ARTHROPLASTY Daily Note and PM  (Link to Caseload Tracking: PT Visit Days : 2  Acknowledge Orders  Time In/Out  PT Charge Capture  Rehab Caseload Tracker  Episode   Amanda Vick is a 81 y.o. female   PRIMARY DIAGNOSIS: Arthritis of left hip  Arthritis of left hip [M16.12]  Osteoarthritis of left hip, unspecified osteoarthritis type [M16.12]  Procedure(s) (LRB):  LEFT HIP TOTAL ARTHROPLASTY (Left)  1 Day Post-Op  Reason for Referral: Pain in left hip (M25.552)  Stiffness of Left Hip, Not elsewhere classified (M25.652)  Difficulty in walking, Not elsewhere classified (R26.2)  Other abnormalities of gait and mobility (R26.89)  History of falling (Z91.81)  Outpatient in a bed: Payor: MEDICARE / Plan: MEDICARE PART A AND B / Product Type: *No Product type* /     REHAB RECOMMENDATIONS:   Recommendation to date pending progress:  Setting:  Short-term Rehab    Equipment:    To Be Determined     GAIT: I Mod I S SBA CGA Min Mod Max Total  NT x2 Comments:   Level of Assistance [] [] [] [x] [] [] [] [] [] [] []    Weightbearing Status  Left Lower Extremity Weight Bearing: Weight Bearing As Tolerated    Distance  50 (+ 50) feet    Gait Quality Antalgic, Decreased lizett , Decreased step clearance, Decreased step length, and Decreased stance    DME Rolling Walker     Stairs Going to rehab     Ramp     I=Independent, Mod I=Modified Independent, S=Supervision, SBA=Standby Assistance, CGA=Contact Guard Assistance,   Min=Minimal Assistance, Mod=Moderate Assistance, Max=Maximal Assistance, Total=Total Assistance, NT=Not Tested      ASSESSMENT:   ASSESSMENT:  Ms. Justice Vigil presents s/p L ADRIANA. At baseline she lives alone and is independent with all mobility without use of an AD. She has had a few falls and states that she ambulates looking down now for improved safety. She is hoping to go to rehab post DC due to not having assistance at home and some memory concerns for her precautions. Tolerated  Left Lower Extremity Weight Bearing: Weight Bearing As Tolerated        Restrictions/Precautions: Weight Bearing, Fall Risk        LOWER EXTREMITY GROSS EVALUATION:  RIGHT LE   Within Functional Limits   Abnormal   Comments   Strength [x] []     Range of Motion [x] []        LEFT LE Within Functional Limits   Abnormal   Comments   Strength [] [x]  Limed due to post operative state   Range of Motion [] [x]  Limited due to post operative state     UPPER EXTREMITY GROSS EVALUATION:     Within Functional Limits   Abnormal   Comments   Strength [x] []    Range of Motion [x] []      SENSATION  Sensation []  Decreased sensation in L side of face, RN notified and made aware     COGNITION/  PERCEPTION: Intact Impaired (Comments):   Orientation [x]     Vision [x]     Hearing [x]  Typically 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 hearing aides   Cognition  [x]       MOBILITY: I Mod I S SBA CGA Min Mod Max Total  NT x2 Comments:   Bed Mobility    Rolling [] [] [] [] [] [] [] [] [] [x] []    Supine to Sit [] [] [] [] [] [] [] [] [] [x] []    Scooting [] [] [] [] [] [] [] [] [] [x] []    Sit to Supine [] [] [] [] [] [] [] [] [] [x] []    Transfers    Sit to Stand [] [] [] [x] [] [] [] [] [] [] []    Bed to Chair [] [] [] [x] [] [] [] [] [] [] []    Stand to Sit [] [] [] [x] [] [] [] [] [] [] []    Stand Pivot [] [] [] [x] [] [] [] [] [] [] []    Toilet [] [] [] [] [] [] [] [] [] [] []     [] [] [] [] [] [] [] [] [] [] []    I=Independent, Mod I=Modified Independent, S=Supervision, SBA=Standby Assistance, CGA=Contact Guard Assistance,   Min=Minimal Assistance, Mod=Moderate Assistance, Max=Maximal Assistance, Total=Total Assistance, NT=Not Tested    BALANCE: Good Fair+ Fair Fair- Poor NT Comments   Sitting Static [x] [] [] [] [] []    Sitting Dynamic [] [x] [] [] [] []              Standing Static [] [] [x] [] [] []    Standing Dynamic [] [] [x] [x] [] []      PLAN:   ACUTE PHYSICAL THERAPY GOALS:   (Developed with and agreed upon by patient and/or caregiver.)    (1.)Ms. Simon Allison will move from supine to sit and sit to supine , scoot up and down, and roll side to side in bed with INDEPENDENCE within 5 treatment day(s). (2.)Ms. Simon Allison will transfer from bed to chair and chair to bed with MODIFIED INDEPENDENCE using the least restrictive device within 5 treatment day(s). (3.)Ms. Simon Allison will ambulate with MODIFIED INDEPENDENCE for a minimum of 350 feet with the least restrictive device within 5 treatment day(s). (4.)Ms. Simon Allison will be able to ambulate up/down 4 steps with L HR and least restrictive device in order to safely enter her home within 7 treatment days. Going to rehab  (5.)Ms. Simon Allison will be able to verbalize and maintain 3/3 hip precautions with no cueing within 7 treatment days. ________________________________________________________________________________________________     FREQUENCY AND DURATION: BID for duration of hospital stay or until stated goals are met, whichever comes first.    THERAPY PROGNOSIS: Good    PROBLEM LIST:   (Skilled intervention is medically necessary to address:)  Decreased ADL/Functional Activities, Decreased Activity Tolerance, Decreased AROM/PROM, Decreased Gait Ability, Decreased Strength, Decreased Transfer Abilities, and Increased Pain   INTERVENTIONS PLANNED:   (Benefits and precautions of physical therapy have been discussed with the patient.)  Therapeutic Activity, Therapeutic Exercise/HEP, Gait Training, Modalities, and Education       TREATMENT:   EVALUATION: LOW COMPLEXITY: (Untimed Charge)    TREATMENT:   Therapeutic Activity (30 Minutes): Therapeutic activity included Transfer Training, Ambulation on level ground, Sitting balance , and Standing balance to improve functional Activity tolerance, Balance, Coordination, Mobility, Strength, and ROM.     TREATMENT GRID:  THERAPEUTIC  EXERCISES: DATE:  8/2/22 DATE:  8/3   DATE:      AM PM AM PM AM PM    [] [x] [] [] [] []   Ankle Pumps  x10 15 15     Quad Sets  X10 L 15 15     Gluteal Sets  x10 15 15     Hip Abd/ADduction  X10 L 15 15     Knee Slides  X10 L 15 15     Short Arc Quads  X10 L 15 15     Long Arc Quads   15 15                                B = bilateral; AA = active assistive; A = active; P = passive      EDUCATION: Education Given To: Patient  Education Provided: Role of Therapy, Home Exercise Program, Precautions  Education Method: Demonstration, Verbal  EDUCATION:  [x] Home Exercises  [x] Fall Precautions  [x] Hip Precautions  [] D/C Instruction Review [] Hip Prosthesis Review  [x] Walker Management/Safety  [] Adaptive Equipment as Needed     AFTER TREATMENT PRECAUTIONS: Bed/Chair Locked, Call light within reach, Chair, Needs within reach, and RN notified    INTERDISCIPLINARY COLLABORATION:  RN/ PCT and PT/ PTA    Compliance with Program/Exercises: Will assess as treatment progresses. Recommendations/Intent for next treatment session:  Treatment next visit will focus on increasing Ms. Barajas's independence with bed mobility, transfers, gait training, strength/ROM exercises, modalities for pain, and patient education.      TIME IN/OUT:  Time In: 1300  Time Out: 1330  Minutes: 2164 [facility] Marc Pruitt PTA

## 2022-08-08 ENCOUNTER — TELEPHONE (OUTPATIENT)
Dept: PRIMARY CARE CLINIC | Facility: CLINIC | Age: 87
End: 2022-08-08

## 2022-08-08 NOTE — TELEPHONE ENCOUNTER
Home Health called needing verbal orders to continue home health  Nika weber [facility]

## 2022-08-09 ENCOUNTER — TELEPHONE (OUTPATIENT)
Dept: PRIMARY CARE CLINIC | Facility: CLINIC | Age: 87
End: 2022-08-09

## 2022-08-09 ENCOUNTER — TELEPHONE (OUTPATIENT)
Dept: ORTHOPEDIC SURGERY | Age: 87
End: 2022-08-09

## 2022-08-09 NOTE — TELEPHONE ENCOUNTER
Does her follow up appt need to be sooner? She needs wound care orders as well. Please give her a call.

## 2022-08-09 NOTE — TELEPHONE ENCOUNTER
[facility] - pt was admitted and having a sore throat and has lower blood pressure.  [phone]

## 2022-08-09 NOTE — TELEPHONE ENCOUNTER
Spoke with her she will let me know if she cant place steri strips and I will make an apt for for to come in sooner if so if not we will see her 4 week PO as sched.

## 2022-08-09 NOTE — TELEPHONE ENCOUNTER
Patient states that her blood pressure 110/66, she was a little dizzy. She was also having a sore throat and has not drank anything today. She is going to do a home Covid test and let us know if it is positive.

## 2022-08-12 ENCOUNTER — APPOINTMENT (OUTPATIENT)
Dept: GENERAL RADIOLOGY | Age: 87
End: 2022-08-12
Payer: MEDICARE

## 2022-08-12 ENCOUNTER — TELEPHONE (OUTPATIENT)
Dept: ORTHOPEDIC SURGERY | Age: 87
End: 2022-08-12

## 2022-08-12 ENCOUNTER — APPOINTMENT (OUTPATIENT)
Dept: ULTRASOUND IMAGING | Age: 87
End: 2022-08-12
Payer: MEDICARE

## 2022-08-12 ENCOUNTER — HOSPITAL ENCOUNTER (EMERGENCY)
Age: 87
Discharge: HOME | End: 2022-08-13
Attending: EMERGENCY MEDICINE
Payer: MEDICARE

## 2022-08-12 DIAGNOSIS — M79.89 LEG SWELLING: Primary | ICD-10-CM

## 2022-08-12 DIAGNOSIS — M79.89 LEFT LEG SWELLING: ICD-10-CM

## 2022-08-12 DIAGNOSIS — N30.00 ACUTE CYSTITIS WITHOUT HEMATURIA: ICD-10-CM

## 2022-08-12 DIAGNOSIS — Z96.642 S/P HIP REPLACEMENT, LEFT: ICD-10-CM

## 2022-08-12 LAB
ALBUMIN SERPL-MCNC: 3.5 G/DL (ref 3.2–4.6)
ALBUMIN/GLOB SERPL: 1.5 {RATIO}
ALP SERPL-CCNC: 91 U/L (ref 45–117)
ALT SERPL-CCNC: 8 U/L (ref 13–61)
ANION GAP SERPL CALC-SCNC: 9 MMOL/L (ref 7–16)
APPEARANCE UR: CLEAR
AST SERPL-CCNC: 28 U/L (ref 15–37)
BACTERIA URNS QL MICRO: ABNORMAL /HPF
BILIRUB SERPL-MCNC: 0.3 MG/DL (ref 0.2–1.1)
BILIRUB UR QL: NEGATIVE
BUN SERPL-MCNC: 11 MG/DL (ref 7–18)
CALCIUM SERPL-MCNC: 8.5 MG/DL (ref 8.3–10.4)
CASTS URNS QL MICRO: 0 /LPF
CHLORIDE SERPL-SCNC: 97 MMOL/L (ref 98–107)
CO2 SERPL-SCNC: 28 MMOL/L (ref 21–32)
COLOR UR: ABNORMAL
CREAT SERPL-MCNC: 0.75 MG/DL (ref 0.6–1)
CRYSTALS URNS QL MICRO: 0 /LPF
EPI CELLS #/AREA URNS HPF: ABNORMAL /HPF
ERYTHROCYTE [DISTWIDTH] IN BLOOD BY AUTOMATED COUNT: 14 % (ref 11.9–14.6)
GLOBULIN SER CALC-MCNC: 2.4 G/DL (ref 2.3–3.5)
GLUCOSE SERPL-MCNC: 113 MG/DL (ref 65–100)
GLUCOSE UR STRIP.AUTO-MCNC: NEGATIVE MG/DL
HCT VFR BLD AUTO: 31.2 % (ref 35.8–46.3)
HGB BLD-MCNC: 10.1 G/DL (ref 11.7–15.4)
HGB UR QL STRIP: NEGATIVE
KETONES UR QL STRIP.AUTO: ABNORMAL MG/DL
LACTATE SERPL-SCNC: 0.9 MMOL/L (ref 0.4–2)
LEUKOCYTE ESTERASE UR QL STRIP.AUTO: ABNORMAL
MCH RBC QN AUTO: 31.8 PG (ref 26.1–32.9)
MCHC RBC AUTO-ENTMCNC: 32.4 G/DL (ref 31.4–35)
MCV RBC AUTO: 98.1 FL (ref 79.6–97.8)
MUCOUS THREADS URNS QL MICRO: ABNORMAL /LPF
NITRITE UR QL STRIP.AUTO: NEGATIVE
NRBC # BLD: 0 K/UL (ref 0–0.2)
OTHER OBSERVATIONS: ABNORMAL
PH UR STRIP: 7 [PH] (ref 5–9)
PLATELET # BLD AUTO: 313 K/UL (ref 150–450)
PMV BLD AUTO: 8.8 FL (ref 9.4–12.3)
POTASSIUM SERPL-SCNC: 3.8 MMOL/L (ref 3.5–5.1)
PROT SERPL-MCNC: 5.9 G/DL (ref 6.4–8.2)
PROT UR STRIP-MCNC: NEGATIVE MG/DL
RBC # BLD AUTO: 3.18 M/UL (ref 4.05–5.2)
RBC #/AREA URNS HPF: ABNORMAL /HPF
SODIUM SERPL-SCNC: 134 MMOL/L (ref 136–145)
SP GR UR REFRACTOMETRY: 1.02 (ref 1–1.02)
UROBILINOGEN UR QL STRIP.AUTO: 0.2 EU/DL (ref 0.2–1)
WBC # BLD AUTO: 9 K/UL (ref 4.3–11.1)
WBC URNS QL MICRO: ABNORMAL /HPF

## 2022-08-12 PROCEDURE — 87186 SC STD MICRODIL/AGAR DIL: CPT

## 2022-08-12 PROCEDURE — 87086 URINE CULTURE/COLONY COUNT: CPT

## 2022-08-12 PROCEDURE — 87088 URINE BACTERIA CULTURE: CPT

## 2022-08-12 PROCEDURE — 6370000000 HC RX 637 (ALT 250 FOR IP): Performed by: EMERGENCY MEDICINE

## 2022-08-12 PROCEDURE — 85027 COMPLETE CBC AUTOMATED: CPT

## 2022-08-12 PROCEDURE — 80053 COMPREHEN METABOLIC PANEL: CPT

## 2022-08-12 PROCEDURE — 87040 BLOOD CULTURE FOR BACTERIA: CPT

## 2022-08-12 PROCEDURE — 83605 ASSAY OF LACTIC ACID: CPT

## 2022-08-12 PROCEDURE — 81001 URINALYSIS AUTO W/SCOPE: CPT

## 2022-08-12 PROCEDURE — 99284 EMERGENCY DEPT VISIT MOD MDM: CPT

## 2022-08-12 PROCEDURE — 73502 X-RAY EXAM HIP UNI 2-3 VIEWS: CPT

## 2022-08-12 PROCEDURE — 81003 URINALYSIS AUTO W/O SCOPE: CPT

## 2022-08-12 PROCEDURE — 93971 EXTREMITY STUDY: CPT

## 2022-08-12 RX ORDER — CEPHALEXIN 500 MG/1
500 CAPSULE ORAL 4 TIMES DAILY
Qty: 28 CAPSULE | Refills: 0 | Status: SHIPPED | OUTPATIENT
Start: 2022-08-12 | End: 2022-08-13

## 2022-08-12 RX ORDER — OXYCODONE HYDROCHLORIDE 5 MG/1
5 TABLET ORAL
Status: COMPLETED | OUTPATIENT
Start: 2022-08-13 | End: 2022-08-13

## 2022-08-12 RX ORDER — CEPHALEXIN 500 MG/1
500 CAPSULE ORAL
Status: COMPLETED | OUTPATIENT
Start: 2022-08-12 | End: 2022-08-12

## 2022-08-12 RX ADMIN — CEPHALEXIN 500 MG: 500 CAPSULE ORAL at 22:29

## 2022-08-12 ASSESSMENT — PAIN SCALES - GENERAL: PAINLEVEL_OUTOF10: 8

## 2022-08-12 ASSESSMENT — PAIN - FUNCTIONAL ASSESSMENT: PAIN_FUNCTIONAL_ASSESSMENT: 0-10

## 2022-08-12 NOTE — TELEPHONE ENCOUNTER
Spoke to patient- and her son, she is going to be checked out by ER in [place] to make sure she is ok

## 2022-08-12 NOTE — TELEPHONE ENCOUNTER
Pt has surg last week, and when she woke up this morning, she is have swelling and pain and cannot walk, and she also has a yeat infection

## 2022-08-12 NOTE — ED TRIAGE NOTES
Patient with pain at her left hip incision and possible fever last night. Patient had left hip replacement 8/2/22. Patient notes left leg, knee and calf into foot swelling. 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 Patient also reports a headache.

## 2022-08-12 NOTE — TELEPHONE ENCOUNTER
He says she is in a lot of pain and he wants to speak to someone. He says she has been doing great but called today and says she is hurting.

## 2022-08-13 VITALS
HEIGHT: 63 IN | HEART RATE: 74 BPM | WEIGHT: 160 LBS | TEMPERATURE: 98.5 F | DIASTOLIC BLOOD PRESSURE: 52 MMHG | OXYGEN SATURATION: 97 % | RESPIRATION RATE: 16 BRPM | BODY MASS INDEX: 28.35 KG/M2 | SYSTOLIC BLOOD PRESSURE: 119 MMHG

## 2022-08-13 PROCEDURE — 6370000000 HC RX 637 (ALT 250 FOR IP): Performed by: EMERGENCY MEDICINE

## 2022-08-13 RX ORDER — CEPHALEXIN 500 MG/1
500 CAPSULE ORAL 4 TIMES DAILY
Qty: 28 CAPSULE | Refills: 0 | Status: SHIPPED | OUTPATIENT
Start: 2022-08-13 | End: 2022-08-20

## 2022-08-13 RX ADMIN — OXYCODONE 5 MG: 5 TABLET ORAL at 00:04

## 2022-08-13 ASSESSMENT — ENCOUNTER SYMPTOMS
ABDOMINAL PAIN: 0
SHORTNESS OF BREATH: 0
RHINORRHEA: 0
DIARRHEA: 0
VOMITING: 0
COUGH: 0
COLOR CHANGE: 0
NAUSEA: 0

## 2022-08-13 ASSESSMENT — PAIN DESCRIPTION - LOCATION
LOCATION: HIP
LOCATION: HIP

## 2022-08-13 ASSESSMENT — PAIN SCALES - GENERAL
PAINLEVEL_OUTOF10: 8
PAINLEVEL_OUTOF10: 5

## 2022-08-13 ASSESSMENT — PAIN - FUNCTIONAL ASSESSMENT: PAIN_FUNCTIONAL_ASSESSMENT: 0-10

## 2022-08-13 NOTE — ED NOTES
I have reviewed discharge instructions with the patient. The patient verbalized understanding. Patient left ED via Discharge Method: wheelchair to Home with family. Opportunity for questions and clarification provided. Patient given 1 scripts. To continue your aftercare when you leave the hospital, you may receive an automated call from our care team to check in on how you are doing. This is a free service and part of our promise to provide the best care and service to meet your aftercare needs.  If you have questions, or wish to unsubscribe from this service please call [phone]. Thank you for Choosing our [facility].       Zac Durbin RN  08/13/22 [id number] temazepam for sleep was sent 10/26, please check with pharmacy what is the holdup

## 2022-08-13 NOTE — ED PROVIDER NOTES
[facility] Emergency Department Provider Note                   PCP:                Vandana Dumont MD               Age: 81 y.o. Sex: female       ICD-10-CM    1. Leg swelling  M79.89 Vascular duplex lower extremity venous left     Vascular duplex lower extremity venous left      2. S/P hip replacement, left  Z96.642       3. Left leg swelling  M79.89       4. Acute cystitis without hematuria  N30.00           DISPOSITION Decision To Discharge 08/12/2022 11:40:45 PM        MDM  Number of Diagnoses or Management Options  Acute cystitis without hematuria: new, needed workup  Left leg swelling: new, needed workup  Leg swelling  S/P hip replacement, left: new, needed workup  Diagnosis management comments: Afebrile. Vital signs stable. No leukocytosis. X-ray left hip with no acute or concerning findings noted. Ultrasound of left lower extremity negative for DVT. UA with 2+ bacteria. Urine culture added on. Patient given Keflex 500 mg po. Case discussed with Dr. Sun Morris. Discussed removing staples at this time and concern of possible infection at surgical incision site. Recommends discharge home with antibiotic coverage. Given patient with UTI, will discharge home with Keflex 500 mg 4 times daily for 7 days. Recommends the patient be seen in office on Monday. States that she will arrange for follow-up appointment on Monday for patient to be seen. Patient given strict return precautions and instructed to return if symptoms worsen or progress in any way.        Amount and/or Complexity of Data Reviewed  Clinical lab tests: ordered and reviewed  Tests in the radiology section of CPT®: ordered and reviewed  Tests in the medicine section of CPT®: ordered and reviewed  Review and summarize past medical records: yes  Independent visualization of images, tracings, or specimens: yes    Risk of Complications, Morbidity, and/or Mortality  Presenting problems: moderate  Diagnostic procedures: moderate  Management options: moderate    Patient Progress  Patient progress: stable       Orders Placed This Encounter   Procedures    Culture, Blood 1    Culture, Blood 1    Culture, Urine    XR HIP 2-3 VW W PELVIS LEFT    CBC    Comprehensive Metabolic Panel    Urinalysis w rflx microscopic    Lactic Acid    Urinalysis, Micro    Vascular duplex lower extremity venous left        Gerard Nevarez is a 81 y.o. female who presents to the Emergency Department with chief complaint of  LLE swelling/pain. Chief Complaint   Patient presents with    Leg Swelling    Post-op Problem      81year-old female with history of CAD, CHF, hypertension, peripheral venous insufficiency, hx of DVT, status post left hip replacement by Dr. Lor Soto on 8/2/22 presents with complaint of increased left lower extremity swelling and pain that is worsened over the past several days. Patient states that she is scheduled to have staples removed on this coming Tuesday. Patient also reports that she is noticed increased pain to the left hip incision site. Reports chills at home. States that she has never taken her temperature and it been elevated. Denies numbness, tingling, weakness, cough, shortness of breath, abdominal pain, urinary symptoms. Reports mild drainage to bandages present on left hip surgical incision site. States that she has been compliant with her home pain medications. Patient on Xarelto for anticoagulation. Denies any alleviating factors. States that pain is exacerbated with palpation. Rates pain as constant. Describes as sharp, cramping. The history is provided by the patient. No  was used. Review of Systems   Constitutional:  Negative for diaphoresis and fatigue. HENT:  Negative for congestion and rhinorrhea. Respiratory:  Negative for cough and shortness of breath. Cardiovascular:  Positive for leg swelling. Negative for chest pain.    Gastrointestinal:  Negative for abdominal pain, diarrhea, nausea and vomiting. Genitourinary:  Negative for dysuria and flank pain. Musculoskeletal:  Positive for arthralgias and joint swelling. Negative for neck pain and neck stiffness. Skin:  Negative for color change and pallor. Neurological:  Negative for dizziness, syncope, weakness, light-headedness and headaches. Hematological:  Does not bruise/bleed easily. Past Medical History:   Diagnosis Date    Abdominal pain 10/28/2014    Angina at rest [facility])     pt denies    Arthritis     osteo - low back,  shoulders, hips, low back    Bilateral carotid bruits     Bursitis     CAD (coronary artery disease)     4 vessel CABG 2005;--- stents x 4--- last one placed 2014-- followed by dr Corin Polanco     Chronic pain     left shoulder    Coagulation defects     Xarelto    Constipation     Cough 09/11/2014    10/8/14, Methacholine Challenge Study: The point at which the FEV1 fell by at least 20%, the PC20, occurred above a dose of 25mg/mL of methacholine. This rules out the diagnosis of asthma with an extremely high degree of sensitivity. No post-challenge FEV1 recovery was identified. Ranelle Canavan, MD        Depressive disorder     Dyspnea 09/11/2014    [facility]; 9/29/14: IMPRESSION: The flow volume loop is consistent restriction. Spirometry suggest restriction with concomitant obstruction at low lung volumes. There is not a significant bronchodilator response. Lung volumes reveal mild restriction.  The unadjusted diffusion capacity is normal.  Dallas Young MD      GERD (gastroesophageal reflux disease)     controlled with med    Headache     Hoarseness or changing voice     thougfht to be related to gerd    Hyperlipidemia     Hypertension     controlled with med    Kidney stone     yrs ago-- no tx required    Lumbago     Macular degeneration     Nodule of left lung     dx'ed 4 years ago-watched for several months-no new growth-being watched-yearly CT scan----- followed by dr. Miquel Frankel Pain in joint, ankle and foot     left 3rd toe and right 2nd toe    Pain in joint, shoulder region     left now bothering > right, right ok    Palpitations 2015    followed by dr Kristi Sales    Panic disorder     panic attacks -- last one     Renal mass 2016    ablation---left side--- followed by dr Kylie Cage in [place]    Sciatica     Unstable angina [facility])     Vascular headache         Past Surgical History:   Procedure Laterality Date    CARDIAC CATHETERIZATION      stent     CARDIAC CATHETERIZATION      stent     CATARACT REMOVAL      CATARACT REMOVAL      left    COLONOSCOPY  2016    Polyps    COLONOSCOPY  12/15/2015    diverticulosis, internal hemorrhoid, colon polyp- no further colonoscopies needed    CORONARY ARTERY BYPASS GRAFT      4 vessel CABG     HEENT Left     macular pucker    HERNIA REPAIR Left 2017    ORTHOPEDIC SURGERY      finger, foot    PARTIAL HYSTERECTOMY (CERVIX NOT REMOVED)      hyst    WA ABDOMEN SURGERY PROC UNLISTED Left 2016    ablation for mass in left side of abd    TOTAL HIP ARTHROPLASTY Left 2022    LEFT HIP TOTAL ARTHROPLASTY performed by J Carlos Gustafson MD at [address] ENDOSCOPY      Esophagus stretch        Family History   Problem Relation Age of Onset    No Known Problems Maternal Grandfather     No Known Problems Paternal Grandmother     No Known Problems Paternal Grandfather     Diabetes Mother     Heart Surgery Mother     Heart Disease Mother     Heart Attack Father     Heart Disease Father     No Known Problems Sister     Cancer Sister         2 sisters w/ lung ca-neither one smoked    Heart Disease Sister     Cancer Brother         lung ca-smoker    Heart Attack Brother          age 22 of MI    Heart Disease Sister     Heart Surgery Sister     Heart Attack Sister     Heart Disease Sister     Heart Surgery Sister     Heart Attack Sister     No Known Problems Maternal Grandmother     Heart Surgery Brother daily as needed (PRN for dizziness), Disp-90 tablet, R-2Normal      amitriptyline (ELAVIL) 25 MG tablet Take 25 mg by mouth nightlyHistorical Med      memantine (NAMENDA) 10 MG tablet Take 1 tablet by mouth daily, Disp-30 tablet, R-5Normal      magnesium oxide (MAG-OX) 400 MG tablet Take 1 tablet by mouth daily, Disp-30 tablet, R-5Normal      Multiple Vitamins-Minerals (OCUVITE ADULT FORMULA PO) Take by mouthHistorical Med      Probiotic Product (PROBIOTIC-10 PO) Take by mouthHistorical Med      carvedilol (COREG) 12.5 MG tablet Take 1 tablet by mouth 2 times daily (with meals), Disp-180 tablet, R-3Normal      potassium chloride (MICRO-K) 10 MEQ extended release capsule Take 1 capsule by mouth daily, Disp-180 capsule, R-3Normal      hydrALAZINE (APRESOLINE) 25 MG tablet TAKE 1 TABLET BY MOUTH TWICE A DAY, Disp-180 tablet, R-3Normal      rosuvastatin (CRESTOR) 40 MG tablet Take 1 tablet by mouth every evening, Disp-90 tablet, R-3Normal      escitalopram (LEXAPRO) 20 MG tablet Take 1 tablet by mouth daily TAKE 1 TABLET BY MOUTH EVERY DAY, Disp-30 tablet, R-0Normal      acetaminophen (TYLENOL) 325 MG tablet Take 500 mg by mouth every 6 hours as neededHistorical Med      loratadine (CLARITIN) 10 MG tablet Take 10 mg by mouth every eveningHistorical Med      nitroGLYCERIN (NITROLINGUAL) 0.4 MG/SPRAY 0.4 mg spray Place 1 spray under the tongueHistorical Med      spironolactone (ALDACTONE) 25 MG tablet Take 25 mg by mouth dailyHistorical Med              Vitals signs and nursing note reviewed. No data found. Physical Exam  Vitals and nursing note reviewed. Constitutional:       Appearance: Normal appearance. HENT:      Head: Normocephalic. Nose: Nose normal.      Mouth/Throat:      Mouth: Mucous membranes are moist.   Eyes:      Extraocular Movements: Extraocular movements intact. Pupils: Pupils are equal, round, and reactive to light. Cardiovascular:      Rate and Rhythm: Normal rate. URINE 2+ (*)     Mucus, UA 3+ (*)     All other components within normal limits   CULTURE, BLOOD 1   CULTURE, BLOOD 1   CULTURE, URINE   LACTIC ACID        Vascular duplex lower extremity venous left   Final Result      1. No evidence of deep vein thrombosis. XR HIP 2-3 VW W PELVIS LEFT   Final Result      1. Status post left total hip arthroplasty. Hardware elements appear intact. No   evidence periprosthetic fracture. ED Course as of 08/13/22 0519   Fri Aug 12, 2022   2146 Bacteria, UA(!): 2+ [DF]   2230 US duplex FINDINGS:     The common femoral, femoral, popliteal, peroneal and posterior tibial veins are  patent and compressible. There is color Doppler flow. No evidence of DVT. IMPRESSION:     1. No evidence of deep vein thrombosis. [DF]   2230 XR L hip FINDINGS:     There is left total hip arthroplasty. Hardware elements appear intact. There are  skin staples noted. No evidence of dislocation or periprosthetic fracture. No  erosive or destructive bone lesion is seen. There is no acute pelvic fracture. The SI joints and pubic symphysis are intact. IMPRESSION:     1. Status post left total hip arthroplasty. Hardware elements appear intact. No  evidence periprosthetic fracture. [DF]      ED Course User Index  [DF] Kishor Hartman MD        Voice dictation software was used during the making of this note. This software is not perfect and grammatical and other typographical errors may be present. This note has not been completely proofread for errors.      Hema Torrez MD  08/13/22 [id number]

## 2022-08-13 NOTE — DISCHARGE INSTRUCTIONS
Take antibiotic for UTI and for possible cellulitis around surgical wound. Follow-up with Dr. Titus Huynh on Monday. Return if symptoms worsen or progress in any way. Keep extremity elevated, rest, ice, pain control as directed.

## 2022-08-15 ENCOUNTER — CARE COORDINATION (OUTPATIENT)
Dept: CARE COORDINATION | Facility: CLINIC | Age: 87
End: 2022-08-15

## 2022-08-15 ENCOUNTER — TELEPHONE (OUTPATIENT)
Dept: ORTHOPEDIC SURGERY | Age: 87
End: 2022-08-15

## 2022-08-15 ENCOUNTER — OFFICE VISIT (OUTPATIENT)
Dept: ORTHOPEDIC SURGERY | Age: 87
End: 2022-08-15

## 2022-08-15 DIAGNOSIS — Z96.641 HISTORY OF TOTAL RIGHT HIP REPLACEMENT: Primary | ICD-10-CM

## 2022-08-15 PROCEDURE — 99024 POSTOP FOLLOW-UP VISIT: CPT | Performed by: ORTHOPAEDIC SURGERY

## 2022-08-15 RX ORDER — DIAPER,BRIEF,INFANT-TODD,DISP
EACH MISCELLANEOUS
Qty: 30 G | Refills: 1 | Status: SHIPPED | OUTPATIENT
Start: 2022-08-15 | End: 2022-08-22

## 2022-08-15 RX ORDER — OXYCODONE HYDROCHLORIDE 5 MG/1
5 TABLET ORAL EVERY 6 HOURS PRN
Qty: 20 TABLET | Refills: 0 | Status: SHIPPED | OUTPATIENT
Start: 2022-08-15 | End: 2022-08-20

## 2022-08-15 NOTE — PROGRESS NOTES
In today a few weeks post left hip arthroplasty. Went to the emergency room yesterday because of concerns. Ultrasound was negative for DVT. She had a little bit of serous drainage. She is 2 weeks out from surgery. Initially some rawness in her groin area. She was not treated for a yeast infection but this is now better. She still would like some Monistat cream available. She was the hospital yesterday with ultrasound not reveal a DVT. She did have a urinary tract infection and this is being treated Jennifer as she now feels better after the antibiotic treatment as prescribed. On exam she got a little bit area we can tell some serous drainage was present. Staples are retained. No substantial hematoma. No erythema or warmth. AP pelvis lateral hip reveal good bone prosthetic interface. These x-rays were done in the emergency room yesterday and were reviewed. Impression she is doing well 2 weeks post hip arthroplasty. She is a little slow to heal with her age and some serous drainage. No suggestion of infection. Staples are removed today. Allow her to be up and functional expect her to continue to improve. Continue with physical therapy. We will see her back in office in a month just for checkup. She will call sooner if any concern. Also request oxycodone for pain additional.  She will try nutritional supplementation with Ensure milkshake.

## 2022-08-15 NOTE — TELEPHONE ENCOUNTER
Son calling to say patient was seen in ER at [facility] and was told needs to be seen today due to pain and possible infection

## 2022-08-16 LAB
BACTERIA SPEC CULT: ABNORMAL
BACTERIA SPEC CULT: ABNORMAL
SERVICE CMNT-IMP: ABNORMAL

## 2022-08-16 NOTE — CARE COORDINATION
Ambulatory Care Coordination Note  8/16/2022    ACC: Teresa Reeves, RN    Summary Note: Pt reports feeling better since ER visit. Seen by ortho today, 2wks post ADRIANA and healing. Being followed by [facility]. Describes UTI being bigger issue, taking prescription as directed. RNCM explained role as ACM, pt agreeable to services. Will continue to follow. Prior to Admission medications    Medication Sig Start Date End Date Taking? Authorizing Provider   hydrocortisone (ALA-TANI) 1 % cream Apply topically 2 times daily. 8/15/22 8/22/22  Carole Christine MD   oxyCODONE (ROXICODONE) 5 MG immediate release tablet Take 1 tablet by mouth every 6 hours as needed for Pain for up to 5 days. Intended supply: 5 days. Take lowest dose possible to manage pain 8/15/22 8/20/22  Carole Christine MD   cephALEXin (KEFLEX) 500 MG capsule Take 1 capsule by mouth in the morning and 1 capsule at noon and 1 capsule in the evening and 1 capsule before bedtime. Do all this for 7 days. 8/13/22 8/20/22  Kishor Bernal MD   furosemide (LASIX) 40 MG tablet Take 1 tablet by mouth in the morning. 8/3/22   KIERSTEN Chahal   ondansetron (ZOFRAN-ODT) 4 MG disintegrating tablet Take 1 tablet by mouth every 8 hours as needed for Nausea or Vomiting 8/3/22   KIERSTEN Chahal   rivaroxaban (XARELTO) 20 MG TABS tablet Take 1 tablet by mouth daily (with breakfast) HOLD XARELTO UNTIL Friday, Cite Ennouzha. MAY RESUME THEN, IF NO WOUND DRAINAGE. 8/3/22   KIERSTEN Chahal   aspirin 81 MG EC tablet Take 1 tablet by mouth in the morning. TAKE ASPIRIN 81MG TWICE DAILY UNTIL Friday, Cite Ennouzha. MAY GO BACK TO TAKING ONCE DAILY WHEN RESTARTED ON Tim Part. . 8/3/22 9/7/22  KIERSTEN Chahal   potassium chloride (KLOR-CON) 10 MEQ extended release tablet Take 1 tablet by mouth three times a week Every Monday Wednesday and Friday 8/3/22   KIERSTEN Chahal   famotidine (PEPCID) 20 MG tablet Take 1 tablet by mouth every evening 7/28/22   uJan Worthington Sara Self MD   meclizine (ANTIVERT) 25 MG CHEW Take 1 tablet by mouth 3 times daily as needed (PRN for dizziness) 7/28/22   Juan Mason MD   amitriptyline (ELAVIL) 25 MG tablet Take 25 mg by mouth nightly    Historical Provider, MD   memantine (NAMENDA) 10 MG tablet Take 1 tablet by mouth daily 7/7/22   Juan Mason MD   magnesium oxide (MAG-OX) 400 MG tablet Take 1 tablet by mouth daily 7/7/22   Juan Mason MD   Multiple Vitamins-Minerals (OCUVITE ADULT FORMULA PO) Take by mouth    Historical Provider, MD   Probiotic Product (PROBIOTIC-10 PO) Take by mouth    Historical Provider, MD   carvedilol (COREG) 12.5 MG tablet Take 1 tablet by mouth 2 times daily (with meals) 6/29/22   Sophie Gallagher DO   potassium chloride (MICRO-K) 10 MEQ extended release capsule Take 1 capsule by mouth daily 6/29/22   Sophie Gallagher DO   coenzyme Q10 100 MG CAPS capsule Take 1 capsule by mouth daily 6/29/22 8/3/22  Sophie Gallagher DO   hydrALAZINE (APRESOLINE) 25 MG tablet TAKE 1 TABLET BY MOUTH TWICE A DAY 6/28/22   Juan Mason MD   rosuvastatin (CRESTOR) 40 MG tablet Take 1 tablet by mouth every evening 6/17/22   Sophie Gallagher DO   escitalopram (LEXAPRO) 20 MG tablet Take 1 tablet by mouth daily TAKE 1 TABLET BY MOUTH EVERY DAY 5/23/22 6/22/22  Juan Mason MD   acetaminophen (TYLENOL) 325 MG tablet Take 500 mg by mouth every 6 hours as needed    Ar Automatic Reconciliation   loratadine (CLARITIN) 10 MG tablet Take 10 mg by mouth every evening    Ar Automatic Reconciliation   nitroGLYCERIN (NITROLINGUAL) 0.4 MG/SPRAY 0.4 mg spray Place 1 spray under the tongue 4/18/19   Ar Automatic Reconciliation   spironolactone (ALDACTONE) 25 MG tablet Take 25 mg by mouth daily  Patient not taking: Reported on 8/12/2022 8/26/21   Ar Automatic Reconciliation   cyanocobalamin 1000 MCG tablet Take 1,000 mcg by mouth daily  8/3/22  Ar Automatic Reconciliation       Future Appointments   Date Time Provider Sindi Ortega   8/31/2022 10:55 AM Juan Mason MD POW [facility] AMB   9/2/2022  9:30 AM KIERSTEN Mena POAG [facility] AMB   9/12/2022 10:10 AM Judith Anand MD POAG [facility] AMB   9/22/2022  9:20 AM POW LAB POW [facility] AMB   9/29/2022  9:40 AM Juan Mason MD POW [facility] AMB   10/4/2022 11:30 AM DO THEODORE WhitesideDE [facility] AMB   2/13/2023 11:15 AM Abdon Álvarez MD [id number] [facility] AMB      and   General Assessment    Do you have any symptoms that are causing concern?: No

## 2022-08-17 LAB
BACTERIA SPEC CULT: NORMAL
SERVICE CMNT-IMP: NORMAL

## 2022-08-22 ENCOUNTER — CARE COORDINATION (OUTPATIENT)
Dept: CARE COORDINATION | Facility: CLINIC | Age: 87
End: 2022-08-22

## 2022-08-22 NOTE — CARE COORDINATION
Ambulatory Care Coordination Note  8/22/2022    ACC: Jed Calvert RN    Summary Note: Pt has stopped oxycodone, taking tylenol instead. Pt reports she had no sxs of UTI previously. Goals Addressed    None         Prior to Admission medications    Medication Sig Start Date End Date Taking? Authorizing Provider   hydrocortisone (ALA-TANI) 1 % cream Apply topically 2 times daily. 8/15/22 8/22/22  Pink Goldberg, MD   furosemide (LASIX) 40 MG tablet Take 1 tablet by mouth in the morning. 8/3/22   KIERSTEN Zuñiga   ondansetron (ZOFRAN-ODT) 4 MG disintegrating tablet Take 1 tablet by mouth every 8 hours as needed for Nausea or Vomiting 8/3/22   KIERSTEN Zuñiga   rivaroxaban (XARELTO) 20 MG TABS tablet Take 1 tablet by mouth daily (with breakfast) HOLD XARELTO UNTIL Friday, Cite Ennouzha. MAY RESUME THEN, IF NO WOUND DRAINAGE. 8/3/22   KIERSTEN Zuñiga   aspirin 81 MG EC tablet Take 1 tablet by mouth in the morning. TAKE ASPIRIN 81MG TWICE DAILY UNTIL Friday, Cite Ennouzha. MAY GO BACK TO TAKING ONCE DAILY WHEN RESTARTED ON Yumi Hover. . 8/3/22 9/7/22  KIERSTEN Zuñiga   potassium chloride (KLOR-CON) 10 MEQ extended release tablet Take 1 tablet by mouth three times a week Every Monday Wednesday and Friday 8/3/22   KIERSTEN Zuñiga   famotidine (PEPCID) 20 MG tablet Take 1 tablet by mouth every evening 7/28/22   Juan Cotter MD   meclizine (ANTIVERT) 25 MG CHEW Take 1 tablet by mouth 3 times daily as needed (PRN for dizziness) 7/28/22   Juan Cotter MD   amitriptyline (ELAVIL) 25 MG tablet Take 25 mg by mouth nightly    Historical Provider, MD   memantine (NAMENDA) 10 MG tablet Take 1 tablet by mouth daily 7/7/22   Juan Cotter MD   magnesium oxide (MAG-OX) 400 MG tablet Take 1 tablet by mouth daily 7/7/22   Juan Cotter MD   Multiple Vitamins-Minerals (OCUVITE ADULT FORMULA PO) Take by mouth    Historical Provider, MD   Probiotic Product (PROBIOTIC-10 PO) Take by mouth    Historical Provider, MD   carvedilol (COREG) 12.5 MG tablet Take 1 tablet by mouth 2 times daily (with meals) 6/29/22   Hannah Briones DO   potassium chloride (MICRO-K) 10 MEQ extended release capsule Take 1 capsule by mouth daily 6/29/22   Hannah Briones DO   coenzyme Q10 100 MG CAPS capsule Take 1 capsule by mouth daily 6/29/22 8/3/22  Hannah Briones DO   hydrALAZINE (APRESOLINE) 25 MG tablet TAKE 1 TABLET BY MOUTH TWICE A DAY 6/28/22   Juan Collazo MD   rosuvastatin (CRESTOR) 40 MG tablet Take 1 tablet by mouth every evening 6/17/22   Hannah Briones DO   escitalopram (LEXAPRO) 20 MG tablet Take 1 tablet by mouth daily TAKE 1 TABLET BY MOUTH EVERY DAY 5/23/22 6/22/22  Juan Collazo MD   acetaminophen (TYLENOL) 325 MG tablet Take 500 mg by mouth every 6 hours as needed    Ar Automatic Reconciliation   loratadine (CLARITIN) 10 MG tablet Take 10 mg by mouth every evening    Ar Automatic Reconciliation   nitroGLYCERIN (NITROLINGUAL) 0.4 MG/SPRAY 0.4 mg spray Place 1 spray under the tongue 4/18/19   Ar Automatic Reconciliation   spironolactone (ALDACTONE) 25 MG tablet Take 25 mg by mouth daily  Patient not taking: Reported on 8/12/2022 8/26/21   Ar Automatic Reconciliation   cyanocobalamin 1000 MCG tablet Take 1,000 mcg by mouth daily  8/3/22  Ar Automatic Reconciliation       Future Appointments   Date Time Provider Sindi Ortega   8/31/2022 10:55 AM Juan Collazo MD POW [facility] AMB   9/2/2022  9:30 AM KIERSTEN Reddy POAG [facility] AMB   9/12/2022 10:10 AM Haris Riojas MD POAG [facility] AMB   9/22/2022  9:20 AM [facility] LAB POW [facility] AMB   9/29/2022  9:40 AM Juan Collazo MD POW [facility] AMB   10/4/2022 11:30 AM Hannah Briones DO UCDE [facility] AMB   2/13/2023 11:15 AM Valentine Benedict MD [id number] [facility] AMB    and   General Assessment

## 2022-08-23 RX ORDER — AMITRIPTYLINE HYDROCHLORIDE 25 MG/1
25 TABLET, FILM COATED ORAL NIGHTLY
Qty: 30 TABLET | Refills: 2 | Status: SHIPPED | OUTPATIENT
Start: 2022-08-23

## 2022-08-29 ENCOUNTER — CARE COORDINATION (OUTPATIENT)
Dept: CARE COORDINATION | Facility: CLINIC | Age: 87
End: 2022-08-29

## 2022-08-29 NOTE — CARE COORDINATION
Ambulatory Care Coordination Note  8/29/2022    ACC: Ariel Ochoa, RN    Summary Note: Pt reports she is doing well, almost pain free, and no further sxs of UTI. Denies concerns, discussed upcoming appts. Goals Addressed                   This Visit's Progress     Conditions and Symptoms   On track     I will schedule office visits, as directed by my provider. I will keep my appointment or reschedule if I have to cancel. I will notify my provider of any barriers to my plan of care. I will notify my provider of any symptoms that indicate a worsening of my condition. Barriers: none  Plan for overcoming my barriers: N/A  Confidence: 10/10  Anticipated Goal Completion Date: 9/15/22                Prior to Admission medications    Medication Sig Start Date End Date Taking? Authorizing Provider   amitriptyline (ELAVIL) 25 MG tablet Take 1 tablet by mouth nightly 8/23/22   Juna Way MD   furosemide (LASIX) 40 MG tablet Take 1 tablet by mouth in the morning. 8/3/22   KIERSTEN Lynne   ondansetron (ZOFRAN-ODT) 4 MG disintegrating tablet Take 1 tablet by mouth every 8 hours as needed for Nausea or Vomiting 8/3/22   KIERSTEN Lynne   rivaroxaban (XARELTO) 20 MG TABS tablet Take 1 tablet by mouth daily (with breakfast) HOLD XARELTO UNTIL Friday, [facility] En[facility]ou[facility]. MAY RESUME THEN, IF NO WOUND DRAINAGE. 8/3/22   KIERSTEN Lynne   aspirin 81 MG EC tablet Take 1 tablet by mouth in the morning. TAKE ASPIRIN 81MG TWICE DAILY UNTIL Friday, [facility]. MAY GO BACK TO TAKING ONCE DAILY WHEN RESTARTED ON [facility] [place]. . 8/3/22 9/7/22  KIERSTEN Lynne   potassium chloride (KLOR-CON) 10 MEQ extended release tablet Take 1 tablet by mouth three times a week Every Monday Wednesday and Friday 8/3/22   KIERSTEN Lynne   famotidine (PEPCID) 20 MG tablet Take 1 tablet by mouth every evening 7/28/22   Juan Way MD   meclizine (ANTIVERT) 25 MG CHEW Take 1 tablet by mouth 3 times daily as needed (PRN for dizziness) 7/28/22   Maryuri Mascorro MD   memantine [facility]) 10 MG tablet Take 1 tablet by mouth daily 7/7/22   Juan Mcdonough MD   magnesium oxide (MAG-OX) 400 MG tablet Take 1 tablet by mouth daily 7/7/22   Juan Mcdonough MD   Multiple Vitamins-Minerals (OCUVITE ADULT FORMULA PO) Take by mouth    Historical Provider, MD   Probiotic Product (PROBIOTIC-10 PO) Take by mouth    Historical Provider, MD   carvedilol (COREG) 12.5 MG tablet Take 1 tablet by mouth 2 times daily (with meals) 6/29/22   Amy Paniagua DO   potassium chloride (MICRO-K) 10 MEQ extended release capsule Take 1 capsule by mouth daily 6/29/22   Amy Paniagua DO   coenzyme Q10 100 MG CAPS capsule Take 1 capsule by mouth daily 6/29/22 8/3/22  Amy Paniagua DO   hydrALAZINE (APRESOLINE) 25 MG tablet TAKE 1 TABLET BY MOUTH TWICE A DAY 6/28/22   Juan Mcdonough MD   rosuvastatin (CRESTOR) 40 MG tablet Take 1 tablet by mouth every evening 6/17/22   Amy Paniagua DO   escitalopram (LEXAPRO) 20 MG tablet Take 1 tablet by mouth daily TAKE 1 TABLET BY MOUTH EVERY DAY 5/23/22 6/22/22  Juan Mcdonough MD   acetaminophen (TYLENOL) 325 MG tablet Take 500 mg by mouth every 6 hours as needed    Ar Automatic Reconciliation   loratadine (CLARITIN) 10 MG tablet Take 10 mg by mouth every evening    Ar Automatic Reconciliation   nitroGLYCERIN (NITROLINGUAL) 0.4 MG/SPRAY 0.4 mg spray Place 1 spray under the tongue 4/18/19   Ar Automatic Reconciliation   spironolactone (ALDACTONE) 25 MG tablet Take 25 mg by mouth daily  Patient not taking: Reported on 8/12/2022 8/26/21   Ar Automatic Reconciliation   cyanocobalamin 1000 MCG tablet Take 1,000 mcg by mouth daily  8/3/22  Ar Automatic Reconciliation       Future Appointments   Date Time Provider Sindi Ortega   8/31/2022 10:55 AM Maryuri Mascorro MD POW [facility] AMB   9/2/2022  9:30 AM KIERSTEN Butler POAG [facility] AMB   9/12/2022 10:10 AM Mishel Hood Azael Nieves MD POAG [facility] AMB   9/22/2022  9:20 AM POW LAB POW [facility] AMB   9/29/2022  9:40 AM Juan Price MD POW [facility] AMB   10/4/2022 11:30 AM Candance Snipes, DO UCDE [facility] AMB   2/13/2023 11:15 AM Jori Moreira MD [id number] [facility] AMB    and   General Assessment    Do you have any symptoms that are causing concern?: No

## 2022-09-01 PROBLEM — Z01.818 PRE-OP TESTING: Status: RESOLVED | Noted: 2022-08-02 | Resolved: 2022-09-01

## 2022-09-02 ENCOUNTER — OFFICE VISIT (OUTPATIENT)
Dept: ORTHOPEDIC SURGERY | Age: 87
End: 2022-09-02

## 2022-09-02 DIAGNOSIS — Z96.642 STATUS POST LEFT HIP REPLACEMENT: Primary | ICD-10-CM

## 2022-09-02 PROCEDURE — 99024 POSTOP FOLLOW-UP VISIT: CPT | Performed by: PHYSICIAN ASSISTANT

## 2022-09-02 NOTE — PROGRESS NOTES
Name: Stella Mcbride  YOB: 1935  Gender: female  MRN: [id number]      Current Outpatient Medications:     amitriptyline (ELAVIL) 25 MG tablet, Take 1 tablet by mouth nightly, Disp: 30 tablet, Rfl: 2    furosemide (LASIX) 40 MG tablet, Take 1 tablet by mouth in the morning., Disp: 1 tablet, Rfl: 0    ondansetron (ZOFRAN-ODT) 4 MG disintegrating tablet, Take 1 tablet by mouth every 8 hours as needed for Nausea or Vomiting, Disp: 30 tablet, Rfl: 0    rivaroxaban (XARELTO) 20 MG TABS tablet, Take 1 tablet by mouth daily (with breakfast) HOLD XARELTO UNTIL Friday, [employer] [employer]. MAY RESUME THEN, IF NO WOUND DRAINAGE., Disp: 1 tablet, Rfl: 0    aspirin 81 MG EC tablet, Take 1 tablet by mouth in the morning. TAKE ASPIRIN 81MG TWICE DAILY UNTIL Friday, [employer] [employer]. MAY GO BACK TO TAKING ONCE DAILY WHEN RESTARTED ON [place]. ., Disp: 30 tablet, Rfl: 0    potassium chloride (KLOR-CON) 10 MEQ extended release tablet, Take 1 tablet by mouth three times a week Every Monday Wednesday and Friday, Disp: 1 tablet, Rfl: 0    famotidine (PEPCID) 20 MG tablet, Take 1 tablet by mouth every evening, Disp: 60 tablet, Rfl: 5    meclizine (ANTIVERT) 25 MG CHEW, Take 1 tablet by mouth 3 times daily as needed (PRN for dizziness), Disp: 90 tablet, Rfl: 2    memantine (NAMENDA) 10 MG tablet, Take 1 tablet by mouth daily, Disp: 30 tablet, Rfl: 5    magnesium oxide (MAG-OX) 400 MG tablet, Take 1 tablet by mouth daily, Disp: 30 tablet, Rfl: 5    Multiple Vitamins-Minerals (OCUVITE ADULT FORMULA PO), Take by mouth, Disp: , Rfl:     Probiotic Product (PROBIOTIC-10 PO), Take by mouth, Disp: , Rfl:     carvedilol (COREG) 12.5 MG tablet, Take 1 tablet by mouth 2 times daily (with meals), Disp: 180 tablet, Rfl: 3    potassium chloride (MICRO-K) 10 MEQ extended release capsule, Take 1 capsule by mouth daily, Disp: 180 capsule, Rfl: 3    hydrALAZINE (APRESOLINE) 25 MG tablet, TAKE 1 TABLET BY MOUTH TWICE A DAY, Disp: 180 tablet, Rfl: 3 rosuvastatin (CRESTOR) 40 MG tablet, Take 1 tablet by mouth every evening, Disp: 90 tablet, Rfl: 3    escitalopram (LEXAPRO) 20 MG tablet, Take 1 tablet by mouth daily TAKE 1 TABLET BY MOUTH EVERY DAY, Disp: 30 tablet, Rfl: 0    acetaminophen (TYLENOL) 325 MG tablet, Take 500 mg by mouth every 6 hours as needed, Disp: , Rfl:     loratadine (CLARITIN) 10 MG tablet, Take 10 mg by mouth every evening, Disp: , Rfl:     nitroGLYCERIN (NITROLINGUAL) 0.4 MG/SPRAY 0.4 mg spray, Place 1 spray under the tongue, Disp: , Rfl:     spironolactone (ALDACTONE) 25 MG tablet, Take 25 mg by mouth daily (Patient not taking: Reported on 8/12/2022), Disp: , Rfl:   Allergies   Allergen Reactions    Amoxicillin-Pot Clavulanate Other (See Comments)     Causes yeast infection    Codeine Other (See Comments)     Made my heart go crazy    Gabapentin Other (See Comments)    Hydrochlorothiazide Other (See Comments)     Hyponatremia    Nitrofurantoin Other (See Comments)    Prednisone Other (See Comments)     Visual loss and headache    Sulfamethoxazole-Trimethoprim Nausea Only       Post-op left ADRIANA    The patient returns now post total hip arthroplasty. Their pain is diminishing and the wound healing. Radiographs:  AP pelvis and lateral views of the left hip were taken in the office today and reveal good bone, prosthetic appearance. The hip is properly located. Radiographic Diagnosis:  Normal post-operative left total hip. Recommendations:    Reviewed x-ray findings with the patient. Patient is to increase their weight bearing status as tolerated. A cane can be used in transition. They are to follow the routine dislocation precautions. They are to wean from their pain medications as tolerated. I will see them back for their 6 month follow up and radiographs.

## 2022-09-06 ENCOUNTER — CARE COORDINATION (OUTPATIENT)
Dept: CARE COORDINATION | Facility: CLINIC | Age: 87
End: 2022-09-06

## 2022-09-06 RX ORDER — POTASSIUM CHLORIDE 750 MG/1
TABLET, FILM COATED, EXTENDED RELEASE ORAL
Qty: 30 TABLET | Refills: 2 | OUTPATIENT
Start: 2022-09-06

## 2022-09-06 RX ORDER — SPIRONOLACTONE 25 MG/1
TABLET ORAL
Qty: 90 TABLET | Refills: 3 | Status: SHIPPED | OUTPATIENT
Start: 2022-09-06

## 2022-09-06 NOTE — CARE COORDINATION
Ambulatory Care Coordination Note  9/6/2022    ACC: Lis Garcia, RN    Summary Note:  Pt reports some mild congestion, light colored phlegm, denies other sxs, no ha/bodyaches or fever, no nvd. States it may be coming from head, though she reports allergy testing was negative. Discussed sxs to report to her physicians. Pt continuing to heal from ADRIANA, no concerns. Discussed upcoming PCP visit 9/29/22, RNCM scheduled f/u 9/30/22. Goals Addressed                   This Visit's Progress     Conditions and Symptoms   On track     I will schedule office visits, as directed by my provider. I will keep my appointment or reschedule if I have to cancel. I will notify my provider of any barriers to my plan of care. I will notify my provider of any symptoms that indicate a worsening of my condition. Barriers: none  Plan for overcoming my barriers: N/A  Confidence: 10/10  Anticipated Goal Completion Date: 9/15/22                Prior to Admission medications    Medication Sig Start Date End Date Taking? Authorizing Provider   spironolactone (ALDACTONE) 25 MG tablet TAKE 1 TABLET BY MOUTH EVERY DAY 9/6/22   Juan Rm MD   amitriptyline (ELAVIL) 25 MG tablet Take 1 tablet by mouth nightly 8/23/22   Juan Rm MD   furosemide (LASIX) 40 MG tablet Take 1 tablet by mouth in the morning. 8/3/22   KIERSTEN Garcia   ondansetron (ZOFRAN-ODT) 4 MG disintegrating tablet Take 1 tablet by mouth every 8 hours as needed for Nausea or Vomiting 8/3/22   KIERSTEN Garcia   rivaroxaban (XARELTO) 20 MG TABS tablet Take 1 tablet by mouth daily (with breakfast) HOLD XARELTO UNTIL Friday, [facility] [place]. MAY RESUME THEN, IF NO WOUND DRAINAGE. 8/3/22   KIERSTEN Garcia   aspirin 81 MG EC tablet Take 1 tablet by mouth in the morning. TAKE ASPIRIN 81MG TWICE DAILY UNTIL Friday, [facility] En[facility]. MAY GO BACK TO TAKING ONCE DAILY WHEN RESTARTED ON [place]. . 8/3/22 9/7/22  [place], [place]   potassium chloride (KLOR-CON) 10 MEQ extended release tablet Take 1 tablet by mouth three times a week Every Monday Wednesday and Friday 8/3/22   KIERSTEN Monroe   famotidine (PEPCID) 20 MG tablet Take 1 tablet by mouth every evening 7/28/22   Juan Riojas MD   meclizine (ANTIVERT) 25 MG CHEW Take 1 tablet by mouth 3 times daily as needed (PRN for dizziness) 7/28/22   Tang Maynard MD   memantine (NAMENDA) 10 MG tablet Take 1 tablet by mouth daily 7/7/22   Juan Riojas MD   magnesium oxide (MAG-OX) 400 MG tablet Take 1 tablet by mouth daily 7/7/22   Juan Riojas MD   Multiple Vitamins-Minerals (OCUVITE ADULT FORMULA PO) Take by mouth    Historical Provider, MD   Probiotic Product (PROBIOTIC-10 PO) Take by mouth    Historical Provider, MD   carvedilol (COREG) 12.5 MG tablet Take 1 tablet by mouth 2 times daily (with meals) 6/29/22   Roc Rashid DO   potassium chloride (MICRO-K) 10 MEQ extended release capsule Take 1 capsule by mouth daily 6/29/22   Roc Rashid DO   coenzyme Q10 100 MG CAPS capsule Take 1 capsule by mouth daily 6/29/22 8/3/22  Roc Rashid DO   hydrALAZINE (APRESOLINE) 25 MG tablet TAKE 1 TABLET BY MOUTH TWICE A DAY 6/28/22   Juan Riojas MD   rosuvastatin (CRESTOR) 40 MG tablet Take 1 tablet by mouth every evening 6/17/22   Roc Rashid DO   escitalopram (LEXAPRO) 20 MG tablet Take 1 tablet by mouth daily TAKE 1 TABLET BY MOUTH EVERY DAY 5/23/22 6/22/22  Juan Riojas MD   acetaminophen (TYLENOL) 325 MG tablet Take 500 mg by mouth every 6 hours as needed    Ar Automatic Reconciliation   loratadine (CLARITIN) 10 MG tablet Take 10 mg by mouth every evening    Ar Automatic Reconciliation   nitroGLYCERIN (NITROLINGUAL) 0.4 MG/SPRAY 0.4 mg spray Place 1 spray under the tongue 4/18/19   Ar Automatic Reconciliation   cyanocobalamin 1000 MCG tablet Take 1,000 mcg by mouth daily  8/3/22  Ar Automatic Reconciliation       Future Appointments Date Time Provider Sindi Ortega   9/22/2022  9:20 AM POW LAB POW [facility] AMB   9/29/2022  9:40 AM Juan Riojas MD POW [facility] AMB   10/4/2022 11:30 AM Roc Rashid DO UCDE [facility] AMB   2/3/2023 10:30 AM Mara Gardiner MD POAG [facility] AMB   2/13/2023 11:15 AM Omid Avila MD [id number] [facility] AMB    and   General Assessment    Do you have any symptoms that are causing concern?: Yes  Progression since Onset: Intermittent - Waxing/Waning  Reported Symptoms: Congestion

## 2022-09-21 DIAGNOSIS — E11.9 TYPE 2 DIABETES MELLITUS WITHOUT COMPLICATION, WITHOUT LONG-TERM CURRENT USE OF INSULIN (HCC): Primary | ICD-10-CM

## 2022-09-21 DIAGNOSIS — I10 ESSENTIAL (PRIMARY) HYPERTENSION: ICD-10-CM

## 2022-09-21 DIAGNOSIS — E87.6 HYPOKALEMIA: ICD-10-CM

## 2022-09-22 DIAGNOSIS — I10 ESSENTIAL (PRIMARY) HYPERTENSION: ICD-10-CM

## 2022-09-22 DIAGNOSIS — E87.6 HYPOKALEMIA: ICD-10-CM

## 2022-09-22 LAB
BASOPHILS # BLD: 0 K/UL (ref 0–0.2)
BASOPHILS NFR BLD: 1 % (ref 0–2)
DIFFERENTIAL METHOD BLD: ABNORMAL
EOSINOPHIL # BLD: 0.2 K/UL (ref 0–0.8)
EOSINOPHIL NFR BLD: 5 % (ref 0.5–7.8)
ERYTHROCYTE [DISTWIDTH] IN BLOOD BY AUTOMATED COUNT: 14.9 % (ref 11.9–14.6)
HCT VFR BLD AUTO: 38.6 % (ref 35.8–46.3)
HGB BLD-MCNC: 11.8 G/DL (ref 11.7–15.4)
IMM GRANULOCYTES # BLD AUTO: 0 K/UL (ref 0–0.5)
IMM GRANULOCYTES NFR BLD AUTO: 0 % (ref 0–5)
LYMPHOCYTES # BLD: 1.7 K/UL (ref 0.5–4.6)
LYMPHOCYTES NFR BLD: 38 % (ref 13–44)
MCH RBC QN AUTO: 30.4 PG (ref 26.1–32.9)
MCHC RBC AUTO-ENTMCNC: 30.6 G/DL (ref 31.4–35)
MCV RBC AUTO: 99.5 FL (ref 79.6–97.8)
MONOCYTES # BLD: 0.5 K/UL (ref 0.1–1.3)
MONOCYTES NFR BLD: 10 % (ref 4–12)
NEUTS SEG # BLD: 2.1 K/UL (ref 1.7–8.2)
NEUTS SEG NFR BLD: 46 % (ref 43–78)
NRBC # BLD: 0 K/UL (ref 0–0.2)
PLATELET # BLD AUTO: 272 K/UL (ref 150–450)
PMV BLD AUTO: 9.7 FL (ref 9.4–12.3)
RBC # BLD AUTO: 3.88 M/UL (ref 4.05–5.2)
WBC # BLD AUTO: 4.6 K/UL (ref 4.3–11.1)

## 2022-09-23 LAB
ALBUMIN SERPL-MCNC: 3.6 G/DL (ref 3.2–4.6)
ALBUMIN/GLOB SERPL: 1.4 {RATIO} (ref 1.2–3.5)
ALP SERPL-CCNC: 91 U/L (ref 50–136)
ALT SERPL-CCNC: 12 U/L (ref 12–65)
ANION GAP SERPL CALC-SCNC: 6 MMOL/L (ref 4–13)
AST SERPL-CCNC: 16 U/L (ref 15–37)
BILIRUB SERPL-MCNC: 0.4 MG/DL (ref 0.2–1.1)
BUN SERPL-MCNC: 13 MG/DL (ref 8–23)
CALCIUM SERPL-MCNC: 9.1 MG/DL (ref 8.3–10.4)
CHLORIDE SERPL-SCNC: 106 MMOL/L (ref 101–110)
CHOLEST SERPL-MCNC: 214 MG/DL
CO2 SERPL-SCNC: 26 MMOL/L (ref 21–32)
CREAT SERPL-MCNC: 0.9 MG/DL (ref 0.6–1)
GLOBULIN SER CALC-MCNC: 2.6 G/DL (ref 2.3–3.5)
GLUCOSE SERPL-MCNC: 98 MG/DL (ref 65–100)
HDLC SERPL-MCNC: 57 MG/DL (ref 40–60)
HDLC SERPL: 3.8 {RATIO}
LDLC SERPL CALC-MCNC: 139.8 MG/DL
POTASSIUM SERPL-SCNC: 4.1 MMOL/L (ref 3.5–5.1)
PROT SERPL-MCNC: 6.2 G/DL (ref 6.3–8.2)
SODIUM SERPL-SCNC: 138 MMOL/L (ref 136–145)
TRIGL SERPL-MCNC: 86 MG/DL (ref 35–150)
VLDLC SERPL CALC-MCNC: 17.2 MG/DL (ref 6–23)

## 2022-09-29 ENCOUNTER — OFFICE VISIT (OUTPATIENT)
Dept: PRIMARY CARE CLINIC | Facility: CLINIC | Age: 87
End: 2022-09-29
Payer: MEDICARE

## 2022-09-29 VITALS
BODY MASS INDEX: 27.46 KG/M2 | HEART RATE: 96 BPM | HEIGHT: 63 IN | DIASTOLIC BLOOD PRESSURE: 62 MMHG | SYSTOLIC BLOOD PRESSURE: 124 MMHG | WEIGHT: 155 LBS | TEMPERATURE: 97.6 F | OXYGEN SATURATION: 96 %

## 2022-09-29 DIAGNOSIS — E11.22 TYPE 2 DIABETES MELLITUS WITH STAGE 2 CHRONIC KIDNEY DISEASE, WITHOUT LONG-TERM CURRENT USE OF INSULIN (HCC): ICD-10-CM

## 2022-09-29 DIAGNOSIS — R30.0 DYSURIA: ICD-10-CM

## 2022-09-29 DIAGNOSIS — F41.9 ANXIETY: ICD-10-CM

## 2022-09-29 DIAGNOSIS — I10 ESSENTIAL (PRIMARY) HYPERTENSION: ICD-10-CM

## 2022-09-29 DIAGNOSIS — H93.12 TINNITUS, LEFT EAR: ICD-10-CM

## 2022-09-29 DIAGNOSIS — Z00.00 MEDICARE ANNUAL WELLNESS VISIT, SUBSEQUENT: Primary | ICD-10-CM

## 2022-09-29 DIAGNOSIS — J01.00 ACUTE NON-RECURRENT MAXILLARY SINUSITIS: ICD-10-CM

## 2022-09-29 DIAGNOSIS — N18.2 TYPE 2 DIABETES MELLITUS WITH STAGE 2 CHRONIC KIDNEY DISEASE, WITHOUT LONG-TERM CURRENT USE OF INSULIN (HCC): ICD-10-CM

## 2022-09-29 DIAGNOSIS — I73.9 PAD (PERIPHERAL ARTERY DISEASE) (HCC): ICD-10-CM

## 2022-09-29 DIAGNOSIS — F33.2 SEVERE EPISODE OF RECURRENT MAJOR DEPRESSIVE DISORDER, WITHOUT PSYCHOTIC FEATURES (HCC): ICD-10-CM

## 2022-09-29 LAB
BILIRUBIN, URINE, POC: NEGATIVE
BLOOD URINE, POC: NEGATIVE
GLUCOSE URINE, POC: NEGATIVE
KETONES, URINE, POC: ABNORMAL
LEUKOCYTE ESTERASE, URINE, POC: ABNORMAL
NITRITE, URINE, POC: NEGATIVE
PH, URINE, POC: 6 (ref 4.6–8)
PROTEIN,URINE, POC: NEGATIVE
SPECIFIC GRAVITY, URINE, POC: 1.02 (ref 1–1.03)
URINALYSIS CLARITY, POC: ABNORMAL
URINALYSIS COLOR, POC: ABNORMAL
UROBILINOGEN, POC: ABNORMAL

## 2022-09-29 PROCEDURE — 81003 URINALYSIS AUTO W/O SCOPE: CPT | Performed by: FAMILY MEDICINE

## 2022-09-29 PROCEDURE — G8417 CALC BMI ABV UP PARAM F/U: HCPCS | Performed by: FAMILY MEDICINE

## 2022-09-29 PROCEDURE — G0439 PPPS, SUBSEQ VISIT: HCPCS | Performed by: FAMILY MEDICINE

## 2022-09-29 PROCEDURE — 1090F PRES/ABSN URINE INCON ASSESS: CPT | Performed by: FAMILY MEDICINE

## 2022-09-29 PROCEDURE — 99213 OFFICE O/P EST LOW 20 MIN: CPT | Performed by: FAMILY MEDICINE

## 2022-09-29 PROCEDURE — 1036F TOBACCO NON-USER: CPT | Performed by: FAMILY MEDICINE

## 2022-09-29 PROCEDURE — 3044F HG A1C LEVEL LT 7.0%: CPT | Performed by: FAMILY MEDICINE

## 2022-09-29 PROCEDURE — 1123F ACP DISCUSS/DSCN MKR DOCD: CPT | Performed by: FAMILY MEDICINE

## 2022-09-29 PROCEDURE — G8427 DOCREV CUR MEDS BY ELIG CLIN: HCPCS | Performed by: FAMILY MEDICINE

## 2022-09-29 RX ORDER — AZITHROMYCIN 250 MG/1
250 TABLET, FILM COATED ORAL SEE ADMIN INSTRUCTIONS
Qty: 6 TABLET | Refills: 0 | Status: SHIPPED | OUTPATIENT
Start: 2022-09-29 | End: 2022-10-04

## 2022-09-29 RX ORDER — ESCITALOPRAM OXALATE 20 MG/1
20 TABLET ORAL DAILY
Qty: 90 TABLET | Refills: 3 | Status: SHIPPED | OUTPATIENT
Start: 2022-09-29 | End: 2022-10-29

## 2022-09-29 RX ORDER — SULFAMETHOXAZOLE AND TRIMETHOPRIM 800; 160 MG/1; MG/1
1 TABLET ORAL 2 TIMES DAILY
Qty: 20 TABLET | Refills: 0 | Status: SHIPPED | OUTPATIENT
Start: 2022-09-29 | End: 2022-10-09

## 2022-09-29 ASSESSMENT — PATIENT HEALTH QUESTIONNAIRE - PHQ9
10. IF YOU CHECKED OFF ANY PROBLEMS, HOW DIFFICULT HAVE THESE PROBLEMS MADE IT FOR YOU TO DO YOUR WORK, TAKE CARE OF THINGS AT HOME, OR GET ALONG WITH OTHER PEOPLE: 1
6. FEELING BAD ABOUT YOURSELF - OR THAT YOU ARE A FAILURE OR HAVE LET YOURSELF OR YOUR FAMILY DOWN: 1
8. MOVING OR SPEAKING SO SLOWLY THAT OTHER PEOPLE COULD HAVE NOTICED. OR THE OPPOSITE, BEING SO FIGETY OR RESTLESS THAT YOU HAVE BEEN MOVING AROUND A LOT MORE THAN USUAL: 1
SUM OF ALL RESPONSES TO PHQ QUESTIONS 1-9: 8
2. FEELING DOWN, DEPRESSED OR HOPELESS: 1
4. FEELING TIRED OR HAVING LITTLE ENERGY: 1
9. THOUGHTS THAT YOU WOULD BE BETTER OFF DEAD, OR OF HURTING YOURSELF: 1
SUM OF ALL RESPONSES TO PHQ QUESTIONS 1-9: 7
5. POOR APPETITE OR OVEREATING: 1
3. TROUBLE FALLING OR STAYING ASLEEP: 1
SUM OF ALL RESPONSES TO PHQ QUESTIONS 1-9: 8
7. TROUBLE CONCENTRATING ON THINGS, SUCH AS READING THE NEWSPAPER OR WATCHING TELEVISION: 1
SUM OF ALL RESPONSES TO PHQ QUESTIONS 1-9: 8

## 2022-09-29 ASSESSMENT — COLUMBIA-SUICIDE SEVERITY RATING SCALE - C-SSRS
2. HAVE YOU ACTUALLY HAD ANY THOUGHTS OF KILLING YOURSELF?: NO
1. WITHIN THE PAST MONTH, HAVE YOU WISHED YOU WERE DEAD OR WISHED YOU COULD GO TO SLEEP AND NOT WAKE UP?: NO
6. HAVE YOU EVER DONE ANYTHING, STARTED TO DO ANYTHING, OR PREPARED TO DO ANYTHING TO END YOUR LIFE?: NO

## 2022-09-29 ASSESSMENT — LIFESTYLE VARIABLES
HOW MANY STANDARD DRINKS CONTAINING ALCOHOL DO YOU HAVE ON A TYPICAL DAY: PATIENT DOES NOT DRINK
HOW OFTEN DO YOU HAVE A DRINK CONTAINING ALCOHOL: NEVER

## 2022-09-30 ENCOUNTER — CARE COORDINATION (OUTPATIENT)
Dept: CARE COORDINATION | Facility: CLINIC | Age: 87
End: 2022-09-30

## 2022-09-30 NOTE — CARE COORDINATION
Ambulatory Care Coordination Note  9/30/2022    ACC: Hesham Bertrand, MODESTO    Pt reports she is doing well, continuing recovery of ADRIANA pleased w/ outcome. HHC to graduate from services. Discussed PCP ov, has started new prescriptions. Discussed ways to prevent UTI. No questions or concerns. Will continue to follow for symptom monitoring UTI. Pt has RNCM contact information for questions or concerns. Scheduled f/u in 2wks. Offered patient enrollment in the Remote Patient Monitoring (RPM) program for in-home monitoring: NA.       Goals Addressed                   This Visit's Progress     Conditions and Symptoms   On track     I will schedule office visits, as directed by my provider. I will keep my appointment or reschedule if I have to cancel. I will notify my provider of any barriers to my plan of care. I will notify my provider of any symptoms that indicate a worsening of my condition. Barriers: none  Plan for overcoming my barriers: N/A  Confidence: 10/10  Anticipated Goal Completion Date: 9/15/22                Prior to Admission medications    Medication Sig Start Date End Date Taking? Authorizing Provider   escitalopram (LEXAPRO) 20 MG tablet Take 1 tablet by mouth daily TAKE 1 TABLET BY MOUTH EVERY DAY 9/29/22 10/29/22  Juan Perez MD   azithromycin (ZITHROMAX) 250 MG tablet Take 1 tablet by mouth See Admin Instructions for 5 days 500mg on day 1 followed by 250mg on days 2 - 5 9/29/22 10/4/22  Juan Perez MD   sulfamethoxazole-trimethoprim (BACTRIM DS;SEPTRA DS) 800-160 MG per tablet Take 1 tablet by mouth 2 times daily for 10 days 9/29/22 10/9/22  Juan Perez MD   spironolactone (ALDACTONE) 25 MG tablet TAKE 1 TABLET BY MOUTH EVERY DAY 9/6/22   Juan Perez MD   amitriptyline (ELAVIL) 25 MG tablet Take 1 tablet by mouth nightly 8/23/22   Juan Perez MD   furosemide (LASIX) 40 MG tablet Take 1 tablet by mouth in the morning.  8/3/22 KIERSTEN Reddy   ondansetron (ZOFRAN-ODT) 4 MG disintegrating tablet Take 1 tablet by mouth every 8 hours as needed for Nausea or Vomiting  Patient not taking: Reported on 9/29/2022 8/3/22   KIERSTEN Reddy   rivaroxaban (XARELTO) 20 MG TABS tablet Take 1 tablet by mouth daily (with breakfast) HOLD XARELTO UNTIL Friday, [facility]. MAY RESUME THEN, IF NO WOUND DRAINAGE. 8/3/22   KIERSTEN Reddy   aspirin 81 MG EC tablet Take 1 tablet by mouth in the morning. TAKE ASPIRIN 81MG TWICE DAILY UNTIL Friday, [facility] [place]. MAY GO BACK TO TAKING ONCE DAILY WHEN RESTARTED ON [place]. . 8/3/22 9/7/22  KIERSTEN Reddy   potassium chloride (KLOR-CON) 10 MEQ extended release tablet Take 1 tablet by mouth three times a week Every Monday Wednesday and Friday 8/3/22   KIERSTEN Reddy   famotidine (PEPCID) 20 MG tablet Take 1 tablet by mouth every evening 7/28/22   Juan Collazo MD   meclizine (ANTIVERT) 25 MG CHEW Take 1 tablet by mouth 3 times daily as needed (PRN for dizziness) 7/28/22   Ev Marcelino MD   memantine (NAMENDA) 10 MG tablet Take 1 tablet by mouth daily 7/7/22   Juan Collazo MD   magnesium oxide (MAG-OX) 400 MG tablet Take 1 tablet by mouth daily 7/7/22   Juan Collazo MD   Multiple Vitamins-Minerals (OCUVITE ADULT FORMULA PO) Take by mouth    Historical Provider, MD   Probiotic Product (PROBIOTIC-10 PO) Take by mouth    Historical Provider, MD   carvedilol (COREG) 12.5 MG tablet Take 1 tablet by mouth 2 times daily (with meals) 6/29/22   Hannah Briones DO   potassium chloride (MICRO-K) 10 MEQ extended release capsule Take 1 capsule by mouth daily 6/29/22   Hannah Briones DO   coenzyme Q10 100 MG CAPS capsule Take 1 capsule by mouth daily 6/29/22 8/3/22  Hannah Birones DO   hydrALAZINE (APRESOLINE) 25 MG tablet TAKE 1 TABLET BY MOUTH TWICE A DAY 6/28/22   Juan Collazo MD   rosuvastatin (CRESTOR) 40 MG tablet Take 1 tablet by mouth every

## 2022-10-02 LAB
BACTERIA SPEC CULT: NORMAL
SERVICE CMNT-IMP: NORMAL

## 2022-10-04 ENCOUNTER — OFFICE VISIT (OUTPATIENT)
Dept: CARDIOLOGY CLINIC | Age: 87
End: 2022-10-04
Payer: MEDICARE

## 2022-10-04 VITALS
HEIGHT: 63 IN | DIASTOLIC BLOOD PRESSURE: 64 MMHG | HEART RATE: 80 BPM | BODY MASS INDEX: 26.93 KG/M2 | SYSTOLIC BLOOD PRESSURE: 128 MMHG | WEIGHT: 152 LBS

## 2022-10-04 DIAGNOSIS — I50.22 CHRONIC SYSTOLIC (CONGESTIVE) HEART FAILURE (HCC): Primary | ICD-10-CM

## 2022-10-04 DIAGNOSIS — I10 ESSENTIAL HYPERTENSION, BENIGN: ICD-10-CM

## 2022-10-04 DIAGNOSIS — I50.32 DIASTOLIC CHF, CHRONIC (HCC): ICD-10-CM

## 2022-10-04 DIAGNOSIS — I73.9 PAD (PERIPHERAL ARTERY DISEASE) (HCC): ICD-10-CM

## 2022-10-04 PROCEDURE — 1090F PRES/ABSN URINE INCON ASSESS: CPT | Performed by: INTERNAL MEDICINE

## 2022-10-04 PROCEDURE — G8484 FLU IMMUNIZE NO ADMIN: HCPCS | Performed by: INTERNAL MEDICINE

## 2022-10-04 PROCEDURE — G8417 CALC BMI ABV UP PARAM F/U: HCPCS | Performed by: INTERNAL MEDICINE

## 2022-10-04 PROCEDURE — 1036F TOBACCO NON-USER: CPT | Performed by: INTERNAL MEDICINE

## 2022-10-04 PROCEDURE — G8427 DOCREV CUR MEDS BY ELIG CLIN: HCPCS | Performed by: INTERNAL MEDICINE

## 2022-10-04 PROCEDURE — 1123F ACP DISCUSS/DSCN MKR DOCD: CPT | Performed by: INTERNAL MEDICINE

## 2022-10-04 PROCEDURE — 99214 OFFICE O/P EST MOD 30 MIN: CPT | Performed by: INTERNAL MEDICINE

## 2022-10-04 RX ORDER — LANOLIN ALCOHOL/MO/W.PET/CERES
1000 CREAM (GRAM) TOPICAL DAILY
COMMUNITY

## 2022-10-04 RX ORDER — ROSUVASTATIN CALCIUM 40 MG/1
40 TABLET, COATED ORAL EVERY EVENING
Qty: 90 TABLET | Refills: 3 | Status: SHIPPED | OUTPATIENT
Start: 2022-10-04

## 2022-10-04 RX ORDER — ASPIRIN 81 MG/1
81 TABLET ORAL DAILY
COMMUNITY

## 2022-10-04 RX ORDER — HYDRALAZINE HYDROCHLORIDE 25 MG/1
TABLET, FILM COATED ORAL
Qty: 180 TABLET | Refills: 3 | Status: SHIPPED | OUTPATIENT
Start: 2022-10-04

## 2022-10-04 NOTE — PROGRESS NOTES
[address], [address] [email or web address] [address]  PHONE: [phone]     10/04/22    NAME:  Spike Quintana  : 1935  MRN: [id number]       SUBJECTIVE:   Spike Quintana is a 81 y.o. female seen for a follow up visit regarding the following:     Chief Complaint   Patient presents with    Congestive Heart Failure     3 month f/u        HPI:   Here for DHF, CAD s/p 4v CABG in   NO PVD on duplex 2017. LLE DVT 2018  Carotid US 2018: mild dz   Echo 3/2022: normal EF  [facility] 2022:  grafts patent. Small vessel disease involving ramus intermedius which is the only change in comparison to 2018. Recommend medical therapy for small vessel coronary disease. Imdur added after above [facility]. More HAs, could not take. 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 Did well with hip surgery, getting over COVID as well. On lasix daily now and aldactone. Edema ok now. AWAN the same, no CP/pressure now. AWAN ok now. Doing well on anticoagulation treatment as reviewed today, no bleeding issues or excessive bruising noted. HAs on imdur in the past.  Did not feel well on hyd. Past Medical History, Past Surgical History, Family history, Social History, and Medications were all reviewed with the patient today and updated as necessary.      Current Outpatient Medications   Medication Sig Dispense Refill    aspirin 81 MG EC tablet Take 81 mg by mouth daily      vitamin B-12 (CYANOCOBALAMIN) 1000 MCG tablet Take 1,000 mcg by mouth daily      Ubiquinol 100 MG CAPS Take by mouth      hydrALAZINE (APRESOLINE) 25 MG tablet TAKE 1 TABLET BY MOUTH TWICE A  tablet 3    rosuvastatin (CRESTOR) 40 MG tablet Take 1 tablet by mouth every evening 90 tablet 3    escitalopram (LEXAPRO) 20 MG tablet Take 1 tablet by mouth daily TAKE 1 TABLET BY MOUTH EVERY DAY 90 tablet 3    sulfamethoxazole-trimethoprim (BACTRIM DS;SEPTRA DS) 800-160 MG per tablet Take 1 tablet by mouth 2 times daily for 10 days 20 tablet 0    spironolactone (ALDACTONE) 25 MG tablet TAKE 1 TABLET BY MOUTH EVERY DAY 90 tablet 3    amitriptyline (ELAVIL) 25 MG tablet Take 1 tablet by mouth nightly 30 tablet 2    ondansetron (ZOFRAN-ODT) 4 MG disintegrating tablet Take 1 tablet by mouth every 8 hours as needed for Nausea or Vomiting 30 tablet 0    rivaroxaban (XARELTO) 20 MG TABS tablet Take 1 tablet by mouth daily (with breakfast) HOLD XARELTO UNTIL Friday, Cite Mayito. MAY RESUME THEN, IF NO WOUND DRAINAGE. 1 tablet 0    potassium chloride (KLOR-CON) 10 MEQ extended release tablet Take 1 tablet by mouth three times a week Every Monday Wednesday and Friday 1 tablet 0    famotidine (PEPCID) 20 MG tablet Take 1 tablet by mouth every evening 60 tablet 5    meclizine (ANTIVERT) 25 MG CHEW Take 1 tablet by mouth 3 times daily as needed (PRN for dizziness) 90 tablet 2    memantine (NAMENDA) 10 MG tablet Take 1 tablet by mouth daily 30 tablet 5    magnesium oxide (MAG-OX) 400 MG tablet Take 1 tablet by mouth daily 30 tablet 5    Multiple Vitamins-Minerals (OCUVITE ADULT FORMULA PO) Take by mouth      Probiotic Product (PROBIOTIC-10 PO) Take by mouth      carvedilol (COREG) 12.5 MG tablet Take 1 tablet by mouth 2 times daily (with meals) 180 tablet 3    acetaminophen (TYLENOL) 325 MG tablet Take 500 mg by mouth every 6 hours as needed      loratadine (CLARITIN) 10 MG tablet Take 10 mg by mouth every evening      nitroGLYCERIN (NITROLINGUAL) 0.4 MG/SPRAY 0.4 mg spray Place 1 spray under the tongue      furosemide (LASIX) 40 MG tablet Take 1 tablet by mouth in the morning. (Patient not taking: Reported on 10/4/2022) 1 tablet 0    aspirin 81 MG EC tablet Take 1 tablet by mouth in the morning. TAKE ASPIRIN 81MG TWICE DAILY UNTIL Friday, Cite Mayito. MAY GO BACK TO TAKING ONCE DAILY WHEN RESTARTED ON Artur Breaker. . 30 tablet 0     No current facility-administered medications for this visit.         Allergies   Allergen Reactions    Amoxicillin-Pot Clavulanate Other (See Comments)     Causes yeast infection    Codeine Other (See Comments)     Made my heart go crazy    Gabapentin Other (See Comments)    Hydrochlorothiazide Other (See Comments)     Hyponatremia    Nitrofurantoin Other (See Comments)    Prednisone Other (See Comments)     Visual loss and headache    Sulfamethoxazole-Trimethoprim Nausea Only     Patient Active Problem List    Diagnosis Date Noted    Type 2 diabetes mellitus with chronic kidney disease 09/29/2022     Priority: Medium    Osteoarthritis of left hip, unspecified osteoarthritis type 08/02/2022     Priority: Medium    Chronic renal disease, stage III [facility]) [[id number]] 06/20/2022     Priority: Medium    Chronic systolic (congestive) heart failure 06/20/2022     Priority: Medium    Arthritis of left hip 06/28/2022     Added automatically from request for surgery [id number]      Carotid stenosis, asymptomatic, bilateral 04/20/2022    Chest pain 04/01/2022     Added automatically from request for surgery [id number]        Controlled type 2 diabetes mellitus without complication, without long-term current use of insulin ([facility] 04/01/2022    Sedative, hypnotic or anxiolytic use, unspecified with unspecified sedative, hypnotic or anxiolytic-induced disorder ([facility] 08/26/2021    Tinnitus of left ear 02/18/2021    Anxiety 02/18/2021    Venous (peripheral) insufficiency 06/06/2019    Bilateral leg pain 06/06/2019    Varicose veins of bilateral lower extremities with pain 06/06/2019    GERD (gastroesophageal reflux disease)     Halitosis 03/02/2018     Last Assessment & Plan:   I reassured the patient that I did not see any issues with her tonsils and   specifically no evidence of any tonsillar cancer which was one of her   bigger concerns. She does have a carious tooth on the right lower mandible   which may be a potential source for issues and she does have problems with   reflux which is another potential source for problems.  I encouraged her to   follow-up with her dentist to have that tooth treated and when she is off   her anticoagulants I encouraged her to follow through with the the EGD as   suggested by her family doctor.  She'll plan to see me as needed        Distorted taste 02/23/2018    Diaphragmatic hernia without obstruction or gangrene 02/23/2018    Chronic left-sided headaches 02/23/2018    Dyskinesia of esophagus 02/23/2018    Macular pucker, left eye 02/23/2018    Mixed hyperlipidemia 21/35/9295    Diastolic CHF, chronic (HCC) 08/29/2017    Diverticulosis of large intestine 08/06/2017    Glucose intolerance (impaired glucose tolerance) 05/12/2017    Idiopathic peripheral neuropathy 05/12/2017    PAD (peripheral artery disease) ([facility] 04/25/2017    Severe episode of recurrent major depressive disorder, without psychotic features ([address] 04/21/2017    Age-related osteoporosis without current pathological fracture     Hypertensive CHF (congestive heart failure) ([facility])     Osteoarthrosis, shoulder region     S/P CABG (coronary artery bypass graft) 03/12/2017    S/P angioplasty with stent 03/12/2017    Keratoconjunctivitis sicca not specified as Sjogren's 02/10/2016    Constipation by delayed colonic transit 10/16/2015    Renal cell carcinoma of left kidney ([facility] 10/16/2015    Essential hypertension, benign 09/24/2015    Irritable bowel syndrome 07/09/2015    Coronary artery disease of native artery of native heart with stable angina pectoris ([address] 09/11/2014     With CABG and 4 cardiac stents          Past Surgical History:   Procedure Laterality Date    CARDIAC CATHETERIZATION      stent 2014    CARDIAC CATHETERIZATION      stent 2006    CATARACT REMOVAL      CATARACT REMOVAL      left    COLONOSCOPY  06/2016    Polyps    COLONOSCOPY  12/15/2015    diverticulosis, internal hemorrhoid, colon polyp- no further colonoscopies needed    CORONARY ARTERY BYPASS GRAFT      4 vessel CABG 2005    HEENT Left     macular pucker    HERNIA REPAIR Left 2017    ORTHOPEDIC SURGERY finger, foot    PARTIAL HYSTERECTOMY (CERVIX NOT REMOVED)      hyst    OR ABDOMEN SURGERY PROC UNLISTED Left 2016    ablation for mass in left side of abd    TOTAL HIP ARTHROPLASTY Left 2022    LEFT HIP TOTAL ARTHROPLASTY performed by Mounika Patino MD at [address] ENDOSCOPY      Esophagus stretch     Family History   Problem Relation Age of Onset    No Known Problems Maternal Grandfather     No Known Problems Paternal Grandmother     No Known Problems Paternal Grandfather     Diabetes Mother     Heart Surgery Mother     Heart Disease Mother     Heart Attack Father     Heart Disease Father     No Known Problems Sister     Cancer Sister         2 sisters w/ lung ca-neither one smoked    Heart Disease Sister     Cancer Brother         lung ca-smoker    Heart Attack Brother          age 22 of MI    Heart Disease Sister     Heart Surgery Sister     Heart Attack Sister     Heart Disease Sister     Heart Surgery Sister     Heart Attack Sister     No Known Problems Maternal Grandmother     Heart Surgery Brother          in [place] during 2nd heart surgery    Heart Disease Brother      Social History     Tobacco Use    Smoking status: Never    Smokeless tobacco: Never   Substance Use Topics    Alcohol use: No         ROS:    No obvious pertinent positives on review of systems except for what was outlined in the HPI today.       PHYSICAL EXAM:     /64   Pulse 80   Ht 5' 3\" (1.6 m)   Wt 152 lb (68.9 kg)   BMI 26.93 kg/m²    General/Constitutional:   Alert and oriented x 3, no acute distress  HEENT:   normocephalic, atraumatic, moist mucous membranes  Neck:   No JVD or carotid bruits bilaterally  Cardiovascular:   regular rate and rhythm, no murmur/rub/gallop appreciated  Pulmonary:   clear to auscultation bilaterally, no respiratory distress  Abdomen:   soft, non-tender, non-distended  Ext:   No sig LE edema bilaterally  Skin:  warm and dry, no obvious rashes seen  Neuro: no obvious sensory or motor deficits  Psychiatric:   normal mood and affect      Lab Results   Component Value Date/Time     09/22/2022 09:29 AM    K 4.1 09/22/2022 09:29 AM     09/22/2022 09:29 AM    CO2 26 09/22/2022 09:29 AM    BUN 13 09/22/2022 09:29 AM    CREATININE 0.90 09/22/2022 09:29 AM    GLUCOSE 98 09/22/2022 09:29 AM    CALCIUM 9.1 09/22/2022 09:29 AM        Lab Results   Component Value Date    WBC 4.6 09/22/2022    HGB 11.8 09/22/2022    HCT 38.6 09/22/2022    MCV 99.5 (H) 09/22/2022     09/22/2022       Lab Results   Component Value Date    TSH 3.090 02/11/2021       Lab Results   Component Value Date    LABA1C 5.4 07/18/2022     Lab Results   Component Value Date     07/18/2022       Lab Results   Component Value Date    CHOL 214 (H) 09/22/2022    CHOL 208 (H) 03/24/2022    CHOL 206 (H) 11/22/2021     Lab Results   Component Value Date    TRIG 86 09/22/2022    TRIG 117 03/24/2022    TRIG 71 11/22/2021     Lab Results   Component Value Date    HDL 57 09/22/2022    HDL 58 03/24/2022    HDL 73 11/22/2021     Lab Results   Component Value Date    LDLCALC 139.8 (H) 09/22/2022    LDLCALC 129 (H) 03/24/2022    LDLCALC 120 (H) 11/22/2021     Lab Results   Component Value Date    LABVLDL 17.2 09/22/2022    LABVLDL 20 12/09/2019    VLDL 21 03/24/2022    VLDL 13 11/22/2021    VLDL 15 08/20/2021     Lab Results   Component Value Date    CHOLHDLRATIO 3.8 09/22/2022           I have Independently reviewed prior care notes, any ER records available, cardiac testing, labs and results with the patient and before seeing the patient today. Also independently reviewed outside records when available. ASSESSMENT:    Dipika Hawley was seen today for congestive heart failure.     Diagnoses and all orders for this visit:    Chronic systolic (congestive) heart failure    Diastolic CHF, chronic (HCC)    Essential hypertension, benign    PAD (peripheral artery disease) ([facility]    Other orders  - hydrALAZINE (APRESOLINE) 25 MG tablet; TAKE 1 TABLET BY MOUTH TWICE A DAY  -     rosuvastatin (CRESTOR) 40 MG tablet; Take 1 tablet by mouth every evening          PLAN:      1. Chronic DHF:  On lasix 40 and aldactone. Follow K and Cr. 2. CAD:  Could not tolerate imdur. On BB, crestor and ASA. Follow for more angina. The patient has been instructed to call with any angina or equivalent as reviewed today. All questions were answered with the patient voicing complete understanding. 3.   DVT: Follow on NOAC now. Monitor with more falls. 4. HPL: NO MORE REPATHA, she blames this for prior admission. On crestor, diet has been good. Lipids poor in the past, re-address in the future. 5. HTN: Remain on coreg, aldactone. Labs ok. 6. Carotid Dz:  Remain on ASA and statin, US ok. No more testing needed now. Patient has been instructed and agrees to call our office with any issues or other concerns related to their cardiac condition(s) and/or complaint(s). Return in about 6 months (around 4/4/2023).        GEN PETIT, DO  10/4/2022

## 2022-10-07 NOTE — PATIENT INSTRUCTIONS
Personalized Preventive Plan for Rissa Linder - 9/29/2022  Medicare offers a range of preventive health benefits. Some of the tests and screenings are paid in full while other may be subject to a deductible, co-insurance, and/or copay. Some of these benefits include a comprehensive review of your medical history including lifestyle, illnesses that may run in your family, and various assessments and screenings as appropriate. After reviewing your medical record and screening and assessments performed today your provider may have ordered immunizations, labs, imaging, and/or referrals for you. A list of these orders (if applicable) as well as your Preventive Care list are included within your After Visit Summary for your review. Other Preventive Recommendations:    A preventive eye exam performed by an eye specialist is recommended every 1-2 years to screen for glaucoma; cataracts, macular degeneration, and other eye disorders. A preventive dental visit is recommended every 6 months. Try to get at least 150 minutes of exercise per week or 10,000 steps per day on a pedometer . Order or download the FREE \"Exercise & Physical Activity: Your Everyday Guide\" from [employer]. Call 4-868.991.7329 or search [employer] online. You need 3314-0829 mg of calcium and 1806-6409 IU of vitamin D per day. It is possible to meet your calcium requirement with diet alone, but a vitamin D supplement is usually necessary to meet this goal.  When exposed to the sun, use a sunscreen that protects against both UVA and UVB radiation with an SPF of 30 or greater. Reapply every 2 to 3 hours or after sweating, drying off with a towel, or swimming. Always wear a seat belt when traveling in a car. Always wear a helmet when riding a bicycle or motorcycle.

## 2022-10-07 NOTE — PROGRESS NOTES
Medicare Annual Wellness Visit    Chuck Jacinto is here for Medicare AWV (Patient is here for a Medicare Wellness today.), Cough (Patient states for the last two weeks she has been congested and coughing up yellow mucus. ), and Discuss Labs (Patient is here to discuss labs)    Assessment & Plan   Medicare annual wellness visit, subsequent  Severe episode of recurrent major depressive disorder, without psychotic features ([address]  -     escitalopram (LEXAPRO) 20 MG tablet; Take 1 tablet by mouth daily TAKE 1 TABLET BY MOUTH EVERY DAY, Disp-90 tablet, R-3Normal  Type 2 diabetes mellitus with stage 2 chronic kidney disease, without long-term current use of insulin ([facility])  Tinnitus, left ear  Anxiety  -     escitalopram (LEXAPRO) 20 MG tablet; Take 1 tablet by mouth daily TAKE 1 TABLET BY MOUTH EVERY DAY, Disp-90 tablet, R-3Normal  Acute non-recurrent maxillary sinusitis  -     sulfamethoxazole-trimethoprim (BACTRIM DS;SEPTRA DS) 800-160 MG per tablet; Take 1 tablet by mouth 2 times daily for 10 days, Disp-20 tablet, R-0Normal  Essential (primary) hypertension  PAD (peripheral artery disease) ([facility])  Dysuria  -     AMB POC URINALYSIS DIP STICK AUTO W/O MICRO  -     sulfamethoxazole-trimethoprim (BACTRIM DS;SEPTRA DS) 800-160 MG per tablet; Take 1 tablet by mouth 2 times daily for 10 days, Disp-20 tablet, R-0Normal  -     Culture, Urine; Future          Recommendations for Preventive Services Due: see orders and patient instructions/AVS.  Recommended screening schedule for the next 5-10 years is provided to the patient in written form: see Patient Instructions/AVS.     Return in about 6 months (around 3/29/2023), or if symptoms worsen or fail to improve, for labs 1 week prior to visit. Subjective   The following acute and/or chronic problems were also addressed today:  Here for awv. Also to review labs. Contiues to have tinnitis but managing. Tolerating medications well. Manages all adls independently.   She does complain of cough and some nasal congestion for several days. No fever or chills. Also some urinary frequency. She denies any back pain. No nausea or vomiting. No other complaints. Patient's complete Health Risk Assessment and screening values have been reviewed and are found in Flowsheets. The following problems were reviewed today and where indicated follow up appointments were made and/or referrals ordered.     Positive Risk Factor Screenings with Interventions:      Depression:  PHQ-9 Total Score: 8    Severity:1-4 = minimal depression, 5-9 = mild depression, 10-14 = moderate depression, 15-19 = moderately severe depression, 20-27 = severe depression  Depression Interventions:  Patient declines any further evaluation/treatment for this issue          General Health and ACP:  General  In general, how would you say your health is?: Good  In the past 7 days, have you experienced any of the following: New or Increased Pain, New or Increased Fatigue, Loneliness, Social Isolation, Stress or Anger?: (!) Yes  Select all that apply: (!) Loneliness  Do you get the social and emotional support that you need?: Yes  Do you have a Living Will?: Yes    Advance Directives       Power of  Living Will ACP-Advance Directive ACP-Power of     Not on File Not on File Filed Not on File        General Health Risk Interventions:      Health Habits/Nutrition:  Physical Activity: Insufficiently Active    Days of Exercise per Week: 2 days    Minutes of Exercise per Session: 20 min     Have you lost any weight without trying in the past 3 months?: (!) Yes  Body mass index: (!) 27.45  Have you seen the dentist within the past year?: Yes  Health Habits/Nutrition Interventions:               Objective   Vitals:    09/29/22 1007   BP: 124/62   Site: Left Upper Arm   Position: Sitting   Cuff Size: Small Adult   Pulse: 96   Temp: 97.6 °F (36.4 °C)   TempSrc: Temporal   SpO2: 96%   Weight: 155 lb (70.3 kg)   Height: 5' 3\" (1.6 m)      Body mass index is 27.46 kg/m². General Appearance: alert and oriented to person, place and time, well-developed and well-nourished, in no acute distress  Pulmonary/Chest: clear to auscultation bilaterally- no wheezes, rales or rhonchi, normal air movement, no respiratory distress  Cardiovascular: normal rate, normal S1 and S2, no gallops, intact distal pulses, and no carotid bruits  Musculoskeletal: normal range of motion, no joint swelling, deformity or tenderness  Neurologic: gait and coordination normal and speech normal       Allergies   Allergen Reactions    Amoxicillin-Pot Clavulanate Other (See Comments)     Causes yeast infection    Codeine Other (See Comments)     Made my heart go crazy    Gabapentin Other (See Comments)    Hydrochlorothiazide Other (See Comments)     Hyponatremia    Nitrofurantoin Other (See Comments)    Prednisone Other (See Comments)     Visual loss and headache    Sulfamethoxazole-Trimethoprim Nausea Only     Prior to Visit Medications    Medication Sig Taking? Authorizing Provider   escitalopram (LEXAPRO) 20 MG tablet Take 1 tablet by mouth daily TAKE 1 TABLET BY MOUTH EVERY DAY Yes Juan Davila MD   sulfamethoxazole-trimethoprim (BACTRIM DS;SEPTRA DS) 800-160 MG per tablet Take 1 tablet by mouth 2 times daily for 10 days Yes Juan Davila MD   spironolactone (ALDACTONE) 25 MG tablet TAKE 1 TABLET BY MOUTH EVERY DAY Yes Juan Davila MD   amitriptyline (ELAVIL) 25 MG tablet Take 1 tablet by mouth nightly Yes Juan Davila MD   furosemide (LASIX) 40 MG tablet Take 1 tablet by mouth in the morning. Patient not taking: Reported on 10/4/2022 Yes KIERSTEN Brizuela   rivaroxaban (XARELTO) 20 MG TABS tablet Take 1 tablet by mouth daily (with breakfast) HOLD XARELTO UNTIL Friday, Cite Ennouzha. MAY RESUME THEN, IF NO WOUND DRAINAGE.  Yes KIERSTEN Brizuela   potassium chloride (KLOR-CON) 10 MEQ extended release tablet Take 1 tablet by mouth three times a week Every Monday Wednesday and Friday Yes KIERSTEN Tiwari   famotidine (PEPCID) 20 MG tablet Take 1 tablet by mouth every evening Yes Juan Marcial MD   meclizine (ANTIVERT) 25 MG CHEW Take 1 tablet by mouth 3 times daily as needed (PRN for dizziness) Yes Juan Marcial MD   memantine (NAMENDA) 10 MG tablet Take 1 tablet by mouth daily Yes Juan Macrial MD   magnesium oxide (MAG-OX) 400 MG tablet Take 1 tablet by mouth daily Yes Juan Marcial MD   Multiple Vitamins-Minerals (OCUVITE ADULT FORMULA PO) Take by mouth Yes Historical Provider, MD   Probiotic Product (PROBIOTIC-10 PO) Take by mouth Yes Historical Provider, MD   carvedilol (COREG) 12.5 MG tablet Take 1 tablet by mouth 2 times daily (with meals) Yes Janett Romo DO   acetaminophen (TYLENOL) 325 MG tablet Take 500 mg by mouth every 6 hours as needed Yes Ar Automatic Reconciliation   loratadine (CLARITIN) 10 MG tablet Take 10 mg by mouth every evening Yes Ar Automatic Reconciliation   nitroGLYCERIN (NITROLINGUAL) 0.4 MG/SPRAY 0.4 mg spray Place 1 spray under the tongue Yes Ar Automatic Reconciliation   aspirin 81 MG EC tablet Take 81 mg by mouth daily  Historical Provider, MD   vitamin B-12 (CYANOCOBALAMIN) 1000 MCG tablet Take 1,000 mcg by mouth daily  Historical Provider, MD   Ubiquinol 100 MG CAPS Take by mouth  Historical Provider, MD   hydrALAZINE (APRESOLINE) 25 MG tablet TAKE 1 TABLET BY MOUTH TWICE A DAY  Janett Romo DO   rosuvastatin (CRESTOR) 40 MG tablet Take 1 tablet by mouth every evening  Janett Romo DO   ondansetron (ZOFRAN-ODT) 4 MG disintegrating tablet Take 1 tablet by mouth every 8 hours as needed for Nausea or Vomiting  KIERSTEN Tiwari   aspirin 81 MG EC tablet Take 1 tablet by mouth in the morning. TAKE ASPIRIN 81MG TWICE DAILY UNTIL Friday, Cite Mayito. MAY GO BACK TO TAKING ONCE DAILY WHEN RESTARTED ON Mechele Amabile. KIERSTEN Joyner   coenzyme Q10 100 MG CAPS capsule Take 1 capsule by mouth daily  Jorge Carrillo DO       CareTeam (Including outside providers/suppliers regularly involved in providing care):   Patient Care Team:  Alex Lee MD as PCP - Kavita Lugo MD as PCP - [facility] Empaneled Provider  SHARON Keating - NP as Nurse Practitioner  Aubrey Harper MD as Physician  Sd Grant DO as Physician  Darshana Mendez RN as Ambulatory Care Manager     Reviewed and updated this visit:  Tobacco  Allergies  Meds  Problems  Med Hx  Surg Hx  Soc Hx  Fam Hx

## 2022-10-14 ENCOUNTER — CARE COORDINATION (OUTPATIENT)
Dept: CARE COORDINATION | Facility: CLINIC | Age: 87
End: 2022-10-14

## 2022-10-14 NOTE — CARE COORDINATION
Ambulatory Care Coordination Note  10/14/2022    ACC: Maris Harris, RN        Offered patient enrollment in the Remote Patient Monitoring (RPM) program for in-home monitoring: NA.    Spoke w/ pt who reports feeling except for tinnitus. This is much improved over the past 3yrs, but still comes and goes. Hx TMJ. Hip healing well, may have mild pain in evening but \"doing fine\". Pt reports urinary sxs, concerned bladder may not be emptying all the way. Pt is straining. Encouraged pt to remain seated until she can empty 2-3x's. Pt is currently doing this. Discussed recent card visit as well as upcoming visits w/ ortho, urol 2/23. Denies concerns this day. Prayed w/ pt prior to ending call. Goals Addressed                   This Visit's Progress     Conditions and Symptoms   On track     I will schedule office visits, as directed by my provider. I will keep my appointment or reschedule if I have to cancel. I will notify my provider of any barriers to my plan of care. I will notify my provider of any symptoms that indicate a worsening of my condition. Barriers: none  Plan for overcoming my barriers: N/A  Confidence: 10/10  Anticipated Goal Completion Date: 9/15/22                Prior to Admission medications    Medication Sig Start Date End Date Taking?  Authorizing Provider   aspirin 81 MG EC tablet Take 81 mg by mouth daily    Historical Provider, MD   vitamin B-12 (CYANOCOBALAMIN) 1000 MCG tablet Take 1,000 mcg by mouth daily    Historical Provider, MD   Ubiquinol 100 MG CAPS Take by mouth    Historical Provider, MD   hydrALAZINE (APRESOLINE) 25 MG tablet TAKE 1 TABLET BY MOUTH TWICE A DAY 10/4/22   Antwan Barr DO   rosuvastatin (CRESTOR) 40 MG tablet Take 1 tablet by mouth every evening 10/4/22   Antwan Barr DO   escitalopram (LEXAPRO) 20 MG tablet Take 1 tablet by mouth daily TAKE 1 TABLET BY MOUTH EVERY DAY 9/29/22 10/29/22  Juan Cardenas MD   spironolactone (ALDACTONE) 25 MG tablet TAKE 1 TABLET BY MOUTH EVERY DAY 9/6/22   Juan Grimes MD   amitriptyline (ELAVIL) 25 MG tablet Take 1 tablet by mouth nightly 8/23/22   Juan Grimes MD   furosemide (LASIX) 40 MG tablet Take 1 tablet by mouth in the morning. Patient not taking: Reported on 10/4/2022 8/3/22   KIERSTEN Moeller   ondansetron (ZOFRAN-ODT) 4 MG disintegrating tablet Take 1 tablet by mouth every 8 hours as needed for Nausea or Vomiting 8/3/22   KIERSTEN Moeller   rivaroxaban (XARELTO) 20 MG TABS tablet Take 1 tablet by mouth daily (with breakfast) HOLD XARELTO UNTIL Friday, [place] [place]. MAY RESUME THEN, IF NO WOUND DRAINAGE. 8/3/22   KIERSTEN Moeller   aspirin 81 MG EC tablet Take 1 tablet by mouth in the morning. TAKE ASPIRIN 81MG TWICE DAILY UNTIL Friday, [place] [place]. MAY GO BACK TO TAKING ONCE DAILY WHEN RESTARTED ON [address] [place] [address] Road. . 8/3/22 9/7/22  KIERSTEN Moeller   potassium chloride (KLOR-CON) 10 MEQ extended release tablet Take 1 tablet by mouth three times a week Every Monday Wednesday and Friday 8/3/22   KIERSTEN Moeller   famotidine (PEPCID) 20 MG tablet Take 1 tablet by mouth every evening 7/28/22   Juan Grimes MD   meclizine (ANTIVERT) 25 MG CHEW Take 1 tablet by mouth 3 times daily as needed (PRN for dizziness) 7/28/22   Janet Caceres MD   memantine [facility]) 10 MG tablet Take 1 tablet by mouth daily 7/7/22   Juan Grimes MD   magnesium oxide (MAG-OX) 400 MG tablet Take 1 tablet by mouth daily 7/7/22   Juan Grimes MD   Multiple Vitamins-Minerals (OCUVITE ADULT FORMULA PO) Take by mouth    Historical Provider, MD   Probiotic Product (PROBIOTIC-10 PO) Take by mouth    Historical Provider, MD   carvedilol (COREG) 12.5 MG tablet Take 1 tablet by mouth 2 times daily (with meals) 6/29/22   Domenica Armstrong DO   coenzyme Q10 100 MG CAPS capsule Take 1 capsule by mouth daily 6/29/22 8/3/22  Domenica Armstrong DO   acetaminophen (TYLENOL) 325 MG tablet Take 500 mg by mouth every 6 hours as needed    Ar Automatic Reconciliation   loratadine (CLARITIN) 10 MG tablet Take 10 mg by mouth every evening    Ar Automatic Reconciliation   nitroGLYCERIN (NITROLINGUAL) 0.4 MG/SPRAY 0.4 mg spray Place 1 spray under the tongue 4/18/19   Ar Automatic Reconciliation       Future Appointments   Date Time Provider Sindi Shannon   2/3/2023 10:30 AM Doug Cooper MD POAG [facility] AMB   2/9/2023 11:30 AM Marleny Galeano DO UCDE [facility] AMB   2/13/2023 11:15 AM Cristina Gonzales MD [id number] [facility] AMB   3/22/2023 10:00 AM POW LAB POW [facility] AMB   3/29/2023 10:00 AM Juan Phelps MD [facility] AMB    and   General Assessment    Do you have any symptoms that are causing concern?: No

## 2022-10-31 ENCOUNTER — CARE COORDINATION (OUTPATIENT)
Dept: CARE COORDINATION | Facility: CLINIC | Age: 87
End: 2022-10-31

## 2022-10-31 NOTE — CARE COORDINATION
Ambulatory Care Coordination Note  10/31/2022    ACC: Becky Swain RN        Offered patient enrollment in the Remote Patient Monitoring (RPM) program for in-home monitoring: NA.  Pt reports is doing well, hip has healed and mobility is good. Pt attending dinner out w/ friends several nights/wk. Discussed upcoming appt w/ urology. Pt states she has 2 heart blockages and can't take nitro each morning d/t headache along with tinnitus. Pt reports mild SOB, denies CP or feet/leg swelling. Discussed monitoring sodium intake. Pt has life alert, knows s/s to report to physician. Will close episode, pt has RNCM contact information for questions or concerns. Goals Addressed                   This Visit's Progress     COMPLETED: Conditions and Symptoms        I will schedule office visits, as directed by my provider. I will keep my appointment or reschedule if I have to cancel. I will notify my provider of any barriers to my plan of care. I will notify my provider of any symptoms that indicate a worsening of my condition. Barriers: none  Plan for overcoming my barriers: N/A  Confidence: 10/10  Anticipated Goal Completion Date: 9/15/22                Prior to Admission medications    Medication Sig Start Date End Date Taking?  Authorizing Provider   aspirin 81 MG EC tablet Take 81 mg by mouth daily    Historical Provider, MD   vitamin B-12 (CYANOCOBALAMIN) 1000 MCG tablet Take 1,000 mcg by mouth daily    Historical Provider, MD   Ubiquinol 100 MG CAPS Take by mouth    Historical Provider, MD   hydrALAZINE (APRESOLINE) 25 MG tablet TAKE 1 TABLET BY MOUTH TWICE A DAY 10/4/22   Bertha Chiang, DO   rosuvastatin (CRESTOR) 40 MG tablet Take 1 tablet by mouth every evening 10/4/22   Bertha Chiang, DO   escitalopram (LEXAPRO) 20 MG tablet Take 1 tablet by mouth daily TAKE 1 TABLET BY MOUTH EVERY DAY 9/29/22 10/29/22  Juan Salmon MD   spironolactone (ALDACTONE) 25 MG tablet TAKE 1 TABLET BY MOUTH EVERY DAY 9/6/22   Juan Aguayo MD   amitriptyline (ELAVIL) 25 MG tablet Take 1 tablet by mouth nightly 8/23/22   Juan Aguayo MD   furosemide (LASIX) 40 MG tablet Take 1 tablet by mouth in the morning. Patient not taking: Reported on 10/4/2022 8/3/22   KIERSTEN Oliver   ondansetron (ZOFRAN-ODT) 4 MG disintegrating tablet Take 1 tablet by mouth every 8 hours as needed for Nausea or Vomiting 8/3/22   KIERSTEN Oliver   rivaroxaban (XARELTO) 20 MG TABS tablet Take 1 tablet by mouth daily (with breakfast) HOLD XARELTO UNTIL Friday, [place] [place]. MAY RESUME THEN, IF NO WOUND DRAINAGE. 8/3/22   KIERSTEN Oliver   aspirin 81 MG EC tablet Take 1 tablet by mouth in the morning. TAKE ASPIRIN 81MG TWICE DAILY UNTIL Friday, [facility] [place]. MAY GO BACK TO TAKING ONCE DAILY WHEN RESTARTED ON [place]. . 8/3/22 9/7/22  KIERSTEN Oliver   potassium chloride (KLOR-CON) 10 MEQ extended release tablet Take 1 tablet by mouth three times a week Every Monday Wednesday and Friday 8/3/22   KIERSTEN Oliver   famotidine (PEPCID) 20 MG tablet Take 1 tablet by mouth every evening 7/28/22   Juan Aguayo MD   meclizine (ANTIVERT) 25 MG CHEW Take 1 tablet by mouth 3 times daily as needed (PRN for dizziness) 7/28/22   Sukh Ramirez MD   memantine [facility]) 10 MG tablet Take 1 tablet by mouth daily 7/7/22   Juan Aguayo MD   magnesium oxide (MAG-OX) 400 MG tablet Take 1 tablet by mouth daily 7/7/22   Juan Aguayo MD   Multiple Vitamins-Minerals (OCUVITE ADULT FORMULA PO) Take by mouth    Historical Provider, MD   Probiotic Product (PROBIOTIC-10 PO) Take by mouth    Historical Provider, MD   carvedilol (COREG) 12.5 MG tablet Take 1 tablet by mouth 2 times daily (with meals) 6/29/22   John López DO   coenzyme Q10 100 MG CAPS capsule Take 1 capsule by mouth daily 6/29/22 8/3/22  John López DO   acetaminophen (TYLENOL) 325 MG tablet Take 500 mg by mouth every 6 hours as needed    Ar Automatic Reconciliation   loratadine (CLARITIN) 10 MG tablet Take 10 mg by mouth every evening    Ar Automatic Reconciliation   nitroGLYCERIN (NITROLINGUAL) 0.4 MG/SPRAY 0.4 mg spray Place 1 spray under the tongue 4/18/19   Ar Automatic Reconciliation       Future Appointments   Date Time Provider Sindi Ortega   11/9/2022  2:00 PM Hugo Tao MD [id number] [facility] AMB   2/3/2023 10:30 AM Naomi Currie MD POAG [facility] AMB   2/9/2023 11:30 AM DO VANNESA Pike [facility] AMB   2/13/2023 11:15 AM Hugo Tao MD [id number] [facility] AMB   3/22/2023 10:00 AM POW LAB POW [facility] AMB   3/29/2023 10:00 AM Juan Hudson MD POW [facility] AMB    and   General Assessment    Do you have any symptoms that are causing concern?: No

## 2022-11-09 ENCOUNTER — OFFICE VISIT (OUTPATIENT)
Dept: UROLOGY | Age: 87
End: 2022-11-09
Payer: MEDICARE

## 2022-11-09 DIAGNOSIS — N30.00 ACUTE CYSTITIS WITHOUT HEMATURIA: Primary | ICD-10-CM

## 2022-11-09 DIAGNOSIS — N39.0 RECURRENT UTI: ICD-10-CM

## 2022-11-09 DIAGNOSIS — R33.9 INCOMPLETE BLADDER EMPTYING: ICD-10-CM

## 2022-11-09 DIAGNOSIS — C64.2 MALIGNANT NEOPLASM OF LEFT KIDNEY, EXCEPT RENAL PELVIS (HCC): ICD-10-CM

## 2022-11-09 LAB
BILIRUBIN, URINE, POC: NEGATIVE
BLOOD URINE, POC: NEGATIVE
GLUCOSE URINE, POC: NEGATIVE
KETONES, URINE, POC: NEGATIVE
LEUKOCYTE ESTERASE, URINE, POC: NORMAL
NITRITE, URINE, POC: NEGATIVE
PH, URINE, POC: 5.5 (ref 4.6–8)
PROTEIN,URINE, POC: NEGATIVE
PVR, POC: 131 CC
SPECIFIC GRAVITY, URINE, POC: 1.02 (ref 1–1.03)
URINALYSIS CLARITY, POC: NORMAL
URINALYSIS COLOR, POC: NORMAL
UROBILINOGEN, POC: NORMAL

## 2022-11-09 PROCEDURE — 1090F PRES/ABSN URINE INCON ASSESS: CPT | Performed by: UROLOGY

## 2022-11-09 PROCEDURE — G8417 CALC BMI ABV UP PARAM F/U: HCPCS | Performed by: UROLOGY

## 2022-11-09 PROCEDURE — 1036F TOBACCO NON-USER: CPT | Performed by: UROLOGY

## 2022-11-09 PROCEDURE — G8427 DOCREV CUR MEDS BY ELIG CLIN: HCPCS | Performed by: UROLOGY

## 2022-11-09 PROCEDURE — 99214 OFFICE O/P EST MOD 30 MIN: CPT | Performed by: UROLOGY

## 2022-11-09 PROCEDURE — 1123F ACP DISCUSS/DSCN MKR DOCD: CPT | Performed by: UROLOGY

## 2022-11-09 PROCEDURE — G8484 FLU IMMUNIZE NO ADMIN: HCPCS | Performed by: UROLOGY

## 2022-11-09 PROCEDURE — 81003 URINALYSIS AUTO W/O SCOPE: CPT | Performed by: UROLOGY

## 2022-11-09 PROCEDURE — 51798 US URINE CAPACITY MEASURE: CPT | Performed by: UROLOGY

## 2022-11-09 RX ORDER — CEPHALEXIN 250 MG/1
250 CAPSULE ORAL DAILY
Qty: 90 CAPSULE | Refills: 3 | Status: SHIPPED | OUTPATIENT
Start: 2022-11-09

## 2022-11-09 ASSESSMENT — ENCOUNTER SYMPTOMS
ABDOMINAL PAIN: 0
VOMITING: 0
EYE PAIN: 0
EYE DISCHARGE: 0
BLOOD IN STOOL: 0
WHEEZING: 0
BACK PAIN: 0
INDIGESTION: 0
SKIN LESIONS: 0
CONSTIPATION: 0
DIARRHEA: 0
COUGH: 0
HEARTBURN: 0
NAUSEA: 0

## 2022-11-09 NOTE — PROGRESS NOTES
[facility]  [address]   [address]  [facility], [address] [facility]  [phone]    Anatoly Wade  : 1935    CC: Recurrent UTI / Incomplete Emptying      HPI     Anatoly Wade is a 81 y.o.  female with a history of 1.5 cm L lower pole renal mass who previously followed with Dr. Hector Sierra. She returns for follow up today, earlier than expected for UTIS, incomplete bladder emptying. Today, she reports having multiple UTIs over the past 6 months that have caused altered mental status. Wants to discuss today. Denies hematuria, urgency, frequency with UTIs but has odor, cloudy urine and altered mental status. Symptoms resolve with antibiotics but then soon recur . She has a history of 1.5 cm L lower pole renal mass s/p IR ablation in . Pathology from biopsy not available for review. She did well after ablation until recently when repeat imaging in 2018 showed 7 mm hyperdense cyst adjacent to the ablation area. Repeat imaging in 2018 showed increase size of cyst per patient report, although I do not see this in the EMR. IR at [facility] performed repeat biopsy/ablation in 10/2018. CT in 2020 negative for recurrence disease but did show cystic area anterior to L renal vein of indeterminate significance. CT 2021 showed 2 cm cystic mass stable anterior to L renal vein. THis is separate from kidney and kidney otherwise without recurrence. CBC/CMP WNL. Also has a history of microscopic hematuria s/p negative work up in 2017.      PVR: 131 cc     Past Medical History:   Diagnosis Date    Abdominal pain 10/28/2014    Angina at rest [facility])     pt denies    Arthritis     osteo - low back,  shoulders, hips, low back    Bilateral carotid bruits     Bursitis     CAD (coronary artery disease)     4 vessel CABG ;--- stents x 4--- last one placed -- followed by dr Annie Conte     Chronic pain     left shoulder    Coagulation defects Xarelto    Constipation     Cough 09/11/2014    10/8/14, Methacholine Challenge Study: The point at which the FEV1 fell by at least 20%, the PC20, occurred above a dose of 25mg/mL of methacholine. This rules out the diagnosis of asthma with an extremely high degree of sensitivity. No post-challenge FEV1 recovery was identified. Claudeen Dials, MD        Depressive disorder     Dyspnea 09/11/2014    [facility]; 9/29/14: IMPRESSION: The flow volume loop is consistent restriction. Spirometry suggest restriction with concomitant obstruction at low lung volumes. There is not a significant bronchodilator response. Lung volumes reveal mild restriction.  The unadjusted diffusion capacity is normal.  Lashawn Thompson MD      GERD (gastroesophageal reflux disease)     controlled with med    Headache     Hoarseness or changing voice     thougfht to be related to gerd    Hyperlipidemia     Hypertension     controlled with med    Kidney stone     yrs ago-- no tx required    Lumbago     Macular degeneration     Nodule of left lung     dx'ed 4 years ago-watched for several months-no new growth-being watched-yearly CT scan----- followed by dr. Jessi Lozanoose    Pain in joint, ankle and foot     left 3rd toe and right 2nd toe    Pain in joint, shoulder region     left now bothering > right, right ok    Palpitations 09/24/2015    followed by dr Guy Rodrigues    Panic disorder     panic attacks -- last one 2014    Renal mass 07/2016    ablation---left side--- followed by dr Kizzy Sepulveda in [place]    Sciatica     Unstable angina [facility])     Vascular headache      Past Surgical History:   Procedure Laterality Date    CARDIAC CATHETERIZATION      stent 2014    CARDIAC CATHETERIZATION      stent 2006    CATARACT REMOVAL      CATARACT REMOVAL      left    COLONOSCOPY  06/2016    Polyps    COLONOSCOPY  12/15/2015    diverticulosis, internal hemorrhoid, colon polyp- no further colonoscopies needed    CORONARY ARTERY BYPASS GRAFT      4 vessel CABG 2005    HEENT Left     macular pucker    HERNIA REPAIR Left 2017    ORTHOPEDIC SURGERY      finger, foot    PARTIAL HYSTERECTOMY (CERVIX NOT REMOVED)      hyst    TX ABDOMEN SURGERY PROC UNLISTED Left 07/2016    ablation for mass in left side of abd    TOTAL HIP ARTHROPLASTY Left 8/2/2022    LEFT HIP TOTAL ARTHROPLASTY performed by Darcy Mercado MD at [address] ENDOSCOPY      Esophagus stretch     Current Outpatient Medications   Medication Sig Dispense Refill    cephALEXin (KEFLEX) 250 MG capsule Take 1 capsule by mouth daily 90 capsule 3    aspirin 81 MG EC tablet Take 81 mg by mouth daily      vitamin B-12 (CYANOCOBALAMIN) 1000 MCG tablet Take 1,000 mcg by mouth daily      Ubiquinol 100 MG CAPS Take by mouth      hydrALAZINE (APRESOLINE) 25 MG tablet TAKE 1 TABLET BY MOUTH TWICE A  tablet 3    rosuvastatin (CRESTOR) 40 MG tablet Take 1 tablet by mouth every evening 90 tablet 3    spironolactone (ALDACTONE) 25 MG tablet TAKE 1 TABLET BY MOUTH EVERY DAY 90 tablet 3    amitriptyline (ELAVIL) 25 MG tablet Take 1 tablet by mouth nightly 30 tablet 2    furosemide (LASIX) 40 MG tablet Take 1 tablet by mouth in the morning. 1 tablet 0    ondansetron (ZOFRAN-ODT) 4 MG disintegrating tablet Take 1 tablet by mouth every 8 hours as needed for Nausea or Vomiting 30 tablet 0    rivaroxaban (XARELTO) 20 MG TABS tablet Take 1 tablet by mouth daily (with breakfast) HOLD XARELTO UNTIL Friday, [facility] [place]. MAY RESUME THEN, IF NO WOUND DRAINAGE.  1 tablet 0    potassium chloride (KLOR-CON) 10 MEQ extended release tablet Take 1 tablet by mouth three times a week Every Monday Wednesday and Friday 1 tablet 0    famotidine (PEPCID) 20 MG tablet Take 1 tablet by mouth every evening 60 tablet 5    meclizine (ANTIVERT) 25 MG CHEW Take 1 tablet by mouth 3 times daily as needed (PRN for dizziness) 90 tablet 2    memantine (NAMENDA) 10 MG tablet Take 1 tablet by mouth daily 30 tablet 5    magnesium oxide (MAG-OX) 400 MG tablet Take 1 tablet by mouth daily 30 tablet 5    Multiple Vitamins-Minerals (OCUVITE ADULT FORMULA PO) Take by mouth      Probiotic Product (PROBIOTIC-10 PO) Take by mouth      carvedilol (COREG) 12.5 MG tablet Take 1 tablet by mouth 2 times daily (with meals) 180 tablet 3    acetaminophen (TYLENOL) 325 MG tablet Take 500 mg by mouth every 6 hours as needed      loratadine (CLARITIN) 10 MG tablet Take 10 mg by mouth every evening      nitroGLYCERIN (NITROLINGUAL) 0.4 MG/SPRAY 0.4 mg spray Place 1 spray under the tongue      escitalopram (LEXAPRO) 20 MG tablet Take 1 tablet by mouth daily TAKE 1 TABLET BY MOUTH EVERY DAY 90 tablet 3     No current facility-administered medications for this visit. Allergies   Allergen Reactions    Amoxicillin-Pot Clavulanate Other (See Comments)     Causes yeast infection    Codeine Other (See Comments)     Made my heart go crazy    Gabapentin Other (See Comments)    Hydrochlorothiazide Other (See Comments)     Hyponatremia    Nitrofurantoin Other (See Comments)    Prednisone Other (See Comments)     Visual loss and headache    Sulfamethoxazole-Trimethoprim Nausea Only     Social History     Socioeconomic History    Marital status:      Spouse name: Not on file    Number of children: Not on file    Years of education: Not on file    Highest education level: Not on file   Occupational History    Not on file   Tobacco Use    Smoking status: Never    Smokeless tobacco: Never   Substance and Sexual Activity    Alcohol use: No    Drug use: No    Sexual activity: Not on file   Other Topics Concern    Not on file   Social History Narrative    Worked in the [employer] in the street for 12 years, worked in a [employer] for 20 years and 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 , 2 sons. Denies alcohol use. Has always lived in [address] [place] [address]. Trellise which she has had for 8 years.     Lifelong nonsmoker with 20 year secondhand smoke exposure from her  cigarettes. Social Determinants of Health     Financial Resource Strain: Not on file   Food Insecurity: Not on file   Transportation Needs: Not on file   Physical Activity: Insufficiently Active    Days of Exercise per Week: 2 days    Minutes of Exercise per Session: 20 min   Stress: Not on file   Social Connections: Not on file   Intimate Partner Violence: Not on file   Housing Stability: Not on file     Family History   Problem Relation Age of Onset    No Known Problems Maternal Grandfather     No Known Problems Paternal Grandmother     No Known Problems Paternal Grandfather     Diabetes Mother     Heart Surgery Mother     Heart Disease Mother     Heart Attack Father     Heart Disease Father     No Known Problems Sister     Cancer Sister         2 sisters w/ lung ca-neither one smoked    Heart Disease Sister     Cancer Brother         lung ca-smoker    Heart Attack Brother          age 22 of MI    Heart Disease Sister     Heart Surgery Sister     Heart Attack Sister     Heart Disease Sister     Heart Surgery Sister     Heart Attack Sister     No Known Problems Maternal Grandmother     Heart Surgery Brother          in [place] during 2nd heart surgery    Heart Disease Brother        Review of Systems  Constitutional:   Negative for fever, chills, appetite change, malaise/fatigue, headaches and weight loss. Skin:  Negative for skin lesions, rash and itching. Eyes:  Negative for visual disturbance, eye pain and eye discharge. ENT:  Negative for difficulty articulating words, pain swallowing, high frequency hearing loss and dry mouth. Respiratory:  Negative for cough, blood in sputum and wheezing. Cardiovascular:  Negative for chest pain, hypertension, irregular heartbeat, leg pain, leg swelling, regular rate and rhythm and varicose veins. GI:  Negative for nausea, vomiting, abdominal pain, blood in stool, constipation, diarrhea, indigestion and heartburn.   Genitourinary: Negative for urinary burning, hematuria, flank pain, recurrent UTIs, history of urolithiasis, nocturia, slower stream, straining, urgency, leakage w/ urge, frequent urination, incomplete emptying and vaginal pain. Musculoskeletal:  Negative for back pain, bone pain, arthralgias, tenderness, muscle weakness and neck pain. Neurological:  Negative for dizziness, focal weakness, numbness, seizures and tremors. Psychological:  Negative for depression and psychiatric problem. Endocrine:  Negative for cold intolerance, thirst, excessive urination, fatigue and heat intolerance. Hem/Lymphatic:  Negative for easy bleeding, easy bruising and frequent infections. Urinalysis  UA - Dipstick  @LASTPROCAMB([id number])@    UA - Micro  WBC - 0  RBC - 0  Bacteria - 0  Epith - 0    There were no vitals taken for this visit. GENERAL: No acute distress, Awake, Alert, Oriented X 3, Gait normal  CARDIAC: regular rate and rhythm  CHEST AND LUNG: Easy work of breathing, clear to auscultation bilaterally, no cyanosis  ABDOMEN: soft, non tender, non-distended, positive bowel sounds, no organomegaly, no palpable masses, no guarding, no rebound tenderness  SKIN: No rash, no erythema, no lacerations or abrasions, no ecchymosis  NEUROLOGIC: cranial nerves 2-12 grossly intact       Assessment and Plan    ICD-10-CM    1. Acute cystitis without hematuria  N30.00 AMB POC URINALYSIS DIP STICK AUTO W/O MICRO      2. Incomplete bladder emptying  R33.9 AMB POC URINALYSIS DIP STICK AUTO W/O MICRO     AMB POC PVR, RO,POST-VOID RES,US,NON-IMAGING      3. Malignant neoplasm of left kidney, except renal pelvis (HCC)  C64.2 AMB POC URINALYSIS DIP STICK AUTO W/O MICRO      4. Recurrent UTI  N39.0 cephALEXin (KEFLEX) 250 MG capsule        Recurrent UTI / Incomplete Bladder Emptying:     Discussed timed/double voiding to improve emptying as well as increasing hydration.   Also will start daily keflex for UTI prevention and keep follow up 2/2023 with me. If UTIs persist then will need to get cysto to further evaluate. Kidney Cancer:   CT due 2/2023 for surveillance:       I have spent 30 minutes today reviewing previous notes, test results and face to face with the patient as well as documenting. Yanick Weaver M.D.     [facility]  [address], [facility]  Phone: [phone]  Fax: [phone]

## 2022-11-23 ENCOUNTER — TELEPHONE (OUTPATIENT)
Dept: PRIMARY CARE CLINIC | Facility: CLINIC | Age: 87
End: 2022-11-23

## 2022-11-23 RX ORDER — AMITRIPTYLINE HYDROCHLORIDE 25 MG/1
25 TABLET, FILM COATED ORAL NIGHTLY
Qty: 60 TABLET | Refills: 1 | Status: SHIPPED | OUTPATIENT
Start: 2022-11-23 | End: 2023-01-22

## 2022-11-23 NOTE — TELEPHONE ENCOUNTER
Pt requesting a refill of    Amitriptyline - she would like it for 60 days    Send to [facility] in [place]

## 2022-12-29 ENCOUNTER — APPOINTMENT (OUTPATIENT)
Dept: GENERAL RADIOLOGY | Age: 87
End: 2022-12-29
Payer: MEDICARE

## 2022-12-29 ENCOUNTER — APPOINTMENT (OUTPATIENT)
Dept: CT IMAGING | Age: 87
End: 2022-12-29
Payer: MEDICARE

## 2022-12-29 ENCOUNTER — HOSPITAL ENCOUNTER (EMERGENCY)
Age: 87
Discharge: HOME | End: 2022-12-30
Attending: EMERGENCY MEDICINE | Admitting: EMERGENCY MEDICINE
Payer: MEDICARE

## 2022-12-29 DIAGNOSIS — R06.01 ORTHOPNEA: Primary | ICD-10-CM

## 2022-12-29 DIAGNOSIS — I25.708 CORONARY ARTERY DISEASE OF BYPASS GRAFT WITH STABLE ANGINA PECTORIS, UNSPECIFIED WHETHER NATIVE OR TRANSPLANTED HEART (HCC): ICD-10-CM

## 2022-12-29 LAB
ALBUMIN SERPL-MCNC: 3.8 G/DL (ref 3.2–4.6)
ALBUMIN/GLOB SERPL: 1.2 {RATIO} (ref 0.4–1.6)
ALP SERPL-CCNC: 86 U/L (ref 50–136)
ALT SERPL-CCNC: 21 U/L (ref 12–65)
ANION GAP SERPL CALC-SCNC: 9 MMOL/L (ref 2–11)
AST SERPL-CCNC: 34 U/L (ref 15–37)
BASOPHILS # BLD: 0 K/UL (ref 0–0.2)
BASOPHILS NFR BLD: 1 % (ref 0–2)
BILIRUB SERPL-MCNC: 0.7 MG/DL (ref 0.2–1.1)
BUN SERPL-MCNC: 16 MG/DL (ref 8–23)
CALCIUM SERPL-MCNC: 9.6 MG/DL (ref 8.3–10.4)
CHLORIDE SERPL-SCNC: 99 MMOL/L (ref 101–110)
CO2 SERPL-SCNC: 27 MMOL/L (ref 21–32)
CREAT SERPL-MCNC: 1.3 MG/DL (ref 0.6–1)
DIFFERENTIAL METHOD BLD: ABNORMAL
EOSINOPHIL # BLD: 0.2 K/UL (ref 0–0.8)
EOSINOPHIL NFR BLD: 2 % (ref 0.5–7.8)
ERYTHROCYTE [DISTWIDTH] IN BLOOD BY AUTOMATED COUNT: 15 % (ref 11.9–14.6)
GLOBULIN SER CALC-MCNC: 3.3 G/DL (ref 2.8–4.5)
GLUCOSE SERPL-MCNC: 100 MG/DL (ref 65–100)
HCT VFR BLD AUTO: 41.9 % (ref 35.8–46.3)
HGB BLD-MCNC: 13.7 G/DL (ref 11.7–15.4)
IMM GRANULOCYTES # BLD AUTO: 0 K/UL (ref 0–0.5)
IMM GRANULOCYTES NFR BLD AUTO: 0 % (ref 0–5)
LYMPHOCYTES # BLD: 2.8 K/UL (ref 0.5–4.6)
LYMPHOCYTES NFR BLD: 41 % (ref 13–44)
MAGNESIUM SERPL-MCNC: 2.2 MG/DL (ref 1.8–2.4)
MCH RBC QN AUTO: 30 PG (ref 26.1–32.9)
MCHC RBC AUTO-ENTMCNC: 32.7 G/DL (ref 31.4–35)
MCV RBC AUTO: 91.9 FL (ref 82–102)
MONOCYTES # BLD: 0.6 K/UL (ref 0.1–1.3)
MONOCYTES NFR BLD: 9 % (ref 4–12)
NEUTS SEG # BLD: 3.1 K/UL (ref 1.7–8.2)
NEUTS SEG NFR BLD: 47 % (ref 43–78)
NRBC # BLD: 0 K/UL (ref 0–0.2)
NT PRO BNP: 417 PG/ML
PLATELET # BLD AUTO: 263 K/UL (ref 150–450)
PMV BLD AUTO: 9.4 FL (ref 9.4–12.3)
POTASSIUM SERPL-SCNC: 3.5 MMOL/L (ref 3.5–5.1)
PROT SERPL-MCNC: 7.1 G/DL (ref 6.3–8.2)
RBC # BLD AUTO: 4.56 M/UL (ref 4.05–5.2)
SODIUM SERPL-SCNC: 135 MMOL/L (ref 133–143)
TROPONIN I SERPL HS-MCNC: 7.6 PG/ML (ref 0–14)
WBC # BLD AUTO: 6.7 K/UL (ref 4.3–11.1)

## 2022-12-29 PROCEDURE — 2700000000 HC OXYGEN THERAPY PER DAY

## 2022-12-29 PROCEDURE — 85025 COMPLETE CBC W/AUTO DIFF WBC: CPT

## 2022-12-29 PROCEDURE — 2580000003 HC RX 258: Performed by: EMERGENCY MEDICINE

## 2022-12-29 PROCEDURE — 94640 AIRWAY INHALATION TREATMENT: CPT

## 2022-12-29 PROCEDURE — 84484 ASSAY OF TROPONIN QUANT: CPT

## 2022-12-29 PROCEDURE — 83735 ASSAY OF MAGNESIUM: CPT

## 2022-12-29 PROCEDURE — 6370000000 HC RX 637 (ALT 250 FOR IP): Performed by: EMERGENCY MEDICINE

## 2022-12-29 PROCEDURE — 71260 CT THORAX DX C+: CPT | Performed by: EMERGENCY MEDICINE

## 2022-12-29 PROCEDURE — 83880 ASSAY OF NATRIURETIC PEPTIDE: CPT

## 2022-12-29 PROCEDURE — 6360000004 HC RX CONTRAST MEDICATION: Performed by: EMERGENCY MEDICINE

## 2022-12-29 PROCEDURE — 80053 COMPREHEN METABOLIC PANEL: CPT

## 2022-12-29 PROCEDURE — 99285 EMERGENCY DEPT VISIT HI MDM: CPT

## 2022-12-29 PROCEDURE — 71045 X-RAY EXAM CHEST 1 VIEW: CPT

## 2022-12-29 PROCEDURE — 96374 THER/PROPH/DIAG INJ IV PUSH: CPT

## 2022-12-29 PROCEDURE — 87804 INFLUENZA ASSAY W/OPTIC: CPT

## 2022-12-29 PROCEDURE — 87635 SARS-COV-2 COVID-19 AMP PRB: CPT

## 2022-12-29 RX ORDER — SODIUM CHLORIDE 0.9 % (FLUSH) 0.9 %
10 SYRINGE (ML) INJECTION
Status: COMPLETED | OUTPATIENT
Start: 2022-12-29 | End: 2022-12-29

## 2022-12-29 RX ORDER — IPRATROPIUM BROMIDE AND ALBUTEROL SULFATE 2.5; .5 MG/3ML; MG/3ML
1 SOLUTION RESPIRATORY (INHALATION)
Status: COMPLETED | OUTPATIENT
Start: 2022-12-29 | End: 2022-12-29

## 2022-12-29 RX ORDER — SODIUM CHLORIDE 0.9 % (FLUSH) 0.9 %
5 SYRINGE (ML) INJECTION AS NEEDED
Status: DISCONTINUED | OUTPATIENT
Start: 2022-12-29 | End: 2022-12-30 | Stop reason: HOSPADM

## 2022-12-29 RX ORDER — FUROSEMIDE 10 MG/ML
60 INJECTION INTRAMUSCULAR; INTRAVENOUS ONCE
Status: COMPLETED | OUTPATIENT
Start: 2022-12-29 | End: 2022-12-30

## 2022-12-29 RX ORDER — SODIUM CHLORIDE 0.9 % (FLUSH) 0.9 %
5 SYRINGE (ML) INJECTION EVERY 8 HOURS
Status: DISCONTINUED | OUTPATIENT
Start: 2022-12-29 | End: 2022-12-30 | Stop reason: HOSPADM

## 2022-12-29 RX ORDER — 0.9 % SODIUM CHLORIDE 0.9 %
100 INTRAVENOUS SOLUTION INTRAVENOUS
Status: COMPLETED | OUTPATIENT
Start: 2022-12-29 | End: 2022-12-30

## 2022-12-29 RX ADMIN — IPRATROPIUM BROMIDE AND ALBUTEROL SULFATE 1 AMPULE: .5; 3 SOLUTION RESPIRATORY (INHALATION) at 22:55

## 2022-12-29 RX ADMIN — SODIUM CHLORIDE, PRESERVATIVE FREE 5 ML: 5 INJECTION INTRAVENOUS at 23:00

## 2022-12-29 RX ADMIN — SODIUM CHLORIDE, PRESERVATIVE FREE 10 ML: 5 INJECTION INTRAVENOUS at 23:58

## 2022-12-29 RX ADMIN — SODIUM CHLORIDE 100 ML: 9 INJECTION, SOLUTION INTRAVENOUS at 23:58

## 2022-12-29 RX ADMIN — IOPAMIDOL 100 ML: 755 INJECTION, SOLUTION INTRAVENOUS at 23:56

## 2022-12-29 ASSESSMENT — ENCOUNTER SYMPTOMS
SHORTNESS OF BREATH: 1
WHEEZING: 0
CHEST TIGHTNESS: 1

## 2022-12-29 ASSESSMENT — PAIN - FUNCTIONAL ASSESSMENT: PAIN_FUNCTIONAL_ASSESSMENT: 0-10

## 2022-12-29 ASSESSMENT — PAIN SCALES - GENERAL: PAINLEVEL_OUTOF10: 0

## 2022-12-30 VITALS
HEART RATE: 66 BPM | BODY MASS INDEX: 26.93 KG/M2 | OXYGEN SATURATION: 100 % | TEMPERATURE: 98.1 F | HEIGHT: 63 IN | RESPIRATION RATE: 20 BRPM | DIASTOLIC BLOOD PRESSURE: 65 MMHG | SYSTOLIC BLOOD PRESSURE: 138 MMHG | WEIGHT: 152 LBS

## 2022-12-30 LAB
FLUAV AG NPH QL IA: NEGATIVE
FLUBV AG NPH QL IA: NEGATIVE
SARS-COV-2 RDRP RESP QL NAA+PROBE: NOT DETECTED
SOURCE: NORMAL
SPECIMEN SOURCE: NORMAL
TROPONIN I SERPL HS-MCNC: 26.3 PG/ML (ref 0–14)

## 2022-12-30 PROCEDURE — 6360000002 HC RX W HCPCS: Performed by: EMERGENCY MEDICINE

## 2022-12-30 PROCEDURE — 84484 ASSAY OF TROPONIN QUANT: CPT

## 2022-12-30 RX ORDER — ISOSORBIDE DINITRATE 40 MG
40 TABLET, EXTENDED RELEASE ORAL EVERY 12 HOURS
Qty: 20 TABLET | Refills: 3 | Status: SHIPPED | OUTPATIENT
Start: 2022-12-30

## 2022-12-30 RX ADMIN — FUROSEMIDE 60 MG: 10 INJECTION, SOLUTION INTRAMUSCULAR; INTRAVENOUS at 02:34

## 2022-12-30 ASSESSMENT — PAIN - FUNCTIONAL ASSESSMENT
PAIN_FUNCTIONAL_ASSESSMENT: NONE - DENIES PAIN
PAIN_FUNCTIONAL_ASSESSMENT: NONE - DENIES PAIN

## 2022-12-30 ASSESSMENT — PAIN SCALES - GENERAL
PAINLEVEL_OUTOF10: 0
PAINLEVEL_OUTOF10: 0

## 2022-12-30 NOTE — ED NOTES
Pt back in room from CT and is reclining on stretcher in nad with son at bedside.       Ximena Torres RN  12/30/22 [id number]

## 2022-12-30 NOTE — ED NOTES
Pt to CT via stretcher with transport tech. She remains in nad and endorses SOB improved since receiving neb treatment as noted from RT.      Zheng Gonzalez RN  12/30/22 [id number]

## 2022-12-30 NOTE — ED TRIAGE NOTES
Pt with increased SOB x 4 days and states, \" heavy in my chest \" that has been constant. Pt describes orthopnea and sleeping on 3 pillows. She takes Lasix 20 mg daily but took 80 mg yesterday and 100 mg today.

## 2022-12-30 NOTE — ED PROVIDER NOTES
Emergency Department Provider Note                   PCP:                Huang Suarez MD               Age: 81 y.o. Sex: female     No diagnosis found. DISPOSITION          MDM  Number of Diagnoses or Management Options  Diagnosis management comments: Differential diagnosis includes congestive heart failure, pneumonia or other respiratory illness. Amount and/or Complexity of Data Reviewed  Clinical lab tests: ordered and reviewed  Tests in the radiology section of CPT®: reviewed and ordered  Review and summarize past medical records: yes  Discuss the patient with other providers: yes (Case discussed via [employer] with Dr. Jen Matthews on-call for [facility] cardiology. Patient is medical management for chronic coronary artery disease. Will try isosorbide dinitrate they the patient did not tolerate mononitrate, stating it caused her headaches.)  Independent visualization of images, tracings, or specimens: yes (EKG at 2153: Is a sinus rhythm, rate of 69 probable left atrial enlargement and borderline repolarization abnormality. No acute ischemic changes or ectopy.)    Risk of Complications, Morbidity, and/or Mortality  Presenting problems: moderate  Diagnostic procedures: moderate  Management options: moderate  General comments: Plan discharge with outpatient follow-up.     Patient Progress  Patient progress: stable                              Orders Placed This Encounter   Procedures    Rapid influenza A/B antigens    COVID-19, Rapid    XR CHEST PORTABLE    CBC with Auto Differential    Comprehensive Metabolic Panel    Magnesium    Troponin    Brain Natriuretic Peptide    Cardiac Monitor - ED Only    Continuous Pulse Oximetry    EKG 12 Lead    Saline lock IV        Medications   sodium chloride flush 0.9 % injection 5 mL (has no administration in time range)   sodium chloride flush 0.9 % injection 5 mL (has no administration in time range)   ipratropium-albuterol (DUONEB) nebulizer solution 1 ampule (has no administration in time range)   furosemide (LASIX) injection 60 mg (has no administration in time range)       New Prescriptions    No medications on file        Bay Dubois is a 81 y.o. female who presents to the Emergency Department with chief complaint of    Chief Complaint   Patient presents with    Shortness of Breath     Pt with increased SOB x 4 days and states, \" heavy in my chest \" that has been constant. Pt describes orthopnea and sleeping on 3 pillows. She takes Lasix 20 mg daily but took 80 mg yesterday and 100 mg today. Patient presents emerged from with complaints of shortness of breath that has been present for the past 2 days or so. She also reports heaviness in the chest associated with the shortness of breath. She reports orthopnea. She has had a cough that has been nonproductive. She does have a history of cardiac disease but denies any history of pulmonary disease. She has had no sick contacts. Is increased her Lasix dosage at home with no improvement. The history is provided by the patient and medical records. Review of Systems   Constitutional:  Negative for chills, fatigue and fever. HENT:  Negative for congestion. Respiratory:  Positive for chest tightness and shortness of breath. Negative for wheezing. Cardiovascular:  Negative for chest pain, palpitations and leg swelling. All other systems reviewed and are negative.     Past Medical History:   Diagnosis Date    Abdominal pain 10/28/2014    Angina at rest [facility])     pt denies    Arthritis     osteo - low back,  shoulders, hips, low back    Bilateral carotid bruits     Bursitis     CAD (coronary artery disease)     4 vessel CABG 2005;--- stents x 4--- last one placed 2014-- followed by dr Brenden Currie     Chronic pain     left shoulder    Coagulation defects     Xarelto    Constipation     Cough 09/11/2014    10/8/14, Methacholine Challenge Study: The point at which the FEV1 fell by at least 20%, the PC20, occurred above a dose of 25mg/mL of methacholine. This rules out the diagnosis of asthma with an extremely high degree of sensitivity. No post-challenge FEV1 recovery was identified. Therese Choi MD        Depressive disorder     Dyspnea 09/11/2014    [facility]; 9/29/14: IMPRESSION: The flow volume loop is consistent restriction. Spirometry suggest restriction with concomitant obstruction at low lung volumes. There is not a significant bronchodilator response. Lung volumes reveal mild restriction.  The unadjusted diffusion capacity is normal.  Sudhakar Cintron MD      GERD (gastroesophageal reflux disease)     controlled with med    Headache     Hoarseness or changing voice     thougfht to be related to gerd    Hyperlipidemia     Hypertension     controlled with med    Kidney stone     yrs ago-- no tx required    Lumbago     Macular degeneration     Nodule of left lung     dx'ed 4 years ago-watched for several months-no new growth-being watched-yearly CT scan----- followed by dr. David Melendez    Pain in joint, ankle and foot     left 3rd toe and right 2nd toe    Pain in joint, shoulder region     left now bothering > right, right ok    Palpitations 09/24/2015    followed by dr Nando Malone    Panic disorder     panic attacks -- last one 2014    Renal mass 07/2016    ablation---left side--- followed by dr Kris Garrido in [place]    Sciatica     Unstable angina [facility])     Vascular headache         Past Surgical History:   Procedure Laterality Date    CARDIAC CATHETERIZATION      stent 2014    CARDIAC CATHETERIZATION      stent 2006    CATARACT REMOVAL      CATARACT REMOVAL      left    COLONOSCOPY  06/2016    Polyps    COLONOSCOPY  12/15/2015    diverticulosis, internal hemorrhoid, colon polyp- no further colonoscopies needed    CORONARY ARTERY BYPASS GRAFT      4 vessel CABG 2005    HEENT Left     macular pucker    HERNIA REPAIR Left 2017    ORTHOPEDIC SURGERY      finger, foot    PARTIAL HYSTERECTOMY (CERVIX NOT REMOVED)      hyst    WI ABDOMEN SURGERY PROC UNLISTED Left 2016    ablation for mass in left side of abd    TOTAL HIP ARTHROPLASTY Left 2022    LEFT HIP TOTAL ARTHROPLASTY performed by Jed Reid MD at [address] ENDOSCOPY      Esophagus stretch        Family History   Problem Relation Age of Onset    No Known Problems Maternal Grandfather     No Known Problems Paternal Grandmother     No Known Problems Paternal Grandfather     Diabetes Mother     Heart Surgery Mother     Heart Disease Mother     Heart Attack Father     Heart Disease Father     No Known Problems Sister     Cancer Sister         2 sisters w/ lung ca-neither one smoked    Heart Disease Sister     Cancer Brother         lung ca-smoker    Heart Attack Brother          age 22 of MI    Heart Disease Sister     Heart Surgery Sister     Heart Attack Sister     Heart Disease Sister     Heart Surgery Sister     Heart Attack Sister     No Known Problems Maternal Grandmother     Heart Surgery Brother          in [place] during 2nd heart surgery    Heart Disease Brother         Social History     Socioeconomic History    Marital status:    Tobacco Use    Smoking status: Never    Smokeless tobacco: Never   Substance and Sexual Activity    Alcohol use: No    Drug use: No   Social History Narrative    Worked in the [employer] in the street for 12 years, worked in a [employer] for 20 years and 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 , 2 sons. Denies alcohol use. Has always lived in [address]. 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 Lifelong nonsmoker with 20 year secondhand smoke exposure from her  cigarettes.      Social Determinants of Health     Physical Activity: Insufficiently Active    Days of Exercise per Week: 2 days    Minutes of Exercise per Session: 20 min         Amoxicillin-pot clavulanate, Codeine, Gabapentin, Hydrochlorothiazide, Nitrofurantoin, Prednisone, and Sulfamethoxazole-trimethoprim     Previous Medications    ACETAMINOPHEN (TYLENOL) 325 MG TABLET    Take 500 mg by mouth every 6 hours as needed    AMITRIPTYLINE (ELAVIL) 25 MG TABLET    Take 1 tablet by mouth nightly    ASPIRIN 81 MG EC TABLET    Take 81 mg by mouth daily    CARVEDILOL (COREG) 12.5 MG TABLET    Take 1 tablet by mouth 2 times daily (with meals)    CEPHALEXIN (KEFLEX) 250 MG CAPSULE    Take 1 capsule by mouth daily    ESCITALOPRAM (LEXAPRO) 20 MG TABLET    Take 1 tablet by mouth daily TAKE 1 TABLET BY MOUTH EVERY DAY    FAMOTIDINE (PEPCID) 20 MG TABLET    Take 1 tablet by mouth every evening    FUROSEMIDE (LASIX) 40 MG TABLET    Take 1 tablet by mouth in the morning. HYDRALAZINE (APRESOLINE) 25 MG TABLET    TAKE 1 TABLET BY MOUTH TWICE A DAY    LORATADINE (CLARITIN) 10 MG TABLET    Take 10 mg by mouth every evening    MAGNESIUM OXIDE (MAG-OX) 400 MG TABLET    Take 1 tablet by mouth daily    MECLIZINE (ANTIVERT) 25 MG CHEW    Take 1 tablet by mouth 3 times daily as needed (PRN for dizziness)    MEMANTINE (NAMENDA) 10 MG TABLET    Take 1 tablet by mouth daily    MULTIPLE VITAMINS-MINERALS (OCUVITE ADULT FORMULA PO)    Take by mouth    NITROGLYCERIN (NITROLINGUAL) 0.4 MG/SPRAY 0.4 MG SPRAY    Place 1 spray under the tongue    ONDANSETRON (ZOFRAN-ODT) 4 MG DISINTEGRATING TABLET    Take 1 tablet by mouth every 8 hours as needed for Nausea or Vomiting    POTASSIUM CHLORIDE (KLOR-CON) 10 MEQ EXTENDED RELEASE TABLET    Take 1 tablet by mouth three times a week Every Monday Wednesday and Friday    PROBIOTIC PRODUCT (PROBIOTIC-10 PO)    Take by mouth    RIVAROXABAN (XARELTO) 20 MG TABS TABLET    Take 1 tablet by mouth daily (with breakfast) HOLD XARELTO UNTIL Friday, [facility] [place]. MAY RESUME THEN, IF NO WOUND DRAINAGE.     ROSUVASTATIN (CRESTOR) 40 MG TABLET    Take 1 tablet by mouth every evening    SPIRONOLACTONE (ALDACTONE) 25 MG TABLET    TAKE 1 TABLET BY MOUTH EVERY DAY UBIQUINOL 100 MG CAPS    Take by mouth    VITAMIN B-12 (CYANOCOBALAMIN) 1000 MCG TABLET    Take 1,000 mcg by mouth daily        Vitals signs and nursing note reviewed. Patient Vitals for the past 4 hrs:   Temp Pulse Resp BP SpO2   12/29/22 2141 97.7 °F (36.5 °C) 70 24 (!) 145/71 97 %          Physical Exam  Vitals and nursing note reviewed. Constitutional:       General: She is not in acute distress. Appearance: Normal appearance. She is not ill-appearing, toxic-appearing or diaphoretic. HENT:      Head: Normocephalic and atraumatic. Nose: Nose normal.      Mouth/Throat:      Mouth: Mucous membranes are moist.      Pharynx: Oropharynx is clear. No oropharyngeal exudate or posterior oropharyngeal erythema. Eyes:      Extraocular Movements: Extraocular movements intact. Conjunctiva/sclera: Conjunctivae normal.      Pupils: Pupils are equal, round, and reactive to light. Neck:      Comments: No JVD noted  Cardiovascular:      Rate and Rhythm: Normal rate and regular rhythm. Pulses: Normal pulses. Pulmonary:      Effort: Pulmonary effort is normal.      Comments: Breath sounds are diminished in the bases bilaterally. No adventitious sounds noted. Abdominal:      General: There is no distension. Palpations: Abdomen is soft. Tenderness: There is no abdominal tenderness. There is no right CVA tenderness, left CVA tenderness or guarding. Musculoskeletal:         General: Normal range of motion. Cervical back: Normal range of motion and neck supple. No rigidity or tenderness. Lymphadenopathy:      Cervical: No cervical adenopathy. Skin:     General: Skin is warm and dry. Capillary Refill: Capillary refill takes less than 2 seconds. Neurological:      General: No focal deficit present. Mental Status: She is alert and oriented to person, place, and time. Mental status is at baseline.    Psychiatric:         Mood and Affect: Mood normal.         Behavior: Behavior normal.         Thought Content: Thought content normal.        Procedures    Results for orders placed or performed during the hospital encounter of 12/29/22   XR CHEST PORTABLE    Narrative    EXAM: XR CHEST PORTABLE    HISTORY: Shortness of Breath. TECHNIQUE: Frontal chest.    COMPARISON: 12/26/2019     FINDINGS:   The cardiac silhouette, mediastinum, and pulmonary vasculature are within normal  limits. There is no consolidation, pleural effusion, or pneumothorax. No significant osseous abnormalities are observed. Impression    No evidence of an acute intrathoracic process. CBC with Auto Differential   Result Value Ref Range    WBC 6.7 4.3 - 11.1 K/uL    RBC 4.56 4.05 - 5.2 M/uL    Hemoglobin 13.7 11.7 - 15.4 g/dL    Hematocrit 41.9 35.8 - 46.3 %    MCV 91.9 82 - 102 FL    MCH 30.0 26.1 - 32.9 PG    MCHC 32.7 31.4 - 35.0 g/dL    RDW 15.0 (H) 11.9 - 14.6 %    Platelets 302 576 - 046 K/uL    MPV 9.4 9.4 - 12.3 FL    nRBC 0.00 0.0 - 0.2 K/uL    Differential Type AUTOMATED      Seg Neutrophils 47 43 - 78 %    Lymphocytes 41 13 - 44 %    Monocytes 9 4.0 - 12.0 %    Eosinophils % 2 0.5 - 7.8 %    Basophils 1 0.0 - 2.0 %    Immature Granulocytes 0 0.0 - 5.0 %    Segs Absolute 3.1 1.7 - 8.2 K/UL    Absolute Lymph # 2.8 0.5 - 4.6 K/UL    Absolute Mono # 0.6 0.1 - 1.3 K/UL    Absolute Eos # 0.2 0.0 - 0.8 K/UL    Basophils Absolute 0.0 0.0 - 0.2 K/UL    Absolute Immature Granulocyte 0.0 0.0 - 0.5 K/UL        XR CHEST PORTABLE   Final Result   No evidence of an acute intrathoracic process. Voice dictation software was used during the making of this note. This software is not perfect and grammatical and other typographical errors may be present. This note has not been completely proofread for errors.      Neeta Hay,   12/30/22 0211

## 2022-12-30 NOTE — ED NOTES
I have reviewed discharge instructions with the patient. The patient verbalized understanding. Patient left ED via Discharge Method: wheelchair to Home with son  Opportunity for questions and clarification provided. Patient given 1 scripts. To continue your aftercare when you leave the hospital, you may receive an automated call from our care team to check in on how you are doing. This is a free service and part of our promise to provide the best care and service to meet your aftercare needs.  If you have questions, or wish to unsubscribe from this service please call [phone]. Thank you for Choosing our [facility] [employer] [facility] Emergency Department.        Aziza James RN  12/30/22 0201

## 2023-01-03 ENCOUNTER — CARE COORDINATION (OUTPATIENT)
Dept: CARE COORDINATION | Facility: CLINIC | Age: 88
End: 2023-01-03

## 2023-01-03 NOTE — CARE COORDINATION
Ambulatory Care Coordination Note  1/3/2023    ACC: Jaye Cano, RN    Offered patient enrollment in the Remote Patient Monitoring (RPM) program for in-home monitoring: NA.    Pt reports chest \"heaviness\". She picked up prescription for isosorbide dinitrate today, understands purpose, how to use and has not started it yet. She may begin today though she is hesitant d/t side effect of HA and ringing in ears. Pt states she has nitro spray but doesn't like to use it unless necessary d/t same. Pt has cardiology appt 1/5/23 and will reach out to cardiology prior to if necessary. Pt reports vaginal itching, has been using hydrocortisone cream however it is not working. Will notify PCP at pt request.  Pt is agreeable to follow up in CCM services. Prior to Admission medications    Medication Sig Start Date End Date Taking? Authorizing Provider   isosorbide dinitrate (ISOCHRON) 40 MG extended release tablet Take 1 tablet by mouth in the morning and 1 tablet in the evening.  12/30/22   Milo Hughes DO   amitriptyline (ELAVIL) 25 MG tablet Take 1 tablet by mouth nightly 11/23/22 1/22/23  Juan Riojas MD   cephALEXin [facility]) 250 MG capsule Take 1 capsule by mouth daily 11/9/22   Omid Avila MD   aspirin 81 MG EC tablet Take 81 mg by mouth daily    Historical Provider, MD   vitamin B-12 (CYANOCOBALAMIN) 1000 MCG tablet Take 1,000 mcg by mouth daily    Historical Provider, MD   Ubiquinol 100 MG CAPS Take by mouth    Historical Provider, MD   hydrALAZINE (APRESOLINE) 25 MG tablet TAKE 1 TABLET BY MOUTH TWICE A DAY 10/4/22   Roc Rashid DO   rosuvastatin (CRESTOR) 40 MG tablet Take 1 tablet by mouth every evening 10/4/22   Roc Rashid DO   escitalopram (LEXAPRO) 20 MG tablet Take 1 tablet by mouth daily TAKE 1 TABLET BY MOUTH EVERY DAY 9/29/22 10/29/22  Juan Riojas MD   spironolactone (ALDACTONE) 25 MG tablet TAKE 1 TABLET BY MOUTH EVERY DAY 9/6/22   Juan Worthington MD Giuliano   furosemide (LASIX) 40 MG tablet Take 1 tablet by mouth in the morning. 8/3/22   KIERSTEN Garcia   ondansetron (ZOFRAN-ODT) 4 MG disintegrating tablet Take 1 tablet by mouth every 8 hours as needed for Nausea or Vomiting 8/3/22   KIERSTEN Garcia   rivaroxaban (XARELTO) 20 MG TABS tablet Take 1 tablet by mouth daily (with breakfast) HOLD XARELTO UNTIL Friday, [facility] [place].   MAY RESUME THEN, IF NO WOUND DRAINAGE. 8/3/22   KIERSTEN Garcia   potassium chloride (KLOR-CON) 10 MEQ extended release tablet Take 1 tablet by mouth three times a week Every Monday Wednesday and Friday 8/3/22   KIERSTEN Garcia   famotidine (PEPCID) 20 MG tablet Take 1 tablet by mouth every evening 7/28/22   Juan Rm MD   meclizine (ANTIVERT) 25 MG CHEW Take 1 tablet by mouth 3 times daily as needed (PRN for dizziness) 7/28/22   Silvestre Chung MD   memantine (NAMENDA) 10 MG tablet Take 1 tablet by mouth daily 7/7/22   Juan Rm MD   magnesium oxide (MAG-OX) 400 MG tablet Take 1 tablet by mouth daily 7/7/22   Juan Rm MD   Multiple Vitamins-Minerals (OCUVITE ADULT FORMULA PO) Take by mouth    Historical Provider, MD   Probiotic Product (PROBIOTIC-10 PO) Take by mouth    Historical Provider, MD   carvedilol (COREG) 12.5 MG tablet Take 1 tablet by mouth 2 times daily (with meals) 6/29/22   Natalya Davis DO   coenzyme Q10 100 MG CAPS capsule Take 1 capsule by mouth daily 6/29/22 8/3/22  Natalya Davis DO   acetaminophen (TYLENOL) 325 MG tablet Take 500 mg by mouth every 6 hours as needed    Ar Automatic Reconciliation   loratadine (CLARITIN) 10 MG tablet Take 10 mg by mouth every evening    Ar Automatic Reconciliation   nitroGLYCERIN (NITROLINGUAL) 0.4 MG/SPRAY 0.4 mg spray Place 1 spray under the tongue 4/18/19   Ar Automatic Reconciliation       Future Appointments   Date Time Provider Sindi Ortega   1/5/2023 12:00 PM Natalya Davis DO [facility] AMB 2/3/2023 10:30 AM Pink Goldberg, MD POAG [facility] AMB   2/9/2023 11:30 AM DO THEODORE PalaciosDE [facility] AMB   2/13/2023 11:15 AM Scooter Faria MD [id number] [facility] AMB   3/22/2023 10:00 AM POW LAB POW [facility] AMB   3/29/2023 10:00 AM Juan Cotter MD POW [facility] AMB      and   General Assessment

## 2023-01-04 ENCOUNTER — NURSE ONLY (OUTPATIENT)
Dept: PRIMARY CARE CLINIC | Facility: CLINIC | Age: 88
End: 2023-01-04
Payer: MEDICARE

## 2023-01-04 ENCOUNTER — CARE COORDINATION (OUTPATIENT)
Dept: CARE COORDINATION | Facility: CLINIC | Age: 88
End: 2023-01-04

## 2023-01-04 DIAGNOSIS — R30.0 DYSURIA: Primary | ICD-10-CM

## 2023-01-04 LAB
BILIRUBIN, URINE, POC: NEGATIVE
BLOOD URINE, POC: NEGATIVE
GLUCOSE URINE, POC: NEGATIVE
KETONES, URINE, POC: NEGATIVE
LEUKOCYTE ESTERASE, URINE, POC: NORMAL
NITRITE, URINE, POC: NEGATIVE
PH, URINE, POC: 5.5 (ref 4.6–8)
PROTEIN,URINE, POC: NORMAL
SPECIFIC GRAVITY, URINE, POC: 1.03 (ref 1–1.03)
URINALYSIS CLARITY, POC: CLEAR
URINALYSIS COLOR, POC: YELLOW
UROBILINOGEN, POC: NORMAL

## 2023-01-04 PROCEDURE — 99211 OFF/OP EST MAY X REQ PHY/QHP: CPT | Performed by: FAMILY MEDICINE

## 2023-01-04 PROCEDURE — 81003 URINALYSIS AUTO W/O SCOPE: CPT | Performed by: FAMILY MEDICINE

## 2023-01-04 RX ORDER — FLUCONAZOLE 150 MG/1
TABLET ORAL
Qty: 2 TABLET | Refills: 0 | Status: SHIPPED | OUTPATIENT
Start: 2023-01-04

## 2023-01-04 NOTE — CARE COORDINATION
RNCM attempted to reach pt to notify of PCP response to sxs of yeast infection, left vm for pt asking her to call PCP office or to contact RNCM back.

## 2023-01-04 NOTE — PROGRESS NOTES
Patient states that she takes 250mg of Keflex daily to keep from getting a UTI. Patient's urine is negative for a UTI, per Dr. Dariana Carrera it is probably a yeast infection from the on going antibiotic. Per Dr. Dariana Carrera send in [phone] [facility] and if it does not get better call and make an appointment.

## 2023-01-05 ENCOUNTER — OFFICE VISIT (OUTPATIENT)
Dept: CARDIOLOGY CLINIC | Age: 88
End: 2023-01-05
Payer: MEDICARE

## 2023-01-05 VITALS
HEART RATE: 68 BPM | SYSTOLIC BLOOD PRESSURE: 118 MMHG | DIASTOLIC BLOOD PRESSURE: 72 MMHG | BODY MASS INDEX: 27.29 KG/M2 | HEIGHT: 63 IN | WEIGHT: 154 LBS

## 2023-01-05 DIAGNOSIS — I25.708 CORONARY ARTERY DISEASE OF BYPASS GRAFT WITH STABLE ANGINA PECTORIS, UNSPECIFIED WHETHER NATIVE OR TRANSPLANTED HEART (HCC): ICD-10-CM

## 2023-01-05 DIAGNOSIS — I50.22 HYPERTENSIVE HEART DISEASE WITH CHRONIC SYSTOLIC CONGESTIVE HEART FAILURE (HCC): ICD-10-CM

## 2023-01-05 DIAGNOSIS — I73.9 PAD (PERIPHERAL ARTERY DISEASE) (HCC): ICD-10-CM

## 2023-01-05 DIAGNOSIS — I11.0 HYPERTENSIVE HEART DISEASE WITH CHRONIC SYSTOLIC CONGESTIVE HEART FAILURE (HCC): ICD-10-CM

## 2023-01-05 DIAGNOSIS — I65.23 CAROTID STENOSIS, ASYMPTOMATIC, BILATERAL: ICD-10-CM

## 2023-01-05 DIAGNOSIS — I10 ESSENTIAL HYPERTENSION, BENIGN: ICD-10-CM

## 2023-01-05 DIAGNOSIS — I50.32 DIASTOLIC CHF, CHRONIC (HCC): Primary | ICD-10-CM

## 2023-01-05 DIAGNOSIS — I87.2 VENOUS (PERIPHERAL) INSUFFICIENCY: ICD-10-CM

## 2023-01-05 DIAGNOSIS — I25.118 CORONARY ARTERY DISEASE OF NATIVE ARTERY OF NATIVE HEART WITH STABLE ANGINA PECTORIS (HCC): ICD-10-CM

## 2023-01-05 PROCEDURE — 1036F TOBACCO NON-USER: CPT | Performed by: INTERNAL MEDICINE

## 2023-01-05 PROCEDURE — G8484 FLU IMMUNIZE NO ADMIN: HCPCS | Performed by: INTERNAL MEDICINE

## 2023-01-05 PROCEDURE — G8417 CALC BMI ABV UP PARAM F/U: HCPCS | Performed by: INTERNAL MEDICINE

## 2023-01-05 PROCEDURE — G8427 DOCREV CUR MEDS BY ELIG CLIN: HCPCS | Performed by: INTERNAL MEDICINE

## 2023-01-05 PROCEDURE — 1123F ACP DISCUSS/DSCN MKR DOCD: CPT | Performed by: INTERNAL MEDICINE

## 2023-01-05 PROCEDURE — 99214 OFFICE O/P EST MOD 30 MIN: CPT | Performed by: INTERNAL MEDICINE

## 2023-01-05 PROCEDURE — 1090F PRES/ABSN URINE INCON ASSESS: CPT | Performed by: INTERNAL MEDICINE

## 2023-01-05 RX ORDER — ASCORBIC ACID 1000 MG
TABLET ORAL
COMMUNITY

## 2023-01-05 NOTE — PROGRESS NOTES
[address], [address] [employer] [facility], [address] [place] [address]  PHONE: [phone]     23    NAME:  Molly Hamlin  : 1935  MRN: [id number]       SUBJECTIVE:   Molly Hamlin is a 81 y.o. female seen for a follow up visit regarding the following:     Chief Complaint   Patient presents with    Coronary Artery Disease         HPI: Here for DHF, CAD s/p 4v CABG in   NO PVD on duplex 2017. LLE DVT 2018  Carotid US 2018: mild dz   Echo 3/2022: normal EF  LHC 2022:  grafts patent. Small vessel disease involving ramus intermedius which is the only change in comparison to 2018. Recommend medical therapy for small vessel coronary disease. (Imdur added after above [facility]. More HAs, could not take)  Carotid US 2022: mild Dz. Seen in ER for SOB on , but could not try another round of long acting nitrates. NO CP now, some pressure in chest, she admits to some anxiety as well. Doing well on anticoagulation treatment as reviewed today, no bleeding issues or excessive bruising noted. HAs on imdur in the past.  Did not feel well on hyd. Past Medical History, Past Surgical History, Family history, Social History, and Medications were all reviewed with the patient today and updated as necessary. Current Outpatient Medications   Medication Sig Dispense Refill    Coenzyme Q10 (CO Q 10) 10 MG CAPS Take by mouth      rivaroxaban (XARELTO) 20 MG TABS tablet Take 1 tablet by mouth daily (with breakfast) HOLD XARELTO UNTIL Friday, Cite Mayito. MAY RESUME THEN, IF NO WOUND DRAINAGE.  90 tablet 3    amitriptyline (ELAVIL) 25 MG tablet Take 1 tablet by mouth nightly 60 tablet 1    cephALEXin (KEFLEX) 250 MG capsule Take 1 capsule by mouth daily 90 capsule 3    aspirin 81 MG EC tablet Take 81 mg by mouth daily      vitamin B-12 (CYANOCOBALAMIN) 1000 MCG tablet Take 1,000 mcg by mouth daily      Ubiquinol 100 MG CAPS Take by mouth      hydrALAZINE (APRESOLINE) 25 MG tablet TAKE 1 TABLET BY MOUTH TWICE A  tablet 3    rosuvastatin (CRESTOR) 40 MG tablet Take 1 tablet by mouth every evening 90 tablet 3    spironolactone (ALDACTONE) 25 MG tablet TAKE 1 TABLET BY MOUTH EVERY DAY 90 tablet 3    ondansetron (ZOFRAN-ODT) 4 MG disintegrating tablet Take 1 tablet by mouth every 8 hours as needed for Nausea or Vomiting 30 tablet 0    potassium chloride (KLOR-CON) 10 MEQ extended release tablet Take 1 tablet by mouth three times a week Every Monday Wednesday and Friday 1 tablet 0    famotidine (PEPCID) 20 MG tablet Take 1 tablet by mouth every evening 60 tablet 5    meclizine (ANTIVERT) 25 MG CHEW Take 1 tablet by mouth 3 times daily as needed (PRN for dizziness) 90 tablet 2    memantine (NAMENDA) 10 MG tablet Take 1 tablet by mouth daily 30 tablet 5    magnesium oxide (MAG-OX) 400 MG tablet Take 1 tablet by mouth daily 30 tablet 5    Multiple Vitamins-Minerals (OCUVITE ADULT FORMULA PO) Take by mouth      Probiotic Product (PROBIOTIC-10 PO) Take by mouth      carvedilol (COREG) 12.5 MG tablet Take 1 tablet by mouth 2 times daily (with meals) 180 tablet 3    acetaminophen (TYLENOL) 325 MG tablet Take 500 mg by mouth every 6 hours as needed      loratadine (CLARITIN) 10 MG tablet Take 10 mg by mouth every evening      nitroGLYCERIN (NITROLINGUAL) 0.4 MG/SPRAY 0.4 mg spray Place 1 spray under the tongue      fluconazole (DIFLUCAN) 150 MG tablet Take one tablet today and one tablet a week from today. Dx:Yeast infection (Patient not taking: Reported on 1/5/2023) 2 tablet 0    escitalopram (LEXAPRO) 20 MG tablet Take 1 tablet by mouth daily TAKE 1 TABLET BY MOUTH EVERY DAY 90 tablet 3    furosemide (LASIX) 40 MG tablet Take 1 tablet by mouth in the morning. 1 tablet 0     No current facility-administered medications for this visit.         Allergies   Allergen Reactions    Amoxicillin-Pot Clavulanate Other (See Comments)     Causes yeast infection    Codeine Other (See Comments)     Made my heart go crazy    Gabapentin Other (See Comments)    Hydrochlorothiazide Other (See Comments)     Hyponatremia    Nitrofurantoin Other (See Comments)    Prednisone Other (See Comments)     Visual loss and headache    Sulfamethoxazole-Trimethoprim Nausea Only     Patient Active Problem List    Diagnosis Date Noted    Type 2 diabetes mellitus with chronic kidney disease 09/29/2022     Priority: Medium    Osteoarthritis of left hip, unspecified osteoarthritis type 08/02/2022     Priority: Medium    Chronic renal disease, stage III [facility]) [[id number]] 06/20/2022     Priority: Medium    Chronic systolic (congestive) heart failure 06/20/2022     Priority: Medium    Arthritis of left hip 06/28/2022     Added automatically from request for surgery [id number]      Carotid stenosis, asymptomatic, bilateral 04/20/2022    Chest pain 04/01/2022     Added automatically from request for surgery [id number]        Controlled type 2 diabetes mellitus without complication, without long-term current use of insulin ([facility] 04/01/2022    Sedative, hypnotic or anxiolytic use, unspecified with unspecified sedative, hypnotic or anxiolytic-induced disorder ([facility] 08/26/2021    Tinnitus of left ear 02/18/2021    Anxiety 02/18/2021    Venous (peripheral) insufficiency 06/06/2019    Bilateral leg pain 06/06/2019    Varicose veins of bilateral lower extremities with pain 06/06/2019    GERD (gastroesophageal reflux disease)     Halitosis 03/02/2018     Last Assessment & Plan:   I reassured the patient that I did not see any issues with her tonsils and   specifically no evidence of any tonsillar cancer which was one of her   bigger concerns. She does have a carious tooth on the right lower mandible   which may be a potential source for issues and she does have problems with   reflux which is another potential source for problems.  I encouraged her to   follow-up with her dentist to have that tooth treated and when she is off   her anticoagulants I encouraged her to follow through with the the EGD as   suggested by her family doctor.  She'll plan to see me as needed        Distorted taste 02/23/2018    Diaphragmatic hernia without obstruction or gangrene 02/23/2018    Chronic left-sided headaches 02/23/2018    Dyskinesia of esophagus 02/23/2018    Macular pucker, left eye 02/23/2018    Mixed hyperlipidemia 61/18/1924    Diastolic CHF, chronic (HCC) 08/29/2017    Diverticulosis of large intestine 08/06/2017    Glucose intolerance (impaired glucose tolerance) 05/12/2017    Idiopathic peripheral neuropathy 05/12/2017    PAD (peripheral artery disease) ([facility] 04/25/2017    Severe episode of recurrent major depressive disorder, without psychotic features ([address] 04/21/2017    Age-related osteoporosis without current pathological fracture     Hypertensive CHF (congestive heart failure) ([facility])     Osteoarthrosis, shoulder region     S/P CABG (coronary artery bypass graft) 03/12/2017    S/P angioplasty with stent 03/12/2017    Keratoconjunctivitis sicca not specified as Sjogren's 02/10/2016    Constipation by delayed colonic transit 10/16/2015    Renal cell carcinoma of left kidney ([address] 10/16/2015    Essential hypertension, benign 09/24/2015    Irritable bowel syndrome 07/09/2015    Coronary artery disease of native artery of native heart with stable angina pectoris ([address] 09/11/2014     With CABG and 4 cardiac stents          Past Surgical History:   Procedure Laterality Date    CARDIAC CATHETERIZATION      stent 2014    CARDIAC CATHETERIZATION      stent 2006    CATARACT REMOVAL      CATARACT REMOVAL      left    COLONOSCOPY  06/2016    Polyps    COLONOSCOPY  12/15/2015    diverticulosis, internal hemorrhoid, colon polyp- no further colonoscopies needed    CORONARY ARTERY BYPASS GRAFT      4 vessel CABG 2005    HEENT Left     macular pucker    HERNIA REPAIR Left 2017    ORTHOPEDIC SURGERY      finger, foot    PARTIAL HYSTERECTOMY (CERVIX NOT REMOVED)      hyst    IL UNLISTED PROCEDURE ABDOMEN PERITONEUM & OMENTUM Left 2016    ablation for mass in left side of abd    TOTAL HIP ARTHROPLASTY Left 2022    LEFT HIP TOTAL ARTHROPLASTY performed by Osman Porras MD at [address] ENDOSCOPY      Esophagus stretch     Family History   Problem Relation Age of Onset    No Known Problems Maternal Grandfather     No Known Problems Paternal Grandmother     No Known Problems Paternal Grandfather     Diabetes Mother     Heart Surgery Mother     Heart Disease Mother     Heart Attack Father     Heart Disease Father     No Known Problems Sister     Cancer Sister         2 sisters w/ lung ca-neither one smoked    Heart Disease Sister     Cancer Brother         lung ca-smoker    Heart Attack Brother          age 22 of MI    Heart Disease Sister     Heart Surgery Sister     Heart Attack Sister     Heart Disease Sister     Heart Surgery Sister     Heart Attack Sister     No Known Problems Maternal Grandmother     Heart Surgery Brother          in [place] during 2nd heart surgery    Heart Disease Brother      Social History     Tobacco Use    Smoking status: Never    Smokeless tobacco: Never   Substance Use Topics    Alcohol use: No         ROS:    No obvious pertinent positives on review of systems except for what was outlined in the HPI today.       PHYSICAL EXAM:     /72   Pulse 68   Ht 5' 3\" (1.6 m)   Wt 154 lb (69.9 kg)   BMI 27.28 kg/m²    General/Constitutional:   Alert and oriented x 3, no acute distress  HEENT:   normocephalic, atraumatic, moist mucous membranes  Neck:   No JVD or carotid bruits bilaterally  Cardiovascular:   regular rate and rhythm, no murmur/rub/gallop appreciated  Pulmonary:   clear to auscultation bilaterally, no respiratory distress  Abdomen:   soft, non-tender, non-distended  Ext:   No sig LE edema bilaterally  Skin:  warm and dry, no obvious rashes seen  Neuro:   no obvious sensory or motor deficits  Psychiatric:   normal mood and affect      Lab Results   Component Value Date/Time     12/29/2022 10:13 PM    K 3.5 12/29/2022 10:13 PM    CL 99 12/29/2022 10:13 PM    CO2 27 12/29/2022 10:13 PM    BUN 16 12/29/2022 10:13 PM    CREATININE 1.30 12/29/2022 10:13 PM    GLUCOSE 100 12/29/2022 10:13 PM    CALCIUM 9.6 12/29/2022 10:13 PM        Lab Results   Component Value Date    WBC 6.7 12/29/2022    HGB 13.7 12/29/2022    HCT 41.9 12/29/2022    MCV 91.9 12/29/2022     12/29/2022       Lab Results   Component Value Date    TSH 3.090 02/11/2021       Lab Results   Component Value Date    LABA1C 5.4 07/18/2022     Lab Results   Component Value Date     07/18/2022       Lab Results   Component Value Date    CHOL 214 (H) 09/22/2022    CHOL 208 (H) 03/24/2022    CHOL 206 (H) 11/22/2021     Lab Results   Component Value Date    TRIG 86 09/22/2022    TRIG 117 03/24/2022    TRIG 71 11/22/2021     Lab Results   Component Value Date    HDL 57 09/22/2022    HDL 58 03/24/2022    HDL 73 11/22/2021     Lab Results   Component Value Date    LDLCALC 139.8 (H) 09/22/2022    LDLCALC 129 (H) 03/24/2022    LDLCALC 120 (H) 11/22/2021     Lab Results   Component Value Date    LABVLDL 17.2 09/22/2022    LABVLDL 20 12/09/2019    VLDL 21 03/24/2022    VLDL 13 11/22/2021    VLDL 15 08/20/2021     Lab Results   Component Value Date    CHOLHDLRATIO 3.8 09/22/2022           I have Independently reviewed prior care notes, any ER records available, cardiac testing, labs and results with the patient and before seeing the patient today. Also independently reviewed outside records when available. ASSESSMENT:    Jennifer Menendez was seen today for coronary artery disease.     Diagnoses and all orders for this visit:    Diastolic CHF, chronic (HCC)    PAD (peripheral artery disease) ([facility]    Coronary artery disease of bypass graft with stable angina pectoris, unspecified whether native or transplanted heart ([facility]    Hypertensive heart disease with chronic systolic congestive heart failure (HCC)    Essential hypertension, benign    Venous (peripheral) insufficiency    Coronary artery disease of native artery of native heart with stable angina pectoris (HCC)    Carotid stenosis, asymptomatic, bilateral    Other orders  -     rivaroxaban (XARELTO) 20 MG TABS tablet; Take 1 tablet by mouth daily (with breakfast) HOLD XARELTO UNTIL Friday, Cite Ennouzha. MAY RESUME THEN, IF NO WOUND DRAINAGE. PLAN:   1. Chronic DHF:  On lasix 40 and aldactone. Follow K and Cr. 2. CAD:  Could not tolerate imdur. some more sx, been to ER, not wanting elective admission. Trial of doubling coreg, see if this helps. 25 BID. HR higher, stop low dose hyd as well. Follow BP. On BB, crestor and ASA. Follow for more angina. The patient has been instructed to call with any angina or equivalent as reviewed today. All questions were answered with the patient voicing complete understanding. 3.   DVT: Follow on NOAC now. Monitor with more falls. 4. HPL: NO MORE REPATHA, she blames this for prior admission. On crestor, diet has been good. Lipids poor in the past, re-address in the future. 5. HTN: follow on higher dose coreg. 6. Carotid Dz:  Remain on ASA and statin, US ok. No more testing needed now. Patient has been instructed and agrees to call our office with any issues or other concerns related to their cardiac condition(s) and/or complaint(s). Return in about 1 month (around 2/5/2023).        GEN PETIT, DO  1/5/2023

## 2023-01-10 ENCOUNTER — CARE COORDINATION (OUTPATIENT)
Dept: CARE COORDINATION | Facility: CLINIC | Age: 88
End: 2023-01-10

## 2023-01-16 ENCOUNTER — CARE COORDINATION (OUTPATIENT)
Dept: CARE COORDINATION | Facility: CLINIC | Age: 88
End: 2023-01-16

## 2023-01-16 NOTE — CARE COORDINATION
Ambulatory Care Coordination Note  1/16/2023    ACC: Julius Lucia, MODESTO    Offered patient enrollment in the Remote Patient Monitoring (RPM) program for in-home monitoring: NA.    ACM spoke w/ pt. Pt reports yeast infection clearing. Discussed card ov, increase in carvedilol, HF educ. Pt reports SBP running >150 sometimes, DBP 50-60. Pulse in 50s. Discussed falls precautions. Pt has walker and cane, will use walker if needed. Pt reports chest not as tight since change in medication. Pt is weighing daily, stable. Pt thinks esophagus may need to be stretched again. Pt has skin cancer removed, pt using gauze around it, and states she is on antibiotics d/t infection to area. Scheduled f/u in 2wks for HF educ. Goals Addressed                   This Visit's Progress     Conditions and Symptoms   On track     I will schedule office visits, as directed by my provider. I will keep my appointment or reschedule if I have to cancel. I will notify my provider of any barriers to my plan of care. I will notify my provider of any symptoms that indicate a worsening of my condition. Barriers: none  Plan for overcoming my barriers: N/A  Confidence: 10/10  Anticipated Goal Completion Date: 9/15/22                Prior to Admission medications    Medication Sig Start Date End Date Taking? Authorizing Provider   Coenzyme Q10 (CO Q 10) 10 MG CAPS Take by mouth    Historical Provider, MD   rivaroxaban (XARELTO) 20 MG TABS tablet Take 1 tablet by mouth daily (with breakfast) HOLD Divya Vu Friday, Cite Ennouzha. MAY RESUME THEN, IF NO WOUND DRAINAGE. 1/5/23   Garett Ghosh,    fluconazole (DIFLUCAN) 150 MG tablet Take one tablet today and one tablet a week from today.  Dx:Yeast infection  Patient not taking: Reported on 1/5/2023 1/4/23   Halie Coe MD   amitriptyline (ELAVIL) 25 MG tablet Take 1 tablet by mouth nightly 11/23/22 1/22/23  Juan Mallory MD   cephALEXin (KEFLEX) 250 MG capsule Take 1 capsule by mouth daily 11/9/22   Jossy Ashraf MD   aspirin 81 MG EC tablet Take 81 mg by mouth daily    Historical Provider, MD   vitamin B-12 (CYANOCOBALAMIN) 1000 MCG tablet Take 1,000 mcg by mouth daily    Historical Provider, MD   Ubiquinol 100 MG CAPS Take by mouth    Historical Provider, MD   hydrALAZINE (APRESOLINE) 25 MG tablet TAKE 1 TABLET BY MOUTH TWICE A DAY 10/4/22   Jalen Postin, DO   rosuvastatin (CRESTOR) 40 MG tablet Take 1 tablet by mouth every evening 10/4/22   Jalen Postin, DO   escitalopram (LEXAPRO) 20 MG tablet Take 1 tablet by mouth daily TAKE 1 TABLET BY MOUTH EVERY DAY 9/29/22 10/29/22  Juan Delgadillo MD   spironolactone (ALDACTONE) 25 MG tablet TAKE 1 TABLET BY MOUTH EVERY DAY 9/6/22   Juan Delgadillo MD   furosemide (LASIX) 40 MG tablet Take 1 tablet by mouth in the morning.  8/3/22   KIERSTEN Guevara   ondansetron (ZOFRAN-ODT) 4 MG disintegrating tablet Take 1 tablet by mouth every 8 hours as needed for Nausea or Vomiting 8/3/22   KIERSTEN Guevara   potassium chloride (KLOR-CON) 10 MEQ extended release tablet Take 1 tablet by mouth three times a week Every Monday Wednesday and Friday 8/3/22   KIERSTEN Guevara   famotidine (PEPCID) 20 MG tablet Take 1 tablet by mouth every evening 7/28/22   Juan Delgadillo MD   meclizine (ANTIVERT) 25 MG CHEW Take 1 tablet by mouth 3 times daily as needed (PRN for dizziness) 7/28/22   Krystal Akins MD   memantine (NAMENDA) 10 MG tablet Take 1 tablet by mouth daily 7/7/22   Juan Delgadillo MD   magnesium oxide (MAG-OX) 400 MG tablet Take 1 tablet by mouth daily 7/7/22   Juan Delgadillo MD   Multiple Vitamins-Minerals (OCUVITE ADULT FORMULA PO) Take by mouth    Historical Provider, MD   Probiotic Product (PROBIOTIC-10 PO) Take by mouth    Historical Provider, MD   carvedilol (COREG) 12.5 MG tablet Take 1 tablet by mouth 2 times daily (with meals) 6/29/22   Theodore Brennan Loretta Rosado DO   acetaminophen (TYLENOL) 325 MG tablet Take 500 mg by mouth every 6 hours as needed    Ar Automatic Reconciliation   loratadine (CLARITIN) 10 MG tablet Take 10 mg by mouth every evening    Ar Automatic Reconciliation   nitroGLYCERIN (NITROLINGUAL) 0.4 MG/SPRAY 0.4 mg spray Place 1 spray under the tongue 4/18/19   Ar Automatic Reconciliation       Future Appointments   Date Time Provider Sindi Ortega   2/3/2023 10:30 AM Tin Garzon MD POAG [facility]   2/9/2023 11:30 AM DO VANNESA Hobson [facility]   2/13/2023 11:15 AM Stuart Hermosillo MD [id number] [facility]   3/22/2023 10:00 AM POW LAB POW [facility] AM[facility]   3/29/2023 10:00 AM Juan Heaton MD [facility]   ,   Congestive Heart Failure Assessment    Do you understand a low sodium diet?: Yes  Do you understand how to read food labels?: Yes  How many restaurant meals do you eat per week?: 3-4  Do you salt your food before tasting it?: No     No patient-reported symptoms      Symptoms:  CHF associated dyspnea on exertion: Pos      Symptom course: stable  Patient-reported weight (lb): 150  Weight trend: stable  Salt intake watch compared to last visit: stable     , and   General Assessment    Do you have any symptoms that are causing concern?: No

## 2023-01-24 ENCOUNTER — TELEPHONE (OUTPATIENT)
Dept: PRIMARY CARE CLINIC | Facility: CLINIC | Age: 88
End: 2023-01-24

## 2023-01-24 RX ORDER — MAGNESIUM OXIDE 400 MG/1
400 TABLET ORAL DAILY
Qty: 30 TABLET | Refills: 5 | Status: SHIPPED | OUTPATIENT
Start: 2023-01-24

## 2023-01-24 RX ORDER — MEMANTINE HYDROCHLORIDE 10 MG/1
10 TABLET ORAL DAILY
Qty: 30 TABLET | Refills: 5 | Status: SHIPPED | OUTPATIENT
Start: 2023-01-24

## 2023-01-24 NOTE — TELEPHONE ENCOUNTER
Patient called to request refills on the following prescriptions and to be sent to her [facility] pharmacy at [address] [place] [address]  Magnesium oxide 400 mg. Memantine 10 mg.

## 2023-01-25 RX ORDER — MAGNESIUM OXIDE 400 MG/1
TABLET ORAL
Qty: 30 TABLET | Refills: 5 | OUTPATIENT
Start: 2023-01-25

## 2023-01-25 RX ORDER — MEMANTINE HYDROCHLORIDE 10 MG/1
TABLET ORAL
Qty: 30 TABLET | Refills: 5 | OUTPATIENT
Start: 2023-01-25

## 2023-02-03 ENCOUNTER — OFFICE VISIT (OUTPATIENT)
Dept: ORTHOPEDIC SURGERY | Age: 88
End: 2023-02-03

## 2023-02-03 ENCOUNTER — CARE COORDINATION (OUTPATIENT)
Dept: CARE COORDINATION | Facility: CLINIC | Age: 88
End: 2023-02-03

## 2023-02-03 DIAGNOSIS — Z96.642 STATUS POST LEFT HIP REPLACEMENT: Primary | ICD-10-CM

## 2023-02-03 NOTE — CARE COORDINATION
Ambulatory Care Coordination Note  2/3/2023    ACC: Mi Posada, MO[place]ST[place]    Offered patient enrollment in the Remote Patient Monitoring (RPM) program for in-home monitoring: NA.    ACM spoke w/ pt who reports she is doing well. She is following BP, no concerns at this time. Will have UGI 2/25/23 and states she may need to have esophagus stretched again. Pt reports ringing in ears and couldn't recall ENT she has seen in past.  [facility] was able to locate Dr. Leticia Gallagher and provide pt w/ contact information, she states she will call for f/u. [facility] scheduled next call in 1wk. Goals Addressed                   This Visit's Progress     Conditions and Symptoms   On track     I will schedule office visits, as directed by my provider. I will keep my appointment or reschedule if I have to cancel. I will notify my provider of any barriers to my plan of care. I will notify my provider of any symptoms that indicate a worsening of my condition. Barriers: none  Plan for overcoming my barriers: N/A  Confidence: 10/10  Anticipated Goal Completion Date: 9/15/22                Prior to Admission medications    Medication Sig Start Date End Date Taking? Authorizing Provider   memantine (NAMENDA) 10 MG tablet Take 1 tablet by mouth daily 1/24/23   Juan Novak MD   magnesium oxide (MAG-OX) 400 MG tablet Take 1 tablet by mouth daily 1/24/23   Juan Novak MD   Coenzyme Q10 (CO Q 10) 10 MG CAPS Take by mouth    Historical Provider, MD   rivaroxaban (XARELTO) 20 MG TABS tablet Take 1 tablet by mouth daily (with breakfast) HOLD Polo Stands Friday, Cite Ennouzha. MAY RESUME THEN, IF NO WOUND DRAINAGE. 1/5/23   Humble Old, DO   fluconazole (DIFLUCAN) 150 MG tablet Take one tablet today and one tablet a week from today.  Dx:Yeast infection  Patient not taking: Reported on 1/5/2023 1/4/23   Janny Dc MD   amitriptyline (ELAVIL) 25 MG tablet Take 1 tablet by mouth nightly 11/23/22 1/22/23  Juan Dallas Walls MD   cephALEXin [facility]) 250 MG capsule Take 1 capsule by mouth daily 11/9/22   Dina Rooney MD   aspirin 81 MG EC tablet Take 81 mg by mouth daily    Historical Provider, MD   vitamin B-12 (CYANOCOBALAMIN) 1000 MCG tablet Take 1,000 mcg by mouth daily    Historical Provider, MD   Ubiquinol 100 MG CAPS Take by mouth    Historical Provider, MD   hydrALAZINE (APRESOLINE) 25 MG tablet TAKE 1 TABLET BY MOUTH TWICE A DAY 10/4/22   Jorge Carrillo DO   rosuvastatin (CRESTOR) 40 MG tablet Take 1 tablet by mouth every evening 10/4/22   Jorge Carrillo, DO   escitalopram (LEXAPRO) 20 MG tablet Take 1 tablet by mouth daily TAKE 1 TABLET BY MOUTH EVERY DAY 9/29/22 10/29/22  Juan Walls MD   spironolactone (ALDACTONE) 25 MG tablet TAKE 1 TABLET BY MOUTH EVERY DAY 9/6/22   Juan Walls MD   furosemide (LASIX) 40 MG tablet Take 1 tablet by mouth in the morning.  8/3/22   KIERSTEN Johnson   ondansetron (ZOFRAN-ODT) 4 MG disintegrating tablet Take 1 tablet by mouth every 8 hours as needed for Nausea or Vomiting 8/3/22   KIERSTEN Johnson   potassium chloride (KLOR-CON) 10 MEQ extended release tablet Take 1 tablet by mouth three times a week Every Monday Wednesday and Friday 8/3/22   KIERSTEN Johnson   famotidine (PEPCID) 20 MG tablet Take 1 tablet by mouth every evening 7/28/22   Juan Walls MD   meclizine (ANTIVERT) 25 MG CHEW Take 1 tablet by mouth 3 times daily as needed (PRN for dizziness) 7/28/22   Juan Walls MD   Multiple Vitamins-Minerals (OCUVITE ADULT FORMULA PO) Take by mouth    Historical Provider, MD   Probiotic Product (PROBIOTIC-10 PO) Take by mouth    Historical Provider, MD   carvedilol (COREG) 12.5 MG tablet Take 1 tablet by mouth 2 times daily (with meals) 6/29/22   Jorge Carrillo DO   acetaminophen (TYLENOL) 325 MG tablet Take 500 mg by mouth every 6 hours as needed    Ar Automatic Reconciliation   loratadine (CLARITIN) 10 MG tablet Take 10 mg by mouth every evening    Ar Automatic Reconciliation   nitroGLYCERIN (NITROLINGUAL) 0.4 MG/SPRAY 0.4 mg spray Place 1 spray under the tongue 4/18/19   Ar Automatic Reconciliation       Future Appointments   Date Time Provider Sindi Ortega   2/9/2023 11:30 AM DO VANNESA Zabala [facility]   2/13/2023 11:15 AM Tera Thomas MD [id number] [facility] AM[facility]   2/27/2023 11:00 AM SFD XR RF ROOM 2 SFDRAD SFD   3/22/2023 10:00 AM POW LAB POW [facility] AM[facility]   3/29/2023 10:00 AM Juan Aguayo MD POW [facility]   8/4/2023 10:30 AM Mounika Patino MD [facility] AM[facility]

## 2023-02-03 NOTE — PROGRESS NOTES
Name: Nate Garcia  YOB: 1935  Gender: female  MRN: [id number]      Current Outpatient Medications:     memantine (NAMENDA) 10 MG tablet, Take 1 tablet by mouth daily, Disp: 30 tablet, Rfl: 5    magnesium oxide (MAG-OX) 400 MG tablet, Take 1 tablet by mouth daily, Disp: 30 tablet, Rfl: 5    Coenzyme Q10 (CO Q 10) 10 MG CAPS, Take by mouth, Disp: , Rfl:     rivaroxaban (XARELTO) 20 MG TABS tablet, Take 1 tablet by mouth daily (with breakfast) HOLD XARELTO UNTIL Friday, Cite [place]. MAY RESUME THEN, IF NO WOUND DRAINAGE., Disp: 90 tablet, Rfl: 3    fluconazole (DIFLUCAN) 150 MG tablet, Take one tablet today and one tablet a week from today.  Dx:Yeast infection (Patient not taking: Reported on 1/5/2023), Disp: 2 tablet, Rfl: 0    amitriptyline (ELAVIL) 25 MG tablet, Take 1 tablet by mouth nightly, Disp: 60 tablet, Rfl: 1    cephALEXin (KEFLEX) 250 MG capsule, Take 1 capsule by mouth daily, Disp: 90 capsule, Rfl: 3    aspirin 81 MG EC tablet, Take 81 mg by mouth daily, Disp: , Rfl:     vitamin B-12 (CYANOCOBALAMIN) 1000 MCG tablet, Take 1,000 mcg by mouth daily, Disp: , Rfl:     Ubiquinol 100 MG CAPS, Take by mouth, Disp: , Rfl:     hydrALAZINE (APRESOLINE) 25 MG tablet, TAKE 1 TABLET BY MOUTH TWICE A DAY, Disp: 180 tablet, Rfl: 3    rosuvastatin (CRESTOR) 40 MG tablet, Take 1 tablet by mouth every evening, Disp: 90 tablet, Rfl: 3    escitalopram (LEXAPRO) 20 MG tablet, Take 1 tablet by mouth daily TAKE 1 TABLET BY MOUTH EVERY DAY, Disp: 90 tablet, Rfl: 3    spironolactone (ALDACTONE) 25 MG tablet, TAKE 1 TABLET BY MOUTH EVERY DAY, Disp: 90 tablet, Rfl: 3    furosemide (LASIX) 40 MG tablet, Take 1 tablet by mouth in the morning., Disp: 1 tablet, Rfl: 0    ondansetron (ZOFRAN-ODT) 4 MG disintegrating tablet, Take 1 tablet by mouth every 8 hours as needed for Nausea or Vomiting, Disp: 30 tablet, Rfl: 0    potassium chloride (KLOR-CON) 10 MEQ extended release tablet, Take 1 tablet by mouth three times a week Every Monday Wednesday and Friday, Disp: 1 tablet, Rfl: 0    famotidine (PEPCID) 20 MG tablet, Take 1 tablet by mouth every evening, Disp: 60 tablet, Rfl: 5    meclizine (ANTIVERT) 25 MG CHEW, Take 1 tablet by mouth 3 times daily as needed (PRN for dizziness), Disp: 90 tablet, Rfl: 2    Multiple Vitamins-Minerals (OCUVITE ADULT FORMULA PO), Take by mouth, Disp: , Rfl:     Probiotic Product (PROBIOTIC-10 PO), Take by mouth, Disp: , Rfl:     carvedilol (COREG) 12.5 MG tablet, Take 1 tablet by mouth 2 times daily (with meals), Disp: 180 tablet, Rfl: 3    acetaminophen (TYLENOL) 325 MG tablet, Take 500 mg by mouth every 6 hours as needed, Disp: , Rfl:     loratadine (CLARITIN) 10 MG tablet, Take 10 mg by mouth every evening, Disp: , Rfl:     nitroGLYCERIN (NITROLINGUAL) 0.4 MG/SPRAY 0.4 mg spray, Place 1 spray under the tongue, Disp: , Rfl:     Allergies   Allergen Reactions    Amoxicillin-Pot Clavulanate Other (See Comments)     Causes yeast infection    Codeine Other (See Comments)     Made my heart go crazy    Gabapentin Other (See Comments)    Hydrochlorothiazide Other (See Comments)     Hyponatremia    Nitrofurantoin Other (See Comments)    Prednisone Other (See Comments)     Visual loss and headache    Sulfamethoxazole-Trimethoprim Nausea Only       CC: Post op left ADRIANA    HPI: Patient is here now 6 months postop total hip replacement.  Here for evaluation of this.  She reports that she had been doing very well until she had a fall 2 weeks ago.  She was leaning forward to pick something up off the floor and lost balance causing her to softly fall on her left side.  She complains of  mild increased pain in the left groin region when she transitions from a seated to standing position and when she takes a few steps but this has been improving with symptoms currently being minimal.  Patient reports minimal, occasional discomfort, to no pain in the thigh.  No other new complaints.  To see her kidney specialist  soon.  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 Has been well managed and no sign of recurrent. No real history of spine problems. Admit osteoporosis over spine and arthritis. PMH: Reviewed in chart    ROS:  As per HPI. Pertinent positives and negatives are addressed with the patient, particularly those related to musculoskeletal concerns. Non-orthopaedic concerns were referred back to the primary care physician. PE:  left hip  Patient is comfortable and in no distress. Flexion: 0 to 90 degrees  Internal rotation: 20 degrees with minimal pain groin region. External rotation: 25 degrees  Adduction: 20 degrees  Abduction: 20 degrees  Incision:  well healed  Gait: no trendelenburg or antalgia  No instability with ROM  Limb length is equal  Quadriceps strength is good    Radiographs:  AP pelvis and lateral views of the left taken in the office reveal a good bone prosthetic interface with no suggestion of a progressive lucency. Radiographic Diagnosis:  Normal post-operative total hip. Diagnosis: Post-operative total hip arthroplasty. They are doing well. Seems like her hip is very stable clinically and radiographically. She could have some referred pain from her spine into the anterior crest area. I think first she should work with her nephrologist who evaluates her for I suspect by her description a kidney cancer issue. We will see her in 6 months    Recommendations:  Reviewed x-ray findings with the patient. She was assured that her left hip appears both clinically and radiographically stable and does not appear compromised from her fall. Activities to be advanced as tolerated. Normal lifetime restrictions as discussed. Appropriate activities for strengthening and conditioning were reviewed. Return appointment as scheduled for follow up and radiographs.     Approximately 20 minutes was spent reviewing the medical record, imaging, performing history and physical examination, discussing the diagnosis and treatment plan with the patient, and completing documentation for this visit.

## 2023-02-10 ENCOUNTER — CARE COORDINATION (OUTPATIENT)
Dept: CARE COORDINATION | Facility: CLINIC | Age: 88
End: 2023-02-10

## 2023-02-13 ENCOUNTER — OFFICE VISIT (OUTPATIENT)
Dept: UROLOGY | Age: 88
End: 2023-02-13

## 2023-02-13 DIAGNOSIS — R39.89 BLADDER PAIN: ICD-10-CM

## 2023-02-13 DIAGNOSIS — C64.2 MALIGNANT NEOPLASM OF LEFT KIDNEY, EXCEPT RENAL PELVIS (HCC): Primary | ICD-10-CM

## 2023-02-13 DIAGNOSIS — R33.9 INCOMPLETE BLADDER EMPTYING: ICD-10-CM

## 2023-02-13 DIAGNOSIS — N39.0 RECURRENT UTI: ICD-10-CM

## 2023-02-13 LAB
BILIRUBIN, URINE, POC: NEGATIVE
BLOOD URINE, POC: NEGATIVE
GLUCOSE URINE, POC: NEGATIVE
KETONES, URINE, POC: NEGATIVE
LEUKOCYTE ESTERASE, URINE, POC: NEGATIVE
NITRITE, URINE, POC: NEGATIVE
PH, URINE, POC: 6 (ref 4.6–8)
PROTEIN,URINE, POC: 100
PVR, POC: 0 CC
SPECIFIC GRAVITY, URINE, POC: 1.03 (ref 1–1.03)
URINALYSIS CLARITY, POC: NORMAL
URINALYSIS COLOR, POC: NORMAL
UROBILINOGEN, POC: NORMAL

## 2023-02-13 ASSESSMENT — ENCOUNTER SYMPTOMS: ABDOMINAL PAIN: 1

## 2023-02-13 NOTE — PROGRESS NOTES
[facility]  [address] [facility]    [address], [address] [facility]  [phone]          Gilford Cedars  : 1935    Chief Complaint   Patient presents with    Follow-up          HPI     Gilford Cedars is a 81 y.o.  female with a history of 1.5 cm L lower pole renal mass who previously followed with  [facility] [place]. Returns for follow up. Seen 2022 for recurrent UTIs and bothersome LUTS. Daily keflex started at last visit and has been working well. No further UTIs. She is due for CT scan for surveillance of her kidney mass after IR ablation. Does report abdominal pain in bladder,  LUQ and LLQ and anxious to get CT scan to rule out recurrent cancer as a cause. She has a history of 1.5 cm L lower pole renal mass s/p IR ablation in . Pathology from biopsy not available for review. She did well after ablation until recently when repeat imaging in 2018 showed 7 mm hyperdense cyst adjacent to the ablation area. Repeat imaging in 2018 showed increase size of cyst per patient report, although I do not see this in the EMR. IR at [facility] performed repeat biopsy/ablation in 10/2018. CT in 2020 negative for recurrence disease but did show cystic area anterior to L renal vein of indeterminate significance. CT 2021 showed 2 cm cystic mass stable anterior to L renal vein. THis is separate from kidney and kidney otherwise without recurrence. CBC/CMP WNL. Repeat CT due today. Also has a history of microscopic hematuria s/p negative work up in 2017.       Past Medical History:   Diagnosis Date    Abdominal pain 10/28/2014    Angina at rest [facility])     pt denies    Arthritis     osteo - low back,  shoulders, hips, low back    Bilateral carotid bruits     Bursitis     CAD (coronary artery disease)     4 vessel CABG ;--- stents x 4--- last one placed -- followed by dr Sp Martínez     Chronic pain     left shoulder    Coagulation defects     Xarelto    Constipation     Cough 09/11/2014    10/8/14, Methacholine Challenge Study: The point at which the FEV1 fell by at least 20%, the PC20, occurred above a dose of 25mg/mL of methacholine. This rules out the diagnosis of asthma with an extremely high degree of sensitivity. No post-challenge FEV1 recovery was identified. No Love MD        Depressive disorder     Dyspnea 09/11/2014    [facility] [place]; 9/29/14: IMPRESSION: The flow volume loop is consistent restriction. Spirometry suggest restriction with concomitant obstruction at low lung volumes. There is not a significant bronchodilator response. Lung volumes reveal mild restriction.  The unadjusted diffusion capacity is normal.  Farnaz House MD      GERD (gastroesophageal reflux disease)     controlled with med    Headache     Hoarseness or changing voice     thougfht to be related to gerd    Hyperlipidemia     Hypertension     controlled with med    Kidney stone     yrs ago-- no tx required    Lumbago     Macular degeneration     Nodule of left lung     dx'ed 4 years ago-watched for several months-no new growth-being watched-yearly CT scan----- followed by dr. German Ackerman    Pain in joint, ankle and foot     left 3rd toe and right 2nd toe    Pain in joint, shoulder region     left now bothering > right, right ok    Palpitations 09/24/2015    followed by dr Yovanny Martinez    Panic disorder     panic attacks -- last one 2014    Renal mass 07/2016    ablation---left side--- followed by dr Taylor Ridmorgan in [place]    Sciatica     Unstable angina [facility])     Vascular headache      Past Surgical History:   Procedure Laterality Date    CARDIAC CATHETERIZATION      stent 2014    CARDIAC CATHETERIZATION      stent 2006    CATARACT REMOVAL      CATARACT REMOVAL      left    COLONOSCOPY  06/2016    Polyps    COLONOSCOPY  12/15/2015    diverticulosis, internal hemorrhoid, colon polyp- no further colonoscopies needed    CORONARY ARTERY BYPASS GRAFT      4 vessel CABG 2005 HEENT Left     macular pucker    HERNIA REPAIR Left 2017    ORTHOPEDIC SURGERY      finger, foot    PARTIAL HYSTERECTOMY (CERVIX NOT REMOVED)      hyst    DE UNLISTED PROCEDURE ABDOMEN PERITONEUM & OMENTUM Left 07/2016    ablation for mass in left side of abd    TOTAL HIP ARTHROPLASTY Left 8/2/2022    LEFT HIP TOTAL ARTHROPLASTY performed by Jed Reid MD at [address] ENDOSCOPY      Esophagus stretch     Current Outpatient Medications   Medication Sig Dispense Refill    memantine (NAMENDA) 10 MG tablet Take 1 tablet by mouth daily 30 tablet 5    magnesium oxide (MAG-OX) 400 MG tablet Take 1 tablet by mouth daily 30 tablet 5    Coenzyme Q10 (CO Q 10) 10 MG CAPS Take by mouth      rivaroxaban (XARELTO) 20 MG TABS tablet Take 1 tablet by mouth daily (with breakfast) HOLD XARELTO UNTIL Friday, Cite [place]. MAY RESUME THEN, IF NO WOUND DRAINAGE. 90 tablet 3    fluconazole (DIFLUCAN) 150 MG tablet Take one tablet today and one tablet a week from today. Dx:Yeast infection 2 tablet 0    cephALEXin (KEFLEX) 250 MG capsule Take 1 capsule by mouth daily 90 capsule 3    aspirin 81 MG EC tablet Take 81 mg by mouth daily      vitamin B-12 (CYANOCOBALAMIN) 1000 MCG tablet Take 1,000 mcg by mouth daily      Ubiquinol 100 MG CAPS Take by mouth      hydrALAZINE (APRESOLINE) 25 MG tablet TAKE 1 TABLET BY MOUTH TWICE A  tablet 3    rosuvastatin (CRESTOR) 40 MG tablet Take 1 tablet by mouth every evening 90 tablet 3    spironolactone (ALDACTONE) 25 MG tablet TAKE 1 TABLET BY MOUTH EVERY DAY 90 tablet 3    furosemide (LASIX) 40 MG tablet Take 1 tablet by mouth in the morning.  1 tablet 0    ondansetron (ZOFRAN-ODT) 4 MG disintegrating tablet Take 1 tablet by mouth every 8 hours as needed for Nausea or Vomiting 30 tablet 0    potassium chloride (KLOR-CON) 10 MEQ extended release tablet Take 1 tablet by mouth three times a week Every Monday Wednesday and Friday 1 tablet 0    famotidine (PEPCID) 20 MG tablet Take 1 tablet by mouth every evening 60 tablet 5    meclizine (ANTIVERT) 25 MG CHEW Take 1 tablet by mouth 3 times daily as needed (PRN for dizziness) 90 tablet 2    Multiple Vitamins-Minerals (OCUVITE ADULT FORMULA PO) Take by mouth      Probiotic Product (PROBIOTIC-10 PO) Take by mouth      carvedilol (COREG) 12.5 MG tablet Take 1 tablet by mouth 2 times daily (with meals) 180 tablet 3    acetaminophen (TYLENOL) 325 MG tablet Take 500 mg by mouth every 6 hours as needed      loratadine (CLARITIN) 10 MG tablet Take 10 mg by mouth every evening      nitroGLYCERIN (NITROLINGUAL) 0.4 MG/SPRAY 0.4 mg spray Place 1 spray under the tongue      amitriptyline (ELAVIL) 25 MG tablet Take 1 tablet by mouth nightly 60 tablet 1    escitalopram (LEXAPRO) 20 MG tablet Take 1 tablet by mouth daily TAKE 1 TABLET BY MOUTH EVERY DAY 90 tablet 3     No current facility-administered medications for this visit. Allergies   Allergen Reactions    Amoxicillin-Pot Clavulanate Other (See Comments)     Causes yeast infection    Codeine Other (See Comments)     Made my heart go crazy    Gabapentin Other (See Comments)    Hydrochlorothiazide Other (See Comments)     Hyponatremia    Nitrofurantoin Other (See Comments)    Prednisone Other (See Comments)     Visual loss and headache    Sulfamethoxazole-Trimethoprim Nausea Only     Social History     Socioeconomic History    Marital status:       Spouse name: Not on file    Number of children: Not on file    Years of education: Not on file    Highest education level: Not on file   Occupational History    Not on file   Tobacco Use    Smoking status: Never    Smokeless tobacco: Never   Substance and Sexual Activity    Alcohol use: No    Drug use: No    Sexual activity: Not on file   Other Topics Concern    Not on file   Social History Narrative    Worked in the [employer] in the street for 12 years, worked in a [employer] for 20 years and 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 , 2 sons. Denies alcohol use. Has always lived in [place]. 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 Lifelong nonsmoker with 20 year secondhand smoke exposure from her  cigarettes. Social Determinants of Health     Financial Resource Strain: Not on file   Food Insecurity: Not on file   Transportation Needs: Not on file   Physical Activity: Insufficiently Active    Days of Exercise per Week: 2 days    Minutes of Exercise per Session: 20 min   Stress: Not on file   Social Connections: Not on file   Intimate Partner Violence: Not on file   Housing Stability: Not on file     Family History   Problem Relation Age of Onset    No Known Problems Maternal Grandfather     No Known Problems Paternal Grandmother     No Known Problems Paternal Grandfather     Diabetes Mother     Heart Surgery Mother     Heart Disease Mother     Heart Attack Father     Heart Disease Father     No Known Problems Sister     Cancer Sister         2 sisters w/ lung ca-neither one smoked    Heart Disease Sister     Cancer Brother         lung ca-smoker    Heart Attack Brother          age 22 of MI    Heart Disease Sister     Heart Surgery Sister     Heart Attack Sister     Heart Disease Sister     Heart Surgery Sister     Heart Attack Sister     No Known Problems Maternal Grandmother     Heart Surgery Brother          in [place] during 2nd heart surgery    Heart Disease Brother        Review of Systems  Constitutional:   Negative for fever, chills, appetite change, malaise/fatigue, headaches and weight loss. Skin:  Negative for skin lesions, rash and itching. Eyes:  Negative for visual disturbance, eye pain and eye discharge. ENT:  Negative for difficulty articulating words, pain swallowing, high frequency hearing loss and dry mouth. Respiratory:  Negative for cough, blood in sputum and wheezing.   Cardiovascular:  Negative for chest pain, hypertension, irregular heartbeat, leg pain, leg swelling, regular rate and rhythm and varicose veins. GI: Positive for abdominal pain. Negative for nausea, vomiting, blood in stool, constipation, diarrhea, indigestion and heartburn. Genitourinary: Positive for incomplete emptying. Negative for urinary burning, hematuria, flank pain, recurrent UTIs, history of urolithiasis, nocturia, slower stream, straining, urgency, leakage w/ urge, frequent urination and vaginal pain. Musculoskeletal:  Negative for back pain, bone pain, arthralgias, tenderness, muscle weakness and neck pain. Neurological:  Negative for dizziness, focal weakness, numbness, seizures and tremors. Psychological:  Negative for depression and psychiatric problem. Endocrine:  Negative for cold intolerance, thirst, excessive urination, fatigue and heat intolerance. Hem/Lymphatic:  Negative for easy bleeding, easy bruising and frequent infections.     Urinalysis  UA - Dipstick  Results for orders placed or performed in visit on 02/13/23   AMB POC URINALYSIS DIP STICK AUTO W/O MICRO   Result Value Ref Range    Color (UA POC)      Clarity (UA POC)      Glucose, Urine, POC Negative Negative    Bilirubin, Urine, POC Negative Negative    KETONES, Urine, POC Negative Negative    Specific Gravity, Urine, POC 1.030 1.001 - 1.035    Blood (UA POC) Negative Negative    pH, Urine, POC 6.0 4.6 - 8.0    Protein, Urine,  Negative    Urobilinogen, POC 0.2 mg/dL     Nitrite, Urine, POC Negative Negative    Leukocyte Esterase, Urine, POC Negative Negative   Results for orders placed or performed in visit on 08/26/21   AMB POC URINALYSIS DIP STICK AUTO W/O MICRO   Result Value Ref Range    Color (UA POC) Yellow     Clarity (UA POC) Clear     Glucose, Urine, POC Negative Negative    Bilirubin, Urine, POC Negative Negative    Ketones, Urine, POC Negative Negative    Specific Gravity, Urine, POC 1.020 1.001 - 1.035 NA    Blood (UA POC) Negative Negative    pH, Urine, POC 5.0 4.6 - 8.0 NA    Protein, Urine, POC Negative Negative    Urobilinogen, POC 0.2 mg/dL 0.2 - 1    Nitrite, Urine, POC Negative Negative    Leukocyte Esterase, Urine, POC Trace Negative       UA - Micro  WBC - 0  RBC - 0  Bacteria - 0  Epith - 0    There were no vitals taken for this visit. GENERAL: No acute distress, Awake, Alert, Oriented X 3, Gait normal  CARDIAC: regular rate and rhythm  CHEST AND LUNG: Easy work of breathing, clear to auscultation bilaterally, no cyanosis  ABDOMEN: soft, non tender, non-distended, positive bowel sounds, no organomegaly, no palpable masses, no guarding, no rebound tenderness  SKIN: No rash, no erythema, no lacerations or abrasions, no ecchymosis  NEUROLOGIC: cranial nerves 2-12 grossly intact       Assessment and Plan    ICD-10-CM    1. Malignant neoplasm of left kidney, except renal pelvis (HCC)  C64.2 AMB POC URINALYSIS DIP STICK AUTO W/O MICRO     CT ABDOMEN PELVIS HEMATURIA Additional Contrast? None      2. Incomplete bladder emptying  R33.9 AMB POC URINALYSIS DIP STICK AUTO W/O MICRO     AMB POC PVR, RO,POST-VOID RES,US,NON-IMAGING      3. Bladder pain  R39.89 CT ABDOMEN PELVIS HEMATURIA Additional Contrast? None      4. Recurrent UTI  N39.0         Left Kidney Mass:   CT due today. Ordered and will call with results when available. For surveillance after IR ablation. Recurrent UTI:   Continue keflex daily. Working well  Discussed UTI prevention measures today. Follow up will be based on CT results. I have spent 30 minutes today reviewing previous notes, test results and face to face with the patient as well as documenting. Yanick Lowe M.D.     [facility]  [place] [address] [address] [facility]  Phone: [phone]  Fax: [phone]

## 2023-02-15 ASSESSMENT — ENCOUNTER SYMPTOMS
WHEEZING: 0
INDIGESTION: 0
BACK PAIN: 0
SKIN LESIONS: 0
EYE DISCHARGE: 0
COUGH: 0
CONSTIPATION: 0
HEARTBURN: 0
VOMITING: 0
BLOOD IN STOOL: 0
EYE PAIN: 0
NAUSEA: 0
DIARRHEA: 0

## 2023-02-20 ENCOUNTER — CARE COORDINATION (OUTPATIENT)
Dept: CARE COORDINATION | Facility: CLINIC | Age: 88
End: 2023-02-20

## 2023-02-20 NOTE — CARE COORDINATION
ACM 2nd unsuccessful attempt to reach pt for f/u CCM services, no answer and no vm. Will make one additional attempt to reach pt.

## 2023-02-27 ENCOUNTER — HOSPITAL ENCOUNTER (OUTPATIENT)
Dept: GENERAL RADIOLOGY | Age: 88
Discharge: HOME | End: 2023-03-02
Payer: MEDICARE

## 2023-02-27 DIAGNOSIS — R13.19 OTHER DYSPHAGIA: ICD-10-CM

## 2023-02-27 DIAGNOSIS — K21.9 GASTROESOPHAGEAL REFLUX DISEASE WITHOUT ESOPHAGITIS: ICD-10-CM

## 2023-02-27 DIAGNOSIS — R10.9 ABDOMINAL PAIN, UNSPECIFIED ABDOMINAL LOCATION: ICD-10-CM

## 2023-02-27 PROCEDURE — 74240 X-RAY XM UPR GI TRC 1CNTRST: CPT

## 2023-02-27 PROCEDURE — 6370000000 HC RX 637 (ALT 250 FOR IP): Performed by: NURSE PRACTITIONER

## 2023-02-27 PROCEDURE — A4641 RADIOPHARM DX AGENT NOC: HCPCS | Performed by: NURSE PRACTITIONER

## 2023-02-27 PROCEDURE — 2500000003 HC RX 250 WO HCPCS: Performed by: NURSE PRACTITIONER

## 2023-02-27 PROCEDURE — 6360000004 HC RX CONTRAST MEDICATION: Performed by: NURSE PRACTITIONER

## 2023-02-27 RX ADMIN — BARIUM SULFATE 355 ML: 0.6 SUSPENSION ORAL at 12:29

## 2023-02-27 RX ADMIN — BARIUM SULFATE 340 G: 980 POWDER, FOR SUSPENSION ORAL at 12:29

## 2023-02-27 RX ADMIN — ANTACID/ANTIFLATULENT 1 EACH: 380; 550; 10; 10 GRANULE, EFFERVESCENT ORAL at 12:29

## 2023-02-27 RX ADMIN — BARIUM SULFATE 1 TABLET: 700 TABLET ORAL at 12:29

## 2023-03-01 ENCOUNTER — HOSPITAL ENCOUNTER (OUTPATIENT)
Dept: CT IMAGING | Age: 88
Discharge: HOME | End: 2023-03-04
Payer: MEDICARE

## 2023-03-01 DIAGNOSIS — R39.89 BLADDER PAIN: ICD-10-CM

## 2023-03-01 DIAGNOSIS — C64.2 MALIGNANT NEOPLASM OF LEFT KIDNEY, EXCEPT RENAL PELVIS (HCC): ICD-10-CM

## 2023-03-01 PROCEDURE — 6360000004 HC RX CONTRAST MEDICATION: Performed by: UROLOGY

## 2023-03-01 PROCEDURE — 74178 CT ABD&PLV WO CNTR FLWD CNTR: CPT

## 2023-03-01 RX ADMIN — IOPAMIDOL 100 ML: 755 INJECTION, SOLUTION INTRAVENOUS at 13:44

## 2023-03-02 DIAGNOSIS — Z85.528 HISTORY OF KIDNEY CANCER: Primary | ICD-10-CM

## 2023-03-20 ENCOUNTER — CARE COORDINATION (OUTPATIENT)
Dept: CARE COORDINATION | Facility: CLINIC | Age: 88
End: 2023-03-20

## 2023-03-20 NOTE — CARE COORDINATION
RNCM 3rd unsuccessful attempt to reach pt, no answer left final vm. Will close episode as pt is unable to be reached.

## 2023-03-22 DIAGNOSIS — E87.6 HYPOKALEMIA: ICD-10-CM

## 2023-03-22 DIAGNOSIS — R53.82 CHRONIC FATIGUE: ICD-10-CM

## 2023-03-22 DIAGNOSIS — I10 ESSENTIAL (PRIMARY) HYPERTENSION: Primary | ICD-10-CM

## 2023-03-23 DIAGNOSIS — E87.6 HYPOKALEMIA: ICD-10-CM

## 2023-03-23 DIAGNOSIS — R53.82 CHRONIC FATIGUE: ICD-10-CM

## 2023-03-23 DIAGNOSIS — I10 ESSENTIAL (PRIMARY) HYPERTENSION: ICD-10-CM

## 2023-03-23 LAB
ALBUMIN SERPL-MCNC: 3.8 G/DL (ref 3.2–4.6)
ALBUMIN/GLOB SERPL: 1.5 (ref 0.4–1.6)
ALP SERPL-CCNC: 87 U/L (ref 50–136)
ALT SERPL-CCNC: 9 U/L (ref 12–65)
ANION GAP SERPL CALC-SCNC: 3 MMOL/L (ref 2–11)
AST SERPL-CCNC: 22 U/L (ref 15–37)
BASOPHILS # BLD: 0.1 K/UL (ref 0–0.2)
BASOPHILS NFR BLD: 1 % (ref 0–2)
BILIRUB SERPL-MCNC: 0.5 MG/DL (ref 0.2–1.1)
BUN SERPL-MCNC: 12 MG/DL (ref 8–23)
CALCIUM SERPL-MCNC: 8.9 MG/DL (ref 8.3–10.4)
CHLORIDE SERPL-SCNC: 105 MMOL/L (ref 101–110)
CHOLEST SERPL-MCNC: 223 MG/DL
CO2 SERPL-SCNC: 31 MMOL/L (ref 21–32)
CREAT SERPL-MCNC: 1.1 MG/DL (ref 0.6–1)
DIFFERENTIAL METHOD BLD: NORMAL
EOSINOPHIL # BLD: 0.2 K/UL (ref 0–0.8)
EOSINOPHIL NFR BLD: 3 % (ref 0.5–7.8)
ERYTHROCYTE [DISTWIDTH] IN BLOOD BY AUTOMATED COUNT: 14.4 % (ref 11.9–14.6)
GLOBULIN SER CALC-MCNC: 2.5 G/DL (ref 2.8–4.5)
GLUCOSE SERPL-MCNC: 99 MG/DL (ref 65–100)
HCT VFR BLD AUTO: 40.8 % (ref 35.8–46.3)
HDLC SERPL-MCNC: 76 MG/DL (ref 40–60)
HDLC SERPL: 2.9
HGB BLD-MCNC: 13.1 G/DL (ref 11.7–15.4)
IMM GRANULOCYTES # BLD AUTO: 0 K/UL (ref 0–0.5)
IMM GRANULOCYTES NFR BLD AUTO: 0 % (ref 0–5)
LDLC SERPL CALC-MCNC: 126.2 MG/DL
LYMPHOCYTES # BLD: 1.4 K/UL (ref 0.5–4.6)
LYMPHOCYTES NFR BLD: 28 % (ref 13–44)
MCH RBC QN AUTO: 31.6 PG (ref 26.1–32.9)
MCHC RBC AUTO-ENTMCNC: 32.1 G/DL (ref 31.4–35)
MCV RBC AUTO: 98.6 FL (ref 82–102)
MONOCYTES # BLD: 0.5 K/UL (ref 0.1–1.3)
MONOCYTES NFR BLD: 10 % (ref 4–12)
NEUTS SEG # BLD: 3 K/UL (ref 1.7–8.2)
NEUTS SEG NFR BLD: 58 % (ref 43–78)
NRBC # BLD: 0 K/UL (ref 0–0.2)
PLATELET # BLD AUTO: 213 K/UL (ref 150–450)
PMV BLD AUTO: 10.2 FL (ref 9.4–12.3)
POTASSIUM SERPL-SCNC: 4.3 MMOL/L (ref 3.5–5.1)
PROT SERPL-MCNC: 6.3 G/DL (ref 6.3–8.2)
RBC # BLD AUTO: 4.14 M/UL (ref 4.05–5.2)
SODIUM SERPL-SCNC: 139 MMOL/L (ref 133–143)
TRIGL SERPL-MCNC: 104 MG/DL (ref 35–150)
TSH, 3RD GENERATION: 1.53 UIU/ML (ref 0.36–3.74)
VLDLC SERPL CALC-MCNC: 20.8 MG/DL (ref 6–23)
WBC # BLD AUTO: 5.2 K/UL (ref 4.3–11.1)

## 2023-03-29 ENCOUNTER — OFFICE VISIT (OUTPATIENT)
Dept: PRIMARY CARE CLINIC | Facility: CLINIC | Age: 88
End: 2023-03-29
Payer: MEDICARE

## 2023-03-29 VITALS
TEMPERATURE: 97.9 F | WEIGHT: 155 LBS | HEART RATE: 50 BPM | BODY MASS INDEX: 27.46 KG/M2 | SYSTOLIC BLOOD PRESSURE: 143 MMHG | OXYGEN SATURATION: 99 % | HEIGHT: 63 IN | DIASTOLIC BLOOD PRESSURE: 62 MMHG

## 2023-03-29 DIAGNOSIS — M54.2 NECK PAIN: ICD-10-CM

## 2023-03-29 DIAGNOSIS — G44.52 NEW DAILY PERSISTENT HEADACHE: ICD-10-CM

## 2023-03-29 DIAGNOSIS — N18.2 TYPE 2 DIABETES MELLITUS WITH STAGE 2 CHRONIC KIDNEY DISEASE, WITHOUT LONG-TERM CURRENT USE OF INSULIN (HCC): ICD-10-CM

## 2023-03-29 DIAGNOSIS — H53.133 SUDDEN VISUAL LOSS OF BOTH EYES: Primary | ICD-10-CM

## 2023-03-29 DIAGNOSIS — F33.2 SEVERE EPISODE OF RECURRENT MAJOR DEPRESSIVE DISORDER, WITHOUT PSYCHOTIC FEATURES (HCC): ICD-10-CM

## 2023-03-29 DIAGNOSIS — R42 DIZZINESS: ICD-10-CM

## 2023-03-29 DIAGNOSIS — F13.99 SEDATIVE, HYPNOTIC OR ANXIOLYTIC USE, UNSPECIFIED WITH UNSPECIFIED SEDATIVE, HYPNOTIC OR ANXIOLYTIC-INDUCED DISORDER (HCC): ICD-10-CM

## 2023-03-29 DIAGNOSIS — N18.31 STAGE 3A CHRONIC KIDNEY DISEASE (HCC): ICD-10-CM

## 2023-03-29 DIAGNOSIS — F41.9 ANXIETY: ICD-10-CM

## 2023-03-29 DIAGNOSIS — E11.22 TYPE 2 DIABETES MELLITUS WITH STAGE 2 CHRONIC KIDNEY DISEASE, WITHOUT LONG-TERM CURRENT USE OF INSULIN (HCC): ICD-10-CM

## 2023-03-29 DIAGNOSIS — G30.9 ALZHEIMER'S DISEASE, UNSPECIFIED (CODE) (HCC): ICD-10-CM

## 2023-03-29 PROCEDURE — 1123F ACP DISCUSS/DSCN MKR DOCD: CPT | Performed by: FAMILY MEDICINE

## 2023-03-29 PROCEDURE — G8417 CALC BMI ABV UP PARAM F/U: HCPCS | Performed by: FAMILY MEDICINE

## 2023-03-29 PROCEDURE — 99215 OFFICE O/P EST HI 40 MIN: CPT | Performed by: FAMILY MEDICINE

## 2023-03-29 PROCEDURE — G8484 FLU IMMUNIZE NO ADMIN: HCPCS | Performed by: FAMILY MEDICINE

## 2023-03-29 PROCEDURE — G8427 DOCREV CUR MEDS BY ELIG CLIN: HCPCS | Performed by: FAMILY MEDICINE

## 2023-03-29 PROCEDURE — 1090F PRES/ABSN URINE INCON ASSESS: CPT | Performed by: FAMILY MEDICINE

## 2023-03-29 PROCEDURE — 1036F TOBACCO NON-USER: CPT | Performed by: FAMILY MEDICINE

## 2023-03-29 RX ORDER — ESCITALOPRAM OXALATE 20 MG/1
20 TABLET ORAL DAILY
Qty: 90 TABLET | Refills: 3 | Status: CANCELLED | OUTPATIENT
Start: 2023-03-29 | End: 2023-04-28

## 2023-03-29 RX ORDER — MECLIZINE HCL 25MG 25 MG/1
25 TABLET, CHEWABLE ORAL 3 TIMES DAILY PRN
Qty: 90 TABLET | Refills: 2 | Status: CANCELLED | OUTPATIENT
Start: 2023-03-29

## 2023-03-29 RX ORDER — AMITRIPTYLINE HYDROCHLORIDE 25 MG/1
25 TABLET, FILM COATED ORAL NIGHTLY
Qty: 60 TABLET | Refills: 1 | Status: CANCELLED | OUTPATIENT
Start: 2023-03-29 | End: 2023-05-28

## 2023-03-29 RX ORDER — LIDOCAINE 50 MG/G
1 PATCH TOPICAL DAILY
Qty: 30 PATCH | Refills: 0 | Status: SHIPPED | OUTPATIENT
Start: 2023-03-29 | End: 2023-04-28

## 2023-03-29 RX ORDER — NITROGLYCERIN 400 UG/1
1 SPRAY ORAL EVERY 5 MIN PRN
Qty: 1 EACH | Refills: 2 | Status: SHIPPED | OUTPATIENT
Start: 2023-03-29 | End: 2023-03-30 | Stop reason: SDUPTHER

## 2023-03-29 RX ORDER — FAMOTIDINE 20 MG/1
20 TABLET, FILM COATED ORAL EVERY EVENING
Qty: 60 TABLET | Refills: 5 | Status: SHIPPED | OUTPATIENT
Start: 2023-03-29

## 2023-03-29 SDOH — ECONOMIC STABILITY: INCOME INSECURITY: HOW HARD IS IT FOR YOU TO PAY FOR THE VERY BASICS LIKE FOOD, HOUSING, MEDICAL CARE, AND HEATING?: HARD

## 2023-03-29 SDOH — ECONOMIC STABILITY: HOUSING INSECURITY
IN THE LAST 12 MONTHS, WAS THERE A TIME WHEN YOU DID NOT HAVE A STEADY PLACE TO SLEEP OR SLEPT IN A SHELTER (INCLUDING NOW)?: NO

## 2023-03-29 SDOH — ECONOMIC STABILITY: FOOD INSECURITY: WITHIN THE PAST 12 MONTHS, THE FOOD YOU BOUGHT JUST DIDN'T LAST AND YOU DIDN'T HAVE MONEY TO GET MORE.: NEVER TRUE

## 2023-03-29 SDOH — ECONOMIC STABILITY: FOOD INSECURITY: WITHIN THE PAST 12 MONTHS, YOU WORRIED THAT YOUR FOOD WOULD RUN OUT BEFORE YOU GOT MONEY TO BUY MORE.: NEVER TRUE

## 2023-03-29 ASSESSMENT — PATIENT HEALTH QUESTIONNAIRE - PHQ9
5. POOR APPETITE OR OVEREATING: 0
SUM OF ALL RESPONSES TO PHQ QUESTIONS 1-9: 15
SUM OF ALL RESPONSES TO PHQ QUESTIONS 1-9: 14
SUM OF ALL RESPONSES TO PHQ QUESTIONS 1-9: 15
8. MOVING OR SPEAKING SO SLOWLY THAT OTHER PEOPLE COULD HAVE NOTICED. OR THE OPPOSITE, BEING SO FIGETY OR RESTLESS THAT YOU HAVE BEEN MOVING AROUND A LOT MORE THAN USUAL: 1
9. THOUGHTS THAT YOU WOULD BE BETTER OFF DEAD, OR OF HURTING YOURSELF: 1
4. FEELING TIRED OR HAVING LITTLE ENERGY: 3
SUM OF ALL RESPONSES TO PHQ QUESTIONS 1-9: 15
SUM OF ALL RESPONSES TO PHQ9 QUESTIONS 1 & 2: 6
7. TROUBLE CONCENTRATING ON THINGS, SUCH AS READING THE NEWSPAPER OR WATCHING TELEVISION: 1
3. TROUBLE FALLING OR STAYING ASLEEP: 0
1. LITTLE INTEREST OR PLEASURE IN DOING THINGS: 3
2. FEELING DOWN, DEPRESSED OR HOPELESS: 3
10. IF YOU CHECKED OFF ANY PROBLEMS, HOW DIFFICULT HAVE THESE PROBLEMS MADE IT FOR YOU TO DO YOUR WORK, TAKE CARE OF THINGS AT HOME, OR GET ALONG WITH OTHER PEOPLE: 1
6. FEELING BAD ABOUT YOURSELF - OR THAT YOU ARE A FAILURE OR HAVE LET YOURSELF OR YOUR FAMILY DOWN: 3

## 2023-03-29 ASSESSMENT — COLUMBIA-SUICIDE SEVERITY RATING SCALE - C-SSRS
6. HAVE YOU EVER DONE ANYTHING, STARTED TO DO ANYTHING, OR PREPARED TO DO ANYTHING TO END YOUR LIFE?: NO
1. WITHIN THE PAST MONTH, HAVE YOU WISHED YOU WERE DEAD OR WISHED YOU COULD GO TO SLEEP AND NOT WAKE UP?: YES
2. HAVE YOU ACTUALLY HAD ANY THOUGHTS OF KILLING YOURSELF?: NO

## 2023-03-29 NOTE — PATIENT INSTRUCTIONS
Take 1/2 of Lexapro for 2 weeks (10mg)  At the same time, start taking TRINTILLIX 5 mg (samples given)  After 2 weeks stop the lexapro. Go or x-ray of neck when you get a chance. I ams ordering a ct of head. They will call you to schedule  Continue with remaining medications for now. Possibly stop the namenda unless you feel it is helping with memory. I am sending in lidocain patch to apply to your neck as needed for pain  Hold on starting elavil while we start the trintillix  We are getting carotid ultrasound to rule out blockages in neck.

## 2023-03-29 NOTE — PROGRESS NOTES
ultrasound to rule out blockages in neck. We discussed the expected course, resolution and complications of the diagnosis(es) in detail. Medication risks, benefits, costs, interactions, and alternatives were discussed as indicated. I advised pt to contact the office if condition worsens, changes or fails to improve as anticipated. Pt expressed understanding with the diagnosis(es) and plan. Total time spent with patient was approximately 45 minutes      HISTORY OF PRESENT ILLNESS:  Patient here for follow-up. She complains of depression and feels that her Lexapro is not working. She states that her life is terrible because of her chronic persistent tinnitus. She has some information regarding the supplements that a friend suggested. It contains ginkgo biloba and she realized that she cannot take this with him blood thinners. She also brought an article regarding surgery. She is requesting referral to ENT. She denies any fall or injury. She complains of headaches. She also adds that she has experienced 2 episodes of sudden loss of vision last episode occurring about a week ago. Last about 30 minutes. She has been having persistent we reviewed medications. Side effects are discussed as well including possible adverse reactions and interactions. Patient is aware of risks and will call if he notices any adverse reactions. Daily headaches for the past few weeks as well. She feels pain at the back of her neck that radiates up to the top of her head. She has had an MRA in the past which was unrevealing. She denies any head injury. She complains of constant dizziness which has been occurring problem as well. She indicates that she feels popping when she turns her neck. REVIEW OF SYSTEMS:  Review of Systems    Review of systems is as stated above, otherwise is negative.     PHYSICAL EXAM:  Vital Signs - BP (!) 143/62 (Site: Left Upper Arm, Position: Sitting, Cuff Size: Small Adult)   Pulse 50

## 2023-03-30 ENCOUNTER — OFFICE VISIT (OUTPATIENT)
Dept: CARDIOLOGY CLINIC | Age: 88
End: 2023-03-30
Payer: MEDICARE

## 2023-03-30 VITALS
BODY MASS INDEX: 27.36 KG/M2 | HEART RATE: 53 BPM | HEIGHT: 63 IN | SYSTOLIC BLOOD PRESSURE: 138 MMHG | WEIGHT: 154.4 LBS | DIASTOLIC BLOOD PRESSURE: 78 MMHG

## 2023-03-30 DIAGNOSIS — I50.32 DIASTOLIC CHF, CHRONIC (HCC): Primary | ICD-10-CM

## 2023-03-30 DIAGNOSIS — I25.118 CORONARY ARTERY DISEASE OF NATIVE ARTERY OF NATIVE HEART WITH STABLE ANGINA PECTORIS (HCC): ICD-10-CM

## 2023-03-30 DIAGNOSIS — I73.9 PAD (PERIPHERAL ARTERY DISEASE) (HCC): ICD-10-CM

## 2023-03-30 DIAGNOSIS — I50.22 CHRONIC SYSTOLIC (CONGESTIVE) HEART FAILURE (HCC): ICD-10-CM

## 2023-03-30 PROCEDURE — 1036F TOBACCO NON-USER: CPT | Performed by: INTERNAL MEDICINE

## 2023-03-30 PROCEDURE — 1123F ACP DISCUSS/DSCN MKR DOCD: CPT | Performed by: INTERNAL MEDICINE

## 2023-03-30 PROCEDURE — G8428 CUR MEDS NOT DOCUMENT: HCPCS | Performed by: INTERNAL MEDICINE

## 2023-03-30 PROCEDURE — 1090F PRES/ABSN URINE INCON ASSESS: CPT | Performed by: INTERNAL MEDICINE

## 2023-03-30 PROCEDURE — 99214 OFFICE O/P EST MOD 30 MIN: CPT | Performed by: INTERNAL MEDICINE

## 2023-03-30 PROCEDURE — G8417 CALC BMI ABV UP PARAM F/U: HCPCS | Performed by: INTERNAL MEDICINE

## 2023-03-30 PROCEDURE — 93000 ELECTROCARDIOGRAM COMPLETE: CPT | Performed by: INTERNAL MEDICINE

## 2023-03-30 PROCEDURE — G8484 FLU IMMUNIZE NO ADMIN: HCPCS | Performed by: INTERNAL MEDICINE

## 2023-03-30 RX ORDER — RANOLAZINE 500 MG/1
500 TABLET, EXTENDED RELEASE ORAL 2 TIMES DAILY
Qty: 180 TABLET | Refills: 3 | Status: SHIPPED | OUTPATIENT
Start: 2023-03-30

## 2023-03-30 RX ORDER — CARVEDILOL 25 MG/1
25 TABLET ORAL 2 TIMES DAILY
Qty: 180 TABLET | Refills: 3 | Status: SHIPPED | OUTPATIENT
Start: 2023-03-30

## 2023-03-30 RX ORDER — NITROGLYCERIN 400 UG/1
1 SPRAY ORAL EVERY 5 MIN PRN
Qty: 1 EACH | Refills: 2 | Status: SHIPPED | OUTPATIENT
Start: 2023-03-30

## 2023-03-30 NOTE — PROGRESS NOTES
([facility]  -     EKG 12 lead    Chronic systolic (congestive) heart failure    Coronary artery disease of native artery of native heart with stable angina pectoris ([facility]    PAD (peripheral artery disease) ([facility]    Other orders  -     carvedilol (COREG) 25 MG tablet; Take 1 tablet by mouth 2 times daily  -     ranolazine (RANEXA) 500 MG extended release tablet; Take 1 tablet by mouth 2 times daily           PLAN:   1. Chronic DHF:  On lasix 40 and aldactone. Follow K and Cr. 2. CAD:  Could not tolerate imdur. some more sx, been to ER, not wanting elective admission. ON coreg, increased dose now. Trial of ranexa. Need to address anxiety as well, seen GI.  TOUGH SITUATION. To [facility] ER for more angina, stressed to her today. 3.   DVT: Follow on NOAC now. Monitor with more falls. 4. HPL: NO MORE REPATHA, she blames this for prior admission. On crestor, diet has been good. Lipids poor in the past, re-address in the future. 5. HTN: follow on higher dose coreg. 6. Carotid Dz:  Remain on ASA and statin, US ok. No more testing needed now. Patient has been instructed and agrees to call our office with any issues or other concerns related to their cardiac condition(s) and/or complaint(s). Return in about 3 weeks (around 4/20/2023).        Tamara Hyawood, DO  3/30/2023

## 2023-04-10 ENCOUNTER — TELEPHONE (OUTPATIENT)
Dept: PRIMARY CARE CLINIC | Facility: CLINIC | Age: 88
End: 2023-04-10

## 2023-04-10 DIAGNOSIS — G44.201 ACUTE INTRACTABLE TENSION-TYPE HEADACHE: ICD-10-CM

## 2023-04-10 DIAGNOSIS — M54.2 NECK PAIN: Primary | ICD-10-CM

## 2023-04-18 ENCOUNTER — OFFICE VISIT (OUTPATIENT)
Dept: VASCULAR SURGERY | Age: 88
End: 2023-04-18
Payer: MEDICARE

## 2023-04-18 VITALS
OXYGEN SATURATION: 99 % | SYSTOLIC BLOOD PRESSURE: 152 MMHG | HEIGHT: 63 IN | BODY MASS INDEX: 26.93 KG/M2 | DIASTOLIC BLOOD PRESSURE: 62 MMHG | HEART RATE: 58 BPM | WEIGHT: 152 LBS

## 2023-04-18 DIAGNOSIS — I65.23 CAROTID STENOSIS, ASYMPTOMATIC, BILATERAL: ICD-10-CM

## 2023-04-18 DIAGNOSIS — I73.9 PAD (PERIPHERAL ARTERY DISEASE) (HCC): Primary | ICD-10-CM

## 2023-04-18 PROCEDURE — 1123F ACP DISCUSS/DSCN MKR DOCD: CPT | Performed by: NURSE PRACTITIONER

## 2023-04-18 PROCEDURE — G8417 CALC BMI ABV UP PARAM F/U: HCPCS | Performed by: NURSE PRACTITIONER

## 2023-04-18 PROCEDURE — G8427 DOCREV CUR MEDS BY ELIG CLIN: HCPCS | Performed by: NURSE PRACTITIONER

## 2023-04-18 PROCEDURE — 99213 OFFICE O/P EST LOW 20 MIN: CPT | Performed by: NURSE PRACTITIONER

## 2023-04-18 PROCEDURE — 1036F TOBACCO NON-USER: CPT | Performed by: NURSE PRACTITIONER

## 2023-04-18 PROCEDURE — 1090F PRES/ABSN URINE INCON ASSESS: CPT | Performed by: NURSE PRACTITIONER

## 2023-04-18 NOTE — PROGRESS NOTES
3.4 cm/s        38.4 cm/s        6.72 cm/s                                    Procedure Staff    Technologist/Clinician: Shira Cifuentes  Supporting Staff: Nicola Nguyen  Performing Physician/Midlevel: None     Exam Completion Date/Time: 4/18/23  2:16 PM  PHYSICAL EXAM  VITALS: BP (!) 152/62 (Site: Left Upper Arm, Position: Sitting, Cuff Size: Medium Adult)   Pulse 58   Ht 5' 3\" (1.6 m)   Wt 152 lb (68.9 kg)   SpO2 99%   BMI 26.93 kg/m²       GENERAL: Well developed, well nourished, in NAD  HEAD/NECK: normocephalic, atraumatic, neck supple  HEART: Regular rate and rhythm  ABDOMEN: soft, nontender, nondistended  EXTREMITIES: upper without CCE with palpable radial and brachial pulses. Lower extremities: palpable distal pulses  MUSCULOSKELETAL: normal gait  NEURO: sensation and strength grossly intact and symmetrical  PSYCH: alert and oriented to person, place and time      Assessment/Plan: Patient is an 79-year-old female who follows up for carotid and lower extremity duplex. No significant lower extremity arterial disease. I have asked her to  continue keeping her legs moisturized well, keep them elevated when sitting. She has less than 50% carotid disease bilaterally. She is to present to the emergency department with any S/S of a stroke i.e. slurred speech, facial drooping, amaurosis fugax  or unilateral weakness. She will return in 1 year and we will repeat her carotid duplex and lower extremity duplex, sooner should any issues arise.         Colette Hutchison, APRN - CNP    20 minutes of time was spent on this encounter including chart review, assessment and evaluation

## 2023-04-27 ENCOUNTER — TELEPHONE (OUTPATIENT)
Dept: ORTHOPEDIC SURGERY | Age: 88
End: 2023-04-27

## 2023-04-27 NOTE — TELEPHONE ENCOUNTER
Pt had a fall twice on 4/20 also 4/26  she is having bilateral hip pain she wanted to know if you have something a lot sooner than June please advise

## 2023-04-28 ENCOUNTER — HOSPITAL ENCOUNTER (OUTPATIENT)
Dept: MRI IMAGING | Age: 88
End: 2023-04-28
Payer: MEDICARE

## 2023-04-28 DIAGNOSIS — H53.133 SUDDEN VISUAL LOSS OF BOTH EYES: ICD-10-CM

## 2023-04-28 DIAGNOSIS — G44.52 NEW DAILY PERSISTENT HEADACHE: ICD-10-CM

## 2023-04-28 DIAGNOSIS — R42 DIZZINESS: ICD-10-CM

## 2023-04-28 PROCEDURE — 70551 MRI BRAIN STEM W/O DYE: CPT

## 2023-05-01 ENCOUNTER — OFFICE VISIT (OUTPATIENT)
Dept: ORTHOPEDIC SURGERY | Age: 88
End: 2023-05-01
Payer: MEDICARE

## 2023-05-01 DIAGNOSIS — M54.50 LOW BACK PAIN, UNSPECIFIED BACK PAIN LATERALITY, UNSPECIFIED CHRONICITY, UNSPECIFIED WHETHER SCIATICA PRESENT: Primary | ICD-10-CM

## 2023-05-01 DIAGNOSIS — Z96.642 STATUS POST LEFT HIP REPLACEMENT: ICD-10-CM

## 2023-05-01 DIAGNOSIS — M47.816 LUMBAR SPONDYLOSIS: ICD-10-CM

## 2023-05-01 DIAGNOSIS — S30.0XXA CONTUSION OF COCCYX, INITIAL ENCOUNTER: ICD-10-CM

## 2023-05-01 PROCEDURE — 1036F TOBACCO NON-USER: CPT | Performed by: ORTHOPAEDIC SURGERY

## 2023-05-01 PROCEDURE — 1090F PRES/ABSN URINE INCON ASSESS: CPT | Performed by: ORTHOPAEDIC SURGERY

## 2023-05-01 PROCEDURE — G8417 CALC BMI ABV UP PARAM F/U: HCPCS | Performed by: ORTHOPAEDIC SURGERY

## 2023-05-01 PROCEDURE — 1123F ACP DISCUSS/DSCN MKR DOCD: CPT | Performed by: ORTHOPAEDIC SURGERY

## 2023-05-01 PROCEDURE — G8428 CUR MEDS NOT DOCUMENT: HCPCS | Performed by: ORTHOPAEDIC SURGERY

## 2023-05-01 PROCEDURE — 99214 OFFICE O/P EST MOD 30 MIN: CPT | Performed by: ORTHOPAEDIC SURGERY

## 2023-05-01 NOTE — PROGRESS NOTES
Name: Jessy Jacob  YOB: 1935  Gender: female  MRN: [id number]    CC:   Chief Complaint   Patient presents with    Hip Pain     Bilateral hip / low back- patient fell S/P L ADRIANA          HPI:   The pain has been present for for a couple of weeks s/p fall at [employer] on some steps and then again at home when she tripped over a chair. It hurts at night when sleeping. There was not an acute injury to the hip. The pain is located over the tail bone and low back. It does not hurt to walk only when sitting. The pain does not radiate down the leg. Numbness and tingling are not noted. The patient is now having difficulty putting socks and shoes on. Treatment so far has been anti-inflammatory medications. 9 months s/p left ADRIANA which is doing well. Review of Systems  As per HPI. Pertinent positives and negatives are addressed with the patient, particularly those related to musculoskeletal concerns. Non-orthopaedic concerns were referred back to the primary care physician. [address]:    Current Outpatient Medications:     carvedilol (COREG) 25 MG tablet, Take 1 tablet by mouth 2 times daily, Disp: 180 tablet, Rfl: 3    ranolazine (RANEXA) 500 MG extended release tablet, Take 1 tablet by mouth 2 times daily, Disp: 180 tablet, Rfl: 3    nitroGLYCERIN (NITROLINGUAL) 0.4 MG/SPRAY 0.4 mg spray, Place 1 spray under the tongue every 5 minutes as needed for Chest pain, Disp: 1 each, Rfl: 2    famotidine (PEPCID) 20 MG tablet, Take 1 tablet by mouth every evening, Disp: 60 tablet, Rfl: 5    memantine (NAMENDA) 10 MG tablet, Take 1 tablet by mouth daily, Disp: 30 tablet, Rfl: 5    magnesium oxide (MAG-OX) 400 MG tablet, Take 1 tablet by mouth daily, Disp: 30 tablet, Rfl: 5    Coenzyme Q10 (CO Q 10) 10 MG CAPS, Take by mouth, Disp: , Rfl:     rivaroxaban (XARELTO) 20 MG TABS tablet, Take 1 tablet by mouth daily (with breakfast) HOLD XARELTO UNTIL Friday, [facility].   MAY RESUME THEN, IF NO WOUND

## 2023-05-02 ENCOUNTER — OFFICE VISIT (OUTPATIENT)
Dept: CARDIOLOGY CLINIC | Age: 88
End: 2023-05-02
Payer: MEDICARE

## 2023-05-02 VITALS
WEIGHT: 155 LBS | DIASTOLIC BLOOD PRESSURE: 70 MMHG | BODY MASS INDEX: 27.46 KG/M2 | SYSTOLIC BLOOD PRESSURE: 147 MMHG | HEIGHT: 63 IN | HEART RATE: 58 BPM

## 2023-05-02 DIAGNOSIS — I50.32 DIASTOLIC CHF, CHRONIC (HCC): ICD-10-CM

## 2023-05-02 DIAGNOSIS — I73.9 PAD (PERIPHERAL ARTERY DISEASE) (HCC): ICD-10-CM

## 2023-05-02 DIAGNOSIS — I50.22 CHRONIC SYSTOLIC (CONGESTIVE) HEART FAILURE (HCC): Primary | ICD-10-CM

## 2023-05-02 DIAGNOSIS — I25.118 CORONARY ARTERY DISEASE OF NATIVE ARTERY OF NATIVE HEART WITH STABLE ANGINA PECTORIS (HCC): ICD-10-CM

## 2023-05-02 DIAGNOSIS — N18.31 STAGE 3A CHRONIC KIDNEY DISEASE (HCC): ICD-10-CM

## 2023-05-02 DIAGNOSIS — Z95.1 S/P CABG (CORONARY ARTERY BYPASS GRAFT): ICD-10-CM

## 2023-05-02 PROCEDURE — 1123F ACP DISCUSS/DSCN MKR DOCD: CPT | Performed by: INTERNAL MEDICINE

## 2023-05-02 PROCEDURE — G8417 CALC BMI ABV UP PARAM F/U: HCPCS | Performed by: INTERNAL MEDICINE

## 2023-05-02 PROCEDURE — 99214 OFFICE O/P EST MOD 30 MIN: CPT | Performed by: INTERNAL MEDICINE

## 2023-05-02 PROCEDURE — G8427 DOCREV CUR MEDS BY ELIG CLIN: HCPCS | Performed by: INTERNAL MEDICINE

## 2023-05-02 PROCEDURE — 1036F TOBACCO NON-USER: CPT | Performed by: INTERNAL MEDICINE

## 2023-05-02 PROCEDURE — 1090F PRES/ABSN URINE INCON ASSESS: CPT | Performed by: INTERNAL MEDICINE

## 2023-05-02 RX ORDER — OMEPRAZOLE 40 MG/1
40 CAPSULE, DELAYED RELEASE ORAL DAILY
COMMUNITY

## 2023-05-02 RX ORDER — AMITRIPTYLINE HYDROCHLORIDE 25 MG/1
25 TABLET, FILM COATED ORAL NIGHTLY
Qty: 30 TABLET | Refills: 2 | Status: SHIPPED | OUTPATIENT
Start: 2023-05-02 | End: 2023-07-31

## 2023-05-02 NOTE — PROGRESS NOTES
[address], [address]  PHONE: [phone]     23    NAME:  Nikkie Wade  : 1935  MRN: [id number]       SUBJECTIVE:   Nikkie Wade is a 81 y.o. female seen for a follow up visit regarding the following:     Chief Complaint   Patient presents with    Congestive Heart Failure       HPI:   Here for DHF, CAD s/p 4v CABG in   NO PVD on duplex 2017. LLE DVT 2018   Echo 3/2022: normal EF  [facility] 2022: 2 grafts patent. Small vessel disease involving ramus intermedius which is the only change in comparison to 2018. Recommend medical therapy for small vessel coronary disease. (Imdur added after above [facility]. More HAs, could not take this med)  Carotid US 2022: mild Dz. AWAN and CP better now. Feeling better now. Energy better. NO new angina. Has hiatal hernia as well. Doing well on anticoagulation treatment as reviewed today, no bleeding issues or excessive bruising noted. HAs on imdur in the past.  Did not feel well on hyd. Past Medical History, Past Surgical History, Family history, Social History, and Medications were all reviewed with the patient today and updated as necessary.      Current Outpatient Medications   Medication Sig Dispense Refill    amitriptyline (ELAVIL) 25 MG tablet Take 1 tablet by mouth nightly 30 tablet 2    omeprazole (PRILOSEC) 40 MG delayed release capsule Take 1 capsule by mouth daily      carvedilol (COREG) 25 MG tablet Take 1 tablet by mouth 2 times daily 180 tablet 3    ranolazine (RANEXA) 500 MG extended release tablet Take 1 tablet by mouth 2 times daily 180 tablet 3    nitroGLYCERIN (NITROLINGUAL) 0.4 MG/SPRAY 0.4 mg spray Place 1 spray under the tongue every 5 minutes as needed for Chest pain 1 each 2    famotidine (PEPCID) 20 MG tablet Take 1 tablet by mouth every evening 60 tablet 5    memantine (NAMENDA) 10 MG tablet Take 1 tablet by mouth daily 30 tablet 5    magnesium oxide

## 2023-05-03 ENCOUNTER — OFFICE VISIT (OUTPATIENT)
Dept: PRIMARY CARE CLINIC | Facility: CLINIC | Age: 88
End: 2023-05-03
Payer: MEDICARE

## 2023-05-03 VITALS
DIASTOLIC BLOOD PRESSURE: 53 MMHG | OXYGEN SATURATION: 98 % | BODY MASS INDEX: 27.29 KG/M2 | SYSTOLIC BLOOD PRESSURE: 121 MMHG | HEIGHT: 63 IN | HEART RATE: 50 BPM | WEIGHT: 154 LBS | TEMPERATURE: 97.1 F

## 2023-05-03 DIAGNOSIS — F33.2 SEVERE EPISODE OF RECURRENT MAJOR DEPRESSIVE DISORDER, WITHOUT PSYCHOTIC FEATURES (HCC): ICD-10-CM

## 2023-05-03 DIAGNOSIS — Z91.81 AT HIGH RISK FOR FALLS: ICD-10-CM

## 2023-05-03 DIAGNOSIS — M54.2 NECK PAIN: ICD-10-CM

## 2023-05-03 DIAGNOSIS — H93.12 TINNITUS, LEFT EAR: Primary | ICD-10-CM

## 2023-05-03 DIAGNOSIS — I10 ESSENTIAL (PRIMARY) HYPERTENSION: ICD-10-CM

## 2023-05-03 DIAGNOSIS — I25.118 CORONARY ARTERY DISEASE OF NATIVE ARTERY OF NATIVE HEART WITH STABLE ANGINA PECTORIS (HCC): ICD-10-CM

## 2023-05-03 PROCEDURE — G8427 DOCREV CUR MEDS BY ELIG CLIN: HCPCS | Performed by: FAMILY MEDICINE

## 2023-05-03 PROCEDURE — 1123F ACP DISCUSS/DSCN MKR DOCD: CPT | Performed by: FAMILY MEDICINE

## 2023-05-03 PROCEDURE — 1090F PRES/ABSN URINE INCON ASSESS: CPT | Performed by: FAMILY MEDICINE

## 2023-05-03 PROCEDURE — 99214 OFFICE O/P EST MOD 30 MIN: CPT | Performed by: FAMILY MEDICINE

## 2023-05-03 PROCEDURE — 1036F TOBACCO NON-USER: CPT | Performed by: FAMILY MEDICINE

## 2023-05-03 PROCEDURE — G8417 CALC BMI ABV UP PARAM F/U: HCPCS | Performed by: FAMILY MEDICINE

## 2023-05-03 SDOH — ECONOMIC STABILITY: FOOD INSECURITY: WITHIN THE PAST 12 MONTHS, THE FOOD YOU BOUGHT JUST DIDN'T LAST AND YOU DIDN'T HAVE MONEY TO GET MORE.: NEVER TRUE

## 2023-05-03 SDOH — ECONOMIC STABILITY: INCOME INSECURITY: HOW HARD IS IT FOR YOU TO PAY FOR THE VERY BASICS LIKE FOOD, HOUSING, MEDICAL CARE, AND HEATING?: HARD

## 2023-05-03 SDOH — ECONOMIC STABILITY: FOOD INSECURITY: WITHIN THE PAST 12 MONTHS, YOU WORRIED THAT YOUR FOOD WOULD RUN OUT BEFORE YOU GOT MONEY TO BUY MORE.: NEVER TRUE

## 2023-05-03 ASSESSMENT — PATIENT HEALTH QUESTIONNAIRE - PHQ9
SUM OF ALL RESPONSES TO PHQ QUESTIONS 1-9: 8
4. FEELING TIRED OR HAVING LITTLE ENERGY: 3
1. LITTLE INTEREST OR PLEASURE IN DOING THINGS: 2
6. FEELING BAD ABOUT YOURSELF - OR THAT YOU ARE A FAILURE OR HAVE LET YOURSELF OR YOUR FAMILY DOWN: 1
9. THOUGHTS THAT YOU WOULD BE BETTER OFF DEAD, OR OF HURTING YOURSELF: 0
SUM OF ALL RESPONSES TO PHQ9 QUESTIONS 1 & 2: 3
5. POOR APPETITE OR OVEREATING: 0
SUM OF ALL RESPONSES TO PHQ QUESTIONS 1-9: 8
2. FEELING DOWN, DEPRESSED OR HOPELESS: 1
7. TROUBLE CONCENTRATING ON THINGS, SUCH AS READING THE NEWSPAPER OR WATCHING TELEVISION: 1
8. MOVING OR SPEAKING SO SLOWLY THAT OTHER PEOPLE COULD HAVE NOTICED. OR THE OPPOSITE, BEING SO FIGETY OR RESTLESS THAT YOU HAVE BEEN MOVING AROUND A LOT MORE THAN USUAL: 0
SUM OF ALL RESPONSES TO PHQ QUESTIONS 1-9: 8
3. TROUBLE FALLING OR STAYING ASLEEP: 0
SUM OF ALL RESPONSES TO PHQ QUESTIONS 1-9: 8
10. IF YOU CHECKED OFF ANY PROBLEMS, HOW DIFFICULT HAVE THESE PROBLEMS MADE IT FOR YOU TO DO YOUR WORK, TAKE CARE OF THINGS AT HOME, OR GET ALONG WITH OTHER PEOPLE: 1

## 2023-05-03 NOTE — PROGRESS NOTES
[facility]  Juan Jaimes M.D.  [address] [place] [address].  [address]  Ph No:  [phone]  Fax:  [phone]:  Chief Complaint   Patient presents with    Depression     Patient states she could not take the Trintellix so she went back on the Lexapro. She said she will need a refill of Lexapro if she does not get a different medication. Results     Patient is here to go over her CT results. IMPRESSION/PLAN    1. Tinnitus, left ear  2. At high risk for falls  3. Essential (primary) hypertension  4. Neck pain  5. Coronary artery disease of native artery of native heart with stable angina pectoris [facility])  Assessment & Plan:   Monitored by specialist- no acute findings meriting change in the plan  6. Severe episode of recurrent major depressive disorder, without psychotic features ([facility] [address]      We reviewed all of her medications. To reduce medication burden. Gave her list of medications that she can try stopping amitriptyline, omeprazole, Pepcid, and possibly she will continue to follow-up with cardiology to see if medications can be discontinued. Blood pressure is a bit low today she will monitor at home hopefully we blood pressure medications as well. I advised her to start taking her Lasix earlier in the afternoon. She takes it before bed and is up 3 times a night. She has been experiencing falls. I advised her that this could be related to her medications. She will call if she has any additional falls. She was unable to take the Trintillix and has restarted lexapro. I did advise her that we stopped this medication but she wishes to continue. We discussed the expected course, resolution and complications of the diagnosis(es) in detail. Medication risks, benefits, costs, interactions, and alternatives were discussed as indicated. I advised pt to contact the office if condition worsens, changes or fails to improve as anticipated.  Pt expressed

## 2023-07-18 RX ORDER — MEMANTINE HYDROCHLORIDE 10 MG/1
TABLET ORAL
Qty: 30 TABLET | Refills: 5 | Status: SHIPPED | OUTPATIENT
Start: 2023-07-18

## 2023-07-21 ENCOUNTER — TELEPHONE (OUTPATIENT)
Dept: PRIMARY CARE CLINIC | Facility: CLINIC | Age: 88
End: 2023-07-21

## 2023-07-24 RX ORDER — MAGNESIUM OXIDE 400 MG/1
400 TABLET ORAL DAILY
Qty: 30 TABLET | Refills: 5 | Status: SHIPPED | OUTPATIENT
Start: 2023-07-24

## 2023-08-07 DIAGNOSIS — E11.22 TYPE 2 DIABETES MELLITUS WITH STAGE 2 CHRONIC KIDNEY DISEASE, WITHOUT LONG-TERM CURRENT USE OF INSULIN (HCC): ICD-10-CM

## 2023-08-07 DIAGNOSIS — N18.2 TYPE 2 DIABETES MELLITUS WITH STAGE 2 CHRONIC KIDNEY DISEASE, WITHOUT LONG-TERM CURRENT USE OF INSULIN (HCC): ICD-10-CM

## 2023-08-07 DIAGNOSIS — I25.118 CORONARY ARTERY DISEASE OF NATIVE ARTERY OF NATIVE HEART WITH STABLE ANGINA PECTORIS (HCC): ICD-10-CM

## 2023-08-07 DIAGNOSIS — I10 ESSENTIAL (PRIMARY) HYPERTENSION: Primary | ICD-10-CM

## 2023-08-07 DIAGNOSIS — N18.31 STAGE 3A CHRONIC KIDNEY DISEASE (HCC): ICD-10-CM

## 2023-08-07 RX ORDER — FUROSEMIDE 40 MG/1
40 TABLET ORAL DAILY
Qty: 90 TABLET | Refills: 3 | Status: SHIPPED | OUTPATIENT
Start: 2023-08-07

## 2023-08-07 NOTE — TELEPHONE ENCOUNTER
MEDICATION REFILL REQUEST      Name of Medication:  lasix  Dose:  40 mg  Frequency:  qd  Quantity:  30  Days' supply:  30 days      Pharm[facility]/Location:  [facility]    *pt needs enough until appt on 9/11

## 2023-08-07 NOTE — TELEPHONE ENCOUNTER
Requested Prescriptions     Pending Prescriptions Disp Refills    furosemide (LASIX) 40 MG tablet 90 tablet 3     Sig: Take 1 tablet by mouth daily

## 2023-08-10 DIAGNOSIS — E11.22 TYPE 2 DIABETES MELLITUS WITH STAGE 2 CHRONIC KIDNEY DISEASE, WITHOUT LONG-TERM CURRENT USE OF INSULIN (HCC): ICD-10-CM

## 2023-08-10 DIAGNOSIS — N18.31 STAGE 3A CHRONIC KIDNEY DISEASE (HCC): ICD-10-CM

## 2023-08-10 DIAGNOSIS — I10 ESSENTIAL (PRIMARY) HYPERTENSION: ICD-10-CM

## 2023-08-10 DIAGNOSIS — N18.2 TYPE 2 DIABETES MELLITUS WITH STAGE 2 CHRONIC KIDNEY DISEASE, WITHOUT LONG-TERM CURRENT USE OF INSULIN (HCC): ICD-10-CM

## 2023-08-10 DIAGNOSIS — I25.118 CORONARY ARTERY DISEASE OF NATIVE ARTERY OF NATIVE HEART WITH STABLE ANGINA PECTORIS (HCC): ICD-10-CM

## 2023-08-10 LAB
ALBUMIN SERPL-MCNC: 3.5 G/DL (ref 3.2–4.6)
ALBUMIN/GLOB SERPL: 1.1 (ref 0.4–1.6)
ALP SERPL-CCNC: 79 U/L (ref 50–136)
ALT SERPL-CCNC: 11 U/L (ref 12–65)
ANION GAP SERPL CALC-SCNC: 5 MMOL/L (ref 2–11)
AST SERPL-CCNC: 13 U/L (ref 15–37)
BASOPHILS # BLD: 0 K/UL (ref 0–0.2)
BASOPHILS NFR BLD: 1 % (ref 0–2)
BILIRUB SERPL-MCNC: 0.5 MG/DL (ref 0.2–1.1)
BUN SERPL-MCNC: 19 MG/DL (ref 8–23)
CALCIUM SERPL-MCNC: 8.8 MG/DL (ref 8.3–10.4)
CHLORIDE SERPL-SCNC: 104 MMOL/L (ref 101–110)
CHOLEST SERPL-MCNC: 214 MG/DL
CO2 SERPL-SCNC: 28 MMOL/L (ref 21–32)
CREAT SERPL-MCNC: 1.4 MG/DL (ref 0.6–1)
DIFFERENTIAL METHOD BLD: ABNORMAL
EOSINOPHIL # BLD: 0.2 K/UL (ref 0–0.8)
EOSINOPHIL NFR BLD: 3 % (ref 0.5–7.8)
ERYTHROCYTE [DISTWIDTH] IN BLOOD BY AUTOMATED COUNT: 14.6 % (ref 11.9–14.6)
GLOBULIN SER CALC-MCNC: 3.2 G/DL (ref 2.8–4.5)
GLUCOSE SERPL-MCNC: 105 MG/DL (ref 65–100)
HCT VFR BLD AUTO: 40 % (ref 35.8–46.3)
HDLC SERPL-MCNC: 79 MG/DL (ref 40–60)
HDLC SERPL: 2.7
HGB BLD-MCNC: 12.7 G/DL (ref 11.7–15.4)
IMM GRANULOCYTES # BLD AUTO: 0 K/UL (ref 0–0.5)
IMM GRANULOCYTES NFR BLD AUTO: 0 % (ref 0–5)
LDLC SERPL CALC-MCNC: 119 MG/DL
LYMPHOCYTES # BLD: 1.2 K/UL (ref 0.5–4.6)
LYMPHOCYTES NFR BLD: 22 % (ref 13–44)
MCH RBC QN AUTO: 32.2 PG (ref 26.1–32.9)
MCHC RBC AUTO-ENTMCNC: 31.8 G/DL (ref 31.4–35)
MCV RBC AUTO: 101.5 FL (ref 82–102)
MONOCYTES # BLD: 0.5 K/UL (ref 0.1–1.3)
MONOCYTES NFR BLD: 10 % (ref 4–12)
NEUTS SEG # BLD: 3.5 K/UL (ref 1.7–8.2)
NEUTS SEG NFR BLD: 64 % (ref 43–78)
NRBC # BLD: 0 K/UL (ref 0–0.2)
PLATELET # BLD AUTO: 244 K/UL (ref 150–450)
PMV BLD AUTO: 10.2 FL (ref 9.4–12.3)
POTASSIUM SERPL-SCNC: 4.1 MMOL/L (ref 3.5–5.1)
PROT SERPL-MCNC: 6.7 G/DL (ref 6.3–8.2)
RBC # BLD AUTO: 3.94 M/UL (ref 4.05–5.2)
SODIUM SERPL-SCNC: 137 MMOL/L (ref 133–143)
TRIGL SERPL-MCNC: 80 MG/DL (ref 35–150)
VLDLC SERPL CALC-MCNC: 16 MG/DL (ref 6–23)
WBC # BLD AUTO: 5.5 K/UL (ref 4.3–11.1)

## 2023-08-11 ENCOUNTER — OFFICE VISIT (OUTPATIENT)
Dept: ORTHOPEDIC SURGERY | Age: 88
End: 2023-08-11

## 2023-08-11 DIAGNOSIS — Z96.642 STATUS POST TOTAL HIP REPLACEMENT, LEFT: Primary | ICD-10-CM

## 2023-08-11 DIAGNOSIS — M47.816 LUMBAR SPONDYLOSIS: ICD-10-CM

## 2023-08-11 LAB
EST. AVERAGE GLUCOSE BLD GHB EST-MCNC: 123 MG/DL
HBA1C MFR BLD: 5.9 % (ref 4.8–5.6)

## 2023-08-11 NOTE — PROGRESS NOTES
Name: Anthony Sparks  YOB: 1935  Gender: female  MRN: [id number]    CC:   Chief Complaint   Patient presents with    Hip Pain     Left hip pain - 1 year PO / recently seen          HPI:   The pain has been present for 2 weeks and is becoming worse. It hurts at night when sleeping. There was not an acute injury to the hip. The pain is located over the left groin and thigh and occasionally in low back. It hurts to walk and pain worsens with increased distance. The pain does not radiate down the leg. Numbness and tingling are not noted. The patient is now having difficulty putting socks and shoes on. Treatment so far has been anti-inflammatory medications. 1 year s/p left ADRIANA which had been doing well. She does also report increased bruisability and takes Xarelto due to h/o DVT 3 years ago. She denies fever, chills, N/V, or any other consitutional symptoms. Review of Systems  As per HPI. Pertinent positives and negatives are addressed with the patient, particularly those related to musculoskeletal concerns. Non-orthopaedic concerns were referred back to the primary care physician.       [id number]  [facility]:    Current Outpatient Medications:     furosemide (LASIX) 40 MG tablet, Take 1 tablet by mouth daily, Disp: 90 tablet, Rfl: 3    magnesium oxide (MAG-OX) 400 MG tablet, Take 1 tablet by mouth daily, Disp: 30 tablet, Rfl: 5    memantine (NAMENDA) 10 MG tablet, TAKE 1 TABLET BY MOUTH EVERY DAY, Disp: 30 tablet, Rfl: 5    amitriptyline (ELAVIL) 25 MG tablet, Take 1 tablet by mouth nightly, Disp: 30 tablet, Rfl: 2    omeprazole (PRILOSEC) 40 MG delayed release capsule, Take 1 capsule by mouth daily, Disp: , Rfl:     carvedilol (COREG) 25 MG tablet, Take 1 tablet by mouth 2 times daily, Disp: 180 tablet, Rfl: 3    ranolazine (RANEXA) 500 MG extended release tablet, Take 1 tablet by mouth 2 times daily, Disp: 180 tablet, Rfl: 3    nitroGLYCERIN (NITROLINGUAL) 0.4 MG/SPRAY 0.4 mg spray, Place 1

## 2023-08-15 ENCOUNTER — OFFICE VISIT (OUTPATIENT)
Dept: VASCULAR SURGERY | Age: 88
End: 2023-08-15
Payer: MEDICARE

## 2023-08-15 VITALS
HEIGHT: 63 IN | SYSTOLIC BLOOD PRESSURE: 157 MMHG | HEART RATE: 62 BPM | WEIGHT: 155 LBS | DIASTOLIC BLOOD PRESSURE: 61 MMHG | OXYGEN SATURATION: 96 % | BODY MASS INDEX: 27.46 KG/M2

## 2023-08-15 DIAGNOSIS — M79.605 PAIN OF LEFT LOWER EXTREMITY: ICD-10-CM

## 2023-08-15 DIAGNOSIS — I87.2 VENOUS (PERIPHERAL) INSUFFICIENCY: Primary | ICD-10-CM

## 2023-08-15 PROBLEM — M79.606 LEG PAIN: Status: ACTIVE | Noted: 2023-08-15

## 2023-08-15 PROCEDURE — 1123F ACP DISCUSS/DSCN MKR DOCD: CPT | Performed by: NURSE PRACTITIONER

## 2023-08-15 PROCEDURE — 1090F PRES/ABSN URINE INCON ASSESS: CPT | Performed by: NURSE PRACTITIONER

## 2023-08-15 PROCEDURE — 1036F TOBACCO NON-USER: CPT | Performed by: NURSE PRACTITIONER

## 2023-08-15 PROCEDURE — G8427 DOCREV CUR MEDS BY ELIG CLIN: HCPCS | Performed by: NURSE PRACTITIONER

## 2023-08-15 PROCEDURE — 99213 OFFICE O/P EST LOW 20 MIN: CPT | Performed by: NURSE PRACTITIONER

## 2023-08-15 PROCEDURE — G8417 CALC BMI ABV UP PARAM F/U: HCPCS | Performed by: NURSE PRACTITIONER

## 2023-08-15 NOTE — PROGRESS NOTES
DATE OF VISIT: 8/15/2023      BRITTNEY Crockett is a 81 y.o. female who follows up for worsening left leg pain. Since her last visit she has undergone a left hip replacement. She remaisn on ASA, Xarelto and Crestor. MEDICAL HISTORY:   Past Medical History:   Diagnosis Date    Abdominal pain 10/28/2014    Angina at rest [facility])     pt denies    Arthritis     osteo - low back,  shoulders, hips, low back    Bilateral carotid bruits     Bursitis     CAD (coronary artery disease)     4 vessel CABG 2005;--- stents x 4--- last one placed 2014-- followed by dr Echo Nuñez     Chronic pain     left shoulder    Coagulation defects     Xarelto    Constipation     Cough 09/11/2014    10/8/14, Methacholine Challenge Study: The point at which the FEV1 fell by at least 20%, the PC20, occurred above a dose of 25mg/mL of methacholine. This rules out the diagnosis of asthma with an extremely high degree of sensitivity. No post-challenge FEV1 recovery was identified. Cindy Joya MD        Depressive disorder     Dyspnea 09/11/2014    [facility]; 9/29/14: IMPRESSION: The flow volume loop is consistent restriction. Spirometry suggest restriction with concomitant obstruction at low lung volumes. There is not a significant bronchodilator response. Lung volumes reveal mild restriction.  The unadjusted diffusion capacity is normal.  Mitesh Agudelo MD      GERD (gastroesophageal reflux disease)     controlled with med    Headache     Hoarseness or changing voice     thougfht to be related to gerd    Hyperlipidemia     Hypertension     controlled with med    Kidney stone     yrs ago-- no tx required    Lumbago     Macular degeneration     Nodule of left lung     dx'ed 4 years ago-watched for several months-no new growth-being watched-yearly CT scan----- followed by dr. Angus Mortimer    Pain in joint, ankle and foot     left 3rd toe and right 2nd toe    Pain in joint, shoulder region     left now bothering > right, right ok

## 2023-08-17 ENCOUNTER — OFFICE VISIT (OUTPATIENT)
Dept: PRIMARY CARE CLINIC | Facility: CLINIC | Age: 88
End: 2023-08-17

## 2023-08-17 VITALS
BODY MASS INDEX: 27.29 KG/M2 | DIASTOLIC BLOOD PRESSURE: 59 MMHG | TEMPERATURE: 98.1 F | HEART RATE: 48 BPM | HEIGHT: 63 IN | SYSTOLIC BLOOD PRESSURE: 128 MMHG | OXYGEN SATURATION: 96 % | WEIGHT: 154 LBS

## 2023-08-17 DIAGNOSIS — F41.9 ANXIETY: ICD-10-CM

## 2023-08-17 DIAGNOSIS — E11.9 CONTROLLED TYPE 2 DIABETES MELLITUS WITHOUT COMPLICATION, WITHOUT LONG-TERM CURRENT USE OF INSULIN (HCC): ICD-10-CM

## 2023-08-17 DIAGNOSIS — N18.32 STAGE 3B CHRONIC KIDNEY DISEASE (HCC): ICD-10-CM

## 2023-08-17 DIAGNOSIS — F33.2 SEVERE EPISODE OF RECURRENT MAJOR DEPRESSIVE DISORDER, WITHOUT PSYCHOTIC FEATURES (HCC): Primary | ICD-10-CM

## 2023-08-17 DIAGNOSIS — H93.12 TINNITUS, LEFT EAR: ICD-10-CM

## 2023-08-17 RX ORDER — SPIRONOLACTONE 25 MG/1
25 TABLET ORAL DAILY
Qty: 90 TABLET | Refills: 3 | Status: SHIPPED | OUTPATIENT
Start: 2023-08-17

## 2023-08-17 RX ORDER — FAMOTIDINE 20 MG/1
20 TABLET, FILM COATED ORAL EVERY EVENING
Qty: 60 TABLET | Refills: 5 | Status: SHIPPED | OUTPATIENT
Start: 2023-08-17

## 2023-08-17 RX ORDER — ESCITALOPRAM OXALATE 20 MG/1
20 TABLET ORAL DAILY
Qty: 90 TABLET | Refills: 3 | Status: SHIPPED | OUTPATIENT
Start: 2023-08-17 | End: 2024-08-11

## 2023-08-17 ASSESSMENT — COLUMBIA-SUICIDE SEVERITY RATING SCALE - C-SSRS
3. HAVE YOU BEEN THINKING ABOUT HOW YOU MIGHT KILL YOURSELF?: NO
2. HAVE YOU ACTUALLY HAD ANY THOUGHTS OF KILLING YOURSELF?: YES
1. WITHIN THE PAST MONTH, HAVE YOU WISHED YOU WERE DEAD OR WISHED YOU COULD GO TO SLEEP AND NOT WAKE UP?: YES
5. HAVE YOU STARTED TO WORK OUT OR WORKED OUT THE DETAILS OF HOW TO KILL YOURSELF? DO YOU INTEND TO CARRY OUT THIS PLAN?: NO
4. HAVE YOU HAD THESE THOUGHTS AND HAD SOME INTENTION OF ACTING ON THEM?: NO
6. HAVE YOU EVER DONE ANYTHING, STARTED TO DO ANYTHING, OR PREPARED TO DO ANYTHING TO END YOUR LIFE?: NO

## 2023-08-17 ASSESSMENT — PATIENT HEALTH QUESTIONNAIRE - PHQ9
8. MOVING OR SPEAKING SO SLOWLY THAT OTHER PEOPLE COULD HAVE NOTICED. OR THE OPPOSITE, BEING SO FIGETY OR RESTLESS THAT YOU HAVE BEEN MOVING AROUND A LOT MORE THAN USUAL: 3
5. POOR APPETITE OR OVEREATING: 0
7. TROUBLE CONCENTRATING ON THINGS, SUCH AS READING THE NEWSPAPER OR WATCHING TELEVISION: 3
SUM OF ALL RESPONSES TO PHQ QUESTIONS 1-9: 20
SUM OF ALL RESPONSES TO PHQ9 QUESTIONS 1 & 2: 6
6. FEELING BAD ABOUT YOURSELF - OR THAT YOU ARE A FAILURE OR HAVE LET YOURSELF OR YOUR FAMILY DOWN: 1
2. FEELING DOWN, DEPRESSED OR HOPELESS: 3
SUM OF ALL RESPONSES TO PHQ QUESTIONS 1-9: 20
10. IF YOU CHECKED OFF ANY PROBLEMS, HOW DIFFICULT HAVE THESE PROBLEMS MADE IT FOR YOU TO DO YOUR WORK, TAKE CARE OF THINGS AT HOME, OR GET ALONG WITH OTHER PEOPLE: 1
1. LITTLE INTEREST OR PLEASURE IN DOING THINGS: 3
9. THOUGHTS THAT YOU WOULD BE BETTER OFF DEAD, OR OF HURTING YOURSELF: 1
SUM OF ALL RESPONSES TO PHQ QUESTIONS 1-9: 20
3. TROUBLE FALLING OR STAYING ASLEEP: 3
4. FEELING TIRED OR HAVING LITTLE ENERGY: 3
SUM OF ALL RESPONSES TO PHQ QUESTIONS 1-9: 19

## 2023-08-17 NOTE — PROGRESS NOTES
[facility] PRIMARY [facility]  Juan Self M.D.  [address] [place] [address].  [facility], [address] [facility]  Ph No:  [phone]  Fax:  [phone]:  Chief Complaint   Patient presents with    Depression     Patient is experiencing a lot of Depression due to the ringing in her ears. Insomnia     Patient states that she has been taking Melatonin to help her sleep but she does not stay asleep all night, she is wondering if she should go back on the Amitriptyline. Urinary Tract Infection     Patient has had frequent UTI, she just wants to make sure she does not have a UTI. Medication Refill    Discuss Labs          IMPRESSION/PLAN    1. Severe episode of recurrent major depressive disorder, without psychotic features ([facility])  -     escitalopram (LEXAPRO) 20 MG tablet; Take 1 tablet by mouth daily TAKE 1 TABLET BY MOUTH EVERY DAY, Disp-90 tablet, R-3Normal  2. Anxiety  -     escitalopram (LEXAPRO) 20 MG tablet; Take 1 tablet by mouth daily TAKE 1 TABLET BY MOUTH EVERY DAY, Disp-90 tablet, R-3Normal  3. Tinnitus, left ear  4. Controlled type 2 diabetes mellitus without complication, without long-term current use of insulin ([facility])  5. Stage 3b chronic kidney disease ([facility])      She unfortunately continues to suffer from chronic tinnitus. She has been very depressed. She is currently on Lexapro. She was on amitriptyline at night for sleep. She will restart her trazodone. I did advise her that this is an antidepressant and not being on it may have contributed to her worsening depression. She will follow-up here however if her symptoms do get worse. She has chronic tinnitus. I did advise her to follow-up with ENT to explore any other options for managing her symptoms. She has well-controlled blood pressure and well-controlled blood sugar. We will continue to monitor. We did review her labs. We will see her back here in 3 months or as needed. We did discuss her chronic kidney disease.   Her

## 2023-08-18 PROBLEM — H93.12 TINNITUS, LEFT EAR: Status: ACTIVE | Noted: 2021-02-18

## 2023-08-29 ENCOUNTER — OFFICE VISIT (OUTPATIENT)
Dept: ORTHOPEDIC SURGERY | Age: 88
End: 2023-08-29

## 2023-08-29 VITALS — WEIGHT: 154 LBS | BODY MASS INDEX: 27.29 KG/M2 | HEIGHT: 63 IN

## 2023-08-29 DIAGNOSIS — M51.36 DDD (DEGENERATIVE DISC DISEASE), LUMBAR: ICD-10-CM

## 2023-08-29 DIAGNOSIS — M48.062 SPINAL STENOSIS OF LUMBAR REGION WITH NEUROGENIC CLAUDICATION: Primary | ICD-10-CM

## 2023-08-29 DIAGNOSIS — M47.816 LUMBAR SPONDYLOSIS: ICD-10-CM

## 2023-08-29 DIAGNOSIS — M43.16 SPONDYLOLISTHESIS OF LUMBAR REGION: ICD-10-CM

## 2023-08-29 RX ORDER — LINACLOTIDE 290 UG/1
CAPSULE, GELATIN COATED ORAL DAILY
COMMUNITY
Start: 2023-06-15

## 2023-08-29 RX ORDER — FAMOTIDINE 40 MG/1
40 TABLET, FILM COATED ORAL 2 TIMES DAILY
COMMUNITY
Start: 2023-06-10

## 2023-08-29 RX ORDER — PREGABALIN 25 MG/1
25 CAPSULE ORAL 3 TIMES DAILY
Qty: 90 CAPSULE | Refills: 2 | Status: SHIPPED | OUTPATIENT
Start: 2023-08-29 | End: 2023-11-27

## 2023-08-29 RX ORDER — CEFDINIR 300 MG/1
CAPSULE ORAL
COMMUNITY
Start: 2023-06-17

## 2023-08-29 NOTE — PROGRESS NOTES
Name: Taryn King  YOB: 1935  Gender: female  MRN: [id number]    CC:   Chief Complaint   Patient presents with    New Patient     Low back pain          HPI:   Taryn King is a 81 y.o. female with a PMHx of chronic UTIs on chronic oral antibiotics, Xarelto for previous DVTs, CKD, other comorbidities listed below. They present here for evaluation of radiating pain down both legs left greater than right. This is been going on for more than 2 weeks. She thinks it started after twisting when seated in a low stool. Her symptoms are worse on the left leg than the right. The pain involves multiple dermatomal patterns from the anterior lateral and posterior thighs all the way down to the lower legs. Its significantly pronounced when she is standing and walking for just a few minutes. It does get relieved relatively quickly when she sits and rests. She is unable to take NSAIDs given her comorbidities. She has been taking Tylenol. She was seen by her primary care provider, she started home exercise program last week but cannot tolerate the exercises as they aggravate her pain. She was previously by Dr. Karena Watson of this practice who felt her hip replacement was noncontributory.     Pain Scale: Up to a 10 out of 10  ADL's affected: Cleaning maintaining her home  Conservative treatment attempted: Tylenol, home exercise program          Past Medical History Includes:   Past Medical History:   Diagnosis Date    Abdominal pain 10/28/2014    Angina at rest [facility])     pt denies    Arthritis     osteo - low back,  shoulders, hips, low back    Bilateral carotid bruits     Bursitis     CAD (coronary artery disease)     4 vessel CABG 2005;--- stents x 4--- last one placed 2014-- followed by dr Clarence Castaneda     Chronic pain     left shoulder    Coagulation defects     Xarelto    Constipation     Cough 09/11/2014    10/8/14, Methacholine Challenge Study: The point at which the FEV1 fell by at

## 2023-08-30 ENCOUNTER — TELEPHONE (OUTPATIENT)
Dept: ORTHOPEDIC SURGERY | Age: 88
End: 2023-08-30

## 2023-08-30 ENCOUNTER — TELEPHONE (OUTPATIENT)
Age: 88
End: 2023-08-30

## 2023-08-30 NOTE — TELEPHONE ENCOUNTER
Patient called and said she went to ortho doctor yesterday with swollen left leg. She said both feet feel funny too. Ortho said she needs to see her cardiologist for this problem. Please call patient to advise. She does have appointment 09/11/2023 @ 10:00 am Deni.

## 2023-08-30 NOTE — TELEPHONE ENCOUNTER
Spoke with pt and as per clive I told the pt This sounds more like a vascular problem. She may need to call her cardiologist or medical doctor.

## 2023-08-30 NOTE — TELEPHONE ENCOUNTER
Patient states that she was seen yesterday, and today her leg is severely swollen. Patient asks if she should come in today to have it looked at?

## 2023-08-31 NOTE — TELEPHONE ENCOUNTER
I saw pt.she said she went to Urgent Care yesterday was assured it was not a blood clot but they did not do an ultrasound. They wrapped her leg at Urgent Care. Her left leg is the worst.Rt.swollen but not as bad as left. She is on Lasix 40mg qday and Aldactone 25mg qday. Had recent vascular duplex 8/14 was told blood flow is good. Any advice?

## 2023-08-31 NOTE — TELEPHONE ENCOUNTER
Elevate the leg, see PCP asap. See me as planned in 2 weeks. Can double lasix to 40 BID for a few days. Focus on low salt diet as well.    Thanks

## 2023-09-11 ENCOUNTER — TELEPHONE (OUTPATIENT)
Age: 88
End: 2023-09-11

## 2023-09-11 NOTE — TELEPHONE ENCOUNTER
Patient cancelled appt today she has COVID she was wondering if there is anything from a cardiac standpoint she should be concerned about.

## 2023-09-11 NOTE — TELEPHONE ENCOUNTER
I told pt.that we do not do anything differently from a cardiac stand point. I told her to follow her PCP's advice. She is concerned that she can not be seen today as planned. Note sent to  to see if pt.can be worked into [facility] office 9/26/23

## 2023-09-18 ENCOUNTER — OFFICE VISIT (OUTPATIENT)
Dept: ORTHOPEDIC SURGERY | Age: 88
End: 2023-09-18
Payer: MEDICARE

## 2023-09-18 VITALS — WEIGHT: 154 LBS | BODY MASS INDEX: 27.29 KG/M2 | HEIGHT: 63 IN

## 2023-09-18 DIAGNOSIS — M48.062 SPINAL STENOSIS OF LUMBAR REGION WITH NEUROGENIC CLAUDICATION: Primary | ICD-10-CM

## 2023-09-18 PROCEDURE — G8417 CALC BMI ABV UP PARAM F/U: HCPCS | Performed by: PHYSICIAN ASSISTANT

## 2023-09-18 PROCEDURE — 1123F ACP DISCUSS/DSCN MKR DOCD: CPT | Performed by: PHYSICIAN ASSISTANT

## 2023-09-18 PROCEDURE — G8427 DOCREV CUR MEDS BY ELIG CLIN: HCPCS | Performed by: PHYSICIAN ASSISTANT

## 2023-09-18 PROCEDURE — 1036F TOBACCO NON-USER: CPT | Performed by: PHYSICIAN ASSISTANT

## 2023-09-18 PROCEDURE — 99214 OFFICE O/P EST MOD 30 MIN: CPT | Performed by: PHYSICIAN ASSISTANT

## 2023-09-18 PROCEDURE — 1090F PRES/ABSN URINE INCON ASSESS: CPT | Performed by: PHYSICIAN ASSISTANT

## 2023-09-18 RX ORDER — MOLNUPIRAVIR 200 MG/1
CAPSULE ORAL
COMMUNITY
Start: 2023-09-08

## 2023-09-18 NOTE — PROGRESS NOTES
09/18/23        Name: Kristi Grullon  YOB: 1935  Gender: female  MRN: [id number]    CC: Joint Pain and Follow-up ([facility] )       HPI: Kristi Grullon is a 81 y.o. female who returns for Joint Pain and Follow-up ([facility] )         Patient returns the office today for follow-up after lumbar MRI    History was obtained by patient      Meds/PSH/PMH/FH/SH: This information has been reviewed. ALLERGIES:   Allergies   Allergen Reactions    Amoxicillin-Pot Clavulanate Other (See Comments) and Itching     Causes yeast infection  Other reaction(s): Itching-Allergy  Causes yeast infection  Causes yeast infection      Gabapentin Other (See Comments)     Other reaction(s): Altered Mental State-Intolerance, Other (see comments)    Hydrochlorothiazide Other (See Comments)     Hyponatremia  Other reaction(s): Other (see comments), Other- (not listed) - Allergy  Hyponatremia  Hyponatremia      Nitrofurantoin Other (See Comments)     Other reaction(s): Altered Mental State-Intolerance    Prednisone Other (See Comments)     Visual loss and headache  Other reaction(s): Other (see comments)  Visual loss and headache    Sulfamethoxazole-Trimethoprim Nausea Only    Adhesive Tape Rash    Codeine Other (See Comments) and Palpitations     Made my heart go crazy  Other reaction(s): Palpitations-Intolerance, Palpitations-Intolerance              Physical Examination:            Imaging:         MRI Result (most recent): MRI LUMBAR SPINE WO CONTRAST 09/07/2023    Narrative  History: Spondylolisthesis, lumbar region; Spondylosis without myelopathy or  radiculopathy, lumbar region; Other intervertebral disc degeneration, lumbar  region    Exam: MRI lumbar spine without contrast    Technique: Axial and sagittal T1 and T2-weighted sequences are available for  review. A sagittal STIR sequence is also available for review. Comparison: 12/23/2010    Findings:     There is grade 1 degenerative anterolisthesis

## 2023-09-28 ENCOUNTER — TELEPHONE (OUTPATIENT)
Dept: PRIMARY CARE CLINIC | Facility: CLINIC | Age: 88
End: 2023-09-28

## 2023-09-28 DIAGNOSIS — H93.13 BILATERAL TINNITUS: Primary | ICD-10-CM

## 2023-09-28 NOTE — TELEPHONE ENCOUNTER
Pt called in stating she needs something faxed in to Dr. Janis Corrales so her hearing test will be covered by medicare.      Her appt is tomorrow at 2pm.

## 2023-10-03 ENCOUNTER — OFFICE VISIT (OUTPATIENT)
Dept: ORTHOPEDIC SURGERY | Age: 88
End: 2023-10-03
Payer: MEDICARE

## 2023-10-03 DIAGNOSIS — M43.16 SPONDYLOLISTHESIS OF LUMBAR REGION: ICD-10-CM

## 2023-10-03 DIAGNOSIS — M51.36 DDD (DEGENERATIVE DISC DISEASE), LUMBAR: ICD-10-CM

## 2023-10-03 DIAGNOSIS — M48.062 SPINAL STENOSIS OF LUMBAR REGION WITH NEUROGENIC CLAUDICATION: Primary | ICD-10-CM

## 2023-10-03 DIAGNOSIS — M47.816 LUMBAR SPONDYLOSIS: ICD-10-CM

## 2023-10-03 PROCEDURE — 62323 NJX INTERLAMINAR LMBR/SAC: CPT | Performed by: PHYSICAL MEDICINE & REHABILITATION

## 2023-10-03 RX ORDER — BETAMETHASONE SODIUM PHOSPHATE AND BETAMETHASONE ACETATE 3; 3 MG/ML; MG/ML
12 INJECTION, SUSPENSION INTRA-ARTICULAR; INTRALESIONAL; INTRAMUSCULAR; SOFT TISSUE ONCE
Status: COMPLETED | OUTPATIENT
Start: 2023-10-03 | End: 2023-10-03

## 2023-10-03 RX ADMIN — BETAMETHASONE SODIUM PHOSPHATE AND BETAMETHASONE ACETATE 12 MG: 3; 3 INJECTION, SUSPENSION INTRA-ARTICULAR; INTRALESIONAL; INTRAMUSCULAR; SOFT TISSUE at 15:14

## 2023-10-03 NOTE — PROGRESS NOTES
Name: Anthony Sparks  YOB: 1935  Gender: female  MRN: [id number]        Interlaminar SEVERIANO Procedure Note      Procedure: L4-L5 interlaminar epidural steroid injection    Precautions: Anthony Sparks reports prior sensitivity to steroid, local anesthetic, iodine, or shellfish. Her medical record indicates history of allergy to prednisone, but patient reports previously taking prednisone for an ear problem without problems. Consent:  Consent was obtained prior to the procedure. The procedure was discussed at length with Anthony Sparks. She was given the opportunity to ask questions regarding the procedure and its associated risks. In addition to the potential risks associated with the procedure itself, the patient was informed both verbally and in writing of potential side effects of the use glucocorticoids. The patient appeared to comprehend the informed consent and desired to have the procedure performed, and informed consent was signed. She was placed in a prone position on the fluoroscopy table and the skin was prepped and draped in a routine sterile fashion. The areas to be injected were each anesthetized with 1 ml of 1% Lidocaine. A 22 gauge 3.5 inch spinal needle was carefully advanced under fluoroscopic guidance to the L4-L5 interlaminar space. 0.5 ml of 70% of Omnipaque was injected to confirm proper needle placement and absence of subdural or vascular flow Once proper placement was confirmed, 2ml of sterile water and 12 mg of betamethasone were injected through the spinal needle. Fluoroscopic guidance was used intermittently over a 10-minute period to insure proper needle placement and her safety. A hard copy of the fluoroscopic image has been placed in her chart and is saved on the C-arm hard drive. She was monitored for 30 minutes after the procedure and discharged home in a stable fashion with a routine follow up.     Procedural Diagnosis:     ICD-10-CM

## 2023-10-08 RX ORDER — ROSUVASTATIN CALCIUM 40 MG/1
40 TABLET, COATED ORAL EVERY EVENING
Qty: 90 TABLET | Refills: 3 | Status: SHIPPED | OUTPATIENT
Start: 2023-10-08

## 2023-10-09 ENCOUNTER — TELEPHONE (OUTPATIENT)
Dept: PRIMARY CARE CLINIC | Facility: CLINIC | Age: 88
End: 2023-10-09

## 2023-10-09 RX ORDER — ROSUVASTATIN CALCIUM 40 MG/1
40 TABLET, COATED ORAL EVERY EVENING
Qty: 90 TABLET | Refills: 3 | Status: SHIPPED | OUTPATIENT
Start: 2023-10-09

## 2023-10-12 ENCOUNTER — TELEPHONE (OUTPATIENT)
Dept: PRIMARY CARE CLINIC | Facility: CLINIC | Age: 88
End: 2023-10-12

## 2023-10-12 RX ORDER — FLUCONAZOLE 150 MG/1
TABLET ORAL
Qty: 2 TABLET | Refills: 0 | Status: SHIPPED | OUTPATIENT
Start: 2023-10-12

## 2023-10-12 NOTE — TELEPHONE ENCOUNTER
Patient return call and stated that she most recently had to  take antibiotics for her UTI and she needs the diflucan because she is itching and very uncomfortable and she knows it is a yeast infecion.   She would like a call back at 10/12/2012

## 2023-10-24 ENCOUNTER — OFFICE VISIT (OUTPATIENT)
Dept: ORTHOPEDIC SURGERY | Age: 88
End: 2023-10-24
Payer: MEDICARE

## 2023-10-24 VITALS — WEIGHT: 154 LBS | HEIGHT: 63 IN | BODY MASS INDEX: 27.29 KG/M2

## 2023-10-24 DIAGNOSIS — M48.062 SPINAL STENOSIS OF LUMBAR REGION WITH NEUROGENIC CLAUDICATION: Primary | ICD-10-CM

## 2023-10-24 PROCEDURE — 1123F ACP DISCUSS/DSCN MKR DOCD: CPT | Performed by: PHYSICIAN ASSISTANT

## 2023-10-24 PROCEDURE — 1090F PRES/ABSN URINE INCON ASSESS: CPT | Performed by: PHYSICIAN ASSISTANT

## 2023-10-24 PROCEDURE — G8484 FLU IMMUNIZE NO ADMIN: HCPCS | Performed by: PHYSICIAN ASSISTANT

## 2023-10-24 PROCEDURE — 1036F TOBACCO NON-USER: CPT | Performed by: PHYSICIAN ASSISTANT

## 2023-10-24 PROCEDURE — G8427 DOCREV CUR MEDS BY ELIG CLIN: HCPCS | Performed by: PHYSICIAN ASSISTANT

## 2023-10-24 PROCEDURE — 99213 OFFICE O/P EST LOW 20 MIN: CPT | Performed by: PHYSICIAN ASSISTANT

## 2023-10-24 PROCEDURE — G8417 CALC BMI ABV UP PARAM F/U: HCPCS | Performed by: PHYSICIAN ASSISTANT

## 2023-10-24 NOTE — PROGRESS NOTES
to physical therapy for neck pain and weakness, the patient declined for now. I will give her some range of motion exercises to work on. I also offered an injection for her left groin pain which I think may be due to either spinal stenosis at L3-4 or a left L3 radiculopathy. The patient declined this for now. I will plan to follow with the patient in 4 months or sooner if needed.

## 2023-10-24 NOTE — PATIENT INSTRUCTIONS
Patient Education        Neck Arthritis: Exercises  Introduction  Here are some examples of exercises for you to try. The exercises may be suggested for a condition or for rehabilitation. Start each exercise slowly. Ease off the exercises if you start to have pain. You will be told when to start these exercises and which ones will work best for you. How to do the exercises  Neck stretches to the side    This stretch works best if you keep your shoulder down as you lean away from it. To help you remember to do this, start by relaxing your shoulders and lightly holding on to your thighs or your chair. Tilt your head toward your shoulder and hold for 15 to 30 seconds. Let the weight of your head stretch your muscles. Repeat 2 to 4 times toward each shoulder. Chin tuck    Lie on the floor with a rolled-up towel under your neck. Your head should be touching the floor. Slowly bring your chin toward your chest.  Hold for a count of 6, and then relax for up to 10 seconds. Repeat 8 to 12 times. Active cervical rotation    Sit in a firm chair, or stand up straight. Keeping your chin level, turn your head to the right, and hold for 15 to 30 seconds. Turn your head to the left and hold for 15 to 30 seconds. Repeat 2 to 4 times to each side. Shoulder blade squeeze    While standing, squeeze your shoulder blades together. Do not raise your shoulders up as you are squeezing. Hold for 6 seconds. Repeat 8 to 12 times. Shoulder rolls    Sit comfortably with your feet shoulder-width apart. You can also do this exercise standing up. Roll your shoulders up, then back, and then down in a smooth, circular motion. Repeat 2 to 4 times. Follow-up care is a key part of your treatment and safety. Be sure to make and go to all appointments, and call your doctor if you are having problems. It's also a good idea to know your test results and keep a list of the medicines you take.   Current as of: July 18, 2023

## 2023-10-26 DIAGNOSIS — N39.0 RECURRENT UTI: ICD-10-CM

## 2023-10-26 RX ORDER — CEPHALEXIN 250 MG/1
250 CAPSULE ORAL DAILY
Qty: 90 CAPSULE | Refills: 3 | Status: SHIPPED | OUTPATIENT
Start: 2023-10-26

## 2023-11-02 ENCOUNTER — OFFICE VISIT (OUTPATIENT)
Age: 88
End: 2023-11-02
Payer: MEDICARE

## 2023-11-02 VITALS
SYSTOLIC BLOOD PRESSURE: 158 MMHG | DIASTOLIC BLOOD PRESSURE: 70 MMHG | BODY MASS INDEX: 27.29 KG/M2 | WEIGHT: 154 LBS | HEART RATE: 58 BPM | HEIGHT: 63 IN

## 2023-11-02 DIAGNOSIS — I25.118 CORONARY ARTERY DISEASE OF NATIVE ARTERY OF NATIVE HEART WITH STABLE ANGINA PECTORIS (HCC): ICD-10-CM

## 2023-11-02 DIAGNOSIS — I50.32 DIASTOLIC CHF, CHRONIC (HCC): Primary | ICD-10-CM

## 2023-11-02 DIAGNOSIS — I73.9 PAD (PERIPHERAL ARTERY DISEASE) (HCC): ICD-10-CM

## 2023-11-02 DIAGNOSIS — I50.22 HYPERTENSIVE HEART DISEASE WITH CHRONIC SYSTOLIC CONGESTIVE HEART FAILURE (HCC): ICD-10-CM

## 2023-11-02 DIAGNOSIS — I11.0 HYPERTENSIVE HEART DISEASE WITH CHRONIC SYSTOLIC CONGESTIVE HEART FAILURE (HCC): ICD-10-CM

## 2023-11-02 DIAGNOSIS — I10 ESSENTIAL HYPERTENSION, BENIGN: ICD-10-CM

## 2023-11-02 PROCEDURE — G8427 DOCREV CUR MEDS BY ELIG CLIN: HCPCS | Performed by: INTERNAL MEDICINE

## 2023-11-02 PROCEDURE — 1090F PRES/ABSN URINE INCON ASSESS: CPT | Performed by: INTERNAL MEDICINE

## 2023-11-02 PROCEDURE — G8484 FLU IMMUNIZE NO ADMIN: HCPCS | Performed by: INTERNAL MEDICINE

## 2023-11-02 PROCEDURE — 99214 OFFICE O/P EST MOD 30 MIN: CPT | Performed by: INTERNAL MEDICINE

## 2023-11-02 PROCEDURE — 1036F TOBACCO NON-USER: CPT | Performed by: INTERNAL MEDICINE

## 2023-11-02 PROCEDURE — G8417 CALC BMI ABV UP PARAM F/U: HCPCS | Performed by: INTERNAL MEDICINE

## 2023-11-02 PROCEDURE — 1123F ACP DISCUSS/DSCN MKR DOCD: CPT | Performed by: INTERNAL MEDICINE

## 2023-11-02 RX ORDER — HYDRALAZINE HYDROCHLORIDE 25 MG/1
TABLET, FILM COATED ORAL
Qty: 180 TABLET | Refills: 3 | Status: SHIPPED | OUTPATIENT
Start: 2023-11-02

## 2023-11-02 NOTE — PROGRESS NOTES
08/10/2023     08/10/2023       Lab Results   Component Value Date    TSH 3.090 02/11/2021       Lab Results   Component Value Date    LABA1C 5.9 (H) 08/10/2023     Lab Results   Component Value Date     08/10/2023       Lab Results   Component Value Date    CHOL 214 (H) 08/10/2023    CHOL 223 (H) 03/23/2023    CHOL 214 (H) 09/22/2022     Lab Results   Component Value Date    TRIG 80 08/10/2023    TRIG 104 03/23/2023    TRIG 86 09/22/2022     Lab Results   Component Value Date    HDL 79 (H) 08/10/2023    HDL 76 (H) 03/23/2023    HDL 57 09/22/2022     Lab Results   Component Value Date    LDLCALC 119 (H) 08/10/2023    LDLCALC 126.2 (H) 03/23/2023    LDLCALC 139.8 (H) 09/22/2022     Lab Results   Component Value Date    LABVLDL 16 08/10/2023    LABVLDL 20.8 03/23/2023    LABVLDL 17.2 09/22/2022    VLDL 21 03/24/2022    VLDL 13 11/22/2021    VLDL 15 08/20/2021     Lab Results   Component Value Date    CHOLHDLRATIO 2.7 08/10/2023    CHOLHDLRATIO 2.9 03/23/2023    CHOLHDLRATIO 3.8 09/22/2022           I have Independently reviewed prior care notes, any ER records available, cardiac testing, labs and results with the patient and before seeing the patient today. Also independently reviewed outside records when available. ASSESSMENT:    Taylor Pretty was seen today for 3 month follow-up, congestive heart failure and fatigue. Diagnoses and all orders for this visit:    Diastolic CHF, chronic (HCC)    Essential hypertension, benign    Hypertensive heart disease with chronic systolic congestive heart failure (HCC)    PAD (peripheral artery disease) (HCC)    Coronary artery disease of native artery of native heart with stable angina pectoris ([address])    Other orders  -     hydrALAZINE (APRESOLINE) 25 MG tablet; TAKE 1 TABLET BY MOUTH TWICE A DAY          PLAN:     1. Chronic DHF:  On lasix 40 and aldactone. Follow K and Cr. Labs in 1-2 weeks. PRN lasix as she can.       Needs some meds for nerves and

## 2023-11-11 ENCOUNTER — APPOINTMENT (OUTPATIENT)
Dept: GENERAL RADIOLOGY | Age: 88
End: 2023-11-11
Payer: MEDICARE

## 2023-11-11 ENCOUNTER — HOSPITAL ENCOUNTER (EMERGENCY)
Age: 88
Discharge: HOME | End: 2023-11-11
Attending: EMERGENCY MEDICINE
Payer: MEDICARE

## 2023-11-11 VITALS
SYSTOLIC BLOOD PRESSURE: 149 MMHG | HEIGHT: 62 IN | BODY MASS INDEX: 28.52 KG/M2 | DIASTOLIC BLOOD PRESSURE: 68 MMHG | TEMPERATURE: 98.5 F | OXYGEN SATURATION: 99 % | HEART RATE: 53 BPM | WEIGHT: 155 LBS | RESPIRATION RATE: 11 BRPM

## 2023-11-11 DIAGNOSIS — I50.23 ACUTE ON CHRONIC SYSTOLIC CONGESTIVE HEART FAILURE (HCC): Primary | ICD-10-CM

## 2023-11-11 LAB
ALBUMIN SERPL-MCNC: 3.3 G/DL (ref 3.2–4.6)
ALBUMIN/GLOB SERPL: 1.3 (ref 0.4–1.6)
ALP SERPL-CCNC: 56 U/L (ref 50–136)
ALT SERPL-CCNC: 12 U/L (ref 12–65)
ANION GAP SERPL CALC-SCNC: 6 MMOL/L (ref 2–11)
APPEARANCE UR: CLEAR
AST SERPL-CCNC: 19 U/L (ref 15–37)
BASOPHILS # BLD: 0 K/UL (ref 0–0.2)
BASOPHILS NFR BLD: 1 % (ref 0–2)
BILIRUB SERPL-MCNC: 0.4 MG/DL (ref 0.2–1.1)
BILIRUB UR QL: NEGATIVE
BUN SERPL-MCNC: 17 MG/DL (ref 8–23)
CALCIUM SERPL-MCNC: 8.3 MG/DL (ref 8.3–10.4)
CHLORIDE SERPL-SCNC: 104 MMOL/L (ref 101–110)
CO2 SERPL-SCNC: 28 MMOL/L (ref 21–32)
COLOR UR: NORMAL
CREAT SERPL-MCNC: 1.1 MG/DL (ref 0.6–1)
DIFFERENTIAL METHOD BLD: ABNORMAL
EOSINOPHIL # BLD: 0.2 K/UL (ref 0–0.8)
EOSINOPHIL NFR BLD: 3 % (ref 0.5–7.8)
ERYTHROCYTE [DISTWIDTH] IN BLOOD BY AUTOMATED COUNT: 15.2 % (ref 11.9–14.6)
GLOBULIN SER CALC-MCNC: 2.6 G/DL (ref 2.8–4.5)
GLUCOSE SERPL-MCNC: 99 MG/DL (ref 65–100)
GLUCOSE UR STRIP.AUTO-MCNC: NEGATIVE MG/DL
HCT VFR BLD AUTO: 36.5 % (ref 35.8–46.3)
HGB BLD-MCNC: 12.1 G/DL (ref 11.7–15.4)
HGB UR QL STRIP: NEGATIVE
IMM GRANULOCYTES # BLD AUTO: 0 K/UL (ref 0–0.5)
IMM GRANULOCYTES NFR BLD AUTO: 0 % (ref 0–5)
KETONES UR QL STRIP.AUTO: NEGATIVE MG/DL
LEUKOCYTE ESTERASE UR QL STRIP.AUTO: NEGATIVE
LYMPHOCYTES # BLD: 1.9 K/UL (ref 0.5–4.6)
LYMPHOCYTES NFR BLD: 39 % (ref 13–44)
MCH RBC QN AUTO: 32.9 PG (ref 26.1–32.9)
MCHC RBC AUTO-ENTMCNC: 33.2 G/DL (ref 31.4–35)
MCV RBC AUTO: 99.2 FL (ref 82–102)
MONOCYTES # BLD: 0.6 K/UL (ref 0.1–1.3)
MONOCYTES NFR BLD: 13 % (ref 4–12)
NEUTS SEG # BLD: 2.1 K/UL (ref 1.7–8.2)
NEUTS SEG NFR BLD: 44 % (ref 43–78)
NITRITE UR QL STRIP.AUTO: NEGATIVE
NRBC # BLD: 0 K/UL (ref 0–0.2)
NT PRO BNP: 704 PG/ML
PH UR STRIP: 7.5 (ref 5–9)
PLATELET # BLD AUTO: 198 K/UL (ref 150–450)
PMV BLD AUTO: 9.4 FL (ref 9.4–12.3)
POTASSIUM SERPL-SCNC: 4.1 MMOL/L (ref 3.5–5.1)
PROT SERPL-MCNC: 5.9 G/DL (ref 6.3–8.2)
PROT UR STRIP-MCNC: NEGATIVE MG/DL
RBC # BLD AUTO: 3.68 M/UL (ref 4.05–5.2)
SODIUM SERPL-SCNC: 138 MMOL/L (ref 133–143)
SP GR UR REFRACTOMETRY: 1.01 (ref 1–1.02)
TROPONIN I SERPL HS-MCNC: 7.5 PG/ML (ref 0–14)
TROPONIN I SERPL HS-MCNC: 8.4 PG/ML (ref 0–14)
UROBILINOGEN UR QL STRIP.AUTO: 0.2 EU/DL (ref 0.2–1)
WBC # BLD AUTO: 4.8 K/UL (ref 4.3–11.1)

## 2023-11-11 PROCEDURE — 96375 TX/PRO/DX INJ NEW DRUG ADDON: CPT

## 2023-11-11 PROCEDURE — 83880 ASSAY OF NATRIURETIC PEPTIDE: CPT

## 2023-11-11 PROCEDURE — 96374 THER/PROPH/DIAG INJ IV PUSH: CPT

## 2023-11-11 PROCEDURE — 6370000000 HC RX 637 (ALT 250 FOR IP): Performed by: EMERGENCY MEDICINE

## 2023-11-11 PROCEDURE — 36415 COLL VENOUS BLD VENIPUNCTURE: CPT

## 2023-11-11 PROCEDURE — 6360000002 HC RX W HCPCS: Performed by: EMERGENCY MEDICINE

## 2023-11-11 PROCEDURE — 81003 URINALYSIS AUTO W/O SCOPE: CPT

## 2023-11-11 PROCEDURE — 85025 COMPLETE CBC W/AUTO DIFF WBC: CPT

## 2023-11-11 PROCEDURE — 84484 ASSAY OF TROPONIN QUANT: CPT

## 2023-11-11 PROCEDURE — 2500000003 HC RX 250 WO HCPCS: Performed by: EMERGENCY MEDICINE

## 2023-11-11 PROCEDURE — 71046 X-RAY EXAM CHEST 2 VIEWS: CPT

## 2023-11-11 PROCEDURE — 80053 COMPREHEN METABOLIC PANEL: CPT

## 2023-11-11 PROCEDURE — 99285 EMERGENCY DEPT VISIT HI MDM: CPT

## 2023-11-11 RX ORDER — ONDANSETRON 2 MG/ML
4 INJECTION INTRAMUSCULAR; INTRAVENOUS
Status: COMPLETED | OUTPATIENT
Start: 2023-11-11 | End: 2023-11-11

## 2023-11-11 RX ORDER — HYDROXYZINE HYDROCHLORIDE 25 MG/1
25 TABLET, FILM COATED ORAL EVERY 8 HOURS PRN
Qty: 30 TABLET | Refills: 0 | Status: SHIPPED | OUTPATIENT
Start: 2023-11-11 | End: 2023-11-21

## 2023-11-11 RX ORDER — BUMETANIDE 0.25 MG/ML
1 INJECTION INTRAMUSCULAR; INTRAVENOUS ONCE
Status: COMPLETED | OUTPATIENT
Start: 2023-11-11 | End: 2023-11-11

## 2023-11-11 RX ADMIN — NITROGLYCERIN 1 INCH: 20 OINTMENT TOPICAL at 18:30

## 2023-11-11 RX ADMIN — ONDANSETRON 4 MG: 2 INJECTION INTRAMUSCULAR; INTRAVENOUS at 18:29

## 2023-11-11 RX ADMIN — BUMETANIDE 1 MG: 0.25 INJECTION INTRAMUSCULAR; INTRAVENOUS at 18:29

## 2023-11-11 ASSESSMENT — LIFESTYLE VARIABLES
HOW OFTEN DO YOU HAVE A DRINK CONTAINING ALCOHOL: NEVER
HOW MANY STANDARD DRINKS CONTAINING ALCOHOL DO YOU HAVE ON A TYPICAL DAY: PATIENT DOES NOT DRINK

## 2023-11-11 ASSESSMENT — ENCOUNTER SYMPTOMS
COUGH: 1
CHEST TIGHTNESS: 1
SHORTNESS OF BREATH: 1

## 2023-11-11 ASSESSMENT — PAIN - FUNCTIONAL ASSESSMENT: PAIN_FUNCTIONAL_ASSESSMENT: 0-10

## 2023-11-11 ASSESSMENT — PAIN DESCRIPTION - DESCRIPTORS: DESCRIPTORS: HEAVINESS

## 2023-11-11 ASSESSMENT — PAIN DESCRIPTION - LOCATION: LOCATION: CHEST

## 2023-11-11 ASSESSMENT — PAIN SCALES - GENERAL: PAINLEVEL_OUTOF10: 3

## 2023-11-11 NOTE — ED PROVIDER NOTES
Emergency Department Provider Note       PCP: Efren Ritchie MD   Age: 81 y.o. Sex: female     DISPOSITION Decision To Discharge 11/11/2023 09:21:50 PM       ICD-10-CM    1. Acute on chronic systolic congestive heart failure (HCC)  I50.23           Medical Decision Making     Complexity of Problems Addressed:  1 or more chronic illnesses with a severe exacerbation or progression. Data Reviewed and Analyzed:  I independently ordered and reviewed each unique test.         I interpreted the EKG at 1813: Is a sinus rhythm, rate of 61 with probable left atrial lodgment. No acute ischemic changes are noted and no ectopy. .    Discussion of management or test interpretation. Chest x-ray shows mild pulmonary vascular congestion. Otherwise there are no clinically significant abnormalities on testing. There is a slight elevation of the BNP. Patient is diuresing well and clinically stable for discharge. Risk of Complications and/or Morbidity of Patient Management:  Prescription drug management performed. Shared medical decision making was utilized in creating the patients health plan today. History       Patient presents from home by ambulance with complaints of chest pressure and heaviness for the past 3 days. The intensity waxes and wanes but is never gone away. It has not interfered with her sleep. She does have a history of congestive heart failure and feels like she is having exacerbation. She took an extra dose of Lasix but this does not seem to help. She did take some nitroglycerin spray which seems to help with the heaviness. She denies any fever or chills. She denies paroxysmal nocturnal dyspnea orthopnea although she is generally sleeps propped up anyways. No shortness of breath and chest heaviness is better at rest and worse with exertion. The history is provided by the patient and medical records.         Review of Systems   Constitutional:  Negative for chills, Tobacco Use    Smoking status: Never    Smokeless tobacco: Never   Substance and Sexual Activity    Alcohol use: No    Drug use: No   Social History Narrative    Worked in the 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 , 2 sons. Denies alcohol use. Has always lived in [place]. 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 Lifelong nonsmoker with 20 year secondhand smoke exposure from her  cigarettes. Social Determinants of Health     Financial Resource Strain: High Risk (5/3/2023)    Overall Financial Resource Strain (CARDIA)     Difficulty of Paying Living Expenses: Hard   Food Insecurity: No Food Insecurity (5/3/2023)    Hunger Vital Sign     Worried About Running Out of Food in the Last Year: Never true     Ran Out of Food in the Last Year: Never true   Transportation Needs: Unknown (5/3/2023)    PRAPARE - Transportation     Lack of Transportation (Non-Medical):  No   Physical Activity: Insufficiently Active (9/29/2022)    Exercise Vital Sign     Days of Exercise per Week: 2 days     Minutes of Exercise per Session: 20 min   Housing Stability: Unknown (5/3/2023)    Housing Stability Vital Sign     Unstable Housing in the Last Year: No        Previous Medications    ACETAMINOPHEN (TYLENOL) 325 MG TABLET    Take 500 mg by mouth every 6 hours as needed    AMITRIPTYLINE (ELAVIL) 25 MG TABLET    Take 1 tablet by mouth nightly    ASPIRIN 81 MG EC TABLET    Take 1 tablet by mouth daily    CARVEDILOL (COREG) 25 MG TABLET    Take 1 tablet by mouth 2 times daily    CEFDINIR (OMNICEF) 300 MG CAPSULE    TAKE 1 CAPSULE BY MOUTH TWICE A DAY FOR 10 DAYS    CEPHALEXIN (KEFLEX) 250 MG CAPSULE    Take 1 capsule by mouth daily    CHOLECALCIFEROL 400 UNIT TABS TABLET    Take 1 tablet by mouth daily    ESCITALOPRAM (LEXAPRO) 20 MG TABLET    Take 1 tablet by mouth daily TAKE 1 TABLET BY MOUTH EVERY DAY

## 2023-11-11 NOTE — ED TRIAGE NOTES
Pt brought by EMS from home c/o chest pain (3/10) and pressure (x3 days). Pt states she is SOB and feels like she has \"an elephant sitting on [her] chest.\" Pt reports taking 324 aspirin and 1 spray of nitro today w/ improvement of chest pain prior to EMS arrival. Pt reports hx of triple bypass and heart failure. A&O 4/4. GCS 15. Speech clear. Pt resting quietly in bed, attached to bedside monitor.

## 2023-11-13 ENCOUNTER — CARE COORDINATION (OUTPATIENT)
Dept: CARE COORDINATION | Facility: CLINIC | Age: 88
End: 2023-11-13

## 2023-11-14 ENCOUNTER — CARE COORDINATION (OUTPATIENT)
Dept: CARE COORDINATION | Facility: CLINIC | Age: 88
End: 2023-11-14

## 2023-11-14 NOTE — CARE COORDINATION
Ambulatory Care Coordination Note  2023    Patient Current Location:  Home: [address]      ACM contacted the patient by telephone. Verified name and  with patient as identifiers. Provided introduction to self, and explanation of the ACM role. Challenges to be reviewed by the provider   Additional needs identified to be addressed with provider: No  none               Method of communication with provider: none. ACM: Ofe Spivey RN    Pt reports no CP \"just tight\", SBP 150s, journaling for provider appts. 151lbs today. Denies SOB or swelling in lower extremities. Scheduled f/u in 1wk. Heart Failure Education outreach Date/Time: 2023 1:03 PM    Ambulatory Care Manager (ACM) contacted the patient by telephone to perform Ambulatory Care Coordination. Verified name and  with patient as identifiers. Provided introduction to self, and explanation of the Ambulatory Care Manager's role. ACM reviewed that a Health Healthy tips for the Holiday packet has been  reviewed . ACM reviewed CHF zones, daily weights, fluid restriction, the importance of low sodium diet, and healthy tips packet with the patient. Instructed patient to call their Cardiologist if they have a weight gain of 3 lbs in 2 days or 5 lbs in a week. Patient reminded that there is a physician on call 24 hours a day / 7 days a week should the patient have questions or concerns. The patient verbalized understanding. Offered patient enrollment in the Remote Patient Monitoring (RPM) program for in-home monitoring: Yes, but did not enroll at this time. Goals Addressed                   This Visit's Progress     Conditions and Symptoms   On track     I will schedule office visits, as directed by my provider. I will keep my appointment or reschedule if I have to cancel. I will notify my provider of any barriers to my plan of care.   I will notify my provider of any symptoms that indicate a

## 2023-11-17 ENCOUNTER — NURSE ONLY (OUTPATIENT)
Dept: PRIMARY CARE CLINIC | Facility: CLINIC | Age: 88
End: 2023-11-17

## 2023-11-17 DIAGNOSIS — N18.32 STAGE 3B CHRONIC KIDNEY DISEASE (HCC): ICD-10-CM

## 2023-11-17 DIAGNOSIS — E78.2 MIXED HYPERLIPIDEMIA: ICD-10-CM

## 2023-11-17 DIAGNOSIS — E11.9 CONTROLLED TYPE 2 DIABETES MELLITUS WITHOUT COMPLICATION, WITHOUT LONG-TERM CURRENT USE OF INSULIN (HCC): Primary | ICD-10-CM

## 2023-11-17 DIAGNOSIS — E11.9 CONTROLLED TYPE 2 DIABETES MELLITUS WITHOUT COMPLICATION, WITHOUT LONG-TERM CURRENT USE OF INSULIN (HCC): ICD-10-CM

## 2023-11-17 LAB
ALBUMIN SERPL-MCNC: 3.8 G/DL (ref 3.2–4.6)
ALBUMIN/GLOB SERPL: 1.3 (ref 0.4–1.6)
ALP SERPL-CCNC: 63 U/L (ref 50–136)
ALT SERPL-CCNC: 16 U/L (ref 12–65)
ANION GAP SERPL CALC-SCNC: 5 MMOL/L (ref 2–11)
AST SERPL-CCNC: 20 U/L (ref 15–37)
BASOPHILS # BLD: 0.1 K/UL (ref 0–0.2)
BASOPHILS NFR BLD: 1 % (ref 0–2)
BILIRUB SERPL-MCNC: 0.6 MG/DL (ref 0.2–1.1)
BUN SERPL-MCNC: 20 MG/DL (ref 8–23)
CALCIUM SERPL-MCNC: 9 MG/DL (ref 8.3–10.4)
CHLORIDE SERPL-SCNC: 104 MMOL/L (ref 101–110)
CHOLEST SERPL-MCNC: 248 MG/DL
CO2 SERPL-SCNC: 28 MMOL/L (ref 21–32)
CREAT SERPL-MCNC: 1.1 MG/DL (ref 0.6–1)
DIFFERENTIAL METHOD BLD: ABNORMAL
EOSINOPHIL # BLD: 0.1 K/UL (ref 0–0.8)
EOSINOPHIL NFR BLD: 2 % (ref 0.5–7.8)
ERYTHROCYTE [DISTWIDTH] IN BLOOD BY AUTOMATED COUNT: 15.5 % (ref 11.9–14.6)
EST. AVERAGE GLUCOSE BLD GHB EST-MCNC: 114 MG/DL
GLOBULIN SER CALC-MCNC: 3 G/DL (ref 2.8–4.5)
GLUCOSE SERPL-MCNC: 116 MG/DL (ref 65–100)
HBA1C MFR BLD: 5.6 % (ref 4.8–5.6)
HCT VFR BLD AUTO: 40.3 % (ref 35.8–46.3)
HDLC SERPL-MCNC: 83 MG/DL (ref 40–60)
HDLC SERPL: 3
HGB BLD-MCNC: 12.9 G/DL (ref 11.7–15.4)
IMM GRANULOCYTES # BLD AUTO: 0 K/UL (ref 0–0.5)
IMM GRANULOCYTES NFR BLD AUTO: 0 % (ref 0–5)
LDLC SERPL CALC-MCNC: 143.2 MG/DL
LYMPHOCYTES # BLD: 1.3 K/UL (ref 0.5–4.6)
LYMPHOCYTES NFR BLD: 21 % (ref 13–44)
MCH RBC QN AUTO: 33.2 PG (ref 26.1–32.9)
MCHC RBC AUTO-ENTMCNC: 32 G/DL (ref 31.4–35)
MCV RBC AUTO: 103.9 FL (ref 82–102)
MONOCYTES # BLD: 0.6 K/UL (ref 0.1–1.3)
MONOCYTES NFR BLD: 10 % (ref 4–12)
NEUTS SEG # BLD: 4.3 K/UL (ref 1.7–8.2)
NEUTS SEG NFR BLD: 66 % (ref 43–78)
NRBC # BLD: 0 K/UL (ref 0–0.2)
PLATELET # BLD AUTO: 238 K/UL (ref 150–450)
PMV BLD AUTO: 9.8 FL (ref 9.4–12.3)
POTASSIUM SERPL-SCNC: 4.2 MMOL/L (ref 3.5–5.1)
PROT SERPL-MCNC: 6.8 G/DL (ref 6.3–8.2)
RBC # BLD AUTO: 3.88 M/UL (ref 4.05–5.2)
SODIUM SERPL-SCNC: 137 MMOL/L (ref 133–143)
TRIGL SERPL-MCNC: 109 MG/DL (ref 35–150)
VLDLC SERPL CALC-MCNC: 21.8 MG/DL (ref 6–23)
WBC # BLD AUTO: 6.4 K/UL (ref 4.3–11.1)

## 2023-11-19 ENCOUNTER — HOSPITAL ENCOUNTER (EMERGENCY)
Age: 88
Discharge: HOME | End: 2023-11-19
Attending: EMERGENCY MEDICINE
Payer: MEDICARE

## 2023-11-19 ENCOUNTER — APPOINTMENT (OUTPATIENT)
Dept: GENERAL RADIOLOGY | Age: 88
End: 2023-11-19
Payer: MEDICARE

## 2023-11-19 VITALS
RESPIRATION RATE: 18 BRPM | OXYGEN SATURATION: 98 % | HEART RATE: 61 BPM | SYSTOLIC BLOOD PRESSURE: 141 MMHG | HEIGHT: 62 IN | TEMPERATURE: 98.1 F | WEIGHT: 152 LBS | DIASTOLIC BLOOD PRESSURE: 64 MMHG | BODY MASS INDEX: 27.97 KG/M2

## 2023-11-19 DIAGNOSIS — I10 ESSENTIAL HYPERTENSION: ICD-10-CM

## 2023-11-19 DIAGNOSIS — R06.09 DOE (DYSPNEA ON EXERTION): Primary | ICD-10-CM

## 2023-11-19 LAB
ALBUMIN SERPL-MCNC: 3.7 G/DL (ref 3.2–4.6)
ALBUMIN/GLOB SERPL: 1.3 (ref 0.4–1.6)
ALP SERPL-CCNC: 64 U/L (ref 50–136)
ALT SERPL-CCNC: 16 U/L (ref 12–65)
ANION GAP SERPL CALC-SCNC: 5 MMOL/L (ref 2–11)
AST SERPL-CCNC: 23 U/L (ref 15–37)
BASOPHILS # BLD: 0 K/UL (ref 0–0.2)
BASOPHILS NFR BLD: 1 % (ref 0–2)
BILIRUB SERPL-MCNC: 0.4 MG/DL (ref 0.2–1.1)
BUN SERPL-MCNC: 20 MG/DL (ref 8–23)
CALCIUM SERPL-MCNC: 8.3 MG/DL (ref 8.3–10.4)
CHLORIDE SERPL-SCNC: 105 MMOL/L (ref 101–110)
CO2 SERPL-SCNC: 28 MMOL/L (ref 21–32)
CREAT SERPL-MCNC: 1.1 MG/DL (ref 0.6–1)
DIFFERENTIAL METHOD BLD: ABNORMAL
EKG ATRIAL RATE: 59 BPM
EKG DIAGNOSIS: NORMAL
EKG P AXIS: 64 DEGREES
EKG P-R INTERVAL: 150 MS
EKG Q-T INTERVAL: 420 MS
EKG QRS DURATION: 76 MS
EKG QTC CALCULATION (BAZETT): 415 MS
EKG R AXIS: 72 DEGREES
EKG T AXIS: 100 DEGREES
EKG VENTRICULAR RATE: 59 BPM
EOSINOPHIL # BLD: 0.2 K/UL (ref 0–0.8)
EOSINOPHIL NFR BLD: 3 % (ref 0.5–7.8)
ERYTHROCYTE [DISTWIDTH] IN BLOOD BY AUTOMATED COUNT: 15.1 % (ref 11.9–14.6)
GLOBULIN SER CALC-MCNC: 2.9 G/DL (ref 2.8–4.5)
GLUCOSE SERPL-MCNC: 82 MG/DL (ref 65–100)
HCT VFR BLD AUTO: 37.5 % (ref 35.8–46.3)
HGB BLD-MCNC: 12.4 G/DL (ref 11.7–15.4)
IMM GRANULOCYTES # BLD AUTO: 0 K/UL (ref 0–0.5)
IMM GRANULOCYTES NFR BLD AUTO: 0 % (ref 0–5)
LIPASE SERPL-CCNC: 288 U/L (ref 73–393)
LYMPHOCYTES # BLD: 1.7 K/UL (ref 0.5–4.6)
LYMPHOCYTES NFR BLD: 28 % (ref 13–44)
MAGNESIUM SERPL-MCNC: 2.6 MG/DL (ref 1.8–2.4)
MCH RBC QN AUTO: 33.2 PG (ref 26.1–32.9)
MCHC RBC AUTO-ENTMCNC: 33.1 G/DL (ref 31.4–35)
MCV RBC AUTO: 100.5 FL (ref 82–102)
MONOCYTES # BLD: 0.8 K/UL (ref 0.1–1.3)
MONOCYTES NFR BLD: 14 % (ref 4–12)
NEUTS SEG # BLD: 3.3 K/UL (ref 1.7–8.2)
NEUTS SEG NFR BLD: 54 % (ref 43–78)
NRBC # BLD: 0 K/UL (ref 0–0.2)
NT PRO BNP: 528 PG/ML
PLATELET # BLD AUTO: 233 K/UL (ref 150–450)
PMV BLD AUTO: 9.5 FL (ref 9.4–12.3)
POTASSIUM SERPL-SCNC: 3.7 MMOL/L (ref 3.5–5.1)
PROT SERPL-MCNC: 6.6 G/DL (ref 6.3–8.2)
RBC # BLD AUTO: 3.73 M/UL (ref 4.05–5.2)
SODIUM SERPL-SCNC: 138 MMOL/L (ref 133–143)
TROPONIN I SERPL HS-MCNC: 8.4 PG/ML (ref 0–14)
TROPONIN I SERPL HS-MCNC: 9.5 PG/ML (ref 0–14)
WBC # BLD AUTO: 6.1 K/UL (ref 4.3–11.1)

## 2023-11-19 PROCEDURE — 83735 ASSAY OF MAGNESIUM: CPT

## 2023-11-19 PROCEDURE — 6370000000 HC RX 637 (ALT 250 FOR IP): Performed by: PHYSICIAN ASSISTANT

## 2023-11-19 PROCEDURE — 83880 ASSAY OF NATRIURETIC PEPTIDE: CPT

## 2023-11-19 PROCEDURE — 84484 ASSAY OF TROPONIN QUANT: CPT

## 2023-11-19 PROCEDURE — 71045 X-RAY EXAM CHEST 1 VIEW: CPT

## 2023-11-19 PROCEDURE — 80053 COMPREHEN METABOLIC PANEL: CPT

## 2023-11-19 PROCEDURE — 85025 COMPLETE CBC W/AUTO DIFF WBC: CPT

## 2023-11-19 PROCEDURE — 83690 ASSAY OF LIPASE: CPT

## 2023-11-19 PROCEDURE — 99285 EMERGENCY DEPT VISIT HI MDM: CPT

## 2023-11-19 RX ORDER — AMLODIPINE BESYLATE 5 MG/1
5 TABLET ORAL DAILY
Qty: 30 TABLET | Refills: 0 | Status: SHIPPED | OUTPATIENT
Start: 2023-11-19 | End: 2023-12-19

## 2023-11-19 RX ORDER — ACETAMINOPHEN 325 MG/1
650 TABLET ORAL
Status: COMPLETED | OUTPATIENT
Start: 2023-11-19 | End: 2023-11-19

## 2023-11-19 RX ADMIN — ACETAMINOPHEN 650 MG: 325 TABLET ORAL at 18:37

## 2023-11-19 ASSESSMENT — PAIN DESCRIPTION - ORIENTATION: ORIENTATION: LEFT

## 2023-11-19 ASSESSMENT — PAIN SCALES - GENERAL: PAINLEVEL_OUTOF10: 5

## 2023-11-19 ASSESSMENT — PAIN DESCRIPTION - LOCATION: LOCATION: HEAD

## 2023-11-19 ASSESSMENT — PAIN - FUNCTIONAL ASSESSMENT: PAIN_FUNCTIONAL_ASSESSMENT: NONE - DENIES PAIN

## 2023-11-19 NOTE — ED TRIAGE NOTES
Pt arrived via POV c/o SOB. Pt denies CP. Pt had same symptoms X 2 weeks.  Denies nausea, vomiting, diarrhea

## 2023-11-20 ENCOUNTER — OFFICE VISIT (OUTPATIENT)
Dept: PRIMARY CARE CLINIC | Facility: CLINIC | Age: 88
End: 2023-11-20
Payer: MEDICARE

## 2023-11-20 VITALS
SYSTOLIC BLOOD PRESSURE: 130 MMHG | BODY MASS INDEX: 28.52 KG/M2 | WEIGHT: 155 LBS | HEIGHT: 62 IN | OXYGEN SATURATION: 100 % | HEART RATE: 55 BPM | DIASTOLIC BLOOD PRESSURE: 57 MMHG | TEMPERATURE: 97.3 F

## 2023-11-20 DIAGNOSIS — H93.12 TINNITUS, LEFT EAR: ICD-10-CM

## 2023-11-20 DIAGNOSIS — I10 ESSENTIAL HYPERTENSION, BENIGN: ICD-10-CM

## 2023-11-20 DIAGNOSIS — I73.9 PAD (PERIPHERAL ARTERY DISEASE) (HCC): ICD-10-CM

## 2023-11-20 DIAGNOSIS — R07.9 CHEST PAIN, UNSPECIFIED TYPE: ICD-10-CM

## 2023-11-20 DIAGNOSIS — F33.2 SEVERE EPISODE OF RECURRENT MAJOR DEPRESSIVE DISORDER, WITHOUT PSYCHOTIC FEATURES (HCC): ICD-10-CM

## 2023-11-20 DIAGNOSIS — E11.9 CONTROLLED TYPE 2 DIABETES MELLITUS WITHOUT COMPLICATION, WITHOUT LONG-TERM CURRENT USE OF INSULIN (HCC): ICD-10-CM

## 2023-11-20 DIAGNOSIS — I25.118 CORONARY ARTERY DISEASE OF NATIVE ARTERY OF NATIVE HEART WITH STABLE ANGINA PECTORIS (HCC): ICD-10-CM

## 2023-11-20 DIAGNOSIS — Z00.00 MEDICARE ANNUAL WELLNESS VISIT, SUBSEQUENT: Primary | ICD-10-CM

## 2023-11-20 PROCEDURE — G8417 CALC BMI ABV UP PARAM F/U: HCPCS | Performed by: FAMILY MEDICINE

## 2023-11-20 PROCEDURE — G8427 DOCREV CUR MEDS BY ELIG CLIN: HCPCS | Performed by: FAMILY MEDICINE

## 2023-11-20 PROCEDURE — 1090F PRES/ABSN URINE INCON ASSESS: CPT | Performed by: FAMILY MEDICINE

## 2023-11-20 PROCEDURE — 99213 OFFICE O/P EST LOW 20 MIN: CPT | Performed by: FAMILY MEDICINE

## 2023-11-20 PROCEDURE — G0439 PPPS, SUBSEQ VISIT: HCPCS | Performed by: FAMILY MEDICINE

## 2023-11-20 PROCEDURE — 3044F HG A1C LEVEL LT 7.0%: CPT | Performed by: FAMILY MEDICINE

## 2023-11-20 PROCEDURE — 1123F ACP DISCUSS/DSCN MKR DOCD: CPT | Performed by: FAMILY MEDICINE

## 2023-11-20 PROCEDURE — G8484 FLU IMMUNIZE NO ADMIN: HCPCS | Performed by: FAMILY MEDICINE

## 2023-11-20 PROCEDURE — 1036F TOBACCO NON-USER: CPT | Performed by: FAMILY MEDICINE

## 2023-11-20 RX ORDER — MECLIZINE HCL 25MG 25 MG/1
25 TABLET, CHEWABLE ORAL 3 TIMES DAILY PRN
Qty: 90 TABLET | Refills: 2 | Status: SHIPPED | OUTPATIENT
Start: 2023-11-20

## 2023-11-20 RX ORDER — MAGNESIUM OXIDE 400 MG/1
400 TABLET ORAL DAILY
Qty: 30 TABLET | Refills: 5 | Status: SHIPPED | OUTPATIENT
Start: 2023-11-20

## 2023-11-20 RX ORDER — FAMOTIDINE 20 MG/1
20 TABLET, FILM COATED ORAL EVERY EVENING
Qty: 60 TABLET | Refills: 5 | Status: SHIPPED | OUTPATIENT
Start: 2023-11-20

## 2023-11-20 RX ORDER — SPIRONOLACTONE 25 MG/1
25 TABLET ORAL DAILY
Qty: 90 TABLET | Refills: 3 | Status: SHIPPED | OUTPATIENT
Start: 2023-11-20

## 2023-11-20 RX ORDER — ROSUVASTATIN CALCIUM 40 MG/1
40 TABLET, COATED ORAL EVERY EVENING
Qty: 90 TABLET | Refills: 3 | Status: SHIPPED | OUTPATIENT
Start: 2023-11-20

## 2023-11-20 RX ORDER — MEMANTINE HYDROCHLORIDE 10 MG/1
10 TABLET ORAL DAILY
Qty: 30 TABLET | Refills: 5 | Status: SHIPPED | OUTPATIENT
Start: 2023-11-20

## 2023-11-20 ASSESSMENT — PATIENT HEALTH QUESTIONNAIRE - PHQ9
5. POOR APPETITE OR OVEREATING: 0
8. MOVING OR SPEAKING SO SLOWLY THAT OTHER PEOPLE COULD HAVE NOTICED. OR THE OPPOSITE, BEING SO FIGETY OR RESTLESS THAT YOU HAVE BEEN MOVING AROUND A LOT MORE THAN USUAL: 0
SUM OF ALL RESPONSES TO PHQ QUESTIONS 1-9: 11
1. LITTLE INTEREST OR PLEASURE IN DOING THINGS: 3
3. TROUBLE FALLING OR STAYING ASLEEP: 0
2. FEELING DOWN, DEPRESSED OR HOPELESS: 2
9. THOUGHTS THAT YOU WOULD BE BETTER OFF DEAD, OR OF HURTING YOURSELF: 0
SUM OF ALL RESPONSES TO PHQ QUESTIONS 1-9: 11
4. FEELING TIRED OR HAVING LITTLE ENERGY: 3
SUM OF ALL RESPONSES TO PHQ9 QUESTIONS 1 & 2: 5
SUM OF ALL RESPONSES TO PHQ QUESTIONS 1-9: 11
6. FEELING BAD ABOUT YOURSELF - OR THAT YOU ARE A FAILURE OR HAVE LET YOURSELF OR YOUR FAMILY DOWN: 3
SUM OF ALL RESPONSES TO PHQ QUESTIONS 1-9: 11
10. IF YOU CHECKED OFF ANY PROBLEMS, HOW DIFFICULT HAVE THESE PROBLEMS MADE IT FOR YOU TO DO YOUR WORK, TAKE CARE OF THINGS AT HOME, OR GET ALONG WITH OTHER PEOPLE: 1
7. TROUBLE CONCENTRATING ON THINGS, SUCH AS READING THE NEWSPAPER OR WATCHING TELEVISION: 0

## 2023-11-20 ASSESSMENT — COLUMBIA-SUICIDE SEVERITY RATING SCALE - C-SSRS
4. HAVE YOU HAD THESE THOUGHTS AND HAD SOME INTENTION OF ACTING ON THEM?: NO
5. HAVE YOU STARTED TO WORK OUT OR WORKED OUT THE DETAILS OF HOW TO KILL YOURSELF? DO YOU INTEND TO CARRY OUT THIS PLAN?: NO
7. DID THIS OCCUR IN THE LAST THREE MONTHS: NO
3. HAVE YOU BEEN THINKING ABOUT HOW YOU MIGHT KILL YOURSELF?: NO

## 2023-11-20 NOTE — ED PROVIDER NOTES
Emergency Department Provider Signout / Continuation of Care Note         DISPOSITION Decision To Discharge 11/19/2023 09:27:40 PM       ICD-10-CM    1. AWAN (dyspnea on exertion)  R06.09       2. Essential hypertension  I10           The patient's care was signed out to me at shift change. Final Plan      Handoff received from KIERSTEN Hernández to follow-up on repeat troponin. Patient is a 81-year-old female with history of CHF, CAD status post s/p 4v CABG in 2005 who presents with complaint of worsening dyspnea with exertion and occasional left-sided chest pressure when exerting herself physically. Patient states that she is unable to take Imdur due to headaches. States that she did take 2 nitro with improvement of chest pressure. States that she has been remain compliant with all home medications. Patient states that she has follow-up appoint with Dr. Rajendra Dang on Tuesday. Denies any active chest pain or pressure. Chest x-ray with no infiltrate consolidation. Initial troponin 8.4. Repeat troponin 9.5. EKG with sinus bradycardia. No ST elevation noted. Case discussed with Dr. Anjelica Prabhakar who recommends the patient be discharged home and continue with follow-up with Dr. Rajendra Dang on Tuesday. Does recommend the patient be started on Norvasc 5 mg daily in addition to all other home medications. Patient updated on plan. Patient given return.        Maurice Traylor MD  11/19/23 [id number]
Hematocrit 37.5 35.8 - 46.3 %    .5 82 - 102 FL    MCH 33.2 (H) 26.1 - 32.9 PG    MCHC 33.1 31.4 - 35.0 g/dL    RDW 15.1 (H) 11.9 - 14.6 %    Platelets 631 571 - 093 K/uL    MPV 9.5 9.4 - 12.3 FL    nRBC 0.00 0.0 - 0.2 K/uL    Differential Type AUTOMATED      Neutrophils % 54 43 - 78 %    Lymphocytes % 28 13 - 44 %    Monocytes % 14 (H) 4.0 - 12.0 %    Eosinophils % 3 0.5 - 7.8 %    Basophils % 1 0.0 - 2.0 %    Immature Granulocytes 0 0.0 - 5.0 %    Neutrophils Absolute 3.3 1.7 - 8.2 K/UL    Lymphocytes Absolute 1.7 0.5 - 4.6 K/UL    Monocytes Absolute 0.8 0.1 - 1.3 K/UL    Eosinophils Absolute 0.2 0.0 - 0.8 K/UL    Basophils Absolute 0.0 0.0 - 0.2 K/UL    Absolute Immature Granulocyte 0.0 0.0 - 0.5 K/UL   CMP   Result Value Ref Range    Sodium 138 133 - 143 mmol/L    Potassium 3.7 3.5 - 5.1 mmol/L    Chloride 105 101 - 110 mmol/L    CO2 28 21 - 32 mmol/L    Anion Gap 5 2 - 11 mmol/L    Glucose 82 65 - 100 mg/dL    BUN 20 8 - 23 MG/DL    Creatinine 1.10 (H) 0.6 - 1.0 MG/DL    Est, Glom Filt Rate 49 (L) >60 ml/min/1.73m2    Calcium 8.3 8.3 - 10.4 MG/DL    Total Bilirubin 0.4 0.2 - 1.1 MG/DL    ALT 16 12 - 65 U/L    AST 23 15 - 37 U/L    Alk Phosphatase 64 50 - 136 U/L    Total Protein 6.6 6.3 - 8.2 g/dL    Albumin 3.7 3.2 - 4.6 g/dL    Globulin 2.9 2.8 - 4.5 g/dL    Albumin/Globulin Ratio 1.3 0.4 - 1.6     Lipase   Result Value Ref Range    Lipase 288 73 - 393 U/L   Magnesium   Result Value Ref Range    Magnesium 2.6 (H) 1.8 - 2.4 mg/dL   Troponin   Result Value Ref Range    Troponin, High Sensitivity 8.4 0 - 14 pg/mL   Brain Natriuretic Peptide   Result Value Ref Range    NT Pro- (H) <450 PG/ML        XR CHEST 1 VIEW   Final Result      1. No acute cardiopulmonary process identified. This exam was interpreted by a board-certified, body-imaging fellowship trained    radiologist from [facility] [facility].  If there are any questions regarding    this examination please feel free to contact a

## 2023-11-20 NOTE — ED NOTES
Report given to [facility], RN and care assumed at this time.       Neena Le  11/19/23 2124

## 2023-11-20 NOTE — DISCHARGE INSTRUCTIONS
Continue home medications but also start taking Norvasc 5 mg daily per cardiology. Follow-up with Dr. Brayan Pereira on Tuesday. Please return to ED if symptoms worsen or progress in any way.

## 2023-11-20 NOTE — PROGRESS NOTES
Medicare Annual Wellness Visit    Erleen Schilder is here for Medicare AWV, Follow-Up from Hospital (Patient was in the hospital last night for heaviness on her chest. She was also in the week before), Medication Refill, Discuss Labs, and Depression (Patient is experiencing some depression. She lost her son 4 months ago.)    Assessment & Plan   Medicare annual wellness visit, subsequent  Controlled type 2 diabetes mellitus without complication, without long-term current use of insulin ([address])  Coronary artery disease of native artery of native heart with stable angina pectoris (HCC)  Essential hypertension, benign  PAD (peripheral artery disease) (HCC)  Tinnitus, left ear  Severe episode of recurrent major depressive disorder, without psychotic features ([address])  Chest pain, unspecified type    I suspect that the cardiac symptoms she is having now may be related to stress and anxiety over the recent loss of her son. She feels some guilt that she was unable to do more for him. She does have an appointment to see cardiology tomorrow. She did have cardiac work-up in ER that was unrevealing. She is encouraged to follow-up in the ER if she has any worsening symptoms. She did note some improvement in her tinnitus while she was taking the nitroglycerin. She states that she has taken long-acting nitroglycerin in the past but developed headaches. She will discuss with cardiology the possibility of taking the lower dose. She will continue with her remaining medications as prescribed. Recommendations for Preventive Services Due: see orders and patient instructions/AVS.  Recommended screening schedule for the next 5-10 years is provided to the patient in written form: see Patient Instructions/AVS.     Return in about 3 months (around 2/20/2024), or if symptoms worsen or fail to improve, for labs 1 week prior to visit.      Subjective   The following acute and/or chronic problems were also addressed today:  Patient is

## 2023-11-21 ENCOUNTER — OFFICE VISIT (OUTPATIENT)
Age: 88
End: 2023-11-21
Payer: MEDICARE

## 2023-11-21 ENCOUNTER — CARE COORDINATION (OUTPATIENT)
Dept: CARE COORDINATION | Facility: CLINIC | Age: 88
End: 2023-11-21

## 2023-11-21 VITALS
HEIGHT: 62 IN | BODY MASS INDEX: 28.34 KG/M2 | SYSTOLIC BLOOD PRESSURE: 139 MMHG | DIASTOLIC BLOOD PRESSURE: 66 MMHG | WEIGHT: 154 LBS | HEART RATE: 58 BPM

## 2023-11-21 DIAGNOSIS — I50.22 HYPERTENSIVE HEART DISEASE WITH CHRONIC SYSTOLIC CONGESTIVE HEART FAILURE (HCC): ICD-10-CM

## 2023-11-21 DIAGNOSIS — I25.118 CORONARY ARTERY DISEASE OF NATIVE ARTERY OF NATIVE HEART WITH STABLE ANGINA PECTORIS (HCC): ICD-10-CM

## 2023-11-21 DIAGNOSIS — I50.32 DIASTOLIC CHF, CHRONIC (HCC): Primary | ICD-10-CM

## 2023-11-21 DIAGNOSIS — I73.9 PAD (PERIPHERAL ARTERY DISEASE) (HCC): ICD-10-CM

## 2023-11-21 DIAGNOSIS — I11.0 HYPERTENSIVE HEART DISEASE WITH CHRONIC SYSTOLIC CONGESTIVE HEART FAILURE (HCC): ICD-10-CM

## 2023-11-21 DIAGNOSIS — I50.22 CHRONIC SYSTOLIC (CONGESTIVE) HEART FAILURE (HCC): ICD-10-CM

## 2023-11-21 PROCEDURE — G8484 FLU IMMUNIZE NO ADMIN: HCPCS | Performed by: INTERNAL MEDICINE

## 2023-11-21 PROCEDURE — 1123F ACP DISCUSS/DSCN MKR DOCD: CPT | Performed by: INTERNAL MEDICINE

## 2023-11-21 PROCEDURE — G8417 CALC BMI ABV UP PARAM F/U: HCPCS | Performed by: INTERNAL MEDICINE

## 2023-11-21 PROCEDURE — 99214 OFFICE O/P EST MOD 30 MIN: CPT | Performed by: INTERNAL MEDICINE

## 2023-11-21 PROCEDURE — G8427 DOCREV CUR MEDS BY ELIG CLIN: HCPCS | Performed by: INTERNAL MEDICINE

## 2023-11-21 PROCEDURE — 1090F PRES/ABSN URINE INCON ASSESS: CPT | Performed by: INTERNAL MEDICINE

## 2023-11-21 PROCEDURE — 1036F TOBACCO NON-USER: CPT | Performed by: INTERNAL MEDICINE

## 2023-11-21 RX ORDER — RANOLAZINE 500 MG/1
500 TABLET, EXTENDED RELEASE ORAL 2 TIMES DAILY
Qty: 180 TABLET | Refills: 3 | Status: SHIPPED | OUTPATIENT
Start: 2023-11-21

## 2023-11-21 RX ORDER — HYDRALAZINE HYDROCHLORIDE 25 MG/1
TABLET, FILM COATED ORAL
Qty: 180 TABLET | Refills: 3 | Status: SHIPPED | OUTPATIENT
Start: 2023-11-21

## 2023-11-21 RX ORDER — CARVEDILOL 25 MG/1
25 TABLET ORAL 2 TIMES DAILY
Qty: 180 TABLET | Refills: 3 | Status: SHIPPED | OUTPATIENT
Start: 2023-11-21

## 2023-11-21 RX ORDER — FUROSEMIDE 40 MG/1
40 TABLET ORAL DAILY
Qty: 90 TABLET | Refills: 3 | Status: SHIPPED | OUTPATIENT
Start: 2023-11-21

## 2023-11-21 RX ORDER — NITROGLYCERIN 40 MG/1
1 PATCH TRANSDERMAL DAILY
Qty: 30 PATCH | Refills: 3 | Status: SHIPPED | OUTPATIENT
Start: 2023-11-21

## 2023-11-21 NOTE — CARE COORDINATION
Provider [address]   2/13/2024 10:30 AM POW LAB POW [facility] AMB   2/20/2024 10:30 AM Efren Ritchie MD POW [facility] AMB   2/21/2024  1:00 PM Taryn Enriquez DO UCDE [facility] AMB   2/26/2024 11:30 AM KIERSTEN Mckeon POAG [facility] AMB   3/4/2024 10:45 AM Amanda Jacobsen MD [id number] [facility] AMB   4/17/2024 10:00 AM BSVS ULTRASOUND 1 [facility]S [facility] AMB   4/17/2024 11:30 AM Kianna, Larissa Stein, APRN - CNP BSVS [facility] AMB

## 2023-11-21 NOTE — PROGRESS NOTES
03/23/2023     Lab Results   Component Value Date    TRIG 109 11/17/2023    TRIG 80 08/10/2023    TRIG 104 03/23/2023     Lab Results   Component Value Date    HDL 83 (H) 11/17/2023    HDL 79 (H) 08/10/2023    HDL 76 (H) 03/23/2023     Lab Results   Component Value Date    LDLCALC 143.2 (H) 11/17/2023    LDLCALC 119 (H) 08/10/2023    LDLCALC 126.2 (H) 03/23/2023     Lab Results   Component Value Date    LABVLDL 21.8 11/17/2023    LABVLDL 16 08/10/2023    LABVLDL 20.8 03/23/2023    VLDL 21 03/24/2022    VLDL 13 11/22/2021    VLDL 15 08/20/2021     Lab Results   Component Value Date    CHOLHDLRATIO 3.0 11/17/2023    CHOLHDLRATIO 2.7 08/10/2023    CHOLHDLRATIO 2.9 03/23/2023           I have Independently reviewed prior care notes, any ER records available, cardiac testing, labs and results with the patient and before seeing the patient today. Also independently reviewed outside records when available. ASSESSMENT:    Mahin Serna was seen today for congestive heart failure. 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 failure (HCC)    PAD (peripheral artery disease) (HCC)    Coronary artery disease of native artery of native heart with stable angina pectoris ([address])    Other orders  -     carvedilol (COREG) 25 MG tablet; Take 1 tablet by mouth 2 times daily  -     furosemide (LASIX) 40 MG tablet; Take 1 tablet by mouth daily  -     hydrALAZINE (APRESOLINE) 25 MG tablet; TAKE 1 TABLET BY MOUTH TWICE A DAY  -     ranolazine (RANEXA) 500 MG extended release tablet; Take 1 tablet by mouth 2 times daily  -     rivaroxaban (XARELTO) 20 MG TABS tablet; Take 1 tablet by mouth daily (with breakfast) HOLD XARELTO UNTIL Friday, [place]. MAY RESUME THEN, IF NO WOUND DRAINAGE. -     nitroGLYCERIN (MINITRAN) 0.2 MG/HR; Place 1 patch onto the skin daily          PLAN:   1.   Chronic DHF:

## 2023-11-22 ENCOUNTER — TELEPHONE (OUTPATIENT)
Age: 88
End: 2023-11-22

## 2023-11-22 NOTE — TELEPHONE ENCOUNTER
Pt son Helena Richards was going through pts medications and realized she does not have RX Amlodipine and wants to know if she is still suppose to be taking that RX and if so she needs a prescription sent to [address]

## 2023-11-22 NOTE — TELEPHONE ENCOUNTER
Called pt's son advised of Dr. Kimberly Patton response. Pt gave a verbal understanding. Roseanne Will states figured out once got home and looked over med listen from after seeing Dr. Monico Lares.

## 2023-11-29 ENCOUNTER — CARE COORDINATION (OUTPATIENT)
Dept: CARE COORDINATION | Facility: CLINIC | Age: 88
End: 2023-11-29

## 2023-11-29 NOTE — CARE COORDINATION
Ambulatory Care Coordination Note  2023    Patient Current Location:  Home: [facility] [address]      ACM contacted the patient by telephone. Verified name and  with patient as identifiers. Provided introduction to self, and explanation of the ACM role. Challenges to be reviewed by the provider   Additional needs identified to be addressed with provider: No  none               Method of communication with provider: none. ACM: Mirza Lira RN    SBP 130s - 150s. Pt performing exercises in bed prior to getting up, plans to walk outside when warms up outside. Discussed HF MGMT, meds. Offered patient enrollment in the Remote Patient Monitoring (RPM) program for in-home monitoring:  no .    Care Coordination Interventions    Referral from Primary Care Provider: No  Suggested Interventions and Community Resources          Goals Addressed                   This Visit's Progress     Conditions and Symptoms   On track     I will schedule office visits, as directed by my provider. I will keep my appointment or reschedule if I have to cancel. I will notify my provider of any barriers to my plan of care. I will notify my provider of any symptoms that indicate a worsening of my condition.     Barriers: none  Plan for overcoming my barriers: N/A  Confidence: 10/10  Anticipated Goal Completion Date: 9/15/22                Future Appointments   Date Time Provider [address]   2024 10:30 AM POW LAB POW [facility] AMB   2024 10:30 AM Rafa Carbone MD POW [facility] AMB   2024  1:00 PM Gurmeet Samuels DO UCDE [facility] AMB   2024 11:30 AM KIERSTEN Dee POAG GVL AMB   3/4/2024 10:45 AM Valentine Flores MD [id number] G[facility] AMB   2024 10:00 AM BSVS ULTRASOUND 1 [facility] AMB   2024 11:30 AM Francia Hutchison APRN - TARIQ [facility] AMB

## 2023-12-06 ENCOUNTER — CARE COORDINATION (OUTPATIENT)
Dept: CARE COORDINATION | Facility: CLINIC | Age: 88
End: 2023-12-06

## 2023-12-06 NOTE — CARE COORDINATION
Ambulatory Care Coordination Note  2023    Patient Current Location:  Home: [facility] [address] [facility]     ACM contacted the patient by telephone. Verified name and  with patient as identifiers. Provided introduction to self, and explanation of the ACM role. Challenges to be reviewed by the provider   Additional needs identified to be addressed with provider: No  none               Method of communication with provider: none. ACM: Carlos Laureano RN    Pt reports is putting up [place] decor, feeling some fatigue. Discussed pacing activities as well as HF mgmt, pt denies swelling. Pt plans to seek Appt w/ GI to see if needs esophagus stretched again, has had done 3x in past. Scheduled f/u in 2wks for HF mgmt. Offered patient enrollment in the Remote Patient Monitoring (RPM) program for in-home monitoring:  no .    Care Coordination Interventions    Referral from Primary Care Provider: No  Suggested Interventions and Community Resources          Goals Addressed                   This Visit's Progress     Conditions and Symptoms   On track     I will schedule office visits, as directed by my provider. I will keep my appointment or reschedule if I have to cancel. I will notify my provider of any barriers to my plan of care. I will notify my provider of any symptoms that indicate a worsening of my condition.     Barriers: none  Plan for overcoming my barriers: N/A  Confidence: 10/10  Anticipated Goal Completion Date: 9/15/22                Future Appointments   Date Time Provider [address] [place] [address]   2024 10:30 AM POW LAB POW [facility] AMB   2024 10:30 AM Cordell Prabhakar MD POW [facility] AMB   2024  1:00 PM Olaf Ge DO UCDE GVL AMB   2024 11:30 AM KIERSTEN Krueger POAG GVL AMB   3/4/2024 10:45 AM Silvana Roth MD [id number] GVL AMB   2024 10:00 AM BSVS ULTRASOUND 1 [facility] AMB   2024 11:30 AM Zi Hutchison, APRN - CNP [facility] AMB

## 2023-12-26 ENCOUNTER — TELEMEDICINE (OUTPATIENT)
Dept: PRIMARY CARE CLINIC | Facility: CLINIC | Age: 88
End: 2023-12-26
Payer: MEDICARE

## 2023-12-26 DIAGNOSIS — J06.9 ACUTE URI: Primary | ICD-10-CM

## 2023-12-26 PROCEDURE — G8484 FLU IMMUNIZE NO ADMIN: HCPCS | Performed by: FAMILY MEDICINE

## 2023-12-26 PROCEDURE — G8417 CALC BMI ABV UP PARAM F/U: HCPCS | Performed by: FAMILY MEDICINE

## 2023-12-26 PROCEDURE — G8427 DOCREV CUR MEDS BY ELIG CLIN: HCPCS | Performed by: FAMILY MEDICINE

## 2023-12-26 PROCEDURE — 96372 THER/PROPH/DIAG INJ SC/IM: CPT | Performed by: FAMILY MEDICINE

## 2023-12-26 PROCEDURE — 99213 OFFICE O/P EST LOW 20 MIN: CPT | Performed by: FAMILY MEDICINE

## 2023-12-26 PROCEDURE — 1123F ACP DISCUSS/DSCN MKR DOCD: CPT | Performed by: FAMILY MEDICINE

## 2023-12-26 PROCEDURE — 1090F PRES/ABSN URINE INCON ASSESS: CPT | Performed by: FAMILY MEDICINE

## 2023-12-26 PROCEDURE — 1036F TOBACCO NON-USER: CPT | Performed by: FAMILY MEDICINE

## 2023-12-26 RX ORDER — AZITHROMYCIN 250 MG/1
250 TABLET, FILM COATED ORAL SEE ADMIN INSTRUCTIONS
Qty: 6 TABLET | Refills: 0 | Status: SHIPPED | OUTPATIENT
Start: 2023-12-26 | End: 2023-12-31

## 2023-12-26 RX ORDER — DEXAMETHASONE SODIUM PHOSPHATE 4 MG/ML
4 INJECTION, SOLUTION INTRA-ARTICULAR; INTRALESIONAL; INTRAMUSCULAR; INTRAVENOUS; SOFT TISSUE ONCE
Status: COMPLETED | OUTPATIENT
Start: 2023-12-26 | End: 2023-12-26

## 2023-12-26 RX ADMIN — DEXAMETHASONE SODIUM PHOSPHATE 4 MG: 4 INJECTION, SOLUTION INTRA-ARTICULAR; INTRALESIONAL; INTRAMUSCULAR; INTRAVENOUS; SOFT TISSUE at 13:48

## 2023-12-26 NOTE — PROGRESS NOTES
Beena Hyatt is a 81 y.o. female evaluated via telephone on 12/26/2023 for   No chief complaint on file. .      Assessment & Plan     1. Acute URI  -     azithromycin (ZITHROMAX) 250 MG tablet; Take 1 tablet by mouth See Admin Instructions for 5 days 500mg on day 1 followed by 250mg on days 2 - 5, Disp-6 tablet, R-0Normal  -     dexAMETHasone (DECADRON) injection 4 mg; 4 mg, IntraMUSCular, ONCE, 1 dose, On Tue 12/26/23 at 1415    I am starting her on a Z-Travis. She will come back for shot of Decadron which she indicates helps when she gets this every year. She will follow-up here or in the ER if her symptoms get worse. No follow-ups on file. Subjective   HPI  Patient was seen in urgent care over the weekend. Complained of cough congestion. Cough described as severe. She had some pain under her left rib. She did get tested for COVID and for the flu which were negative. She was not given medication. She feels like the cough is getting worse. She denies fever currently. She states that she usually gets a shot of Decadron or an antibiotic which usually clears her infection up. She denies any shortness of breath. No nausea or vomiting. No other complaints. Objective   Review of Systems  Patient-Reported Vitals  No data recorded   Physical Exam        Beena Hyatt, was evaluated through a synchronous (real-time) audio-video encounter. The patient (or guardian if applicable) is aware that this is a billable service, which includes applicable co-pays. This Virtual Visit was conducted with patient's (and/or legal guardian's) consent. Patient identification was verified, and a caregiver was present when appropriate.    The patient was located at Home: [address] [facility] [address] [facility]  Provider was located at [address]): [address] Replaced by [facility],  [address] [place] [address]           Rajan Landin MD

## 2024-01-03 ENCOUNTER — TELEPHONE (OUTPATIENT)
Dept: PRIMARY CARE CLINIC | Facility: CLINIC | Age: 89
End: 2024-01-03

## 2024-01-03 NOTE — TELEPHONE ENCOUNTER
Called pt to reschedule 2/20/24 appt due to Dr. Nobles being ooo -  did not answer - lvm to call back to reschedule    Sending [employer] message and letter

## 2024-01-05 NOTE — Clinical Note
Attempted to call patient with  ID #[id number] to discuss information needed below. No answer and unable to LVM. Writer will attempt again later.   Physician has arrived.

## 2024-01-08 ENCOUNTER — CARE COORDINATION (OUTPATIENT)
Dept: CARE COORDINATION | Facility: CLINIC | Age: 89
End: 2024-01-08

## 2024-01-10 ENCOUNTER — NURSE ONLY (OUTPATIENT)
Dept: PRIMARY CARE CLINIC | Facility: CLINIC | Age: 89
End: 2024-01-10
Payer: MEDICARE

## 2024-01-10 ENCOUNTER — TELEPHONE (OUTPATIENT)
Dept: PRIMARY CARE CLINIC | Facility: CLINIC | Age: 89
End: 2024-01-10

## 2024-01-10 DIAGNOSIS — R30.0 DYSURIA: Primary | ICD-10-CM

## 2024-01-10 LAB
BILIRUBIN, URINE, POC: NEGATIVE
BLOOD URINE, POC: NEGATIVE
GLUCOSE URINE, POC: NEGATIVE
KETONES, URINE, POC: NEGATIVE
LEUKOCYTE ESTERASE, URINE, POC: NORMAL
NITRITE, URINE, POC: NEGATIVE
PH, URINE, POC: 5 (ref 4.6–8)
PROTEIN,URINE, POC: NEGATIVE
SPECIFIC GRAVITY, URINE, POC: 1.03 (ref 1–1.03)
URINALYSIS CLARITY, POC: CLEAR
URINALYSIS COLOR, POC: YELLOW
UROBILINOGEN, POC: NORMAL

## 2024-01-10 PROCEDURE — 81003 URINALYSIS AUTO W/O SCOPE: CPT | Performed by: FAMILY MEDICINE

## 2024-01-11 RX ORDER — FLUCONAZOLE 150 MG/1
TABLET ORAL
Qty: 1 TABLET | Refills: 2 | Status: SHIPPED | OUTPATIENT
Start: 2024-01-11

## 2024-01-19 ENCOUNTER — CARE COORDINATION (OUTPATIENT)
Dept: CARE COORDINATION | Facility: CLINIC | Age: 89
End: 2024-01-19

## 2024-01-19 NOTE — CARE COORDINATION
RNCM 3rd unsuccessful attempt to reach pt, no answer left final vm. Will close program as pt is unable to be reached.

## 2024-02-16 ENCOUNTER — NURSE ONLY (OUTPATIENT)
Dept: PRIMARY CARE CLINIC | Facility: CLINIC | Age: 89
End: 2024-02-16

## 2024-02-16 DIAGNOSIS — Z13.1 SCREENING FOR DIABETES MELLITUS: ICD-10-CM

## 2024-02-16 DIAGNOSIS — R73.01 IFG (IMPAIRED FASTING GLUCOSE): ICD-10-CM

## 2024-02-16 DIAGNOSIS — Z13.29 SCREENING FOR ENDOCRINE, METABOLIC AND IMMUNITY DISORDER: ICD-10-CM

## 2024-02-16 DIAGNOSIS — Z13.228 SCREENING FOR METABOLIC DISORDER: ICD-10-CM

## 2024-02-16 DIAGNOSIS — Z13.0 SCREENING FOR ENDOCRINE, METABOLIC AND IMMUNITY DISORDER: ICD-10-CM

## 2024-02-16 DIAGNOSIS — Z13.228 SCREENING FOR ENDOCRINE, METABOLIC AND IMMUNITY DISORDER: ICD-10-CM

## 2024-02-16 DIAGNOSIS — Z13.220 SCREENING FOR LIPID DISORDERS: ICD-10-CM

## 2024-02-16 DIAGNOSIS — I10 ESSENTIAL HYPERTENSION, BENIGN: ICD-10-CM

## 2024-02-16 DIAGNOSIS — Z13.0 SCREENING FOR IRON DEFICIENCY ANEMIA: ICD-10-CM

## 2024-02-16 DIAGNOSIS — I25.118 CORONARY ARTERY DISEASE OF NATIVE ARTERY OF NATIVE HEART WITH STABLE ANGINA PECTORIS (HCC): ICD-10-CM

## 2024-02-16 DIAGNOSIS — I25.118 CORONARY ARTERY DISEASE OF NATIVE ARTERY OF NATIVE HEART WITH STABLE ANGINA PECTORIS (HCC): Primary | ICD-10-CM

## 2024-02-16 LAB
ALBUMIN SERPL-MCNC: 3.6 G/DL (ref 3.2–4.6)
ALBUMIN/GLOB SERPL: 1.4 (ref 0.4–1.6)
ALP SERPL-CCNC: 71 U/L (ref 50–136)
ALT SERPL-CCNC: 12 U/L (ref 12–65)
ANION GAP SERPL CALC-SCNC: 6 MMOL/L (ref 2–11)
AST SERPL-CCNC: 19 U/L (ref 15–37)
BASOPHILS # BLD: 0 K/UL (ref 0–0.2)
BASOPHILS NFR BLD: 1 % (ref 0–2)
BILIRUB SERPL-MCNC: 0.5 MG/DL (ref 0.2–1.1)
BUN SERPL-MCNC: 16 MG/DL (ref 8–23)
CALCIUM SERPL-MCNC: 9.4 MG/DL (ref 8.3–10.4)
CHLORIDE SERPL-SCNC: 107 MMOL/L (ref 103–113)
CHOLEST SERPL-MCNC: 239 MG/DL
CO2 SERPL-SCNC: 27 MMOL/L (ref 21–32)
CREAT SERPL-MCNC: 1 MG/DL (ref 0.6–1)
DIFFERENTIAL METHOD BLD: ABNORMAL
EOSINOPHIL # BLD: 0.1 K/UL (ref 0–0.8)
EOSINOPHIL NFR BLD: 2 % (ref 0.5–7.8)
ERYTHROCYTE [DISTWIDTH] IN BLOOD BY AUTOMATED COUNT: 14.4 % (ref 11.9–14.6)
EST. AVERAGE GLUCOSE BLD GHB EST-MCNC: 108 MG/DL
GLOBULIN SER CALC-MCNC: 2.6 G/DL (ref 2.8–4.5)
GLUCOSE SERPL-MCNC: 98 MG/DL (ref 65–100)
HBA1C MFR BLD: 5.4 % (ref 4.8–5.6)
HCT VFR BLD AUTO: 39.1 % (ref 35.8–46.3)
HDLC SERPL-MCNC: 83 MG/DL (ref 40–60)
HDLC SERPL: 2.9
HGB BLD-MCNC: 12.6 G/DL (ref 11.7–15.4)
IMM GRANULOCYTES # BLD AUTO: 0 K/UL (ref 0–0.5)
IMM GRANULOCYTES NFR BLD AUTO: 0 % (ref 0–5)
LDLC SERPL CALC-MCNC: 137.4 MG/DL
LYMPHOCYTES # BLD: 1.6 K/UL (ref 0.5–4.6)
LYMPHOCYTES NFR BLD: 30 % (ref 13–44)
MCH RBC QN AUTO: 33.2 PG (ref 26.1–32.9)
MCHC RBC AUTO-ENTMCNC: 32.2 G/DL (ref 31.4–35)
MCV RBC AUTO: 103.2 FL (ref 82–102)
MONOCYTES # BLD: 0.5 K/UL (ref 0.1–1.3)
MONOCYTES NFR BLD: 8 % (ref 4–12)
NEUTS SEG # BLD: 3.2 K/UL (ref 1.7–8.2)
NEUTS SEG NFR BLD: 59 % (ref 43–78)
NRBC # BLD: 0 K/UL (ref 0–0.2)
PLATELET # BLD AUTO: 217 K/UL (ref 150–450)
PMV BLD AUTO: 10.1 FL (ref 9.4–12.3)
POTASSIUM SERPL-SCNC: 4.4 MMOL/L (ref 3.5–5.1)
PROT SERPL-MCNC: 6.2 G/DL (ref 6.3–8.2)
RBC # BLD AUTO: 3.79 M/UL (ref 4.05–5.2)
SODIUM SERPL-SCNC: 140 MMOL/L (ref 136–146)
TRIGL SERPL-MCNC: 93 MG/DL (ref 35–150)
VLDLC SERPL CALC-MCNC: 18.6 MG/DL (ref 6–23)
WBC # BLD AUTO: 5.5 K/UL (ref 4.3–11.1)

## 2024-02-20 RX ORDER — NITROGLYCERIN 40 MG/1
PATCH TRANSDERMAL
Qty: 90 PATCH | Refills: 1 | Status: SHIPPED | OUTPATIENT
Start: 2024-02-20

## 2024-02-21 ENCOUNTER — OFFICE VISIT (OUTPATIENT)
Age: 89
End: 2024-02-21
Payer: MEDICARE

## 2024-02-21 VITALS
HEART RATE: 58 BPM | DIASTOLIC BLOOD PRESSURE: 62 MMHG | SYSTOLIC BLOOD PRESSURE: 120 MMHG | WEIGHT: 156 LBS | BODY MASS INDEX: 28.53 KG/M2

## 2024-02-21 DIAGNOSIS — I11.0 HYPERTENSIVE HEART DISEASE WITH CHRONIC SYSTOLIC CONGESTIVE HEART FAILURE (HCC): ICD-10-CM

## 2024-02-21 DIAGNOSIS — I10 ESSENTIAL HYPERTENSION, BENIGN: ICD-10-CM

## 2024-02-21 DIAGNOSIS — I50.32 DIASTOLIC CHF, CHRONIC (HCC): ICD-10-CM

## 2024-02-21 DIAGNOSIS — I87.2 VENOUS (PERIPHERAL) INSUFFICIENCY: ICD-10-CM

## 2024-02-21 DIAGNOSIS — I50.22 HYPERTENSIVE HEART DISEASE WITH CHRONIC SYSTOLIC CONGESTIVE HEART FAILURE (HCC): ICD-10-CM

## 2024-02-21 DIAGNOSIS — I73.9 PAD (PERIPHERAL ARTERY DISEASE) (HCC): ICD-10-CM

## 2024-02-21 DIAGNOSIS — I25.118 CORONARY ARTERY DISEASE OF NATIVE ARTERY OF NATIVE HEART WITH STABLE ANGINA PECTORIS (HCC): Primary | ICD-10-CM

## 2024-02-21 PROCEDURE — 1036F TOBACCO NON-USER: CPT | Performed by: INTERNAL MEDICINE

## 2024-02-21 PROCEDURE — G8428 CUR MEDS NOT DOCUMENT: HCPCS | Performed by: INTERNAL MEDICINE

## 2024-02-21 PROCEDURE — G8484 FLU IMMUNIZE NO ADMIN: HCPCS | Performed by: INTERNAL MEDICINE

## 2024-02-21 PROCEDURE — G8417 CALC BMI ABV UP PARAM F/U: HCPCS | Performed by: INTERNAL MEDICINE

## 2024-02-21 PROCEDURE — 99214 OFFICE O/P EST MOD 30 MIN: CPT | Performed by: INTERNAL MEDICINE

## 2024-02-21 PROCEDURE — 1123F ACP DISCUSS/DSCN MKR DOCD: CPT | Performed by: INTERNAL MEDICINE

## 2024-02-21 PROCEDURE — 1090F PRES/ABSN URINE INCON ASSESS: CPT | Performed by: INTERNAL MEDICINE

## 2024-02-21 RX ORDER — DOCUSATE SODIUM 100 MG/1
100 CAPSULE, LIQUID FILLED ORAL 2 TIMES DAILY
COMMUNITY

## 2024-02-21 RX ORDER — POTASSIUM CHLORIDE 750 MG/1
10 TABLET, FILM COATED, EXTENDED RELEASE ORAL
Qty: 1 TABLET | Refills: 0 | Status: SHIPPED | OUTPATIENT
Start: 2024-02-21

## 2024-02-21 NOTE — PROGRESS NOTES
crestor, diet has been good.    Lipids noted, she not wanting med change.       5. HTN: follow on higher dose coreg.      6. Carotid Dz: no more testing needed now.        Patient has been instructed and agrees to call our office with any issues or other concerns related to their cardiac condition(s) and/or complaint(s).        Return in about 3 months (around 5/21/2024).       Mitesh Ramirez, DO  2/21/2024

## 2024-02-26 ENCOUNTER — OFFICE VISIT (OUTPATIENT)
Dept: PRIMARY CARE CLINIC | Facility: CLINIC | Age: 89
End: 2024-02-26
Payer: MEDICARE

## 2024-02-26 ENCOUNTER — OFFICE VISIT (OUTPATIENT)
Dept: ORTHOPEDIC SURGERY | Age: 89
End: 2024-02-26
Payer: MEDICARE

## 2024-02-26 VITALS
BODY MASS INDEX: 28.52 KG/M2 | DIASTOLIC BLOOD PRESSURE: 56 MMHG | HEART RATE: 55 BPM | WEIGHT: 155 LBS | TEMPERATURE: 97.3 F | SYSTOLIC BLOOD PRESSURE: 145 MMHG | HEIGHT: 62 IN | OXYGEN SATURATION: 97 %

## 2024-02-26 VITALS — HEIGHT: 62 IN | BODY MASS INDEX: 28.71 KG/M2 | WEIGHT: 156 LBS

## 2024-02-26 DIAGNOSIS — N18.2 TYPE 2 DIABETES MELLITUS WITH STAGE 2 CHRONIC KIDNEY DISEASE, WITHOUT LONG-TERM CURRENT USE OF INSULIN (HCC): ICD-10-CM

## 2024-02-26 DIAGNOSIS — M54.2 NECK PAIN ON LEFT SIDE: Primary | ICD-10-CM

## 2024-02-26 DIAGNOSIS — N18.32 STAGE 3B CHRONIC KIDNEY DISEASE (HCC): ICD-10-CM

## 2024-02-26 DIAGNOSIS — R25.1 SHAKES: ICD-10-CM

## 2024-02-26 DIAGNOSIS — M48.062 SPINAL STENOSIS OF LUMBAR REGION WITH NEUROGENIC CLAUDICATION: Primary | ICD-10-CM

## 2024-02-26 DIAGNOSIS — E11.22 TYPE 2 DIABETES MELLITUS WITH STAGE 2 CHRONIC KIDNEY DISEASE, WITHOUT LONG-TERM CURRENT USE OF INSULIN (HCC): ICD-10-CM

## 2024-02-26 DIAGNOSIS — F33.2 SEVERE EPISODE OF RECURRENT MAJOR DEPRESSIVE DISORDER, WITHOUT PSYCHOTIC FEATURES (HCC): ICD-10-CM

## 2024-02-26 PROCEDURE — 99214 OFFICE O/P EST MOD 30 MIN: CPT | Performed by: PHYSICIAN ASSISTANT

## 2024-02-26 PROCEDURE — G8417 CALC BMI ABV UP PARAM F/U: HCPCS | Performed by: FAMILY MEDICINE

## 2024-02-26 PROCEDURE — G8484 FLU IMMUNIZE NO ADMIN: HCPCS | Performed by: PHYSICIAN ASSISTANT

## 2024-02-26 PROCEDURE — 1036F TOBACCO NON-USER: CPT | Performed by: FAMILY MEDICINE

## 2024-02-26 PROCEDURE — 1036F TOBACCO NON-USER: CPT | Performed by: PHYSICIAN ASSISTANT

## 2024-02-26 PROCEDURE — G8427 DOCREV CUR MEDS BY ELIG CLIN: HCPCS | Performed by: FAMILY MEDICINE

## 2024-02-26 PROCEDURE — 3044F HG A1C LEVEL LT 7.0%: CPT | Performed by: FAMILY MEDICINE

## 2024-02-26 PROCEDURE — G8427 DOCREV CUR MEDS BY ELIG CLIN: HCPCS | Performed by: PHYSICIAN ASSISTANT

## 2024-02-26 PROCEDURE — 1123F ACP DISCUSS/DSCN MKR DOCD: CPT | Performed by: FAMILY MEDICINE

## 2024-02-26 PROCEDURE — 1123F ACP DISCUSS/DSCN MKR DOCD: CPT | Performed by: PHYSICIAN ASSISTANT

## 2024-02-26 PROCEDURE — 99214 OFFICE O/P EST MOD 30 MIN: CPT | Performed by: FAMILY MEDICINE

## 2024-02-26 PROCEDURE — G8417 CALC BMI ABV UP PARAM F/U: HCPCS | Performed by: PHYSICIAN ASSISTANT

## 2024-02-26 PROCEDURE — 1090F PRES/ABSN URINE INCON ASSESS: CPT | Performed by: PHYSICIAN ASSISTANT

## 2024-02-26 PROCEDURE — 1090F PRES/ABSN URINE INCON ASSESS: CPT | Performed by: FAMILY MEDICINE

## 2024-02-26 PROCEDURE — G8484 FLU IMMUNIZE NO ADMIN: HCPCS | Performed by: FAMILY MEDICINE

## 2024-02-26 ASSESSMENT — PATIENT HEALTH QUESTIONNAIRE - PHQ9
9. THOUGHTS THAT YOU WOULD BE BETTER OFF DEAD, OR OF HURTING YOURSELF: 0
4. FEELING TIRED OR HAVING LITTLE ENERGY: 3
6. FEELING BAD ABOUT YOURSELF - OR THAT YOU ARE A FAILURE OR HAVE LET YOURSELF OR YOUR FAMILY DOWN: 2
10. IF YOU CHECKED OFF ANY PROBLEMS, HOW DIFFICULT HAVE THESE PROBLEMS MADE IT FOR YOU TO DO YOUR WORK, TAKE CARE OF THINGS AT HOME, OR GET ALONG WITH OTHER PEOPLE: 1
5. POOR APPETITE OR OVEREATING: 0
2. FEELING DOWN, DEPRESSED OR HOPELESS: 2
SUM OF ALL RESPONSES TO PHQ9 QUESTIONS 1 & 2: 5
SUM OF ALL RESPONSES TO PHQ QUESTIONS 1-9: 14
7. TROUBLE CONCENTRATING ON THINGS, SUCH AS READING THE NEWSPAPER OR WATCHING TELEVISION: 3
SUM OF ALL RESPONSES TO PHQ QUESTIONS 1-9: 14
1. LITTLE INTEREST OR PLEASURE IN DOING THINGS: 3
8. MOVING OR SPEAKING SO SLOWLY THAT OTHER PEOPLE COULD HAVE NOTICED. OR THE OPPOSITE, BEING SO FIGETY OR RESTLESS THAT YOU HAVE BEEN MOVING AROUND A LOT MORE THAN USUAL: 1
3. TROUBLE FALLING OR STAYING ASLEEP: 0

## 2024-02-26 NOTE — PROGRESS NOTES
02/26/24        Name: Muriel Barajas  YOB: 1935  Gender: female  MRN: [id number]    CC: Follow-up       HPI: Muriel Barajas is a 88 y.o. female who returns for Follow-up         Patient returns the office today for follow-up.  She last underwent L4-5 interlaminar epidural steroid injection with Dr. Samson on 10/3/2023.  Had greater than 80% proving her pain for more than 3 months.  Over the last few weeks she has had return of lower back pain with some rating pain into her legs.    History was obtained by patient      Meds/PSH/PMH/FH/SH: This information has been reviewed.      ALLERGIES:   Allergies   Allergen Reactions    Amoxicillin-Pot Clavulanate Other (See Comments) and Itching     Causes yeast infection  Other reaction(s): Itching-Allergy  Causes yeast infection  Causes yeast infection      Gabapentin Other (See Comments)     Other reaction(s): Altered Mental State-Intolerance, Other (see comments)    Hydrochlorothiazide Other (See Comments)     Hyponatremia  Other reaction(s): Other (see comments), Other- (not listed) - Allergy  Hyponatremia  Hyponatremia      Nitrofurantoin Other (See Comments)     Other reaction(s): Altered Mental State-Intolerance    Prednisone Other (See Comments)     Visual loss and headache  Other reaction(s): Other (see comments)  Visual loss and headache    Sulfamethoxazole-Trimethoprim Nausea Only    Adhesive Tape Rash    Codeine Other (See Comments) and Palpitations     Made my heart go crazy  Other reaction(s): Palpitations-Intolerance, Palpitations-Intolerance              Physical Examination:            Imaging:       MRI Result (most recent):  MRI LUMBAR SPINE WO CONTRAST 09/07/2023    Narrative  History: Spondylolisthesis, lumbar region; Spondylosis without myelopathy or  radiculopathy, lumbar region; Other intervertebral disc degeneration, lumbar  region    Exam: MRI lumbar spine without contrast    Technique: Axial and sagittal T1 and T2-weighted

## 2024-02-26 NOTE — PROGRESS NOTES
[facility] PRIMARY [facility]  Juan Nobles M.D.  [address] [place] [address] [place] [address]  Ph No:  [phone]  Fax:  [phone]    CHIEF COMPLAINT:  Chief Complaint   Patient presents with    Neck Pain     Patient presents with left side neck pain, left ear pain, pressure on left side and tender on the top of her head x several weeks. She takes Tylenol and Vicks terry.    Discuss Labs     Patient is here to go over lab results    Leg Injury     Patient has bruising and red legs x2 days.          IMPRESSION/PLAN    1. Neck pain on left side  -     CT SOFT TISSUE NECK W WO CONTRAST; Future  2. Shakes  -     [facility]  3. Severe episode of recurrent major depressive disorder, without psychotic features (HCC)  4. Type 2 diabetes mellitus with stage 2 chronic kidney disease, without long-term current use of insulin (HCC)  5. Stage 3b chronic kidney disease (HCC)      Will get a CT of her neck.  Follow-up depending on results.  Referring to neurology for evaluation of her shakes and persistent debilitating tinnitus.  I did advise her that we need to avoid sedating medications such as benzodiazepines as she is at very high risk for falls and high risk for bleeds particularly while she is on Xarelto and aspirin.  Continue with Lexapro for depression.  Continue with remaining medications as prescribed.  She still lives alone but has constant contact with her son and has an emergency alert button.  She is still very much alert and admits to having some issues with remembering names.  She still manages her ADLs independently.  Will see her back here and 6 months or as needed.    We discussed the expected course, resolution and complications of the diagnosis(es) in detail.  Medication risks, benefits, costs, interactions, and alternatives were discussed as indicated.  I advised pt to contact the office if condition worsens, changes or fails to improve as anticipated. Pt expressed

## 2024-03-04 RX ORDER — BETAMETHASONE SODIUM PHOSPHATE AND BETAMETHASONE ACETATE 3; 3 MG/ML; MG/ML
12 INJECTION, SUSPENSION INTRA-ARTICULAR; INTRALESIONAL; INTRAMUSCULAR; SOFT TISSUE ONCE
Status: CANCELLED | OUTPATIENT
Start: 2024-03-04 | End: 2024-03-04

## 2024-03-06 ENCOUNTER — TELEPHONE (OUTPATIENT)
Dept: PRIMARY CARE CLINIC | Facility: CLINIC | Age: 89
End: 2024-03-06

## 2024-03-06 NOTE — TELEPHONE ENCOUNTER
Patient would like a refill on Xanax she said it helps her ear, her shaking and nerves and that she does not have enough to last the weekend. [facility] [place].  She said to leave a message on her voicemail and let her know that he is going to refill   [phone].  She was seen in February and she is scheduled for May.

## 2024-03-11 ENCOUNTER — TELEMEDICINE (OUTPATIENT)
Dept: PRIMARY CARE CLINIC | Facility: CLINIC | Age: 89
End: 2024-03-11
Payer: MEDICARE

## 2024-03-11 DIAGNOSIS — H93.13 BILATERAL TINNITUS: ICD-10-CM

## 2024-03-11 DIAGNOSIS — F13.99 SEDATIVE, HYPNOTIC OR ANXIOLYTIC USE, UNSPECIFIED WITH UNSPECIFIED SEDATIVE, HYPNOTIC OR ANXIOLYTIC-INDUCED DISORDER (HCC): ICD-10-CM

## 2024-03-11 DIAGNOSIS — R25.1 SHAKES: Primary | ICD-10-CM

## 2024-03-11 PROCEDURE — G8484 FLU IMMUNIZE NO ADMIN: HCPCS | Performed by: FAMILY MEDICINE

## 2024-03-11 PROCEDURE — 1123F ACP DISCUSS/DSCN MKR DOCD: CPT | Performed by: FAMILY MEDICINE

## 2024-03-11 PROCEDURE — G8417 CALC BMI ABV UP PARAM F/U: HCPCS | Performed by: FAMILY MEDICINE

## 2024-03-11 PROCEDURE — G8427 DOCREV CUR MEDS BY ELIG CLIN: HCPCS | Performed by: FAMILY MEDICINE

## 2024-03-11 PROCEDURE — 99213 OFFICE O/P EST LOW 20 MIN: CPT | Performed by: FAMILY MEDICINE

## 2024-03-11 PROCEDURE — 1036F TOBACCO NON-USER: CPT | Performed by: FAMILY MEDICINE

## 2024-03-11 PROCEDURE — 1090F PRES/ABSN URINE INCON ASSESS: CPT | Performed by: FAMILY MEDICINE

## 2024-03-11 RX ORDER — ALPRAZOLAM 0.5 MG/1
0.25 TABLET ORAL 2 TIMES DAILY PRN
Qty: 30 TABLET | Refills: 0 | Status: SHIPPED | OUTPATIENT
Start: 2024-03-11 | End: 2024-04-10

## 2024-03-11 NOTE — PROGRESS NOTES
Muriel Barajas is a 88 y.o. female evaluated via telephone on 3/11/2024 for   No chief complaint on file.  .      Assessment & Plan     1. Shakes  -     ALPRAZolam (XANAX) 0.5 MG tablet; Take 0.5 tablets by mouth 2 times daily as needed for Sleep for up to 30 days. Max Daily Amount: 0.5 mg, Disp-30 tablet, R-0Normal  2. Bilateral tinnitus  -     ALPRAZolam (XANAX) 0.5 MG tablet; Take 0.5 tablets by mouth 2 times daily as needed for Sleep for up to 30 days. Max Daily Amount: 0.5 mg, Disp-30 tablet, R-0Normal  3. Sedative, hypnotic or anxiolytic use, unspecified with unspecified sedative, hypnotic or anxiolytic-induced disorder (HCC)  -     ALPRAZolam (XANAX) 0.5 MG tablet; Take 0.5 tablets by mouth 2 times daily as needed for Sleep for up to 30 days. Max Daily Amount: 0.5 mg, Disp-30 tablet, R-0Normal    We spoke at length regarding the safety of benzodiazepines given her age and her high risk for falls.  She feels that this medication has actually made her balance better and that she is less shaking and her quality of life is dramatically improved.  She is aware of the risk of falls and sedation and she is aware not to drive or operate equipment while taking the medication.  Will see her back in 1 month.  I did send a prescription for 0.5 mg and she will take one half of a pill twice daily as needed.    Return in about 1 month (around 4/11/2024), or if symptoms worsen or fail to improve.        Subjective   HPI  Patient is here to discuss medications.  She has longstanding history of severe chronic tinnitus and shakes and tremors.  She in the past had been on Xanax which she indicates that helped but caused some sedation and was at 1 point switch to Valium which worked okay for a while.  She states that she restarted her Xanax about 2 weeks ago.  She has a bottle left from 2021.  She started taking one half of a tablet twice a day as it has been prescribed and noticed a tremendous improvement in her tinnitus and

## 2024-03-12 ENCOUNTER — OFFICE VISIT (OUTPATIENT)
Dept: ORTHOPEDIC SURGERY | Age: 89
End: 2024-03-12
Payer: MEDICARE

## 2024-03-12 DIAGNOSIS — M48.062 SPINAL STENOSIS OF LUMBAR REGION WITH NEUROGENIC CLAUDICATION: Primary | ICD-10-CM

## 2024-03-12 PROCEDURE — 64483 NJX AA&/STRD TFRM EPI L/S 1: CPT | Performed by: PHYSICAL MEDICINE & REHABILITATION

## 2024-03-12 RX ORDER — TRIAMCINOLONE ACETONIDE 40 MG/ML
40 INJECTION, SUSPENSION INTRA-ARTICULAR; INTRAMUSCULAR ONCE
Status: COMPLETED | OUTPATIENT
Start: 2024-03-12 | End: 2024-03-12

## 2024-03-12 RX ADMIN — TRIAMCINOLONE ACETONIDE 40 MG: 40 INJECTION, SUSPENSION INTRA-ARTICULAR; INTRAMUSCULAR at 14:42

## 2024-03-12 NOTE — PROGRESS NOTES
placement and her safety. A hard copy of the fluoroscopic image has been placed in her chart and is saved on the C-arm hard drive. She was monitored for 30 minutes after the procedure and discharged home in a stable fashion with a routine follow up.    Procedural Diagnosis:     ICD-10-CM    1. Spinal stenosis of lumbar region with neurogenic claudication  M48.062 FL NERVE BLOCK LUMBOSACRAL 1ST     triamcinolone acetonide (KENALOG-40) injection 40 mg         LYNNE RODRIGUEZ MD  03/12/24

## 2024-03-15 ENCOUNTER — HOSPITAL ENCOUNTER (OUTPATIENT)
Dept: CT IMAGING | Age: 89
End: 2024-03-15
Attending: FAMILY MEDICINE
Payer: MEDICARE

## 2024-03-15 DIAGNOSIS — M54.2 NECK PAIN ON LEFT SIDE: ICD-10-CM

## 2024-03-15 PROCEDURE — 70491 CT SOFT TISSUE NECK W/DYE: CPT

## 2024-03-15 PROCEDURE — 6360000004 HC RX CONTRAST MEDICATION: Performed by: FAMILY MEDICINE

## 2024-03-15 RX ADMIN — IOPAMIDOL 80 ML: 755 INJECTION, SOLUTION INTRAVENOUS at 11:21

## 2024-03-19 ENCOUNTER — TELEPHONE (OUTPATIENT)
Dept: ORTHOPEDIC SURGERY | Age: 89
End: 2024-03-19

## 2024-03-20 NOTE — TELEPHONE ENCOUNTER
Called pt back no answer, pt should wait one more week for injection to take effect and if it persists pt needs an appt  to see clive and discuss further options.

## 2024-03-26 NOTE — PROGRESS NOTES
Problem Relation Age of Onset    No Known Problems Maternal Grandfather     No Known Problems Paternal Grandmother     No Known Problems Paternal Grandfather     Diabetes Mother     Heart Surgery Mother     Heart Disease Mother     Heart Attack Father     Heart Disease Father     No Known Problems Sister     Cancer Sister         2 sisters w/ lung ca-neither one smoked    Heart Disease Sister     Cancer Brother         lung ca-smoker    Heart Attack Brother          age 25 of [place]    Heart Disease Sister     Heart Surgery Sister     Heart Attack Sister     Heart Disease Sister     Heart Surgery Sister     Heart Attack Sister     No Known Problems Maternal Grandmother     Heart Surgery Brother          in [place] during 2nd heart surgery    Heart Disease Brother        Review of Systems  Constitutional:   Negative for fever, chills, appetite change, malaise/fatigue, headaches and weight loss.  Skin:  Negative for skin lesions, rash and itching.  Eyes:  Negative for visual disturbance, eye pain and eye discharge.  ENT:  Negative for difficulty articulating words, pain swallowing, high frequency hearing loss and dry mouth.  Respiratory:  Negative for cough, blood in sputum and wheezing.  Cardiovascular:  Negative for chest pain, hypertension, irregular heartbeat, leg pain, leg swelling, regular rate and rhythm and varicose veins.  GI:  Negative for nausea, vomiting, abdominal pain, blood in stool, constipation, diarrhea, indigestion and heartburn.  Genitourinary: Positive for recurrent UTIs. Negative for urinary burning, hematuria, flank pain, history of urolithiasis, nocturia, slower stream, straining, urgency, leakage w/ urge, frequent urination, incomplete emptying and vaginal pain.  Musculoskeletal:  Negative for back pain, bone pain, arthralgias, tenderness, muscle weakness and neck pain.  Neurological:  Negative for dizziness, focal weakness, numbness, seizures and tremors.  Psychological:  Negative for

## 2024-03-27 ENCOUNTER — OFFICE VISIT (OUTPATIENT)
Dept: UROLOGY | Age: 89
End: 2024-03-27
Payer: MEDICARE

## 2024-03-27 DIAGNOSIS — C64.2 MALIGNANT NEOPLASM OF LEFT KIDNEY, EXCEPT RENAL PELVIS (HCC): Primary | ICD-10-CM

## 2024-03-27 DIAGNOSIS — N39.0 RECURRENT UTI: ICD-10-CM

## 2024-03-27 DIAGNOSIS — R33.9 INCOMPLETE BLADDER EMPTYING: ICD-10-CM

## 2024-03-27 DIAGNOSIS — R39.89 BLADDER PAIN: ICD-10-CM

## 2024-03-27 LAB
BILIRUBIN, URINE, POC: NEGATIVE
BLOOD URINE, POC: NEGATIVE
GLUCOSE URINE, POC: NEGATIVE
KETONES, URINE, POC: NEGATIVE
LEUKOCYTE ESTERASE, URINE, POC: NEGATIVE
NITRITE, URINE, POC: NEGATIVE
PH, URINE, POC: 5.5 (ref 4.6–8)
PROTEIN,URINE, POC: 30
SPECIFIC GRAVITY, URINE, POC: 1.03 (ref 1–1.03)
URINALYSIS CLARITY, POC: NORMAL
URINALYSIS COLOR, POC: NORMAL
UROBILINOGEN, POC: NORMAL

## 2024-03-27 PROCEDURE — 99213 OFFICE O/P EST LOW 20 MIN: CPT | Performed by: UROLOGY

## 2024-03-27 PROCEDURE — 1036F TOBACCO NON-USER: CPT | Performed by: UROLOGY

## 2024-03-27 PROCEDURE — G8417 CALC BMI ABV UP PARAM F/U: HCPCS | Performed by: UROLOGY

## 2024-03-27 PROCEDURE — G8427 DOCREV CUR MEDS BY ELIG CLIN: HCPCS | Performed by: UROLOGY

## 2024-03-27 PROCEDURE — G8484 FLU IMMUNIZE NO ADMIN: HCPCS | Performed by: UROLOGY

## 2024-03-27 PROCEDURE — 1123F ACP DISCUSS/DSCN MKR DOCD: CPT | Performed by: UROLOGY

## 2024-03-27 PROCEDURE — 1090F PRES/ABSN URINE INCON ASSESS: CPT | Performed by: UROLOGY

## 2024-03-27 PROCEDURE — 81003 URINALYSIS AUTO W/O SCOPE: CPT | Performed by: UROLOGY

## 2024-03-27 ASSESSMENT — ENCOUNTER SYMPTOMS
NAUSEA: 0
COUGH: 0
EYE PAIN: 0
INDIGESTION: 0
ABDOMINAL PAIN: 0
DIARRHEA: 0
SKIN LESIONS: 0
WHEEZING: 0
HEARTBURN: 0
CONSTIPATION: 0
EYE DISCHARGE: 0
VOMITING: 0
BLOOD IN STOOL: 0
BACK PAIN: 0

## 2024-04-01 ENCOUNTER — OFFICE VISIT (OUTPATIENT)
Dept: ORTHOPEDIC SURGERY | Age: 89
End: 2024-04-01
Payer: MEDICARE

## 2024-04-01 DIAGNOSIS — M48.062 SPINAL STENOSIS OF LUMBAR REGION WITH NEUROGENIC CLAUDICATION: Primary | ICD-10-CM

## 2024-04-01 PROCEDURE — G8428 CUR MEDS NOT DOCUMENT: HCPCS | Performed by: PHYSICIAN ASSISTANT

## 2024-04-01 PROCEDURE — G8417 CALC BMI ABV UP PARAM F/U: HCPCS | Performed by: PHYSICIAN ASSISTANT

## 2024-04-01 PROCEDURE — 1090F PRES/ABSN URINE INCON ASSESS: CPT | Performed by: PHYSICIAN ASSISTANT

## 2024-04-01 PROCEDURE — 99214 OFFICE O/P EST MOD 30 MIN: CPT | Performed by: PHYSICIAN ASSISTANT

## 2024-04-01 PROCEDURE — 1123F ACP DISCUSS/DSCN MKR DOCD: CPT | Performed by: PHYSICIAN ASSISTANT

## 2024-04-01 PROCEDURE — 1036F TOBACCO NON-USER: CPT | Performed by: PHYSICIAN ASSISTANT

## 2024-04-01 PROCEDURE — G2211 COMPLEX E/M VISIT ADD ON: HCPCS | Performed by: PHYSICIAN ASSISTANT

## 2024-04-01 NOTE — PROGRESS NOTES
or  radiculopathy, lumbar region; Other intervertebral disc degeneration, lumbar  region    Exam: MRI lumbar spine without contrast    Technique: Axial and sagittal T1 and T2-weighted sequences are available for  review.  A sagittal STIR sequence is also available for review.    Comparison: 12/23/2010    Findings:    There is grade 1 degenerative anterolisthesis present L4 on L5. This is new when  compared with the prior exam. Degenerative disc signal present at all included  levels with varying degrees of disc height loss. The conus is normal in  configuration and signal intensity throughout. Axial imaging demonstrates no  abnormal retroperitoneal lesions.    L1-2: There is facet arthropathy without central or neural foraminal narrowing.    L2-3: There is a disc bulge with bilateral facet arthropathy. There is mild  central stenosis, new when compared with the prior exam. There is also mild left  neural foraminal narrowing, stable.    L3-L4: There is a disc bulge with bilateral facet arthropathy and redundancy of  ligamentum flavum. There is moderate central stenosis, new. There is moderate  right neural foraminal narrowing, stable.    L4-L5: There is a disc bulge with bilateral facet arthropathy and redundancy of  the ligament of flavum. There is severe central stenosis, new. There is stable  mild bilateral neural foraminal narrowing.    L5-S1: There is a disc bulge with bilateral facet arthropathy. There is moderate  right and mild left neural foraminal narrowing, stable.    Impression  Impression: Multilevel lumbar spondylosis as described level by level above.  Overall, there has been interval progression of disease when compared with the  prior exam including new degenerative grade 1 anterolisthesis L4 on L5.            Assessment and Plan    Unfortunately she did not get any relief from the selective nerve root blocks.  It seems like she got very meaningful relief from the previous L4-5 SEVERIANO months ago.  I

## 2024-04-08 ENCOUNTER — TELEPHONE (OUTPATIENT)
Dept: ORTHOPEDIC SURGERY | Age: 89
End: 2024-04-08

## 2024-04-08 NOTE — TELEPHONE ENCOUNTER
Called pt no answer, no VM, pt okay to get injection and okay to hold blood thinner.   3 day hold on xarelto is fine,

## 2024-04-10 ENCOUNTER — TELEPHONE (OUTPATIENT)
Dept: ORTHOPEDIC SURGERY | Age: 89
End: 2024-04-10

## 2024-04-10 ENCOUNTER — HOSPITAL ENCOUNTER (OUTPATIENT)
Dept: CT IMAGING | Age: 89
Discharge: HOME | End: 2024-04-13
Attending: UROLOGY
Payer: MEDICARE

## 2024-04-10 DIAGNOSIS — C64.2 MALIGNANT NEOPLASM OF LEFT KIDNEY, EXCEPT RENAL PELVIS (HCC): ICD-10-CM

## 2024-04-10 LAB — CREAT BLD-MCNC: 1.01 MG/DL (ref 0.8–1.5)

## 2024-04-10 PROCEDURE — 82565 ASSAY OF CREATININE: CPT

## 2024-04-10 PROCEDURE — 6360000004 HC RX CONTRAST MEDICATION: Performed by: UROLOGY

## 2024-04-10 PROCEDURE — 74170 CT ABD WO CNTRST FLWD CNTRST: CPT

## 2024-04-10 RX ADMIN — IOPAMIDOL 100 ML: 755 INJECTION, SOLUTION INTRAVENOUS at 10:41

## 2024-04-10 NOTE — TELEPHONE ENCOUNTER
Patient wants to set up an injection,  was told , we would have to check with cardio to come off blood thinner so she could do the injection

## 2024-04-11 ENCOUNTER — TELEMEDICINE (OUTPATIENT)
Dept: PRIMARY CARE CLINIC | Facility: CLINIC | Age: 89
End: 2024-04-11
Payer: MEDICARE

## 2024-04-11 DIAGNOSIS — R25.1 SHAKES: ICD-10-CM

## 2024-04-11 DIAGNOSIS — F13.99 SEDATIVE, HYPNOTIC OR ANXIOLYTIC USE, UNSPECIFIED WITH UNSPECIFIED SEDATIVE, HYPNOTIC OR ANXIOLYTIC-INDUCED DISORDER (HCC): ICD-10-CM

## 2024-04-11 DIAGNOSIS — H93.13 BILATERAL TINNITUS: Primary | ICD-10-CM

## 2024-04-11 PROCEDURE — 1036F TOBACCO NON-USER: CPT | Performed by: FAMILY MEDICINE

## 2024-04-11 PROCEDURE — 1123F ACP DISCUSS/DSCN MKR DOCD: CPT | Performed by: FAMILY MEDICINE

## 2024-04-11 PROCEDURE — G8427 DOCREV CUR MEDS BY ELIG CLIN: HCPCS | Performed by: FAMILY MEDICINE

## 2024-04-11 PROCEDURE — G8417 CALC BMI ABV UP PARAM F/U: HCPCS | Performed by: FAMILY MEDICINE

## 2024-04-11 PROCEDURE — 99214 OFFICE O/P EST MOD 30 MIN: CPT | Performed by: FAMILY MEDICINE

## 2024-04-11 PROCEDURE — 1090F PRES/ABSN URINE INCON ASSESS: CPT | Performed by: FAMILY MEDICINE

## 2024-04-11 RX ORDER — ALPRAZOLAM 0.5 MG/1
0.25 TABLET ORAL 2 TIMES DAILY PRN
Qty: 60 TABLET | Refills: 1 | Status: SHIPPED | OUTPATIENT
Start: 2024-04-11 | End: 2024-10-08

## 2024-04-11 RX ORDER — POTASSIUM CHLORIDE 750 MG/1
10 TABLET, FILM COATED, EXTENDED RELEASE ORAL DAILY
Qty: 30 TABLET | Refills: 5 | Status: SHIPPED | OUTPATIENT
Start: 2024-04-11

## 2024-04-11 ASSESSMENT — PATIENT HEALTH QUESTIONNAIRE - PHQ9
SUM OF ALL RESPONSES TO PHQ QUESTIONS 1-9: 2
2. FEELING DOWN, DEPRESSED OR HOPELESS: SEVERAL DAYS
SUM OF ALL RESPONSES TO PHQ9 QUESTIONS 1 & 2: 2
SUM OF ALL RESPONSES TO PHQ QUESTIONS 1-9: 2
SUM OF ALL RESPONSES TO PHQ QUESTIONS 1-9: 2
1. LITTLE INTEREST OR PLEASURE IN DOING THINGS: SEVERAL DAYS
SUM OF ALL RESPONSES TO PHQ QUESTIONS 1-9: 2

## 2024-04-11 NOTE — PROGRESS NOTES
Muriel Barajas is a 88 y.o. female evaluated via telephone on 4/11/2024 for   No chief complaint on file.  .      Assessment & Plan     1. Bilateral tinnitus  -     ALPRAZolam (XANAX) 0.5 MG tablet; Take 0.5 tablets by mouth 2 times daily as needed for Sleep for up to 180 days. Max Daily Amount: 0.5 mg, Disp-60 tablet, R-1Normal  2. Shakes  -     ALPRAZolam (XANAX) 0.5 MG tablet; Take 0.5 tablets by mouth 2 times daily as needed for Sleep for up to 180 days. Max Daily Amount: 0.5 mg, Disp-60 tablet, R-1Normal  3. Sedative, hypnotic or anxiolytic use, unspecified with unspecified sedative, hypnotic or anxiolytic-induced disorder (HCC)  -     ALPRAZolam (XANAX) 0.5 MG tablet; Take 0.5 tablets by mouth 2 times daily as needed for Sleep for up to 180 days. Max Daily Amount: 0.5 mg, Disp-60 tablet, R-1Normal    We will continue with alprazolam.  She will take a whole pill at night and one half of a pill as needed during the day.  We again discussed the side effects of the medications and the potential for falls and dizziness.  She indicates that the dizziness was worse before the medication and that she was more risk before she started taking it.  She is nonetheless aware that the potential.  Will need to monitor closely.    Return in about 3 months (around 7/11/2024), or if symptoms worsen or fail to improve, for labs 1 week prior to visit.        Subjective   HPI  Patient here today for follow-up.  She is very excited that she has had less of the ringing in her ears and has had less of the shakes and tremors.  She states that it is not entirely gone but she has had some days when she had no symptoms whatsoever.  She indicates that this is a change from before when she has severe symptoms every day.  She states that it does seem to progress at night.  She indicates that she has interruptions in sleep and that contemplating taking a whole tablet at night.  She has been taking only half of a 0.5 pill twice a day.  She

## 2024-04-11 NOTE — RESULT ENCOUNTER NOTE
Mrs. Barajas, your CT scan shows shows NO recurrence of your kidney mass and NO recurrence of your cancer.  You do not need any additional intervention at this time and I would recommend that you keep your follow up with me as scheduled next year.     Best Regards,   Dr. Jay

## 2024-04-17 ENCOUNTER — OFFICE VISIT (OUTPATIENT)
Dept: VASCULAR SURGERY | Age: 89
End: 2024-04-17
Payer: MEDICARE

## 2024-04-17 VITALS
HEIGHT: 62 IN | OXYGEN SATURATION: 96 % | SYSTOLIC BLOOD PRESSURE: 152 MMHG | BODY MASS INDEX: 28.89 KG/M2 | WEIGHT: 157 LBS | HEART RATE: 57 BPM | DIASTOLIC BLOOD PRESSURE: 69 MMHG

## 2024-04-17 DIAGNOSIS — I73.9 PVD (PERIPHERAL VASCULAR DISEASE) (HCC): ICD-10-CM

## 2024-04-17 DIAGNOSIS — I65.23 BILATERAL CAROTID ARTERY STENOSIS: Primary | ICD-10-CM

## 2024-04-17 PROCEDURE — G8427 DOCREV CUR MEDS BY ELIG CLIN: HCPCS | Performed by: NURSE PRACTITIONER

## 2024-04-17 PROCEDURE — G8417 CALC BMI ABV UP PARAM F/U: HCPCS | Performed by: NURSE PRACTITIONER

## 2024-04-17 PROCEDURE — 1036F TOBACCO NON-USER: CPT | Performed by: NURSE PRACTITIONER

## 2024-04-17 PROCEDURE — 1123F ACP DISCUSS/DSCN MKR DOCD: CPT | Performed by: NURSE PRACTITIONER

## 2024-04-17 PROCEDURE — 99214 OFFICE O/P EST MOD 30 MIN: CPT | Performed by: NURSE PRACTITIONER

## 2024-04-17 PROCEDURE — 1090F PRES/ABSN URINE INCON ASSESS: CPT | Performed by: NURSE PRACTITIONER

## 2024-04-17 NOTE — PROGRESS NOTES
DATE OF VISIT: 4/17/2024      [facility]  Muriel Barajas is a 88 y.o. female is here in follow up for her arterial and carotid duplex study.  She denies any new lateralizing neurological symptoms.  She denies any  lifestyle limiting claudication, rest pain or open ulcerations.  She does have a history of a DVT.  She remains on Xarelto, Crestor and aspirin.         MEDICAL HISTORY:   Past Medical History:   Diagnosis Date    Abdominal pain 10/28/2014    Angina at rest (HCC)     pt denies    Arthritis     osteo - low back,  shoulders, hips, low back    Bilateral carotid bruits     Bursitis     CAD (coronary artery disease)     4 vessel CABG 2005;--- stents x 4--- last one placed 2014-- followed by dr rasheed    Cervicalgia     Chronic pain     left shoulder    Coagulation defects     Xarelto    Constipation     Cough 09/11/2014    10/8/14, Methacholine Challenge Study: The point at which the FEV1 fell by at least 20%, the PC20, occurred above a dose of 25mg/mL of methacholine. This rules out the diagnosis of asthma with an extremely high degree of sensitivity. No post-challenge FEV1 recovery was identified.  Micki Emery MD        Depressive disorder     Dyspnea 09/11/2014    [facility]; 9/29/14: IMPRESSION: The flow volume loop is consistent restriction. Spirometry suggest restriction with concomitant obstruction at low lung volumes. There is not a significant bronchodilator response. Lung volumes reveal mild restriction. The unadjusted diffusion capacity is normal.  Michele Orellana MD      GERD (gastroesophageal reflux disease)     controlled with med    Headache     Hoarseness or changing voice     thougfht to be related to gerd    Hyperlipidemia     Hypertension     controlled with med    Kidney stone     yrs ago-- no tx required    Lumbago     Macular degeneration     Nodule of left lung     dx'ed 4 years ago-watched for several months-no new growth-being watched-yearly CT scan----- followed by dr. fermin Alvarez in  Called Dr. López re: RVAD cannulation dressing site oozing, writer has had to change dressing 2 times in the last hour, 4 times total in the last 14 hours. Full report given, will continue to monitor at this time.

## 2024-05-13 ENCOUNTER — TELEPHONE (OUTPATIENT)
Dept: ORTHOPEDIC SURGERY | Age: 89
End: 2024-05-13

## 2024-05-16 ENCOUNTER — TELEPHONE (OUTPATIENT)
Dept: ORTHOPEDIC SURGERY | Age: 89
End: 2024-05-16

## 2024-05-16 NOTE — TELEPHONE ENCOUNTER
Call pt and tell her when she  needs to  stop  her  blood  thinner  before next weeks  injection  on  5/23     You can leave her a VM

## 2024-05-17 DIAGNOSIS — M43.16 SPONDYLOLISTHESIS OF LUMBAR REGION: ICD-10-CM

## 2024-05-17 DIAGNOSIS — M47.816 LUMBAR SPONDYLOSIS: ICD-10-CM

## 2024-05-17 DIAGNOSIS — M51.36 DDD (DEGENERATIVE DISC DISEASE), LUMBAR: ICD-10-CM

## 2024-05-17 DIAGNOSIS — M48.062 SPINAL STENOSIS OF LUMBAR REGION WITH NEUROGENIC CLAUDICATION: Primary | ICD-10-CM

## 2024-05-22 DIAGNOSIS — I10 ESSENTIAL HYPERTENSION, BENIGN: ICD-10-CM

## 2024-05-22 DIAGNOSIS — E11.22 TYPE 2 DIABETES MELLITUS WITH STAGE 2 CHRONIC KIDNEY DISEASE, WITHOUT LONG-TERM CURRENT USE OF INSULIN (HCC): ICD-10-CM

## 2024-05-22 DIAGNOSIS — N18.2 TYPE 2 DIABETES MELLITUS WITH STAGE 2 CHRONIC KIDNEY DISEASE, WITHOUT LONG-TERM CURRENT USE OF INSULIN (HCC): ICD-10-CM

## 2024-05-22 DIAGNOSIS — E78.2 MIXED HYPERLIPIDEMIA: ICD-10-CM

## 2024-05-22 DIAGNOSIS — N18.2 TYPE 2 DIABETES MELLITUS WITH STAGE 2 CHRONIC KIDNEY DISEASE, WITHOUT LONG-TERM CURRENT USE OF INSULIN (HCC): Primary | ICD-10-CM

## 2024-05-22 DIAGNOSIS — E11.22 TYPE 2 DIABETES MELLITUS WITH STAGE 2 CHRONIC KIDNEY DISEASE, WITHOUT LONG-TERM CURRENT USE OF INSULIN (HCC): Primary | ICD-10-CM

## 2024-05-22 LAB
ALBUMIN SERPL-MCNC: 3.4 G/DL (ref 3.2–4.6)
ALBUMIN/GLOB SERPL: 1.3 (ref 1–1.9)
ALP SERPL-CCNC: 65 U/L (ref 35–104)
ALT SERPL-CCNC: 6 U/L (ref 12–65)
ANION GAP SERPL CALC-SCNC: 11 MMOL/L (ref 9–18)
AST SERPL-CCNC: 26 U/L (ref 15–37)
BASOPHILS # BLD: 0 K/UL (ref 0–0.2)
BASOPHILS NFR BLD: 1 % (ref 0–2)
BILIRUB SERPL-MCNC: 0.5 MG/DL (ref 0–1.2)
BUN SERPL-MCNC: 16 MG/DL (ref 8–23)
CALCIUM SERPL-MCNC: 8.9 MG/DL (ref 8.8–10.2)
CHLORIDE SERPL-SCNC: 101 MMOL/L (ref 98–107)
CHOLEST SERPL-MCNC: 228 MG/DL (ref 0–200)
CO2 SERPL-SCNC: 27 MMOL/L (ref 20–28)
CREAT SERPL-MCNC: 1.08 MG/DL (ref 0.6–1.1)
DIFFERENTIAL METHOD BLD: ABNORMAL
EOSINOPHIL # BLD: 0.1 K/UL (ref 0–0.8)
EOSINOPHIL NFR BLD: 2 % (ref 0.5–7.8)
ERYTHROCYTE [DISTWIDTH] IN BLOOD BY AUTOMATED COUNT: 15.2 % (ref 11.9–14.6)
EST. AVERAGE GLUCOSE BLD GHB EST-MCNC: 124 MG/DL
GLOBULIN SER CALC-MCNC: 2.6 G/DL (ref 2.3–3.5)
GLUCOSE SERPL-MCNC: 99 MG/DL (ref 70–99)
HBA1C MFR BLD: 5.9 % (ref 0–5.6)
HCT VFR BLD AUTO: 37.9 % (ref 35.8–46.3)
HDLC SERPL-MCNC: 73 MG/DL (ref 40–60)
HDLC SERPL: 3.1 (ref 0–5)
HGB BLD-MCNC: 12.4 G/DL (ref 11.7–15.4)
IMM GRANULOCYTES # BLD AUTO: 0 K/UL (ref 0–0.5)
IMM GRANULOCYTES NFR BLD AUTO: 0 % (ref 0–5)
LDLC SERPL CALC-MCNC: 136 MG/DL (ref 0–100)
LYMPHOCYTES # BLD: 1.4 K/UL (ref 0.5–4.6)
LYMPHOCYTES NFR BLD: 30 % (ref 13–44)
MCH RBC QN AUTO: 33.2 PG (ref 26.1–32.9)
MCHC RBC AUTO-ENTMCNC: 32.7 G/DL (ref 31.4–35)
MCV RBC AUTO: 101.3 FL (ref 82–102)
MONOCYTES # BLD: 0.6 K/UL (ref 0.1–1.3)
MONOCYTES NFR BLD: 12 % (ref 4–12)
NEUTS SEG # BLD: 2.6 K/UL (ref 1.7–8.2)
NEUTS SEG NFR BLD: 55 % (ref 43–78)
NRBC # BLD: 0 K/UL (ref 0–0.2)
PLATELET # BLD AUTO: 226 K/UL (ref 150–450)
PMV BLD AUTO: 10.2 FL (ref 9.4–12.3)
POTASSIUM SERPL-SCNC: 4.2 MMOL/L (ref 3.5–5.1)
PROT SERPL-MCNC: 6 G/DL (ref 6.3–8.2)
RBC # BLD AUTO: 3.74 M/UL (ref 4.05–5.2)
SODIUM SERPL-SCNC: 140 MMOL/L (ref 136–145)
TRIGL SERPL-MCNC: 96 MG/DL (ref 0–150)
VLDLC SERPL CALC-MCNC: 19 MG/DL (ref 6–23)
WBC # BLD AUTO: 4.7 K/UL (ref 4.3–11.1)

## 2024-05-23 ENCOUNTER — OFFICE VISIT (OUTPATIENT)
Dept: ORTHOPEDIC SURGERY | Age: 89
End: 2024-05-23
Payer: MEDICARE

## 2024-05-23 DIAGNOSIS — M47.816 LUMBAR SPONDYLOSIS: ICD-10-CM

## 2024-05-23 DIAGNOSIS — M51.36 DDD (DEGENERATIVE DISC DISEASE), LUMBAR: ICD-10-CM

## 2024-05-23 DIAGNOSIS — M48.062 SPINAL STENOSIS OF LUMBAR REGION WITH NEUROGENIC CLAUDICATION: Primary | ICD-10-CM

## 2024-05-23 DIAGNOSIS — M43.16 SPONDYLOLISTHESIS OF LUMBAR REGION: ICD-10-CM

## 2024-05-23 PROCEDURE — 62323 NJX INTERLAMINAR LMBR/SAC: CPT | Performed by: PHYSICAL MEDICINE & REHABILITATION

## 2024-05-23 RX ORDER — BETAMETHASONE SODIUM PHOSPHATE AND BETAMETHASONE ACETATE 3; 3 MG/ML; MG/ML
12 INJECTION, SUSPENSION INTRA-ARTICULAR; INTRALESIONAL; INTRAMUSCULAR; SOFT TISSUE ONCE
Status: COMPLETED | OUTPATIENT
Start: 2024-05-23 | End: 2024-05-23

## 2024-05-23 RX ADMIN — BETAMETHASONE SODIUM PHOSPHATE AND BETAMETHASONE ACETATE 12 MG: 3; 3 INJECTION, SUSPENSION INTRA-ARTICULAR; INTRALESIONAL; INTRAMUSCULAR; SOFT TISSUE at 09:25

## 2024-05-23 NOTE — PROGRESS NOTES
Name: Muriel Barajas  YOB: 1935  Gender: female  MRN: [id number]        Interlaminar SEVERIANO Procedure Note      Procedure: L4-L5 interlaminar epidural steroid injection    Precautions: Muriel Barajas reports prior sensitivity to steroid, local anesthetic, iodine, or shellfish.  Her medical record indicates history of allergy to prednisone, but patient reports previously taking prednisone for an ear problem without problems. She also tolerated L4-5 SEVERIANO and bilateral L4-5 transforaminal SEVERIANO without problems.       Consent:  Consent was obtained prior to the procedure. The procedure was discussed at length with Muriel Barajas. She was given the opportunity to ask questions regarding the procedure and its associated risks.  In addition to the potential risks associated with the procedure itself, the patient was informed both verbally and in writing of potential side effects of the use glucocorticoids.  The patient appeared to comprehend the informed consent and desired to have the procedure performed, and informed consent was signed.     She was placed in a prone position on the fluoroscopy table and the skin was prepped and draped in a routine sterile fashion. The areas to be injected were each anesthetized with 1 ml of 1% Lidocaine. A 22 gauge 3.5 inch spinal needle was carefully advanced under fluoroscopic guidance to the L4-L5 interlaminar space (left paramedian). 0.5 ml of 70% of Omnipaque was injected to confirm proper needle placement and absence of subdural or vascular flow Once proper placement was confirmed, 2ml of sterile water and 12 mg of betamethasone were injected through the spinal needle.       Fluoroscopic guidance was used intermittently over a 10-minute period to insure proper needle placement and her safety. A hard copy of the fluoroscopic image has been placed in her chart and is saved on the C-arm hard drive. She was monitored for 30 minutes after the procedure and

## 2024-05-28 ENCOUNTER — OFFICE VISIT (OUTPATIENT)
Dept: PRIMARY CARE CLINIC | Facility: CLINIC | Age: 89
End: 2024-05-28
Payer: MEDICARE

## 2024-05-28 VITALS
WEIGHT: 157 LBS | TEMPERATURE: 97.2 F | DIASTOLIC BLOOD PRESSURE: 45 MMHG | HEIGHT: 62 IN | HEART RATE: 55 BPM | OXYGEN SATURATION: 95 % | BODY MASS INDEX: 28.89 KG/M2 | SYSTOLIC BLOOD PRESSURE: 109 MMHG

## 2024-05-28 DIAGNOSIS — I50.22 CHRONIC SYSTOLIC (CONGESTIVE) HEART FAILURE (HCC): ICD-10-CM

## 2024-05-28 DIAGNOSIS — R35.0 FREQUENCY OF URINATION: ICD-10-CM

## 2024-05-28 DIAGNOSIS — K59.04 CHRONIC IDIOPATHIC CONSTIPATION: ICD-10-CM

## 2024-05-28 DIAGNOSIS — E11.22 TYPE 2 DIABETES MELLITUS WITH STAGE 2 CHRONIC KIDNEY DISEASE, WITHOUT LONG-TERM CURRENT USE OF INSULIN (HCC): ICD-10-CM

## 2024-05-28 DIAGNOSIS — H93.13 BILATERAL TINNITUS: Primary | ICD-10-CM

## 2024-05-28 DIAGNOSIS — R11.0 NAUSEA: ICD-10-CM

## 2024-05-28 DIAGNOSIS — N18.32 STAGE 3B CHRONIC KIDNEY DISEASE (HCC): ICD-10-CM

## 2024-05-28 DIAGNOSIS — E78.2 MIXED HYPERLIPIDEMIA: ICD-10-CM

## 2024-05-28 DIAGNOSIS — N18.2 TYPE 2 DIABETES MELLITUS WITH STAGE 2 CHRONIC KIDNEY DISEASE, WITHOUT LONG-TERM CURRENT USE OF INSULIN (HCC): ICD-10-CM

## 2024-05-28 DIAGNOSIS — G30.9 ALZHEIMER'S DISEASE, UNSPECIFIED (CODE) (HCC): ICD-10-CM

## 2024-05-28 LAB
BILIRUBIN, URINE, POC: NEGATIVE
BLOOD URINE, POC: NEGATIVE
GLUCOSE URINE, POC: NEGATIVE
KETONES, URINE, POC: NORMAL
LEUKOCYTE ESTERASE, URINE, POC: NEGATIVE
NITRITE, URINE, POC: NEGATIVE
PH, URINE, POC: 6 (ref 4.6–8)
PROTEIN,URINE, POC: NORMAL
SPECIFIC GRAVITY, URINE, POC: 1.03 (ref 1–1.03)
URINALYSIS CLARITY, POC: CLEAR
URINALYSIS COLOR, POC: YELLOW
UROBILINOGEN, POC: NORMAL

## 2024-05-28 PROCEDURE — 1123F ACP DISCUSS/DSCN MKR DOCD: CPT | Performed by: FAMILY MEDICINE

## 2024-05-28 PROCEDURE — 99214 OFFICE O/P EST MOD 30 MIN: CPT | Performed by: FAMILY MEDICINE

## 2024-05-28 PROCEDURE — G8427 DOCREV CUR MEDS BY ELIG CLIN: HCPCS | Performed by: FAMILY MEDICINE

## 2024-05-28 PROCEDURE — 3044F HG A1C LEVEL LT 7.0%: CPT | Performed by: FAMILY MEDICINE

## 2024-05-28 PROCEDURE — 1036F TOBACCO NON-USER: CPT | Performed by: FAMILY MEDICINE

## 2024-05-28 PROCEDURE — 1090F PRES/ABSN URINE INCON ASSESS: CPT | Performed by: FAMILY MEDICINE

## 2024-05-28 PROCEDURE — G8417 CALC BMI ABV UP PARAM F/U: HCPCS | Performed by: FAMILY MEDICINE

## 2024-05-28 PROCEDURE — 81003 URINALYSIS AUTO W/O SCOPE: CPT | Performed by: FAMILY MEDICINE

## 2024-05-28 RX ORDER — MECLIZINE HCL 25MG 25 MG/1
25 TABLET, CHEWABLE ORAL 3 TIMES DAILY PRN
Qty: 90 TABLET | Refills: 2 | Status: SHIPPED | OUTPATIENT
Start: 2024-05-28

## 2024-05-28 RX ORDER — POTASSIUM CHLORIDE 750 MG/1
10 CAPSULE, EXTENDED RELEASE ORAL DAILY
Qty: 180 CAPSULE | Refills: 3 | Status: SHIPPED | OUTPATIENT
Start: 2024-05-28

## 2024-05-28 SDOH — ECONOMIC STABILITY: INCOME INSECURITY: HOW HARD IS IT FOR YOU TO PAY FOR THE VERY BASICS LIKE FOOD, HOUSING, MEDICAL CARE, AND HEATING?: SOMEWHAT HARD

## 2024-05-28 SDOH — ECONOMIC STABILITY: FOOD INSECURITY: WITHIN THE PAST 12 MONTHS, THE FOOD YOU BOUGHT JUST DIDN'T LAST AND YOU DIDN'T HAVE MONEY TO GET MORE.: NEVER TRUE

## 2024-05-28 SDOH — ECONOMIC STABILITY: FOOD INSECURITY: WITHIN THE PAST 12 MONTHS, YOU WORRIED THAT YOUR FOOD WOULD RUN OUT BEFORE YOU GOT MONEY TO BUY MORE.: NEVER TRUE

## 2024-05-28 ASSESSMENT — PATIENT HEALTH QUESTIONNAIRE - PHQ9
8. MOVING OR SPEAKING SO SLOWLY THAT OTHER PEOPLE COULD HAVE NOTICED. OR THE OPPOSITE, BEING SO FIGETY OR RESTLESS THAT YOU HAVE BEEN MOVING AROUND A LOT MORE THAN USUAL: MORE THAN HALF THE DAYS
SUM OF ALL RESPONSES TO PHQ QUESTIONS 1-9: 14
6. FEELING BAD ABOUT YOURSELF - OR THAT YOU ARE A FAILURE OR HAVE LET YOURSELF OR YOUR FAMILY DOWN: MORE THAN HALF THE DAYS
1. LITTLE INTEREST OR PLEASURE IN DOING THINGS: SEVERAL DAYS
2. FEELING DOWN, DEPRESSED OR HOPELESS: MORE THAN HALF THE DAYS
7. TROUBLE CONCENTRATING ON THINGS, SUCH AS READING THE NEWSPAPER OR WATCHING TELEVISION: NEARLY EVERY DAY
3. TROUBLE FALLING OR STAYING ASLEEP: NOT AT ALL
SUM OF ALL RESPONSES TO PHQ QUESTIONS 1-9: 14
SUM OF ALL RESPONSES TO PHQ QUESTIONS 1-9: 14
9. THOUGHTS THAT YOU WOULD BE BETTER OFF DEAD, OR OF HURTING YOURSELF: NOT AT ALL
SUM OF ALL RESPONSES TO PHQ9 QUESTIONS 1 & 2: 3
10. IF YOU CHECKED OFF ANY PROBLEMS, HOW DIFFICULT HAVE THESE PROBLEMS MADE IT FOR YOU TO DO YOUR WORK, TAKE CARE OF THINGS AT HOME, OR GET ALONG WITH OTHER PEOPLE: SOMEWHAT DIFFICULT
4. FEELING TIRED OR HAVING LITTLE ENERGY: NEARLY EVERY DAY
5. POOR APPETITE OR OVEREATING: SEVERAL DAYS
SUM OF ALL RESPONSES TO PHQ QUESTIONS 1-9: 14

## 2024-05-28 NOTE — PATIENT INSTRUCTIONS
[employer]*  (Call 211 if you need more resources.)    [employer]  They offer: help uninsured patients who do not qualify for government-sponsored health insurance and cannot afford to pay for their medical care. Insured patients may also qualify depending on family income, family size, and medical needs.   Contact: [phone]   Helpful Info: How to apply-  Option 1: Fill out the Financial Assistance Application(FAP). Copies of the Financial Assistance Application and the FAP may be obtained for free by calling the [employer] customer service department at [phone].   Option 2: download a copy from the [employer] website: https://www.[employer]/patient-resources/financial-assistance     [employer]    They offer:  May be able to assist with medical bills if you are uninsured.  Eligibility Assistance: [phone]    [employer]  They offer: medication cost assistance, personal care items, and small durable medical equipment to low income and uninsured patients with chronic health conditions.   Contact: 945.612.1206 or [phone]     Wellness Outreach  They offer: connections to financial assistance for social and medical services for low income and uninsured persons.   Contact: [phone] (leave message with name and contact number if no answer).    Utility Assistance     [employer] Low Income Home Energy Assistance Program  They offer: energy assistance.  Contact: 122.535.8036; https://www.[employer].Teamly/[place]  Helpful Info: Must be a [place] resident and need financial assistance with home energy costs.    [employer]   They offer: wide range of services to low and moderate-income residents in [facility]  Contact: [phone]; [address] [place] [address]    [facility]   They offer: basic needs for stability and support services.   Contact: [phone]; Eryn Venegas

## 2024-05-28 NOTE — PROGRESS NOTES
[facility] PRIMARY CARE  Juan Nobles M.D.  [address] [place] [address] [place] [address]  Ph No:  [phone]  Fax:  [phone]    CHIEF COMPLAINT:  Chief Complaint   Patient presents with    Dizziness     Patient is requesting a refill of her medication.      Depression     Patient is still having quite a bit of depression    Discuss Labs          IMPRESSION/PLAN    1. Bilateral tinnitus  2. Type 2 diabetes mellitus with stage 2 chronic kidney disease, without long-term current use of insulin ([facility])  3. Chronic systolic (congestive) heart failure  4. Nausea  -     AMB POC URINALYSIS DIP STICK AUTO W/O MICRO  5. Chronic idiopathic constipation  6. Alzheimer's disease, unspecified (CODE) ([facility])  7. Mixed hyperlipidemia  Assessment & Plan:   Well-controlled, continue current medications, medication adherence emphasized, and lifestyle modifications recommended  8. Stage 3b chronic kidney disease (HCC)  Assessment & Plan:   Well-controlled, continue current medications and medication adherence emphasized      Continue with xanax at current dose.  We discussed side effects including sedations and risk of falls.  Continue with remaining medications as prescribed.  Checking ua.  Fu depending on results.  Bp is low today.  Will monitor at home      Medication risks, benefits, costs, interactions, and alternatives were discussed as indicated.  I advised pt to contact the office if condition worsens, changes or fails to improve as anticipated. Pt expressed understanding with the diagnosis(es) and plan.       HISTORY OF PRESENT ILLNESS:  Here for fu and refills of medications.  Continues to take xanax 0.5 hat night.  Has tried 1/4 tab during the day.  No falls or injury.  Xanax not consistently helping with dizziness or tinnitus.  Has good days and bad days.  Otherwise doing well.  Here to review labs.  Co fatigue and low energy.  No other co.         REVIEW OF SYSTEMS:  Review of Systems    Review of systems is as

## 2024-05-30 ENCOUNTER — OFFICE VISIT (OUTPATIENT)
Age: 89
End: 2024-05-30
Payer: MEDICARE

## 2024-05-30 VITALS
SYSTOLIC BLOOD PRESSURE: 128 MMHG | DIASTOLIC BLOOD PRESSURE: 54 MMHG | HEART RATE: 58 BPM | BODY MASS INDEX: 28.56 KG/M2 | WEIGHT: 155.2 LBS | HEIGHT: 62 IN

## 2024-05-30 DIAGNOSIS — I65.23 CAROTID STENOSIS, ASYMPTOMATIC, BILATERAL: ICD-10-CM

## 2024-05-30 DIAGNOSIS — Z95.1 S/P CABG (CORONARY ARTERY BYPASS GRAFT): ICD-10-CM

## 2024-05-30 DIAGNOSIS — I50.32 DIASTOLIC CHF, CHRONIC (HCC): Primary | ICD-10-CM

## 2024-05-30 DIAGNOSIS — I10 ESSENTIAL HYPERTENSION, BENIGN: ICD-10-CM

## 2024-05-30 DIAGNOSIS — I73.9 PAD (PERIPHERAL ARTERY DISEASE) (HCC): ICD-10-CM

## 2024-05-30 DIAGNOSIS — I25.118 CORONARY ARTERY DISEASE OF NATIVE ARTERY OF NATIVE HEART WITH STABLE ANGINA PECTORIS (HCC): ICD-10-CM

## 2024-05-30 DIAGNOSIS — N18.32 STAGE 3B CHRONIC KIDNEY DISEASE (HCC): ICD-10-CM

## 2024-05-30 PROCEDURE — 1123F ACP DISCUSS/DSCN MKR DOCD: CPT | Performed by: INTERNAL MEDICINE

## 2024-05-30 PROCEDURE — G8427 DOCREV CUR MEDS BY ELIG CLIN: HCPCS | Performed by: INTERNAL MEDICINE

## 2024-05-30 PROCEDURE — G8417 CALC BMI ABV UP PARAM F/U: HCPCS | Performed by: INTERNAL MEDICINE

## 2024-05-30 PROCEDURE — 1036F TOBACCO NON-USER: CPT | Performed by: INTERNAL MEDICINE

## 2024-05-30 PROCEDURE — 99214 OFFICE O/P EST MOD 30 MIN: CPT | Performed by: INTERNAL MEDICINE

## 2024-05-30 PROCEDURE — 1090F PRES/ABSN URINE INCON ASSESS: CPT | Performed by: INTERNAL MEDICINE

## 2024-05-30 NOTE — PROGRESS NOTES
not wanting med change.       5. HTN: follow on higher dose coreg.     Lower, trial off the norvasc.       6. Carotid Dz: no more testing needed now.          Patient has been instructed and agrees to call our office with any issues or other concerns related to their cardiac condition(s) and/or complaint(s).        Return in about 4 months (around 9/30/2024).       Mitesh Ramirez, DO  5/30/2024

## 2024-06-07 ENCOUNTER — TELEPHONE (OUTPATIENT)
Dept: PRIMARY CARE CLINIC | Facility: CLINIC | Age: 89
End: 2024-06-07

## 2024-06-10 ENCOUNTER — TELEPHONE (OUTPATIENT)
Dept: PRIMARY CARE CLINIC | Facility: CLINIC | Age: 89
End: 2024-06-10

## 2024-06-17 ENCOUNTER — OFFICE VISIT (OUTPATIENT)
Dept: ORTHOPEDIC SURGERY | Age: 89
End: 2024-06-17
Payer: MEDICARE

## 2024-06-17 VITALS — BODY MASS INDEX: 28.89 KG/M2 | WEIGHT: 157 LBS | HEIGHT: 62 IN

## 2024-06-17 DIAGNOSIS — M48.062 SPINAL STENOSIS OF LUMBAR REGION WITH NEUROGENIC CLAUDICATION: Primary | ICD-10-CM

## 2024-06-17 PROCEDURE — G8417 CALC BMI ABV UP PARAM F/U: HCPCS | Performed by: PHYSICIAN ASSISTANT

## 2024-06-17 PROCEDURE — 1090F PRES/ABSN URINE INCON ASSESS: CPT | Performed by: PHYSICIAN ASSISTANT

## 2024-06-17 PROCEDURE — G8427 DOCREV CUR MEDS BY ELIG CLIN: HCPCS | Performed by: PHYSICIAN ASSISTANT

## 2024-06-17 PROCEDURE — 99214 OFFICE O/P EST MOD 30 MIN: CPT | Performed by: PHYSICIAN ASSISTANT

## 2024-06-17 PROCEDURE — 1036F TOBACCO NON-USER: CPT | Performed by: PHYSICIAN ASSISTANT

## 2024-06-17 PROCEDURE — 1123F ACP DISCUSS/DSCN MKR DOCD: CPT | Performed by: PHYSICIAN ASSISTANT

## 2024-06-17 NOTE — PROGRESS NOTES
06/17/24        Name: Muriel Barajas  YOB: 1935  Gender: female  MRN: [id number]    CC: Follow-up       HPI: Muriel Barajas is a 89 y.o. female who returns for Follow-up         Patient recently underwent L4-5 interlaminar epidural steroid injection at L4-5.  Still not having much improvement in her symptoms.  Still having severe back pain rating to the left leg and foot.  She is also having difficulty with her bladder muscles.  She has to strain to urinate.  She is having severe back and leg pain with even the slightest cough.    History was obtained by patient      Meds/PSH/PMH/FH/SH: This information has been reviewed.      ALLERGIES:   Allergies   Allergen Reactions    Amoxicillin-Pot Clavulanate Other (See Comments) and Itching     Causes yeast infection  Other reaction(s): Itching-Allergy  Causes yeast infection  Causes yeast infection      Gabapentin Other (See Comments)     Other reaction(s): Altered Mental State-Intolerance, Other (see comments)    Hydrochlorothiazide Other (See Comments)     Hyponatremia  Other reaction(s): Other (see comments), Other- (not listed) - Allergy  Hyponatremia  Hyponatremia      Nitrofurantoin Other (See Comments)     Other reaction(s): Altered Mental State-Intolerance    Prednisone Other (See Comments)     Visual loss and headache  Other reaction(s): Other (see comments)  Visual loss and headache    Sulfamethoxazole-Trimethoprim Nausea Only    Adhesive Tape Rash    Codeine Other (See Comments) and Palpitations     Made my heart go crazy  Other reaction(s): Palpitations-Intolerance, Palpitations-Intolerance              Physical Examination:            Imaging:         MRI Result (most recent):  MRI LUMBAR SPINE WO CONTRAST 09/07/2023    Narrative  History: Spondylolisthesis, lumbar region; Spondylosis without myelopathy or  radiculopathy, lumbar region; Other intervertebral disc degeneration, lumbar  region    Exam: MRI lumbar spine without

## 2024-06-18 ENCOUNTER — OFFICE VISIT (OUTPATIENT)
Dept: PRIMARY CARE CLINIC | Facility: CLINIC | Age: 89
End: 2024-06-18
Payer: MEDICARE

## 2024-06-18 VITALS
SYSTOLIC BLOOD PRESSURE: 139 MMHG | OXYGEN SATURATION: 97 % | BODY MASS INDEX: 28.52 KG/M2 | TEMPERATURE: 98.2 F | DIASTOLIC BLOOD PRESSURE: 52 MMHG | HEART RATE: 59 BPM | WEIGHT: 155 LBS | HEIGHT: 62 IN

## 2024-06-18 DIAGNOSIS — R25.1 SHAKES: ICD-10-CM

## 2024-06-18 DIAGNOSIS — H93.13 BILATERAL TINNITUS: Primary | ICD-10-CM

## 2024-06-18 DIAGNOSIS — F13.99 SEDATIVE, HYPNOTIC OR ANXIOLYTIC USE, UNSPECIFIED WITH UNSPECIFIED SEDATIVE, HYPNOTIC OR ANXIOLYTIC-INDUCED DISORDER (HCC): ICD-10-CM

## 2024-06-18 PROCEDURE — 99213 OFFICE O/P EST LOW 20 MIN: CPT | Performed by: FAMILY MEDICINE

## 2024-06-18 PROCEDURE — 1090F PRES/ABSN URINE INCON ASSESS: CPT | Performed by: FAMILY MEDICINE

## 2024-06-18 PROCEDURE — G8427 DOCREV CUR MEDS BY ELIG CLIN: HCPCS | Performed by: FAMILY MEDICINE

## 2024-06-18 PROCEDURE — 1036F TOBACCO NON-USER: CPT | Performed by: FAMILY MEDICINE

## 2024-06-18 PROCEDURE — 1123F ACP DISCUSS/DSCN MKR DOCD: CPT | Performed by: FAMILY MEDICINE

## 2024-06-18 PROCEDURE — G8417 CALC BMI ABV UP PARAM F/U: HCPCS | Performed by: FAMILY MEDICINE

## 2024-06-18 RX ORDER — DIAZEPAM 2 MG/1
2 TABLET ORAL DAILY PRN
Qty: 30 TABLET | Refills: 2 | Status: SHIPPED | OUTPATIENT
Start: 2024-06-18 | End: 2024-09-16

## 2024-06-18 NOTE — PROGRESS NOTES
[facility] PRIMARY CARE  Juan Nobles M.D.  [address] [place] [address] [place] [address]  Ph No:  [phone]  Fax:  [phone]    CHIEF COMPLAINT:  Chief Complaint   Patient presents with    Anxiety     Patient would like to discuss changing from Xanax to Valium. The Xanax makes her too tired during the day.          IMPRESSION/PLAN    1. Bilateral tinnitus  -     diazePAM (VALIUM) 2 MG tablet; Take 1 tablet by mouth daily as needed for Anxiety for up to 90 days. Max Daily Amount: 2 mg, Disp-30 tablet, R-2Normal  2. Shakes  -     diazePAM (VALIUM) 2 MG tablet; Take 1 tablet by mouth daily as needed for Anxiety for up to 90 days. Max Daily Amount: 2 mg, Disp-30 tablet, R-2Normal  3. Sedative, hypnotic or anxiolytic use, unspecified with unspecified sedative, hypnotic or anxiolytic-induced disorder (HCC)  -     diazePAM (VALIUM) 2 MG tablet; Take 1 tablet by mouth daily as needed for Anxiety for up to 90 days. Max Daily Amount: 2 mg, Disp-30 tablet, R-2Normal      D/c xanax.  Restart valium 2 mg.  She takes 1/4-1/2 pill only as needed for severe tinnitis and anxiety.  She is very much aware of the possibility of sedation and dependence with this medication.  She feels the benefits outweigh the risks.  She is fully alert and oriented and capable of informed decision making.  She is debating on whether to see a surgeon about her hip and leg pain.  She does not think she could undergo any surgeries.  She is unable to take opiate pain medications.  Managing with tylenol for now.      We discussed the expected course, resolution and complications of the diagnosis(es) in detail.  Medication risks, benefits, costs, interactions, and alternatives were discussed as indicated.  I advised pt to contact the office if condition worsens, changes or fails to improve as anticipated. Pt expressed understanding with the diagnosis(es) and plan.       HISTORY OF PRESENT ILLNESS:  Patient is a today to discuss her medications

## 2024-06-18 NOTE — PATIENT INSTRUCTIONS

## 2024-06-19 RX ORDER — MEMANTINE HYDROCHLORIDE 10 MG/1
10 TABLET ORAL DAILY
Qty: 30 TABLET | Refills: 5 | Status: SHIPPED | OUTPATIENT
Start: 2024-06-19

## 2024-06-21 ENCOUNTER — OFFICE VISIT (OUTPATIENT)
Age: 89
End: 2024-06-21
Payer: MEDICARE

## 2024-06-21 DIAGNOSIS — M43.16 SPONDYLOLISTHESIS OF LUMBAR REGION: ICD-10-CM

## 2024-06-21 DIAGNOSIS — M48.062 SPINAL STENOSIS OF LUMBAR REGION WITH NEUROGENIC CLAUDICATION: Primary | ICD-10-CM

## 2024-06-21 PROCEDURE — G8417 CALC BMI ABV UP PARAM F/U: HCPCS | Performed by: ORTHOPAEDIC SURGERY

## 2024-06-21 PROCEDURE — 1036F TOBACCO NON-USER: CPT | Performed by: ORTHOPAEDIC SURGERY

## 2024-06-21 PROCEDURE — 1123F ACP DISCUSS/DSCN MKR DOCD: CPT | Performed by: ORTHOPAEDIC SURGERY

## 2024-06-21 PROCEDURE — 99214 OFFICE O/P EST MOD 30 MIN: CPT | Performed by: ORTHOPAEDIC SURGERY

## 2024-06-21 PROCEDURE — 1090F PRES/ABSN URINE INCON ASSESS: CPT | Performed by: ORTHOPAEDIC SURGERY

## 2024-06-21 PROCEDURE — G8427 DOCREV CUR MEDS BY ELIG CLIN: HCPCS | Performed by: ORTHOPAEDIC SURGERY

## 2024-06-21 NOTE — PROGRESS NOTES
DAY, Disp: 90 patch, Rfl: 1    Coenzyme Q10 (COQ10 PO), Take by mouth, Disp: , Rfl:     carvedilol (COREG) 25 MG tablet, Take 1 tablet by mouth 2 times daily, Disp: 180 tablet, Rfl: 3    furosemide (LASIX) 40 MG tablet, Take 1 tablet by mouth daily, Disp: 90 tablet, Rfl: 3    hydrALAZINE (APRESOLINE) 25 MG tablet, TAKE 1 TABLET BY MOUTH TWICE A DAY, Disp: 180 tablet, Rfl: 3    ranolazine (RANEXA) 500 MG extended release tablet, Take 1 tablet by mouth 2 times daily, Disp: 180 tablet, Rfl: 3    rivaroxaban (XARELTO) 20 MG TABS tablet, Take 1 tablet by mouth daily (with breakfast) HOLD XARELTO UNTIL Friday, AUGUST 5TH.  MAY RESUME THEN, IF NO WOUND DRAINAGE., Disp: 90 tablet, Rfl: 3    famotidine (PEPCID) 20 MG tablet, Take 1 tablet by mouth every evening, Disp: 60 tablet, Rfl: 5    spironolactone (ALDACTONE) 25 MG tablet, Take 1 tablet by mouth daily, Disp: 90 tablet, Rfl: 3    rosuvastatin (CRESTOR) 40 MG tablet, Take 1 tablet by mouth every evening, Disp: 90 tablet, Rfl: 3    magnesium oxide (MAG-OX) 400 MG tablet, Take 1 tablet by mouth daily, Disp: 30 tablet, Rfl: 5    Cholecalciferol 400 UNIT TABS tablet, Take 1 tablet by mouth daily, Disp: , Rfl:     cephALEXin (KEFLEX) 250 MG capsule, Take 1 capsule by mouth daily, Disp: 90 capsule, Rfl: 3    escitalopram (LEXAPRO) 20 MG tablet, Take 1 tablet by mouth daily TAKE 1 TABLET BY MOUTH EVERY DAY, Disp: 90 tablet, Rfl: 3    nitroGLYCERIN (NITROLINGUAL) 0.4 MG/SPRAY 0.4 mg spray, Place 1 spray under the tongue every 5 minutes as needed for Chest pain, Disp: 1 each, Rfl: 2    aspirin 81 MG EC tablet, Take 1 tablet by mouth daily, Disp: , Rfl:     vitamin B-12 (CYANOCOBALAMIN) 1000 MCG tablet, Take 1 tablet by mouth daily, Disp: , Rfl:     Multiple Vitamins-Minerals (OCUVITE ADULT FORMULA PO), Take by mouth, Disp: , Rfl:     Probiotic Product (PROBIOTIC-10 PO), Take by mouth, Disp: , Rfl:     acetaminophen (TYLENOL) 325 MG tablet, Take 500 mg by mouth every 6 hours as

## 2024-07-16 RX ORDER — FUROSEMIDE 40 MG/1
40 TABLET ORAL DAILY
Qty: 90 TABLET | Refills: 3 | Status: SHIPPED | OUTPATIENT
Start: 2024-07-16

## 2024-07-16 NOTE — TELEPHONE ENCOUNTER
Needs RX for furosemide (LASIX) 40 MG tablet       [facility]/pharmacy #[id number] - [place], [place] - [address] [facility] [phone] -  [phone]

## 2024-08-26 ENCOUNTER — OFFICE VISIT (OUTPATIENT)
Age: 89
End: 2024-08-26
Payer: MEDICARE

## 2024-08-26 DIAGNOSIS — M79.672 LEFT FOOT PAIN: Primary | ICD-10-CM

## 2024-08-26 DIAGNOSIS — M25.572 ACUTE LEFT ANKLE PAIN: ICD-10-CM

## 2024-08-26 DIAGNOSIS — M48.062 SPINAL STENOSIS OF LUMBAR REGION WITH NEUROGENIC CLAUDICATION: ICD-10-CM

## 2024-08-26 DIAGNOSIS — M43.16 SPONDYLOLISTHESIS OF LUMBAR REGION: ICD-10-CM

## 2024-08-26 PROCEDURE — 99214 OFFICE O/P EST MOD 30 MIN: CPT | Performed by: ORTHOPAEDIC SURGERY

## 2024-08-26 PROCEDURE — 1090F PRES/ABSN URINE INCON ASSESS: CPT | Performed by: ORTHOPAEDIC SURGERY

## 2024-08-26 PROCEDURE — G8427 DOCREV CUR MEDS BY ELIG CLIN: HCPCS | Performed by: ORTHOPAEDIC SURGERY

## 2024-08-26 PROCEDURE — 1123F ACP DISCUSS/DSCN MKR DOCD: CPT | Performed by: ORTHOPAEDIC SURGERY

## 2024-08-26 PROCEDURE — 1036F TOBACCO NON-USER: CPT | Performed by: ORTHOPAEDIC SURGERY

## 2024-08-26 PROCEDURE — G8417 CALC BMI ABV UP PARAM F/U: HCPCS | Performed by: ORTHOPAEDIC SURGERY

## 2024-08-26 NOTE — PROGRESS NOTES
I think she is at a critical point and it would not be surprising to see her lose a lot more function fairly rapidly and no longer able to be independent.  Whereas, she is otherwise fairly healthy and addressing her back would probably allow her to stay independent for several more years..  In this scenario, I think it would be reasonable to consider an L3-L5 laminectomy and an L4-L5 instrumented fusion.  I would use the cortical screw approach to make the smallest incision, with the least muscular dissection, lowest blood loss, and have the best pullout resistance in osteopenic bone.       We discussed the details of surgery including a midline incision in over the low back followed by dissection to the area of stenosis.  The nerves would be freed up by trimming any impinging structures including ligaments and bone.   Then any segments that are deemed to be unstable will be fused together with cadaver bone and screws and rods will supplement the fusion.  A drain may be inserted and the wound would be closed with suture and covered with sterile dressings.  The patient would expect to stay in the hospital 1-2 days or until she can get about safely with minimal assistance. A short stay in a rehabilitation facility could also be considered depending on how quickly she recovers.  Follow-up would be scheduled for 2-3 weeks and she would have restrictions including no driving, and no lifting greater than 15 lbs until follow up with me. She was encouraged to walk as much as possible before and after the operation to facilitate an expeditious recovery.  We also discussed the potential risks of the surgery including, but not limited to infection, spinal fluid leak and potential headaches requiring her to remain supine post-operatively; injury to the cauda equina or peripheral nerve root resulting in weakness, numbness, or very rarely bowel or bladder dysfunction; persistent back or leg symptoms, recurrence of stenosis or the  development of instability or hardware failure possibly needing additional surgery;  blood loss needing transfusion; postoperative hematoma; and the risks of anesthesia.  The patient voiced an understanding of these issues as outlined.  The procedure that may prove to be beneficial here is a L3-5 laminectomy and L4-L5 fusion with allograft, and instrumentation.  I do not think we would need an interbody cage here.  She is going to consider her options and let us know how she would like to proceed    She would need cardiac clearance before we could proceed..      Electronically Signed By Devendra Gomez MD   08/26/24  1:05 PM

## 2024-09-07 DIAGNOSIS — F41.9 ANXIETY: ICD-10-CM

## 2024-09-07 DIAGNOSIS — F33.2 SEVERE EPISODE OF RECURRENT MAJOR DEPRESSIVE DISORDER, WITHOUT PSYCHOTIC FEATURES (HCC): ICD-10-CM

## 2024-09-09 RX ORDER — ESCITALOPRAM OXALATE 20 MG/1
20 TABLET ORAL DAILY
Qty: 90 TABLET | Refills: 3 | OUTPATIENT
Start: 2024-09-09

## 2024-09-11 DIAGNOSIS — F41.9 ANXIETY: ICD-10-CM

## 2024-09-11 DIAGNOSIS — F33.2 SEVERE EPISODE OF RECURRENT MAJOR DEPRESSIVE DISORDER, WITHOUT PSYCHOTIC FEATURES (HCC): ICD-10-CM

## 2024-09-11 RX ORDER — ESCITALOPRAM OXALATE 20 MG/1
20 TABLET ORAL DAILY
Qty: 90 TABLET | Refills: 3 | OUTPATIENT
Start: 2024-09-11

## 2024-09-13 ENCOUNTER — OFFICE VISIT (OUTPATIENT)
Age: 89
End: 2024-09-13
Payer: MEDICARE

## 2024-09-13 VITALS
HEIGHT: 62 IN | HEART RATE: 60 BPM | DIASTOLIC BLOOD PRESSURE: 52 MMHG | BODY MASS INDEX: 28.6 KG/M2 | SYSTOLIC BLOOD PRESSURE: 100 MMHG | WEIGHT: 155.4 LBS

## 2024-09-13 DIAGNOSIS — I10 ESSENTIAL HYPERTENSION, BENIGN: ICD-10-CM

## 2024-09-13 DIAGNOSIS — I50.32 DIASTOLIC CHF, CHRONIC (HCC): ICD-10-CM

## 2024-09-13 DIAGNOSIS — I25.118 CORONARY ARTERY DISEASE OF NATIVE ARTERY OF NATIVE HEART WITH STABLE ANGINA PECTORIS (HCC): Primary | ICD-10-CM

## 2024-09-13 DIAGNOSIS — I65.23 CAROTID STENOSIS, ASYMPTOMATIC, BILATERAL: ICD-10-CM

## 2024-09-13 DIAGNOSIS — I73.9 PAD (PERIPHERAL ARTERY DISEASE) (HCC): ICD-10-CM

## 2024-09-13 PROCEDURE — 99214 OFFICE O/P EST MOD 30 MIN: CPT | Performed by: INTERNAL MEDICINE

## 2024-09-13 PROCEDURE — 1123F ACP DISCUSS/DSCN MKR DOCD: CPT | Performed by: INTERNAL MEDICINE

## 2024-09-13 PROCEDURE — 1036F TOBACCO NON-USER: CPT | Performed by: INTERNAL MEDICINE

## 2024-09-13 PROCEDURE — G8417 CALC BMI ABV UP PARAM F/U: HCPCS | Performed by: INTERNAL MEDICINE

## 2024-09-13 PROCEDURE — 1090F PRES/ABSN URINE INCON ASSESS: CPT | Performed by: INTERNAL MEDICINE

## 2024-09-13 PROCEDURE — G8427 DOCREV CUR MEDS BY ELIG CLIN: HCPCS | Performed by: INTERNAL MEDICINE

## 2024-09-17 ENCOUNTER — OFFICE VISIT (OUTPATIENT)
Dept: ORTHOPEDIC SURGERY | Age: 89
End: 2024-09-17
Payer: MEDICARE

## 2024-09-17 VITALS — WEIGHT: 155 LBS | HEIGHT: 62 IN | BODY MASS INDEX: 28.52 KG/M2

## 2024-09-17 DIAGNOSIS — M47.812 CERVICAL SPONDYLOSIS: Primary | ICD-10-CM

## 2024-09-17 DIAGNOSIS — M50.30 DDD (DEGENERATIVE DISC DISEASE), CERVICAL: ICD-10-CM

## 2024-09-17 PROCEDURE — G8417 CALC BMI ABV UP PARAM F/U: HCPCS | Performed by: PHYSICIAN ASSISTANT

## 2024-09-17 PROCEDURE — 99214 OFFICE O/P EST MOD 30 MIN: CPT | Performed by: PHYSICIAN ASSISTANT

## 2024-09-17 PROCEDURE — G8427 DOCREV CUR MEDS BY ELIG CLIN: HCPCS | Performed by: PHYSICIAN ASSISTANT

## 2024-09-17 PROCEDURE — 1090F PRES/ABSN URINE INCON ASSESS: CPT | Performed by: PHYSICIAN ASSISTANT

## 2024-09-17 PROCEDURE — G2211 COMPLEX E/M VISIT ADD ON: HCPCS | Performed by: PHYSICIAN ASSISTANT

## 2024-09-17 PROCEDURE — 1123F ACP DISCUSS/DSCN MKR DOCD: CPT | Performed by: PHYSICIAN ASSISTANT

## 2024-09-17 PROCEDURE — 1036F TOBACCO NON-USER: CPT | Performed by: PHYSICIAN ASSISTANT

## 2024-09-17 RX ORDER — TIZANIDINE 2 MG/1
2 TABLET ORAL 3 TIMES DAILY PRN
Qty: 90 TABLET | Refills: 2 | Status: SHIPPED | OUTPATIENT
Start: 2024-09-17

## 2024-09-23 ENCOUNTER — TELEPHONE (OUTPATIENT)
Dept: PRIMARY CARE CLINIC | Facility: CLINIC | Age: 89
End: 2024-09-23

## 2024-09-23 DIAGNOSIS — F41.9 ANXIETY: ICD-10-CM

## 2024-09-23 DIAGNOSIS — F33.2 SEVERE EPISODE OF RECURRENT MAJOR DEPRESSIVE DISORDER, WITHOUT PSYCHOTIC FEATURES (HCC): ICD-10-CM

## 2024-09-24 DIAGNOSIS — F41.9 ANXIETY: ICD-10-CM

## 2024-09-24 DIAGNOSIS — F33.2 SEVERE EPISODE OF RECURRENT MAJOR DEPRESSIVE DISORDER, WITHOUT PSYCHOTIC FEATURES (HCC): ICD-10-CM

## 2024-09-24 RX ORDER — ESCITALOPRAM OXALATE 20 MG/1
20 TABLET ORAL DAILY
Qty: 90 TABLET | Refills: 3 | Status: SHIPPED | OUTPATIENT
Start: 2024-09-24 | End: 2025-09-19

## 2024-09-24 RX ORDER — ESCITALOPRAM OXALATE 20 MG/1
20 TABLET ORAL DAILY
Qty: 90 TABLET | Refills: 3 | OUTPATIENT
Start: 2024-09-24

## 2024-10-11 RX ORDER — MEMANTINE HYDROCHLORIDE 10 MG/1
10 TABLET ORAL DAILY
Qty: 90 TABLET | Refills: 2 | OUTPATIENT
Start: 2024-10-11

## 2024-10-20 NOTE — PROGRESS NOTES
[facility] NEUROLOGY  [address], [address]  [place] [address]  Phone: [phone] Fax [phone]  Donaldo Gonzales MD      Patient: Muriel Barajas  Provider: Donaldo Gonzales MD    CC:   Chief Complaint   Patient presents with    New Patient    Tremors     Referring Provider:    History of Present Illness:     Muriel Barajas is a 89 y.o. RH female who presents for evaluation of tremors.     She is accompanied by her son.      Patient presents for further evaluation of tremors, which have been present over the last year, described as a more \"internal\" sensation of tremulousness without any obvious objective evidence of tremors of the head or extremities.  Symptoms come on episodically are often worse when she feels he is more stressed or anxious.    Previous trials of benzodiazepines including Xanax and Valium were noted.  Chart review indicates that they were previously felt to be beneficial but it may have been making her dizziness worse and so they were ultimately discontinued.  She is currently taking Lexapro for depression.    Perhaps her larger complaint is chronic tinnitus in the left ear for which she has been evaluated by several ENT physicians, and unfortunately despite therapies and specialized hearing aids, she continues to have very persistent symptoms.    Patient does report some balance impairment though is seen today walking unassisted.  She does have a cane at home which is son states that she has been instructed to use but does not always use it.  Denies any recent falls.  No adilson vertiginous symptoms.  No presyncope.  She does have to be careful with quick turns because she may quickly lose her balance.    She continues to live at home independently.  She has an emergency alert button in case of falls.  Her son lives close by and is able to look after her.  She manages her medications on her own, though this sometimes gets confused with generic names.  She manages her finances and

## 2024-10-21 ENCOUNTER — OFFICE VISIT (OUTPATIENT)
Dept: NEUROLOGY | Age: 89
End: 2024-10-21
Payer: MEDICARE

## 2024-10-21 VITALS
DIASTOLIC BLOOD PRESSURE: 77 MMHG | HEART RATE: 67 BPM | HEIGHT: 62 IN | BODY MASS INDEX: 27.97 KG/M2 | WEIGHT: 152 LBS | SYSTOLIC BLOOD PRESSURE: 144 MMHG

## 2024-10-21 DIAGNOSIS — H93.12 TINNITUS OF LEFT EAR: ICD-10-CM

## 2024-10-21 DIAGNOSIS — R41.81 AGE-RELATED COGNITIVE DECLINE: ICD-10-CM

## 2024-10-21 DIAGNOSIS — R26.9 GAIT DISTURBANCE: ICD-10-CM

## 2024-10-21 DIAGNOSIS — R25.1 TREMORS OF NERVOUS SYSTEM: Primary | ICD-10-CM

## 2024-10-21 PROCEDURE — G8484 FLU IMMUNIZE NO ADMIN: HCPCS | Performed by: PSYCHIATRY & NEUROLOGY

## 2024-10-21 PROCEDURE — 1036F TOBACCO NON-USER: CPT | Performed by: PSYCHIATRY & NEUROLOGY

## 2024-10-21 PROCEDURE — 1123F ACP DISCUSS/DSCN MKR DOCD: CPT | Performed by: PSYCHIATRY & NEUROLOGY

## 2024-10-21 PROCEDURE — G8417 CALC BMI ABV UP PARAM F/U: HCPCS | Performed by: PSYCHIATRY & NEUROLOGY

## 2024-10-21 PROCEDURE — G8427 DOCREV CUR MEDS BY ELIG CLIN: HCPCS | Performed by: PSYCHIATRY & NEUROLOGY

## 2024-10-21 PROCEDURE — 99204 OFFICE O/P NEW MOD 45 MIN: CPT | Performed by: PSYCHIATRY & NEUROLOGY

## 2024-10-21 PROCEDURE — 1090F PRES/ABSN URINE INCON ASSESS: CPT | Performed by: PSYCHIATRY & NEUROLOGY

## 2024-10-21 ASSESSMENT — ENCOUNTER SYMPTOMS
VOICE CHANGE: 0
COUGH: 0
ABDOMINAL PAIN: 0
BACK PAIN: 1

## 2024-10-25 DIAGNOSIS — N39.0 RECURRENT UTI: ICD-10-CM

## 2024-11-07 ENCOUNTER — HOSPITAL ENCOUNTER (OUTPATIENT)
Dept: PHYSICAL THERAPY | Age: 89
Setting detail: RECURRING SERIES
Discharge: HOME | End: 2024-11-10
Payer: MEDICARE

## 2024-11-07 DIAGNOSIS — Z78.9 DEFICIT IN ACTIVITIES OF DAILY LIVING (ADL): ICD-10-CM

## 2024-11-07 DIAGNOSIS — M54.2 NECK PAIN: Primary | ICD-10-CM

## 2024-11-07 DIAGNOSIS — M62.81 MUSCLE WEAKNESS (GENERALIZED): ICD-10-CM

## 2024-11-07 PROCEDURE — 97162 PT EVAL MOD COMPLEX 30 MIN: CPT

## 2024-11-07 PROCEDURE — 97110 THERAPEUTIC EXERCISES: CPT

## 2024-11-07 ASSESSMENT — PAIN DESCRIPTION - LOCATION: LOCATION: NECK

## 2024-11-07 ASSESSMENT — PAIN SCALES - GENERAL: PAINLEVEL_OUTOF10: 5

## 2024-11-07 ASSESSMENT — PAIN DESCRIPTION - ORIENTATION: ORIENTATION: LEFT

## 2024-11-07 NOTE — THERAPY EVALUATION
Muriel Barajas  : 1935  Primary: Medicare Part A And B (Medicare)  Secondary: IMRIAM [facility] [place]  [address]  [facility] [address]  Phone: [phone]  Fax: [phone] Plan Frequency: 1-2 times a week for up to 90 days (will receive treatment for other diagnosis; plan to alternate sessions between diagnoses)    Plan of Care/Certification Expiration Date: 25 (start of care 2024)        Plan of Care/Certification Expiration Date:  Plan of Care/Certification Expiration Date: 25 (start of care 2024)    Frequency/Duration: Plan Frequency: 1-2 times a week for up to 90 days (will receive treatment for other diagnosis; plan to alternate sessions between diagnoses)      Time In/Out:   Time In: 1330  Time Out: 1425      PT Visit Info:    Progress Note Counter: 1      Visit Count:  1                OUTPATIENT PHYSICAL THERAPY:             Initial Assessment 2024               Episode (neck pain)         Treatment Diagnosis:     Neck pain  Muscle weakness (generalized)  Deficit in activities of daily living (ADL)  Medical/Referring Diagnosis:    Cervical spondylosis    Referring Physician:  Sukumar Wakefield PA MD Orders:  PT Eval and Treat - Patient will also receive eval and treat for difficulty with walking.   Return MD Appt:  TBD  Date of Onset:  Onset Date:  (unknown)     Allergies:  Amoxicillin-pot clavulanate, Gabapentin, Hydrochlorothiazide, Nitrofurantoin, Prednisone, Sulfamethoxazole-trimethoprim, Adhesive tape, and Codeine  Restrictions/Precautions:    Cardiac Precautions: hypertension, CHF, and dizziness  Fall Precautions: L hip pain, osteoporosis; weakness and deconditioning/shortness of breath      Medications Last Reviewed:  2024     SUBJECTIVE   History of Injury/Illness (Reason for Referral):  Ms. Muriel Barajas is a 89 y.o. female presenting to physical therapy with complaints of neck pain.

## 2024-11-08 NOTE — PROGRESS NOTES
Muriel Barajas  : 1935  Primary: Medicare Part A And B (Medicare)  Secondary: MIRIAM [facility] [place]  [address]  [facility] [address]  Phone: [phone]  Fax: [phone] Plan Frequency: 1-2 times a week for up to 90 days (will receive treatment for other diagnosis; plan to alternate sessions between diagnoses)    Plan of Care/Certification Expiration Date: 25 (start of care 2024)        Plan of Care/Certification Expiration Date:  Plan of Care/Certification Expiration Date: 25 (start of care 2024)    Frequency/Duration:   Plan Frequency: 1-2 times a week for up to 90 days (will receive treatment for other diagnosis; plan to alternate sessions between diagnoses)      Time In/Out:   Time In: 1330  Time Out: 1425      PT Visit Info:    Progress Note Counter: 1      Visit Count:  1    OUTPATIENT PHYSICAL THERAPY:   Treatment Note 2024       Episode  (neck pain)               Treatment Diagnosis:    Neck pain  Muscle weakness (generalized)  Deficit in activities of daily living (ADL)  Medical/Referring Diagnosis:    Cervical spondylosis    Referring Physician:  Sukumar Wakefield PA MD Orders:  PT Eval and Treat   Return MD Appt:  TBD   Date of Onset:  Onset Date:  (unknown)     Allergies:   Amoxicillin-pot clavulanate, Gabapentin, Hydrochlorothiazide, Nitrofurantoin, Prednisone, Sulfamethoxazole-trimethoprim, Adhesive tape, and Codeine  Restrictions/Precautions:   Cardiac Precautions: hypertension, CHF, and dizziness  Fall Precautions: L hip pain, osteoporosis; weakness and deconditioning/shortness of breath      Interventions Planned (Treatment may consist of any combination of the following):     See Assessment Note    Subjective Comments:   Patient reports pain at left side of her neck and general fatigue.  Initial Pain Level:: Left Neck 5/10  Post Session Pain Level:  Left  Neck 5/10  Medications Last Reviewed:

## 2024-11-11 ENCOUNTER — HOSPITAL ENCOUNTER (OUTPATIENT)
Dept: PHYSICAL THERAPY | Age: 89
Setting detail: RECURRING SERIES
Discharge: HOME | End: 2024-11-14
Payer: MEDICARE

## 2024-11-11 PROCEDURE — 97110 THERAPEUTIC EXERCISES: CPT

## 2024-11-11 ASSESSMENT — PAIN SCALES - GENERAL: PAINLEVEL_OUTOF10: 5

## 2024-11-11 NOTE — PROGRESS NOTES
Muriel Barajas  : 1935  Primary: Medicare Part A And B (Medicare)  [facility]: [facility] [place]  [address]  [facility] [address]  Phone: [phone]  Fax: [phone] Plan Frequency: 1-2 times a week for up to 90 days (will receive treatment for other diagnosis; plan to alternate sessions between diagnoses)    Plan of Care/Certification Expiration Date: 25 (start of care 2024)        Plan of Care/Certification Expiration Date:  Plan of Care/Certification Expiration Date: 25 (start of care 2024)    Frequency/Duration:   Plan Frequency: 1-2 times a week for up to 90 days (will receive treatment for other diagnosis; plan to alternate sessions between diagnoses)      Time In/Out:   Time In: 1530  Time Out: 1630      PT Visit Info:    Progress Note Counter: 2      Visit Count:  2    OUTPATIENT PHYSICAL THERAPY:   Treatment Note 2024       Episode  (neck pain)               Treatment Diagnosis:    No data found  Medical/Referring Diagnosis:    Cervical spondylosis    Referring Physician:  Sukumar Wakefield PA MD Orders:  PT Eval and Treat   Return MD Appt:  TBD   Date of Onset:  Onset Date:  (unknown)     Allergies:   Amoxicillin-pot clavulanate, Gabapentin, Hydrochlorothiazide, Nitrofurantoin, Prednisone, Sulfamethoxazole-trimethoprim, Adhesive tape, and Codeine  Restrictions/Precautions:   Cardiac Precautions: hypertension, CHF, and dizziness  Fall Precautions: L hip pain, osteoporosis; weakness and deconditioning/shortness of breath      Interventions Planned (Treatment may consist of any combination of the following):     See Assessment Note    Subjective Comments:   Patient reported left side of neck 3/10, LLE pain 5/10 today   Initial Pain Level::     5/10  Post Session Pain Level:       0/10  Medications Last Reviewed:  2024  Updated Objective Findings:   /80 pulse 59, O2 SATS   96%  Treatment

## 2024-11-12 ENCOUNTER — APPOINTMENT (OUTPATIENT)
Dept: PHYSICAL THERAPY | Age: 89
End: 2024-11-12
Payer: MEDICARE

## 2024-11-14 ENCOUNTER — APPOINTMENT (OUTPATIENT)
Dept: PHYSICAL THERAPY | Age: 89
End: 2024-11-14
Payer: MEDICARE

## 2024-11-18 ENCOUNTER — HOSPITAL ENCOUNTER (OUTPATIENT)
Dept: PHYSICAL THERAPY | Age: 89
Setting detail: RECURRING SERIES
Discharge: HOME | End: 2024-11-21
Payer: MEDICARE

## 2024-11-18 PROCEDURE — 97140 MANUAL THERAPY 1/> REGIONS: CPT

## 2024-11-18 PROCEDURE — 97110 THERAPEUTIC EXERCISES: CPT

## 2024-11-18 NOTE — PROGRESS NOTES
Muriel Barajas  : 1935  Primary: Medicare Part A And B (Medicare)  Secondary: MIRIAM [facility] [place]  [address]  [facility] [address]  Phone: [phone]  Fax: [phone] Plan Frequency: 1-2 times a week for up to 90 days (will receive treatment for other diagnosis; plan to alternate sessions between diagnoses)    Plan of Care/Certification Expiration Date: 25 (start of care 2024)        Plan of Care/Certification Expiration Date:  Plan of Care/Certification Expiration Date: 25 (start of care 2024)    Frequency/Duration:   Plan Frequency: 1-2 times a week for up to 90 days (will receive treatment for other diagnosis; plan to alternate sessions between diagnoses)      Time In/Out:   Time In: 1232  Time Out: 1325      PT Visit Info:    Progress Note Counter: 2      Visit Count:  3    OUTPATIENT PHYSICAL THERAPY:   Treatment Note 2024       Episode  (neck pain)               Treatment Diagnosis:    No data found  Medical/Referring Diagnosis:    Cervical spondylosis    Referring Physician:  Sukumar Wakefield PA MD Orders:  PT Eval and Treat   Return MD Appt:  TBD   Date of Onset:  Onset Date:  (unknown)     Allergies:   Amoxicillin-pot clavulanate, Gabapentin, Hydrochlorothiazide, Nitrofurantoin, Prednisone, Sulfamethoxazole-trimethoprim, Adhesive tape, and Codeine  Restrictions/Precautions:   Cardiac Precautions: hypertension, CHF, and dizziness  Fall Precautions: L hip pain, osteoporosis; weakness and deconditioning/shortness of breath      Interventions Planned (Treatment may consist of any combination of the following):     See Assessment Note    Subjective Comments:   Patient  states that it is hard for her to check her blindspot when driving due to L sided neck pain.  Initial Pain Level::      (NR)/10  Post Session Pain Level:        (NR)/10  Medications Last Reviewed:  2024  Updated Objective Findings:

## 2024-11-20 ENCOUNTER — OFFICE VISIT (OUTPATIENT)
Age: 89
End: 2024-11-20

## 2024-11-20 VITALS
HEIGHT: 62 IN | DIASTOLIC BLOOD PRESSURE: 66 MMHG | HEART RATE: 54 BPM | SYSTOLIC BLOOD PRESSURE: 130 MMHG | BODY MASS INDEX: 28.16 KG/M2 | WEIGHT: 153 LBS

## 2024-11-20 DIAGNOSIS — I10 ESSENTIAL HYPERTENSION, BENIGN: ICD-10-CM

## 2024-11-20 DIAGNOSIS — I11.0 HYPERTENSIVE HEART DISEASE WITH CHRONIC SYSTOLIC CONGESTIVE HEART FAILURE (HCC): ICD-10-CM

## 2024-11-20 DIAGNOSIS — I50.22 HYPERTENSIVE HEART DISEASE WITH CHRONIC SYSTOLIC CONGESTIVE HEART FAILURE (HCC): ICD-10-CM

## 2024-11-20 DIAGNOSIS — I25.118 CORONARY ARTERY DISEASE OF NATIVE ARTERY OF NATIVE HEART WITH STABLE ANGINA PECTORIS (HCC): Primary | ICD-10-CM

## 2024-11-20 DIAGNOSIS — N18.32 STAGE 3B CHRONIC KIDNEY DISEASE (HCC): ICD-10-CM

## 2024-11-20 RX ORDER — NITROGLYCERIN 40 MG/1
1 PATCH TRANSDERMAL DAILY
Qty: 90 PATCH | Refills: 1 | Status: SHIPPED | OUTPATIENT
Start: 2024-11-20

## 2024-11-20 RX ORDER — NITROGLYCERIN 400 UG/1
1 SPRAY ORAL EVERY 5 MIN PRN
Qty: 1 EACH | Refills: 2 | Status: SHIPPED | OUTPATIENT
Start: 2024-11-20

## 2024-11-20 RX ORDER — RANOLAZINE 500 MG/1
500 TABLET, EXTENDED RELEASE ORAL 2 TIMES DAILY
Qty: 180 TABLET | Refills: 3 | Status: SHIPPED | OUTPATIENT
Start: 2024-11-20

## 2024-11-20 NOTE — PROGRESS NOTES
[address] [facility], [address] [place] [address]  PHONE: [phone]     24    NAME:  Muriel Barajas  : 1935  MRN: [id number]       SUBJECTIVE:   Muriel Barajas is a 89 y.o. female seen for a follow up visit regarding the following:     Chief Complaint   Patient presents with    Coronary Artery Disease    Congestive Heart Failure    Shortness of Breath     Worse in the last 6 months       HPI: Here for DHF, CAD s/p 4v CABG in   NO PVD on duplex 2017.   LLE DVT 2018   Echo 3/2022: normal EF  [facility] 2022:  grafts patent.  Small vessel disease involving ramus intermedius which is the only change in comparison to 2018.  Recommend medical therapy for small vessel coronary disease.  (Imdur added after above [facility].  More HAs, could not take this med)  Carotid US 2022: mild Dz.       Son passed away.     memory worsening, other son watching her.    Some AWAN, some angina.   AWAN remains issue.   On NTG patch now, this has helped some, but she forgets it often she says.   NO new CP, pressure.   Doing well on anticoagulation treatment as reviewed today, no bleeding issues or excessive bruising noted.               Past Medical History, Past Surgical History, Family history, Social History, and Medications were all reviewed with the patient today and updated as necessary.     Current Outpatient Medications   Medication Sig Dispense Refill    rivaroxaban (XARELTO) 20 MG TABS tablet Take 1 tablet by mouth daily (with breakfast) HOLD XARELTO UNTIL .  MAY RESUME THEN, IF NO WOUND DRAINAGE. 90 tablet 3    ranolazine (RANEXA) 500 MG extended release tablet Take 1 tablet by mouth 2 times daily 180 tablet 3    nitroGLYCERIN (NITROLINGUAL) 0.4 MG/SPRAY 0.4 mg spray Place 1 spray under the tongue every 5 minutes as needed for Chest pain 1 each 2    nitroGLYCERIN (NITRODUR) 0.2 MG/HR Place 1 patch onto the skin daily 90 patch 1    cephALEXin (KEFLEX) 250 MG capsule Take 1 capsule by

## 2024-11-22 ENCOUNTER — HOSPITAL ENCOUNTER (OUTPATIENT)
Dept: PHYSICAL THERAPY | Age: 89
Setting detail: RECURRING SERIES
Discharge: HOME | End: 2024-11-25
Payer: MEDICARE

## 2024-11-22 PROCEDURE — 97110 THERAPEUTIC EXERCISES: CPT

## 2024-11-22 PROCEDURE — 97140 MANUAL THERAPY 1/> REGIONS: CPT

## 2024-11-22 ASSESSMENT — PAIN SCALES - GENERAL: PAINLEVEL_OUTOF10: 5

## 2024-11-22 NOTE — PROGRESS NOTES
Jb Gomes, PT SFORPWD [facility]   12/12/2024 11:00 AM Jb Gomes, PT SFOR[facility]   2/11/2025 12:00 PM Mitesh Ramirez DO DE [facility] AMB   3/26/2025 10:45 AM Yanick Jay MD [id number] [facility] AMB   4/23/2025 11:00 AM B[facility] ULTRASOUND 3 [facility] AMB   4/23/2025 11:30 AM Colette Hutchison APRN - TARIQ [facility] AMB

## 2024-11-25 ENCOUNTER — LAB (OUTPATIENT)
Dept: PRIMARY CARE CLINIC | Facility: CLINIC | Age: 89
End: 2024-11-25

## 2024-11-25 ENCOUNTER — HOSPITAL ENCOUNTER (OUTPATIENT)
Dept: PHYSICAL THERAPY | Age: 89
Setting detail: RECURRING SERIES
Discharge: HOME | End: 2024-11-28
Payer: MEDICARE

## 2024-11-25 DIAGNOSIS — Z13.220 SCREENING FOR LIPID DISORDERS: ICD-10-CM

## 2024-11-25 DIAGNOSIS — E55.9 VITAMIN D DEFICIENCY: ICD-10-CM

## 2024-11-25 DIAGNOSIS — R53.83 OTHER FATIGUE: ICD-10-CM

## 2024-11-25 DIAGNOSIS — R73.09 OTHER ABNORMAL GLUCOSE: ICD-10-CM

## 2024-11-25 DIAGNOSIS — R79.0 LOW MAGNESIUM LEVEL: Primary | ICD-10-CM

## 2024-11-25 DIAGNOSIS — Z13.29 SCREENING FOR THYROID DISORDER: ICD-10-CM

## 2024-11-25 DIAGNOSIS — R53.81 MALAISE: ICD-10-CM

## 2024-11-25 DIAGNOSIS — Z13.0 SCREENING FOR ENDOCRINE, NUTRITIONAL, METABOLIC AND IMMUNITY DISORDER: ICD-10-CM

## 2024-11-25 DIAGNOSIS — R53.83 MALAISE AND FATIGUE: ICD-10-CM

## 2024-11-25 DIAGNOSIS — Z13.29 SCREENING FOR ENDOCRINE, NUTRITIONAL, METABOLIC AND IMMUNITY DISORDER: ICD-10-CM

## 2024-11-25 DIAGNOSIS — Z13.228 SCREENING FOR METABOLIC DISORDER: ICD-10-CM

## 2024-11-25 DIAGNOSIS — Z13.1 SCREENING FOR DIABETES MELLITUS: ICD-10-CM

## 2024-11-25 DIAGNOSIS — R53.82 CHRONIC FATIGUE: ICD-10-CM

## 2024-11-25 DIAGNOSIS — R79.89 OTHER SPECIFIED ABNORMAL FINDINGS OF BLOOD CHEMISTRY: ICD-10-CM

## 2024-11-25 DIAGNOSIS — Z13.228 SCREENING FOR ENDOCRINE, NUTRITIONAL, METABOLIC AND IMMUNITY DISORDER: ICD-10-CM

## 2024-11-25 DIAGNOSIS — Z13.21 ENCOUNTER FOR VITAMIN DEFICIENCY SCREENING: ICD-10-CM

## 2024-11-25 DIAGNOSIS — R79.0 LOW MAGNESIUM LEVEL: ICD-10-CM

## 2024-11-25 DIAGNOSIS — Z79.899 ENCOUNTER FOR LONG-TERM (CURRENT) USE OF MEDICATIONS: ICD-10-CM

## 2024-11-25 DIAGNOSIS — Z13.0 SCREENING FOR DEFICIENCY ANEMIA: ICD-10-CM

## 2024-11-25 DIAGNOSIS — Z13.21 SCREENING FOR ENDOCRINE, NUTRITIONAL, METABOLIC AND IMMUNITY DISORDER: ICD-10-CM

## 2024-11-25 DIAGNOSIS — R79.9 ABNORMAL BLOOD CHEMISTRY: ICD-10-CM

## 2024-11-25 DIAGNOSIS — R53.81 MALAISE AND FATIGUE: ICD-10-CM

## 2024-11-25 LAB
25(OH)D3 SERPL-MCNC: 42 NG/ML (ref 30–100)
ALBUMIN SERPL-MCNC: 3.7 G/DL (ref 3.2–4.6)
ALBUMIN/GLOB SERPL: 1.5 (ref 1–1.9)
ALP SERPL-CCNC: 64 U/L (ref 35–104)
ALT SERPL-CCNC: 7 U/L (ref 8–45)
ANION GAP SERPL CALC-SCNC: 10 MMOL/L (ref 7–16)
AST SERPL-CCNC: 23 U/L (ref 15–37)
BASOPHILS # BLD: 0 K/UL (ref 0–0.2)
BASOPHILS NFR BLD: 1 % (ref 0–2)
BILIRUB SERPL-MCNC: 0.4 MG/DL (ref 0–1.2)
BUN SERPL-MCNC: 18 MG/DL (ref 8–23)
CALCIUM SERPL-MCNC: 9.3 MG/DL (ref 8.8–10.2)
CHLORIDE SERPL-SCNC: 101 MMOL/L (ref 98–107)
CHOLEST SERPL-MCNC: 243 MG/DL (ref 0–200)
CO2 SERPL-SCNC: 27 MMOL/L (ref 20–29)
CREAT SERPL-MCNC: 1.14 MG/DL (ref 0.6–1.1)
DIFFERENTIAL METHOD BLD: ABNORMAL
EOSINOPHIL # BLD: 0.1 K/UL (ref 0–0.8)
EOSINOPHIL NFR BLD: 2 % (ref 0.5–7.8)
ERYTHROCYTE [DISTWIDTH] IN BLOOD BY AUTOMATED COUNT: 13.9 % (ref 11.9–14.6)
EST. AVERAGE GLUCOSE BLD GHB EST-MCNC: 123 MG/DL
GLOBULIN SER CALC-MCNC: 2.4 G/DL (ref 2.3–3.5)
GLUCOSE SERPL-MCNC: 98 MG/DL (ref 70–99)
HBA1C MFR BLD: 5.9 % (ref 0–5.6)
HCT VFR BLD AUTO: 38.8 % (ref 35.8–46.3)
HDLC SERPL-MCNC: 83 MG/DL (ref 40–60)
HDLC SERPL: 2.9 (ref 0–5)
HGB BLD-MCNC: 12.5 G/DL (ref 11.7–15.4)
IMM GRANULOCYTES # BLD AUTO: 0 K/UL (ref 0–0.5)
IMM GRANULOCYTES NFR BLD AUTO: 0 % (ref 0–5)
LDLC SERPL CALC-MCNC: 145 MG/DL (ref 0–100)
LYMPHOCYTES # BLD: 1.2 K/UL (ref 0.5–4.6)
LYMPHOCYTES NFR BLD: 31 % (ref 13–44)
MAGNESIUM SERPL-MCNC: 2.2 MG/DL (ref 1.8–2.4)
MCH RBC QN AUTO: 32.6 PG (ref 26.1–32.9)
MCHC RBC AUTO-ENTMCNC: 32.2 G/DL (ref 31.4–35)
MCV RBC AUTO: 101.3 FL (ref 82–102)
MONOCYTES # BLD: 0.4 K/UL (ref 0.1–1.3)
MONOCYTES NFR BLD: 11 % (ref 4–12)
NEUTS SEG # BLD: 2.2 K/UL (ref 1.7–8.2)
NEUTS SEG NFR BLD: 55 % (ref 43–78)
NRBC # BLD: 0 K/UL (ref 0–0.2)
PLATELET # BLD AUTO: 229 K/UL (ref 150–450)
PMV BLD AUTO: 10.1 FL (ref 9.4–12.3)
POTASSIUM SERPL-SCNC: 4.7 MMOL/L (ref 3.5–5.1)
PROT SERPL-MCNC: 6.1 G/DL (ref 6.3–8.2)
RBC # BLD AUTO: 3.83 M/UL (ref 4.05–5.2)
SODIUM SERPL-SCNC: 139 MMOL/L (ref 136–145)
TRIGL SERPL-MCNC: 75 MG/DL (ref 0–150)
TSH W FREE THYROID IF ABNORMAL: 2.27 UIU/ML (ref 0.27–4.2)
VIT B12 SERPL-MCNC: 1002 PG/ML (ref 193–986)
VLDLC SERPL CALC-MCNC: 15 MG/DL (ref 6–23)
WBC # BLD AUTO: 4 K/UL (ref 4.3–11.1)

## 2024-11-25 PROCEDURE — 97110 THERAPEUTIC EXERCISES: CPT

## 2024-11-25 ASSESSMENT — PAIN SCALES - GENERAL: PAINLEVEL_OUTOF10: 6

## 2024-11-25 NOTE — PROGRESS NOTES
Muriel Barajas  : 1935  Primary: Medicare Part A And B (Medicare)  Secondary: [facility] [place]  [address]AS[address] A  [facility] [address]  Phone: [phone]  Fax: [phone] Plan Frequency: 1-2 times a week for up to 90 days (will receive treatment for other diagnosis; plan to alternate sessions between diagnoses)    Plan of Care/Certification Expiration Date: 25 (start of care 2024)        Plan of Care/Certification Expiration Date:  Plan of Care/Certification Expiration Date: 25 (start of care 2024)    Frequency/Duration:   Plan Frequency: 1-2 times a week for up to 90 days (will receive treatment for other diagnosis; plan to alternate sessions between diagnoses)      Time In/Out:   Time In: 1050  Time Out: 1150      PT Visit Info:    Progress Note Counter: 5      Visit Count:  5    OUTPATIENT PHYSICAL THERAPY:   Treatment Note 2024       Episode  (neck pain)               Treatment Diagnosis:    No data found  Medical/Referring Diagnosis:    Cervical spondylosis    Referring Physician:  Sukumar Wakefield PA MD Orders:  PT Eval and Treat   Return MD Appt:  TBD   Date of Onset:  Onset Date:  (unknown)     Allergies:   Amoxicillin-pot clavulanate, Gabapentin, Hydrochlorothiazide, Nitrofurantoin, Prednisone, Sulfamethoxazole-trimethoprim, Adhesive tape, and Codeine  Restrictions/Precautions:   Cardiac Precautions: hypertension, CHF, and dizziness  Fall Precautions: L hip pain, osteoporosis; weakness and deconditioning/shortness of breath      Interventions Planned (Treatment may consist of any combination of the following):     See Assessment Note    Subjective Comments: Patient stated her pain on the left side of her neck and left hip.    Initial Pain Level::     6/10  Post Session Pain Level:       3/10  Medications Last Reviewed:  2024  Updated Objective Findings:   Pt. 's O2 SATS 99% pulse 67     Treatment

## 2024-12-02 ENCOUNTER — OFFICE VISIT (OUTPATIENT)
Dept: PRIMARY CARE CLINIC | Facility: CLINIC | Age: 89
End: 2024-12-02

## 2024-12-02 VITALS
TEMPERATURE: 97.9 F | SYSTOLIC BLOOD PRESSURE: 143 MMHG | HEART RATE: 51 BPM | HEIGHT: 62 IN | OXYGEN SATURATION: 97 % | DIASTOLIC BLOOD PRESSURE: 56 MMHG | BODY MASS INDEX: 28.89 KG/M2 | WEIGHT: 157 LBS

## 2024-12-02 DIAGNOSIS — Z00.00 MEDICARE ANNUAL WELLNESS VISIT, SUBSEQUENT: Primary | ICD-10-CM

## 2024-12-02 DIAGNOSIS — N18.32 STAGE 3B CHRONIC KIDNEY DISEASE (HCC): ICD-10-CM

## 2024-12-02 DIAGNOSIS — H93.13 BILATERAL TINNITUS: ICD-10-CM

## 2024-12-02 DIAGNOSIS — N39.0 URINARY TRACT INFECTION WITHOUT HEMATURIA, SITE UNSPECIFIED: ICD-10-CM

## 2024-12-02 DIAGNOSIS — I10 ESSENTIAL HYPERTENSION, BENIGN: ICD-10-CM

## 2024-12-02 DIAGNOSIS — E78.2 MIXED HYPERLIPIDEMIA: ICD-10-CM

## 2024-12-02 DIAGNOSIS — R42 VERTIGO: ICD-10-CM

## 2024-12-02 DIAGNOSIS — F33.2 SEVERE EPISODE OF RECURRENT MAJOR DEPRESSIVE DISORDER, WITHOUT PSYCHOTIC FEATURES (HCC): ICD-10-CM

## 2024-12-02 DIAGNOSIS — R73.02 GLUCOSE INTOLERANCE (IMPAIRED GLUCOSE TOLERANCE): ICD-10-CM

## 2024-12-02 LAB
BILIRUBIN, URINE, POC: NEGATIVE
BLOOD URINE, POC: NEGATIVE
GLUCOSE URINE, POC: NEGATIVE
KETONES, URINE, POC: NEGATIVE
LEUKOCYTE ESTERASE, URINE, POC: NORMAL
NITRITE, URINE, POC: NEGATIVE
PH, URINE, POC: 5.5 (ref 4.6–8)
PROTEIN,URINE, POC: NEGATIVE
SPECIFIC GRAVITY, URINE, POC: 1.01 (ref 1–1.03)
URINALYSIS CLARITY, POC: CLEAR
URINALYSIS COLOR, POC: YELLOW
UROBILINOGEN, POC: NORMAL

## 2024-12-02 RX ORDER — MAGNESIUM OXIDE 400 MG/1
400 TABLET ORAL DAILY
Qty: 30 TABLET | Refills: 5 | Status: SHIPPED | OUTPATIENT
Start: 2024-12-02

## 2024-12-02 ASSESSMENT — PATIENT HEALTH QUESTIONNAIRE - PHQ9
6. FEELING BAD ABOUT YOURSELF - OR THAT YOU ARE A FAILURE OR HAVE LET YOURSELF OR YOUR FAMILY DOWN: NEARLY EVERY DAY
8. MOVING OR SPEAKING SO SLOWLY THAT OTHER PEOPLE COULD HAVE NOTICED. OR THE OPPOSITE, BEING SO FIGETY OR RESTLESS THAT YOU HAVE BEEN MOVING AROUND A LOT MORE THAN USUAL: NOT AT ALL
10. IF YOU CHECKED OFF ANY PROBLEMS, HOW DIFFICULT HAVE THESE PROBLEMS MADE IT FOR YOU TO DO YOUR WORK, TAKE CARE OF THINGS AT HOME, OR GET ALONG WITH OTHER PEOPLE: SOMEWHAT DIFFICULT
SUM OF ALL RESPONSES TO PHQ QUESTIONS 1-9: 12
2. FEELING DOWN, DEPRESSED OR HOPELESS: NEARLY EVERY DAY
3. TROUBLE FALLING OR STAYING ASLEEP: NOT AT ALL
5. POOR APPETITE OR OVEREATING: NOT AT ALL
SUM OF ALL RESPONSES TO PHQ QUESTIONS 1-9: 12
7. TROUBLE CONCENTRATING ON THINGS, SUCH AS READING THE NEWSPAPER OR WATCHING TELEVISION: NEARLY EVERY DAY
4. FEELING TIRED OR HAVING LITTLE ENERGY: NEARLY EVERY DAY
1. LITTLE INTEREST OR PLEASURE IN DOING THINGS: NOT AT ALL
9. THOUGHTS THAT YOU WOULD BE BETTER OFF DEAD, OR OF HURTING YOURSELF: NOT AT ALL
SUM OF ALL RESPONSES TO PHQ9 QUESTIONS 1 & 2: 3
SUM OF ALL RESPONSES TO PHQ QUESTIONS 1-9: 12
SUM OF ALL RESPONSES TO PHQ QUESTIONS 1-9: 12

## 2024-12-02 NOTE — PROGRESS NOTES
patch, continue using nitroglycerin spray as needed for shortness of breath, and monitor symptoms.    6. Spinal Arthritis     - Assessment: The patient has experienced severe leg pain, which has improved with therapy. X-rays confirm significant spinal arthritis. Surgery was considered but advised against by the cardiologist. Physical therapy has alleviated the pain.     - Plan: Proceed with physical therapy, manage pain with Tylenol as tolerated, and avoid stronger pain medications due to the risk of falls.    7. Suspected Urinary Tract Infection     - Assessment: The patient notes changes in urine odor, but recent urinalysis results are unavailable.     - Plan: Collect a urine sample for a repeat urinalysis and possible culture, continue with the current low-dose antibiotic therapy, and monitor for UTI symptoms.    8. Vitamin B12 Supplementation     - Assessment: B12 levels are high at 1000 but are not causing any issues. The patient has been taking a B12 supplement.     - Plan: Maintain the current dose of B12 supplementation.      Recommendations for Preventive Services Due: see orders and patient instructions/AVS.  Recommended screening schedule for the next 5-10 years is provided to the patient in written form: see Patient Instructions/AVS.     No follow-ups on file.     Subjective   The following acute and/or chronic problems were also addressed today:  The patient, Kerry Barajas, reports experiencing an unusual smell in her urine this week, although it was checked last week. She is on a low dose of antibiotics. Kerry has been prescribed Xanaflex for muscle spasms and takes meclizine for vertigo, which she finds helpful without causing drowsiness. She mentions having balance issues and persistent tinnitus in her ear, which worsened with the use of a nitroglycerin patch prescribed by Dr. Ramirez, leading her to discontinue its use. However, she uses nitroglycerin spray for shortness of breath but denies any

## 2024-12-02 NOTE — PATIENT INSTRUCTIONS
alcohol can cause health problems.     Manage other health problems such as diabetes, high blood pressure, and high cholesterol. If you think you may have a problem with alcohol or drug use, talk to your doctor.   Medicines    Take your medicines exactly as prescribed. Call your doctor if you think you are having a problem with your medicine.     If your doctor recommends aspirin, take the amount directed each day. Make sure you take aspirin and not another kind of pain reliever, such as acetaminophen (Tylenol).   When should you call for help?   Call 911 if you have symptoms of a heart attack. These may include:    Chest pain or pressure, or a strange feeling in the chest.     Sweating.     Shortness of breath.     Pain, pressure, or a strange feeling in the back, neck, jaw, or upper belly or in one or both shoulders or arms.     Lightheadedness or sudden weakness.     A fast or irregular heartbeat.   After you call 911, the  may tell you to chew 1 adult-strength or 2 to 4 low-dose aspirin. Wait for an ambulance. Do not try to drive yourself.  Watch closely for changes in your health, and be sure to contact your doctor if you have any problems.  Where can you learn more?  Go to https://www.[employer].net/patientEd and enter F075 to learn more about \"A Healthy Heart: Care Instructions.\"  Current as of: June 24, 2023  Content Version: 14.2  © 2024 [employer].   Care instructions adapted under license by [employer]. If you have questions about a medical condition or this instruction, always ask your healthcare professional. [employer] disclaims any warranty or liability for your use of this information.      Personalized Preventive Plan for Muriel Barajas - 12/2/2024  Medicare offers a range of preventive health benefits. Some of the tests and screenings are paid in full while other may be subject to a deductible, co-insurance, and/or copay.    Some of these benefits include a

## 2024-12-06 RX ORDER — CARVEDILOL 25 MG/1
25 TABLET ORAL 2 TIMES DAILY
Qty: 180 TABLET | Refills: 3 | Status: SHIPPED | OUTPATIENT
Start: 2024-12-06

## 2024-12-06 RX ORDER — SPIRONOLACTONE 25 MG/1
25 TABLET ORAL DAILY
Qty: 90 TABLET | Refills: 3 | OUTPATIENT
Start: 2024-12-06

## 2024-12-16 RX ORDER — MEMANTINE HYDROCHLORIDE 10 MG/1
10 TABLET ORAL DAILY
Qty: 30 TABLET | Refills: 5 | Status: SHIPPED | OUTPATIENT
Start: 2024-12-16

## 2024-12-16 RX ORDER — TIZANIDINE 2 MG/1
2 TABLET ORAL 3 TIMES DAILY PRN
Qty: 270 TABLET | OUTPATIENT
Start: 2024-12-16

## 2024-12-23 DIAGNOSIS — I10 ESSENTIAL HYPERTENSION, BENIGN: Primary | ICD-10-CM

## 2024-12-26 RX ORDER — SPIRONOLACTONE 25 MG/1
25 TABLET ORAL DAILY
Qty: 90 TABLET | Refills: 3 | OUTPATIENT
Start: 2024-12-26

## 2024-12-26 RX ORDER — SPIRONOLACTONE 25 MG/1
25 TABLET ORAL DAILY
Qty: 90 TABLET | Refills: 3 | Status: SHIPPED | OUTPATIENT
Start: 2024-12-26

## 2024-12-30 NOTE — TELEPHONE ENCOUNTER
Patient requesting a refill on     rosuvastatin (CRESTOR) 40 MG tablet [       Needs to be sent to     [facility]/pharmacy #[id number] - [address] - [address]  - P [phone] - F [phone]  [address] [facility] [place] [address]  Phone: [phone]  Fax: [phone]

## 2025-01-06 RX ORDER — ROSUVASTATIN CALCIUM 40 MG/1
40 TABLET, COATED ORAL EVERY EVENING
Qty: 90 TABLET | Refills: 3 | OUTPATIENT
Start: 2025-01-06

## 2025-01-06 RX ORDER — FAMOTIDINE 20 MG/1
20 TABLET, FILM COATED ORAL EVERY EVENING
Qty: 90 TABLET | Refills: 3 | Status: SHIPPED | OUTPATIENT
Start: 2025-01-06

## 2025-01-06 RX ORDER — ROSUVASTATIN CALCIUM 40 MG/1
40 TABLET, COATED ORAL EVERY EVENING
Qty: 90 TABLET | Refills: 3 | Status: SHIPPED | OUTPATIENT
Start: 2025-01-06

## 2025-01-14 RX ORDER — MEMANTINE HYDROCHLORIDE 10 MG/1
10 TABLET ORAL DAILY
Qty: 90 TABLET | Refills: 2 | OUTPATIENT
Start: 2025-01-14

## 2025-02-11 ENCOUNTER — OFFICE VISIT (OUTPATIENT)
Age: 89
End: 2025-02-11
Payer: MEDICARE

## 2025-02-11 VITALS
SYSTOLIC BLOOD PRESSURE: 130 MMHG | HEART RATE: 54 BPM | HEIGHT: 62 IN | BODY MASS INDEX: 28.52 KG/M2 | WEIGHT: 155 LBS | DIASTOLIC BLOOD PRESSURE: 72 MMHG

## 2025-02-11 DIAGNOSIS — R07.2 PRECORDIAL PAIN: Primary | ICD-10-CM

## 2025-02-11 DIAGNOSIS — I10 ESSENTIAL HYPERTENSION, BENIGN: ICD-10-CM

## 2025-02-11 DIAGNOSIS — I11.0 HYPERTENSIVE HEART DISEASE WITH CHRONIC SYSTOLIC CONGESTIVE HEART FAILURE (HCC): ICD-10-CM

## 2025-02-11 DIAGNOSIS — Z95.1 S/P CABG (CORONARY ARTERY BYPASS GRAFT): ICD-10-CM

## 2025-02-11 DIAGNOSIS — I73.9 PAD (PERIPHERAL ARTERY DISEASE) (HCC): ICD-10-CM

## 2025-02-11 DIAGNOSIS — I25.118 CORONARY ARTERY DISEASE OF NATIVE ARTERY OF NATIVE HEART WITH STABLE ANGINA PECTORIS (HCC): ICD-10-CM

## 2025-02-11 DIAGNOSIS — I50.32 DIASTOLIC CHF, CHRONIC (HCC): ICD-10-CM

## 2025-02-11 DIAGNOSIS — I87.2 VENOUS (PERIPHERAL) INSUFFICIENCY: ICD-10-CM

## 2025-02-11 DIAGNOSIS — I50.22 HYPERTENSIVE HEART DISEASE WITH CHRONIC SYSTOLIC CONGESTIVE HEART FAILURE (HCC): ICD-10-CM

## 2025-02-11 DIAGNOSIS — N18.32 STAGE 3B CHRONIC KIDNEY DISEASE (HCC): ICD-10-CM

## 2025-02-11 PROCEDURE — 1036F TOBACCO NON-USER: CPT | Performed by: INTERNAL MEDICINE

## 2025-02-11 PROCEDURE — 93000 ELECTROCARDIOGRAM COMPLETE: CPT | Performed by: INTERNAL MEDICINE

## 2025-02-11 PROCEDURE — G8427 DOCREV CUR MEDS BY ELIG CLIN: HCPCS | Performed by: INTERNAL MEDICINE

## 2025-02-11 PROCEDURE — 99214 OFFICE O/P EST MOD 30 MIN: CPT | Performed by: INTERNAL MEDICINE

## 2025-02-11 PROCEDURE — 1125F AMNT PAIN NOTED PAIN PRSNT: CPT | Performed by: INTERNAL MEDICINE

## 2025-02-11 PROCEDURE — G8417 CALC BMI ABV UP PARAM F/U: HCPCS | Performed by: INTERNAL MEDICINE

## 2025-02-11 PROCEDURE — 1159F MED LIST DOCD IN RCRD: CPT | Performed by: INTERNAL MEDICINE

## 2025-02-11 PROCEDURE — 1090F PRES/ABSN URINE INCON ASSESS: CPT | Performed by: INTERNAL MEDICINE

## 2025-02-11 PROCEDURE — 1123F ACP DISCUSS/DSCN MKR DOCD: CPT | Performed by: INTERNAL MEDICINE

## 2025-02-11 RX ORDER — CETIRIZINE HYDROCHLORIDE 10 MG/1
10 TABLET ORAL DAILY
COMMUNITY

## 2025-02-11 RX ORDER — CARVEDILOL 25 MG/1
25 TABLET ORAL 2 TIMES DAILY
Qty: 180 TABLET | Refills: 3 | Status: SHIPPED | OUTPATIENT
Start: 2025-02-11

## 2025-02-11 RX ORDER — NITROGLYCERIN 40 MG/1
1 PATCH TRANSDERMAL DAILY
Qty: 90 PATCH | Refills: 1 | Status: SHIPPED | OUTPATIENT
Start: 2025-02-11

## 2025-02-11 RX ORDER — NITROGLYCERIN 400 UG/1
1 SPRAY ORAL EVERY 5 MIN PRN
Qty: 1 EACH | Refills: 2 | Status: SHIPPED | OUTPATIENT
Start: 2025-02-11

## 2025-02-11 RX ORDER — RANOLAZINE 500 MG/1
500 TABLET, EXTENDED RELEASE ORAL 2 TIMES DAILY
Qty: 180 TABLET | Refills: 3 | Status: SHIPPED | OUTPATIENT
Start: 2025-02-11

## 2025-02-11 RX ORDER — FUROSEMIDE 40 MG/1
40 TABLET ORAL DAILY
Qty: 90 TABLET | Refills: 3 | Status: SHIPPED | OUTPATIENT
Start: 2025-02-11

## 2025-02-11 NOTE — PROGRESS NOTES
2 [facility], [address] [place] [address]  PHONE: [phone]     25    NAME:  Muriel Barajas  : 1935  MRN: [id number]       SUBJECTIVE:   Muriel Barajas is a 89 y.o. female seen for a follow up visit regarding the following:     Chief Complaint   Patient presents with    Coronary Artery Disease    Congestive Heart Failure       HPI:  Here for DHF, CAD s/p 4v CABG in   NO PVD on duplex 2017.   LLE DVT 2018   Echo 3/2022: normal EF  [facility] 2022: 2 grafts patent.  Small vessel disease involving ramus intermedius which is the only change in comparison to 2018.  Recommend medical therapy for small vessel coronary disease.  (Imdur added after above [facility].  More HAs, could not take this med)  Carotid US 2022: mild Dz.      Memory worsening, son watching her.    Some AWAN, some angina.   AWAN remains issue.   On NTG patch now, this has helped some, but she forgets it often she says.     No new CP, pressure.     She is following daily weights.    On lasix and aldactone daily.     Doing well on anticoagulation treatment as reviewed today, no bleeding issues or excessive bruising noted.                  Past Medical History, Past Surgical History, Family history, Social History, and Medications were all reviewed with the patient today and updated as necessary.     Current Outpatient Medications   Medication Sig Dispense Refill    cetirizine (ZYRTEC) 10 MG tablet Take 1 tablet by mouth daily      rivaroxaban (XARELTO) 20 MG TABS tablet Take 1 tablet by mouth daily (with breakfast) HOLD XARELTO UNTIL .  MAY RESUME THEN, IF NO WOUND DRAINAGE. 90 tablet 3    ranolazine (RANEXA) 500 MG extended release tablet Take 1 tablet by mouth 2 times daily 180 tablet 3    nitroGLYCERIN (NITROLINGUAL) 0.4 MG/SPRAY 0.4 mg spray Place 1 spray under the tongue every 5 minutes as needed for Chest pain 1 each 2    nitroGLYCERIN (NITRODUR) 0.2 MG/HR Place 1 patch onto the skin daily 90

## 2025-02-13 ENCOUNTER — TELEPHONE (OUTPATIENT)
Age: 89
End: 2025-02-13

## 2025-02-13 NOTE — TELEPHONE ENCOUNTER
----- Message from Dr. Mitesh Ramirez DO sent at 2/11/2025 12:06 PM EST -----  Needs referral for HH, home nurse, PT, OT.   Thanks

## 2025-02-19 NOTE — PROGRESS NOTES
07/19/22 1611   Oxygen Therapy/Pulse Ox   O2 Therapy Room air   Heart Rate 61   SpO2 98 %   Pulse Oximeter Device Mode Intermittent   $Pulse Oximeter $Spot check (single)   RT Misc Charges   $RT Education $Self Management   Initial respiratory Assessment completed with pt. Pt was interviewed and evaluated in [facility] prior to surgery. Patient ID:  Jose Bowers  [id number]  81 y.o.  1935  Surgeon: Dr. Jennifer Weldon  Date of Surgery: [unfilled]  Procedure: Total Right Hip Arthroplasty  Primary Care Physician: Jean Marie Young [phone]  Specialists:       SAT  98  %  HR 61    FEV1:1.11  % PREDICTED:  68 %    BS:CLEAR      Pt taught proper COUGH technique  IS REVIEWED WITH PT AS WELL AS BENEFITS OF USING IS IN SEDENTARY PTS. DIAPHRAGMATIC BREATHING EXERCISE INSTRUCTIONS GIVEN    History of smoking:   DENIES                 Quit date:         Secondhand smoke:DENIES    Past procedures with Oxygen desaturation or delayed awakening:DENIES    Past Medical History:   Diagnosis Date    Abdominal pain 10/28/2014    Angina at rest [facility])     pt denies    Arthritis     osteo - low back,  shoulders, hips, low back    Bilateral carotid bruits     Bursitis     CAD (coronary artery disease)     4 vessel CABG 2005;--- stents x 4--- last one placed 2014-- followed by dr Jhonatan Kauffman     Chronic pain     left shoulder    Coagulation defects     Xarelto    Constipation     Cough 09/11/2014    10/8/14, Methacholine Challenge Study: The point at which the FEV1 fell by at least 20%, the PC20, occurred above a dose of 25mg/mL of methacholine. This rules out the diagnosis of asthma with an extremely high degree of sensitivity. No post-challenge FEV1 recovery was identified. Julio César Lyons MD        Depressive disorder     Dyspnea 09/11/2014    [facility]; 9/29/14: IMPRESSION: The flow volume loop is consistent restriction. Spirometry suggest restriction with concomitant obstruction at low lung volumes.  There Chart reviewed. Plan for potential EGD and colonoscopy for evaluation of anemia. Will follow-up after procedure completion and obtain repeat NC CTH to determine if patient is appropriate for resolution of anticoagulation. Please reach out for any questions or concerns.   Patient was planned to undergo EGD/colonoscopy today however unable to complete Miralax prep this AM as Gatorade was unable to be obtained. Will place patient on clear liquid diet for today, plan for Miralax prep this evening and NPO after midnight for EGD/colonoscopy tomorrow. Orders placed.    Per sign out, transfuse if Hemoglobin < 7. Upon shift start after sign out, noticed last Hgb resulted at 6.7 at 1848. Went to consent patient for transfusion. No signs of active bleeding. Patient appeared comfortable. 1 unit pRBC ordered.    Received notification via [employer] secure chat that patient was saturating in the 70s to 80s on SpO2 probe after moving from the chair back to the bed.  Presented to bedside to evaluate patient, patient noted to be sitting upright in bed without significant increased WOB, although with discomfort from NRB mask.  Respiratory at bedside had placed patient on midflow O2 with additional NRB.  XR performed at bedside grossly unchanged from prior on 2/11/25 by my read.      Patient continued to have persistently low saturations, reading in the 60s-70s or at times unreadable with finger SpO2 probe.  Patient displayed appropriate mentation and answered questions appropriately throughout my exam.  Denied CP, SOB, although does endorse prior history of PUTNAM which has been worse recently.    On my physical exam, patient with clear lung sounds in the left lung and largely clear lung sounds on the right, although with very mild end-expiratory wheezes on exam in right lung fields.  Patient without conversational dyspnea or retractions.      A forehead oxygen probe was obtained by nursing and placed, which immediately captured % SpO2 with good waveform.  NRB was removed and SpO2 reading remained the same.  Patient appeared to be resting comfortably     Wean O2 as tolerated, moving towards goal of home O2 2L/min NC, maintaining SpO2 > 88%  Patient may require STAT CT PE study if persistently hypoxic; last Cr 0.91  Scheduled Xopenex and Atrovent added by day team attending  Family updated at bedside at this time    Yeimi Ham DO   3                                   SACS score:6  Stop Bang                                CS HS  RESPIRATORY ASSESSMENT Q SHIFT   O2 PRN    ALBUTEROL  NEBULIZER Q6 PRN WHEEZING                                               Referrals:  DECLINED  Pt.  Phone Number: (2) more than 100 beats/min

## 2025-02-24 ENCOUNTER — TELEPHONE (OUTPATIENT)
Age: 89
End: 2025-02-24

## 2025-02-24 NOTE — TELEPHONE ENCOUNTER
Pt is having SOB, but not any worse than what the  Is already aware of.   Pt states that she has questions she would like to ask the doctor about her heart failure diagnosis and why he can't do a certain procedure, etc. Pt states the day that she was in office, she was really upset and didn't think to ask all the questions that she has now.    Pt ask that she be called back at her number but if she don't answer to please call her son, Toño @ [phone].

## 2025-02-25 NOTE — TELEPHONE ENCOUNTER
See my last note, I have discussed with her son.  She can try an extra lasix today, see if this helps.  Call for worsening sx.   Call for more angina.    Thanks

## 2025-02-25 NOTE — TELEPHONE ENCOUNTER
I spoke back w/pt.she does not feel ER visit is needed.I told her that home health will help keep an eye on her.They did visit yesterday and nurse and physical therapy are on board.  Her wt.was up 3 pounds today so she thinks she has fluid on her.She currently has 40mg of Lasix.Should she take an extra today?    She wants to know what 's thoughts on her having another heart cath?

## 2025-02-25 NOTE — TELEPHONE ENCOUNTER
Pt.was seen in the office 2/11 and shocked by her dx.She gives out of breath easily.She is having a lot of chest discomfort and heaviness in her chest.She wants to know if there is anything else she can do to help her situation?She just has a lot of questions that she did not get to ask at the appt.Ask if  could call her.She will be at home until 4pm today.

## 2025-02-25 NOTE — TELEPHONE ENCOUNTER
I called and informed pt.of 's response and she v/u.She will head to the ER PRN and call the office PRN.

## 2025-02-28 ENCOUNTER — TELEPHONE (OUTPATIENT)
Age: 89
End: 2025-02-28

## 2025-02-28 NOTE — TELEPHONE ENCOUNTER
Patient stated she lost her new bottle of Furosemide and the pharmacy will not refill it, she is not sure what to do.

## 2025-03-03 ENCOUNTER — LAB (OUTPATIENT)
Dept: PRIMARY CARE CLINIC | Facility: CLINIC | Age: 89
End: 2025-03-03

## 2025-03-03 DIAGNOSIS — Z13.1 SCREENING FOR DIABETES MELLITUS: Primary | ICD-10-CM

## 2025-03-03 DIAGNOSIS — Z13.228 SCREENING FOR ENDOCRINE, NUTRITIONAL, METABOLIC AND IMMUNITY DISORDER: ICD-10-CM

## 2025-03-03 DIAGNOSIS — Z13.21 ENCOUNTER FOR VITAMIN DEFICIENCY SCREENING: ICD-10-CM

## 2025-03-03 DIAGNOSIS — R79.89 OTHER SPECIFIED ABNORMAL FINDINGS OF BLOOD CHEMISTRY: ICD-10-CM

## 2025-03-03 DIAGNOSIS — R79.0 LOW MAGNESIUM LEVEL: ICD-10-CM

## 2025-03-03 DIAGNOSIS — Z13.220 SCREENING FOR LIPID DISORDERS: ICD-10-CM

## 2025-03-03 DIAGNOSIS — Z13.29 SCREENING FOR ENDOCRINE, NUTRITIONAL, METABOLIC AND IMMUNITY DISORDER: ICD-10-CM

## 2025-03-03 DIAGNOSIS — R53.81 MALAISE AND FATIGUE: ICD-10-CM

## 2025-03-03 DIAGNOSIS — R79.9 ABNORMAL BLOOD CHEMISTRY: ICD-10-CM

## 2025-03-03 DIAGNOSIS — R53.82 CHRONIC FATIGUE: ICD-10-CM

## 2025-03-03 DIAGNOSIS — Z13.1 SCREENING FOR DIABETES MELLITUS: ICD-10-CM

## 2025-03-03 DIAGNOSIS — E55.9 VITAMIN D DEFICIENCY: ICD-10-CM

## 2025-03-03 DIAGNOSIS — Z13.0 SCREENING FOR ENDOCRINE, NUTRITIONAL, METABOLIC AND IMMUNITY DISORDER: ICD-10-CM

## 2025-03-03 DIAGNOSIS — Z79.899 ENCOUNTER FOR LONG-TERM (CURRENT) USE OF MEDICATIONS: ICD-10-CM

## 2025-03-03 DIAGNOSIS — R53.81 MALAISE: ICD-10-CM

## 2025-03-03 DIAGNOSIS — R53.83 MALAISE AND FATIGUE: ICD-10-CM

## 2025-03-03 DIAGNOSIS — Z13.29 SCREENING FOR THYROID DISORDER: ICD-10-CM

## 2025-03-03 DIAGNOSIS — R53.83 OTHER FATIGUE: ICD-10-CM

## 2025-03-03 DIAGNOSIS — Z13.0 SCREENING FOR DEFICIENCY ANEMIA: ICD-10-CM

## 2025-03-03 DIAGNOSIS — Z13.228 SCREENING FOR METABOLIC DISORDER: ICD-10-CM

## 2025-03-03 DIAGNOSIS — Z13.21 SCREENING FOR ENDOCRINE, NUTRITIONAL, METABOLIC AND IMMUNITY DISORDER: ICD-10-CM

## 2025-03-03 DIAGNOSIS — R73.09 OTHER ABNORMAL GLUCOSE: ICD-10-CM

## 2025-03-03 LAB
25(OH)D3 SERPL-MCNC: 38.1 NG/ML (ref 30–100)
ALBUMIN SERPL-MCNC: 3.6 G/DL (ref 3.2–4.6)
ALBUMIN/GLOB SERPL: 1.4 (ref 1–1.9)
ALP SERPL-CCNC: 61 U/L (ref 35–104)
ALT SERPL-CCNC: 10 U/L (ref 8–45)
ANION GAP SERPL CALC-SCNC: 11 MMOL/L (ref 7–16)
AST SERPL-CCNC: 23 U/L (ref 15–37)
BASOPHILS # BLD: 0.03 K/UL (ref 0–0.2)
BASOPHILS NFR BLD: 0.7 % (ref 0–2)
BILIRUB SERPL-MCNC: 0.5 MG/DL (ref 0–1.2)
BUN SERPL-MCNC: 15 MG/DL (ref 8–23)
CALCIUM SERPL-MCNC: 9.2 MG/DL (ref 8.8–10.2)
CHLORIDE SERPL-SCNC: 100 MMOL/L (ref 98–107)
CHOLEST SERPL-MCNC: 258 MG/DL (ref 0–200)
CO2 SERPL-SCNC: 29 MMOL/L (ref 20–29)
CREAT SERPL-MCNC: 1.15 MG/DL (ref 0.6–1.1)
DIFFERENTIAL METHOD BLD: ABNORMAL
EOSINOPHIL # BLD: 0.07 K/UL (ref 0–0.8)
EOSINOPHIL NFR BLD: 1.5 % (ref 0.5–7.8)
ERYTHROCYTE [DISTWIDTH] IN BLOOD BY AUTOMATED COUNT: 15.3 % (ref 11.9–14.6)
EST. AVERAGE GLUCOSE BLD GHB EST-MCNC: 123 MG/DL
GLOBULIN SER CALC-MCNC: 2.5 G/DL (ref 2.3–3.5)
GLUCOSE SERPL-MCNC: 100 MG/DL (ref 70–99)
HBA1C MFR BLD: 5.9 % (ref 0–5.6)
HCT VFR BLD AUTO: 39 % (ref 35.8–46.3)
HDLC SERPL-MCNC: 90 MG/DL (ref 40–60)
HDLC SERPL: 2.9 (ref 0–5)
HGB BLD-MCNC: 12.9 G/DL (ref 11.7–15.4)
IMM GRANULOCYTES # BLD AUTO: 0.02 K/UL (ref 0–0.5)
IMM GRANULOCYTES NFR BLD AUTO: 0.4 % (ref 0–5)
LDLC SERPL CALC-MCNC: 146 MG/DL (ref 0–100)
LYMPHOCYTES # BLD: 1.28 K/UL (ref 0.5–4.6)
LYMPHOCYTES NFR BLD: 28.1 % (ref 13–44)
MAGNESIUM SERPL-MCNC: 2 MG/DL (ref 1.8–2.4)
MCH RBC QN AUTO: 33 PG (ref 26.1–32.9)
MCHC RBC AUTO-ENTMCNC: 33.1 G/DL (ref 31.4–35)
MCV RBC AUTO: 99.7 FL (ref 82–102)
MONOCYTES # BLD: 0.49 K/UL (ref 0.1–1.3)
MONOCYTES NFR BLD: 10.7 % (ref 4–12)
NEUTS SEG # BLD: 2.67 K/UL (ref 1.7–8.2)
NEUTS SEG NFR BLD: 58.6 % (ref 43–78)
NRBC # BLD: 0 K/UL (ref 0–0.2)
PLATELET # BLD AUTO: 235 K/UL (ref 150–450)
PMV BLD AUTO: 9.7 FL (ref 9.4–12.3)
POTASSIUM SERPL-SCNC: 4 MMOL/L (ref 3.5–5.1)
PROT SERPL-MCNC: 6.1 G/DL (ref 6.3–8.2)
RBC # BLD AUTO: 3.91 M/UL (ref 4.05–5.2)
SODIUM SERPL-SCNC: 140 MMOL/L (ref 136–145)
TRIGL SERPL-MCNC: 107 MG/DL (ref 0–150)
TSH W FREE THYROID IF ABNORMAL: 2.3 UIU/ML (ref 0.27–4.2)
VIT B12 SERPL-MCNC: 3493 PG/ML (ref 193–986)
VLDLC SERPL CALC-MCNC: 21 MG/DL (ref 6–23)
WBC # BLD AUTO: 4.6 K/UL (ref 4.3–11.1)

## 2025-03-13 ENCOUNTER — OFFICE VISIT (OUTPATIENT)
Dept: PRIMARY CARE CLINIC | Facility: CLINIC | Age: 89
End: 2025-03-13

## 2025-03-13 VITALS
OXYGEN SATURATION: 97 % | HEIGHT: 62 IN | TEMPERATURE: 97.1 F | SYSTOLIC BLOOD PRESSURE: 150 MMHG | BODY MASS INDEX: 28.52 KG/M2 | DIASTOLIC BLOOD PRESSURE: 67 MMHG | HEART RATE: 55 BPM | WEIGHT: 155 LBS

## 2025-03-13 DIAGNOSIS — F33.2 SEVERE EPISODE OF RECURRENT MAJOR DEPRESSIVE DISORDER, WITHOUT PSYCHOTIC FEATURES (HCC): ICD-10-CM

## 2025-03-13 DIAGNOSIS — C64.2 MALIGNANT NEOPLASM OF LEFT KIDNEY, EXCEPT RENAL PELVIS (HCC): ICD-10-CM

## 2025-03-13 DIAGNOSIS — E78.2 MIXED HYPERLIPIDEMIA: ICD-10-CM

## 2025-03-13 DIAGNOSIS — I25.118 CORONARY ARTERY DISEASE OF NATIVE ARTERY OF NATIVE HEART WITH STABLE ANGINA PECTORIS: ICD-10-CM

## 2025-03-13 DIAGNOSIS — N18.32 STAGE 3B CHRONIC KIDNEY DISEASE (HCC): ICD-10-CM

## 2025-03-13 DIAGNOSIS — N18.2 TYPE 2 DIABETES MELLITUS WITH STAGE 2 CHRONIC KIDNEY DISEASE, WITHOUT LONG-TERM CURRENT USE OF INSULIN (HCC): ICD-10-CM

## 2025-03-13 DIAGNOSIS — I10 ESSENTIAL HYPERTENSION, BENIGN: Primary | ICD-10-CM

## 2025-03-13 DIAGNOSIS — E11.22 TYPE 2 DIABETES MELLITUS WITH STAGE 2 CHRONIC KIDNEY DISEASE, WITHOUT LONG-TERM CURRENT USE OF INSULIN (HCC): ICD-10-CM

## 2025-03-13 DIAGNOSIS — F13.99 SEDATIVE, HYPNOTIC OR ANXIOLYTIC USE, UNSPECIFIED WITH UNSPECIFIED SEDATIVE, HYPNOTIC OR ANXIOLYTIC-INDUCED DISORDER (HCC): ICD-10-CM

## 2025-03-13 RX ORDER — OMEPRAZOLE 40 MG/1
CAPSULE, DELAYED RELEASE ORAL
COMMUNITY
Start: 2025-01-24

## 2025-03-13 ASSESSMENT — PATIENT HEALTH QUESTIONNAIRE - PHQ9
SUM OF ALL RESPONSES TO PHQ QUESTIONS 1-9: 1
10. IF YOU CHECKED OFF ANY PROBLEMS, HOW DIFFICULT HAVE THESE PROBLEMS MADE IT FOR YOU TO DO YOUR WORK, TAKE CARE OF THINGS AT HOME, OR GET ALONG WITH OTHER PEOPLE: NOT DIFFICULT AT ALL
8. MOVING OR SPEAKING SO SLOWLY THAT OTHER PEOPLE COULD HAVE NOTICED. OR THE OPPOSITE, BEING SO FIGETY OR RESTLESS THAT YOU HAVE BEEN MOVING AROUND A LOT MORE THAN USUAL: NOT AT ALL
5. POOR APPETITE OR OVEREATING: NOT AT ALL
9. THOUGHTS THAT YOU WOULD BE BETTER OFF DEAD, OR OF HURTING YOURSELF: NOT AT ALL
SUM OF ALL RESPONSES TO PHQ QUESTIONS 1-9: 1
2. FEELING DOWN, DEPRESSED OR HOPELESS: NOT AT ALL
6. FEELING BAD ABOUT YOURSELF - OR THAT YOU ARE A FAILURE OR HAVE LET YOURSELF OR YOUR FAMILY DOWN: NOT AT ALL
8. MOVING OR SPEAKING SO SLOWLY THAT OTHER PEOPLE COULD HAVE NOTICED. OR THE OPPOSITE - BEING SO FIDGETY OR RESTLESS THAT YOU HAVE BEEN MOVING AROUND A LOT MORE THAN USUAL: NOT AT ALL
6. FEELING BAD ABOUT YOURSELF - OR THAT YOU ARE A FAILURE OR HAVE LET YOURSELF OR YOUR FAMILY DOWN: NOT AT ALL
3. TROUBLE FALLING OR STAYING ASLEEP: NOT AT ALL
3. TROUBLE FALLING OR STAYING ASLEEP: NOT AT ALL
5. POOR APPETITE OR OVEREATING: NOT AT ALL
SUM OF ALL RESPONSES TO PHQ QUESTIONS 1-9: 1
10. IF YOU CHECKED OFF ANY PROBLEMS, HOW DIFFICULT HAVE THESE PROBLEMS MADE IT FOR YOU TO DO YOUR WORK, TAKE CARE OF THINGS AT HOME, OR GET ALONG WITH OTHER PEOPLE: NOT DIFFICULT AT ALL
SUM OF ALL RESPONSES TO PHQ QUESTIONS 1-9: 1
7. TROUBLE CONCENTRATING ON THINGS, SUCH AS READING THE NEWSPAPER OR WATCHING TELEVISION: NOT AT ALL
SUM OF ALL RESPONSES TO PHQ QUESTIONS 1-9: 1
1. LITTLE INTEREST OR PLEASURE IN DOING THINGS: SEVERAL DAYS
4. FEELING TIRED OR HAVING LITTLE ENERGY: NOT AT ALL
1. LITTLE INTEREST OR PLEASURE IN DOING THINGS: SEVERAL DAYS
9. THOUGHTS THAT YOU WOULD BE BETTER OFF DEAD, OR OF HURTING YOURSELF: NOT AT ALL
4. FEELING TIRED OR HAVING LITTLE ENERGY: NOT AT ALL
7. TROUBLE CONCENTRATING ON THINGS, SUCH AS READING THE NEWSPAPER OR WATCHING TELEVISION: NOT AT ALL
2. FEELING DOWN, DEPRESSED OR HOPELESS: NOT AT ALL

## 2025-03-13 NOTE — PROGRESS NOTES
disease.  She reports experiencing shortness of breath and chest tightness, which may indicate that her bypass grafts are blocking. She was informed that her bypass grafts typically last about 12 years, and she is now beyond that period. She was advised to continue taking aspirin and to use the nitroglycerin patch as directed. She was also advised to set an alarm to remind her to remove the patch before bed to avoid headaches.    3. Subscapularis bursitis.  She has been experiencing shoulder pain for the past 3 to 4 weeks, likely due to inflammation from physical activity. She was advised to apply Voltaren gel to the affected area. A diclofenac patch was provided for immediate application, and she was instructed to use Salonpas patches, which are available over the counter. She was also advised to perform range of motion exercises to prevent stiffness.    4. Vitamin B12 excess.  Her vitamin B12 levels are significantly elevated at 3400, well above the normal range of up to 900. She was advised to reduce her intake of vitamin B12 supplements.           I have reviewed the patient's past medical history, social history and family history and vitals.  We have discussed treatment plan and follow up and given patient instructions.  Patient's questions are answered and we will follow up as indicated.    Return in about 6 months (around 9/13/2025), or if symptoms worsen or fail to improve, for labs 1 week prior to visit.     Juan Nobles MD    ADDITIONAL EDUCATION    Last 7 days of labs:    No visits with results within 1 Week(s) from this visit.   Latest known visit with results is:   Orders Only on 03/03/2025   Component Date Value Ref Range Status    Magnesium 03/03/2025 2.0  1.8 - 2.4 mg/dL Final    Cholesterol, Total 03/03/2025 258 (H)  0 - 200 MG/DL Final    Comment: Borderline High: 200-239 mg/dL  High: Greater than or equal to 240 mg/dL      Triglycerides 03/03/2025 107  0 - 150 MG/DL Final    Comment:

## 2025-03-25 ENCOUNTER — OFFICE VISIT (OUTPATIENT)
Dept: ORTHOPEDIC SURGERY | Age: 89
End: 2025-03-25

## 2025-03-25 DIAGNOSIS — M67.911 TENDINOPATHY OF RIGHT ROTATOR CUFF: ICD-10-CM

## 2025-03-25 DIAGNOSIS — M25.511 RIGHT SHOULDER PAIN, UNSPECIFIED CHRONICITY: Primary | ICD-10-CM

## 2025-03-25 RX ORDER — METHYLPREDNISOLONE ACETATE 40 MG/ML
40 INJECTION, SUSPENSION INTRA-ARTICULAR; INTRALESIONAL; INTRAMUSCULAR; SOFT TISSUE ONCE
Status: COMPLETED | OUTPATIENT
Start: 2025-03-25 | End: 2025-03-25

## 2025-03-25 RX ADMIN — METHYLPREDNISOLONE ACETATE 40 MG: 40 INJECTION, SUSPENSION INTRA-ARTICULAR; INTRALESIONAL; INTRAMUSCULAR; SOFT TISSUE at 11:16

## 2025-03-25 NOTE — PROGRESS NOTES
Name: Muriel Barajas  YOB: 1935  Gender: female  MRN: [id number]  Date of Encounter:  3/25/2025       CHIEF COMPLAINT:     Chief Complaint   Patient presents with    Shoulder Pain     Right        SUBJECTIVE/OBJECTIVE:      HPI:    Muriel Barajas  is a 89 y.o. pleasant female who presents today for a new evaluation of her right shoulder pain.    Muriel Barajas  has a past medical history of Abdominal pain, Angina at rest, Arthritis, Bilateral carotid bruits, Bursitis, CAD (coronary artery disease), Cervicalgia, Chronic pain, Coagulation defects, Constipation, Cough, Depressive disorder, Dyspnea, GERD (gastroesophageal reflux disease), Headache, Hoarseness or changing voice, Hyperlipidemia, Hypertension, Kidney stone, Lumbago, Macular degeneration, Nodule of left lung, Pain in joint, ankle and foot, Pain in joint, shoulder region, Palpitations, Panic disorder, Renal mass, Sciatica, Unstable angina (HCC), and Vascular headache.     History of Present Illness  The patient is an 89-year-old female presenting for right shoulder pain.    The onset of her shoulder pain was approximately 3 weeks ago, following a gardening incident where she was trimming bushes and cut a larger branch. A sudden, sharp pain was felt in the right shoulder, which has persisted since the incident. The pain is localized to the upper arm, making it difficult to lift or reach objects. As a right-handed individual, this has significantly impacted daily activities, including dressing and undressing. No noticeable changes in the contour of the arm have been observed. Despite attempts to alleviate the pain through exercise and massage, these interventions have only exacerbated the discomfort. Pain has been managed with Tylenol. A previous diagnosis of arthritis in the left shoulder several years ago is recalled, but the current pain in the right shoulder is reported to be more severe.         PAST HISTORY:   Past medical,

## 2025-03-26 ENCOUNTER — OFFICE VISIT (OUTPATIENT)
Dept: UROLOGY | Age: 89
End: 2025-03-26

## 2025-03-26 DIAGNOSIS — C64.2 MALIGNANT NEOPLASM OF LEFT KIDNEY, EXCEPT RENAL PELVIS (HCC): ICD-10-CM

## 2025-03-26 DIAGNOSIS — R39.89 BLADDER PAIN: ICD-10-CM

## 2025-03-26 DIAGNOSIS — R33.9 INCOMPLETE BLADDER EMPTYING: ICD-10-CM

## 2025-03-26 DIAGNOSIS — N39.0 RECURRENT UTI: Primary | ICD-10-CM

## 2025-03-26 LAB
BILIRUBIN, URINE, POC: NEGATIVE
BLOOD URINE, POC: NEGATIVE
GLUCOSE URINE, POC: NEGATIVE MG/DL
KETONES, URINE, POC: NEGATIVE MG/DL
LEUKOCYTE ESTERASE, URINE, POC: NORMAL
NITRITE, URINE, POC: NEGATIVE
PH, URINE, POC: 6 (ref 4.6–8)
PROTEIN,URINE, POC: NEGATIVE MG/DL
PVR, POC: 4 CC
SPECIFIC GRAVITY, URINE, POC: 1.01 (ref 1–1.03)
URINALYSIS CLARITY, POC: NORMAL
URINALYSIS COLOR, POC: NORMAL
UROBILINOGEN, POC: NORMAL MG/DL

## 2025-04-09 RX ORDER — FUROSEMIDE 40 MG/1
40 TABLET ORAL 2 TIMES DAILY
Qty: 180 TABLET | Refills: 3 | Status: SHIPPED | OUTPATIENT
Start: 2025-04-09

## 2025-04-09 NOTE — TELEPHONE ENCOUNTER
MEDICATION REFILL REQUEST      Name of Medication:  Furosemide  Dose:  40mg  Frequency:  Pt stated she is taking it twice a day per   Quantity:  60  Days' supply:  90      Pharmacy Name/Location:  [facility] in [place]  
No

## 2025-04-23 ENCOUNTER — OFFICE VISIT (OUTPATIENT)
Dept: VASCULAR SURGERY | Age: 89
End: 2025-04-23
Payer: MEDICARE

## 2025-04-23 VITALS
WEIGHT: 155 LBS | BODY MASS INDEX: 28.52 KG/M2 | SYSTOLIC BLOOD PRESSURE: 137 MMHG | HEART RATE: 52 BPM | DIASTOLIC BLOOD PRESSURE: 73 MMHG | OXYGEN SATURATION: 95 % | HEIGHT: 62 IN

## 2025-04-23 DIAGNOSIS — I65.23 BILATERAL CAROTID ARTERY STENOSIS: Primary | ICD-10-CM

## 2025-04-23 PROCEDURE — G2211 COMPLEX E/M VISIT ADD ON: HCPCS | Performed by: NURSE PRACTITIONER

## 2025-04-23 PROCEDURE — 1159F MED LIST DOCD IN RCRD: CPT | Performed by: NURSE PRACTITIONER

## 2025-04-23 PROCEDURE — 99213 OFFICE O/P EST LOW 20 MIN: CPT | Performed by: NURSE PRACTITIONER

## 2025-04-23 PROCEDURE — G8427 DOCREV CUR MEDS BY ELIG CLIN: HCPCS | Performed by: NURSE PRACTITIONER

## 2025-04-23 PROCEDURE — 1123F ACP DISCUSS/DSCN MKR DOCD: CPT | Performed by: NURSE PRACTITIONER

## 2025-04-23 PROCEDURE — G8417 CALC BMI ABV UP PARAM F/U: HCPCS | Performed by: NURSE PRACTITIONER

## 2025-04-23 PROCEDURE — 1090F PRES/ABSN URINE INCON ASSESS: CPT | Performed by: NURSE PRACTITIONER

## 2025-04-23 PROCEDURE — 1036F TOBACCO NON-USER: CPT | Performed by: NURSE PRACTITIONER

## 2025-04-23 NOTE — PROGRESS NOTES
now bothering > right, right ok    Palpitations 09/24/2015    followed by dr rasheed    Panic disorder     panic attacks -- last one 2014    Renal mass 07/2016    ablation---left side--- followed by dr hugo in [place]    Sciatica     Unstable angina (HCC)     Vascular headache          SURGICAL HISTORY:   Past Surgical History:   Procedure Laterality Date    CARDIAC CATHETERIZATION      stent 2014    CARDIAC CATHETERIZATION      stent 2006    CATARACT REMOVAL      CATARACT REMOVAL      left    COLONOSCOPY  06/2016    Polyps    COLONOSCOPY  12/15/2015    diverticulosis, internal hemorrhoid, colon polyp- no further colonoscopies needed    CORONARY ARTERY BYPASS GRAFT      4 vessel CABG 2005    HEENT Left     macular pucker    HERNIA REPAIR Left 2017    ORTHOPEDIC SURGERY      finger, foot    PARTIAL HYSTERECTOMY (CERVIX NOT REMOVED)      hyst    OK UNLISTED PROCEDURE ABDOMEN PERITONEUM & OMENTUM Left 07/2016    ablation for mass in left side of abd    REVISION TOTAL HIP ARTHROPLASTY Left     SKIN CANCER EXCISION      TOTAL HIP ARTHROPLASTY Left 08/02/2022    LEFT HIP TOTAL ARTHROPLASTY performed [place] Dev[place] [facility]    UPPER GASTROINTESTINAL ENDOSCOPY      Esophagus stretch       Review of Systems:  Constitutional:   Negative for fevers and unexplained weight loss.  Respiratory:   Negative for hemoptysis.  Cardiovascular:   Negative except as noted in HPI.  Musculoskeletal:  Negative for active, unexplained/severe joint pain.      ALLERGIES:   Allergies   Allergen Reactions    Amoxicillin-Pot Clavulanate Other (See Comments) and Itching     Causes yeast infection  Other reaction(s): Itching-Allergy  Causes yeast infection  Causes yeast infection      Gabapentin Other (See Comments)     Other reaction(s): Altered Mental State-Intolerance, Other (see comments)    Hydrochlorothiazide Other (See Comments)     Hyponatremia  Other reaction(s): Other (see comments), Other- (not listed) -

## 2025-04-26 ENCOUNTER — HOSPITAL ENCOUNTER (EMERGENCY)
Age: 89
Discharge: HOME | End: 2025-04-26
Attending: EMERGENCY MEDICINE
Payer: MEDICARE

## 2025-04-26 VITALS
HEART RATE: 57 BPM | TEMPERATURE: 97.7 F | DIASTOLIC BLOOD PRESSURE: 53 MMHG | RESPIRATION RATE: 18 BRPM | SYSTOLIC BLOOD PRESSURE: 137 MMHG | OXYGEN SATURATION: 96 %

## 2025-04-26 DIAGNOSIS — S81.811A NONINFECTED SKIN TEAR OF RIGHT LOWER EXTREMITY, INITIAL ENCOUNTER: Primary | ICD-10-CM

## 2025-04-26 PROCEDURE — 99284 EMERGENCY DEPT VISIT MOD MDM: CPT

## 2025-04-26 PROCEDURE — 6360000002 HC RX W HCPCS: Performed by: EMERGENCY MEDICINE

## 2025-04-26 RX ORDER — LIDOCAINE HYDROCHLORIDE AND EPINEPHRINE 10; 10 MG/ML; UG/ML
20 INJECTION, SOLUTION INFILTRATION; PERINEURAL
Status: COMPLETED | OUTPATIENT
Start: 2025-04-26 | End: 2025-04-26

## 2025-04-26 RX ADMIN — LIDOCAINE HYDROCHLORIDE,EPINEPHRINE BITARTRATE 4 ML: 10; .01 INJECTION, SOLUTION INFILTRATION; PERINEURAL at 21:23

## 2025-04-26 ASSESSMENT — PAIN DESCRIPTION - ORIENTATION: ORIENTATION: RIGHT

## 2025-04-26 ASSESSMENT — PAIN DESCRIPTION - DESCRIPTORS: DESCRIPTORS: BURNING

## 2025-04-26 ASSESSMENT — PAIN DESCRIPTION - LOCATION: LOCATION: LEG

## 2025-04-26 ASSESSMENT — PAIN SCALES - GENERAL: PAINLEVEL_OUTOF10: 9

## 2025-04-26 ASSESSMENT — PAIN - FUNCTIONAL ASSESSMENT: PAIN_FUNCTIONAL_ASSESSMENT: NONE - DENIES PAIN

## 2025-04-27 NOTE — DISCHARGE INSTRUCTIONS
Keep the wound clean and dry, allow the Steri-Strips to fall off over time.  Return the emergency department any worsening of symptoms including increasing pain swelling or drainage from the wound

## 2025-04-27 NOTE — ED TRIAGE NOTES
Patient arrives via EMS from a restaurant, c/o fall when trying to get into truck. Laceration on R shin. BLE bruising noted. Takes xarelto and baby asa. Denies hitting head or LOC. Recent L Hip replacement. Hx of 4 cardiac stents and bypass surgery.  No meds given enroute.

## 2025-04-27 NOTE — ED PROVIDER NOTES
Overall Financial Resource Strain (CARDIA)     Difficulty of Paying Living Expenses: Somewhat hard   Food Insecurity: No Food Insecurity (5/28/2024)    Hunger Vital Sign     Worried About Running Out of Food in the Last Year: Never true     Ran Out of Food in the Last Year: Never true   Transportation Needs: Unmet Transportation Needs (5/28/2024)    PRAPARE - Transportation     Lack of Transportation (Non-Medical): Yes   Physical Activity: Insufficiently Active (12/2/2024)    Exercise Vital Sign     Days of Exercise per Week: 2 days     Minutes of Exercise per Session: 60 min   Social Connections: Unknown (3/20/2021)    Received from [facility]    Social Connections     Frequency of Communication with Friends and Family: Not asked     Frequency of Social Gatherings with Friends and Family: Not asked   Intimate Partner Violence: Unknown (3/20/2021)    Received from [facility]    Intimate Partner Violence     Fear of Current or Ex-Partner: Not asked     Emotionally Abused: Not asked     Physically Abused: Not asked     Sexually Abused: Not asked   Housing Stability: Unknown (5/28/2024)    Housing Stability Vital Sign     Unstable Housing in the Last Year: No        Previous Medications    ACETAMINOPHEN (TYLENOL) 325 MG TABLET    Take 500 mg by mouth every 6 hours as needed    ASPIRIN 81 MG EC TABLET    Take 1 tablet by mouth daily    CARVEDILOL (COREG) 25 MG TABLET    Take 1 tablet by mouth 2 times daily    CEPHALEXIN (KEFLEX) 250 MG CAPSULE    Take 1 capsule by mouth daily    CETIRIZINE (ZYRTEC) 10 MG TABLET    Take 1 tablet by mouth daily    CHOLECALCIFEROL 400 UNIT TABS TABLET    Take 1 tablet by mouth daily    COENZYME Q10 (COQ10 PO)    Take by mouth    DOCUSATE SODIUM (COLACE) 100 MG CAPSULE    Take 1 capsule by mouth 2 times daily    ESCITALOPRAM (LEXAPRO) 20 MG TABLET    Take 1 tablet by mouth daily TAKE 1 TABLET BY MOUTH EVERY DAY    FAMOTIDINE (PEPCID) 20 MG TABLET

## 2025-04-30 ENCOUNTER — CARE COORDINATION (OUTPATIENT)
Dept: CARE COORDINATION | Facility: CLINIC | Age: 89
End: 2025-04-30

## 2025-04-30 NOTE — CARE COORDINATION
Ambulatory Care Coordination Note     4/30/2025 3:28 PM     ACM outreach attempt by this ACM today to offer care management services. ACM was unable to reach the patient by telephone today;   left voice message requesting a return phone call to this ACM.     ACM: Karon Koo RN     Care Summary Note: ACM attempted to reach patient for care management services. Left message with ACM contact information and request for return call. ACM will f/u 05/02/2025.     PCP/Specialist follow up:   Future Appointments         Provider Specialty Dept Phone    5/6/2025 10:30 AM (Arrive by 10:15 AM) Destini Cano MD Orthopedic Surgery [phone]    5/19/2025 9:45 AM Mitesh Ramirez DO Cardiology [phone]    8/19/2025 3:00 PM Mitesh Ramirez DO Cardiology [phone]    9/8/2025 9:00 AM [facility] Primary Care [phone]    9/15/2025 1:40 PM Juan Nobles MD Primary Care [phone]    3/25/2026 10:45 AM Yanick Jay MD Urology [phone]          Follow Up:   Plan for next ACM outreach in approximately  05/02/2025  to complete:  - outreach attempt to offer care management services.    Completed Karon Koo RN Ambulatory Care Manager  Date: 04/30/2025

## 2025-05-02 ENCOUNTER — CARE COORDINATION (OUTPATIENT)
Dept: CARE COORDINATION | Facility: CLINIC | Age: 89
End: 2025-05-02

## 2025-05-02 NOTE — PROGRESS NOTES
PHYSICAL EXAMINATION:     Gen: Well-developed, no acute distress   HEENT: NC/AT, EOMI   Neck: Trachea midline, normal ROM   CV: Regular rhythm by palpation of distal pulse, normal capillary refill   Pulm: No respiratory distress, no stridor   Psychiatric: Well oriented, normal mood and affect.   Skin: No rashes, lesions or ulcers, normal temperature, turgor, and texture on uninvolved extremity.      ORTHO EXAM:    Right Shoulder:      Inspection: no biceps deformity; no notable atrophy or erythema  ROM active  passive:   150. Abduction 90  150.  Ex rotation 60. Int rotation:side of hip  Tenderness: Rotator cuff footprint and bicipital groove tenderness  Strength: Abduction 4/5, External rotation 4+/5, Internal rotation 5/5  Provocative tests: Negative Belly press, bearhug.  Positive empty and full can. Negative Hornblower.  Positive speeds.  Positive Will and Neer testing  Normal capillary refill / 2+ radial pulse   Sensation intact to light touch              DIAGNOSTIC IMAGING:     I have reviewed prior imaging studies. No new imaging studies required.     ASSESSMENT/PLAN:   1. Tendinopathy of right rotator cuff         I recommended and performed a glenohumeral joint injection today.  We discussed that we cannot repeat further injections until at least 3 months.  I did advise she do some therapy with her home therapist who is working on her leg PT.  She can continue tylenol for pain. We will have her return in 3 months.  We can consider repeat injection at that time.  She can call sooner if needed.    Orders / medications today:   Orders Placed This Encounter    US ARTHR/ASP/INJ MAJOR JNT/BURSA RIGHT     Standing Status:   Future     Number of Occurrences:   1     Expected Date:   5/6/2025     Expiration Date:   5/6/2026    External Referral to Home Health     Referral Priority:   Routine     Referral Type:   Home Health Care     Referral Reason:   Specialty Services Required     Requested

## 2025-05-02 NOTE — CARE COORDINATION
Ambulatory Care Coordination Note     5/2/2025 11:19 AM     ACM outreach attempt by this ACM today to offer care management services. ACM was unable to reach the patient by telephone today;   left voice message requesting a return phone call to this ACM.     ACM: Karon Koo RN     Care Summary Note: ACM 2nd attempt to reach patient for care management services and follow up. Left message with ACM contact information and request for return call. ACM will f/u 05/05/2025.     PCP/Specialist follow up:   Future Appointments         Provider Specialty Dept Phone    5/6/2025 10:30 AM (Arrive by 10:15 AM) Destini Cano MD Orthopedic Surgery [phone]    5/19/2025 9:45 AM Mitesh Ramirez DO Cardiology [phone]    8/19/2025 3:00 PM Mitesh Ramirez DO Cardiology [phone]    9/8/2025 9:00 AM [facility] Primary Care [phone]    9/15/2025 1:40 PM Juan Nobles MD Primary Care [phone]    3/25/2026 10:45 AM Yanick Jay MD Urology [phone]          Follow Up:   Plan for next ACM outreach in approximately 1-2 days  to complete:  - outreach attempt to offer care management services.     Completed Karon Koo RN Ambulatory Care Manager  Date: 05/02/2025

## 2025-05-05 ENCOUNTER — CARE COORDINATION (OUTPATIENT)
Dept: CARE COORDINATION | Facility: CLINIC | Age: 89
End: 2025-05-05

## 2025-05-05 NOTE — CARE COORDINATION
Ambulatory Care Coordination Note     5/5/2025 4:47 PM     patient outreach attempt by this ACM today to offer care management services. ACM was unable to reach the patient by telephone today;   left voice message requesting a return phone call to this ACM.     Patient closed (unable to reach patient) from the High Risk Care Management program on 5/5/2025.  No further Ambulatory Care Manager follow up scheduled.     Completed Karon Koo RN Ambulatory Care Manager  Date: 05/05/2025

## 2025-05-06 ENCOUNTER — OFFICE VISIT (OUTPATIENT)
Dept: ORTHOPEDIC SURGERY | Age: 89
End: 2025-05-06
Payer: MEDICARE

## 2025-05-06 DIAGNOSIS — M67.911 TENDINOPATHY OF RIGHT ROTATOR CUFF: Primary | ICD-10-CM

## 2025-05-06 PROCEDURE — 1036F TOBACCO NON-USER: CPT | Performed by: STUDENT IN AN ORGANIZED HEALTH CARE EDUCATION/TRAINING PROGRAM

## 2025-05-06 PROCEDURE — 99213 OFFICE O/P EST LOW 20 MIN: CPT | Performed by: STUDENT IN AN ORGANIZED HEALTH CARE EDUCATION/TRAINING PROGRAM

## 2025-05-06 PROCEDURE — 20611 DRAIN/INJ JOINT/BURSA W/US: CPT | Performed by: STUDENT IN AN ORGANIZED HEALTH CARE EDUCATION/TRAINING PROGRAM

## 2025-05-06 PROCEDURE — G8417 CALC BMI ABV UP PARAM F/U: HCPCS | Performed by: STUDENT IN AN ORGANIZED HEALTH CARE EDUCATION/TRAINING PROGRAM

## 2025-05-06 PROCEDURE — G8428 CUR MEDS NOT DOCUMENT: HCPCS | Performed by: STUDENT IN AN ORGANIZED HEALTH CARE EDUCATION/TRAINING PROGRAM

## 2025-05-06 PROCEDURE — 1090F PRES/ABSN URINE INCON ASSESS: CPT | Performed by: STUDENT IN AN ORGANIZED HEALTH CARE EDUCATION/TRAINING PROGRAM

## 2025-05-06 PROCEDURE — 1123F ACP DISCUSS/DSCN MKR DOCD: CPT | Performed by: STUDENT IN AN ORGANIZED HEALTH CARE EDUCATION/TRAINING PROGRAM

## 2025-05-06 RX ORDER — METHYLPREDNISOLONE ACETATE 40 MG/ML
40 INJECTION, SUSPENSION INTRA-ARTICULAR; INTRALESIONAL; INTRAMUSCULAR; SOFT TISSUE ONCE
Status: COMPLETED | OUTPATIENT
Start: 2025-05-06 | End: 2025-05-06

## 2025-05-06 RX ADMIN — METHYLPREDNISOLONE ACETATE 40 MG: 40 INJECTION, SUSPENSION INTRA-ARTICULAR; INTRALESIONAL; INTRAMUSCULAR; SOFT TISSUE at 10:32

## 2025-05-12 ENCOUNTER — HOSPITAL ENCOUNTER (INPATIENT)
Age: 89
LOS: 2 days | Discharge: HOME | DRG: 303 | End: 2025-05-14
Attending: EMERGENCY MEDICINE | Admitting: INTERNAL MEDICINE
Payer: MEDICARE

## 2025-05-12 ENCOUNTER — TELEPHONE (OUTPATIENT)
Age: 89
End: 2025-05-12

## 2025-05-12 ENCOUNTER — APPOINTMENT (OUTPATIENT)
Dept: GENERAL RADIOLOGY | Age: 89
DRG: 303 | End: 2025-05-12
Payer: MEDICARE

## 2025-05-12 DIAGNOSIS — Z09 HOSPITAL DISCHARGE FOLLOW-UP: Primary | ICD-10-CM

## 2025-05-12 DIAGNOSIS — R06.02 SHORTNESS OF BREATH: ICD-10-CM

## 2025-05-12 DIAGNOSIS — R07.9 CHEST PAIN, UNSPECIFIED TYPE: ICD-10-CM

## 2025-05-12 PROBLEM — I20.0 ACCELERATING ANGINA (HCC): Status: ACTIVE | Noted: 2025-05-12

## 2025-05-12 LAB
ALBUMIN SERPL-MCNC: 3.5 G/DL (ref 3.2–4.6)
ALBUMIN/GLOB SERPL: 1.1 (ref 1–1.9)
ALP SERPL-CCNC: 80 U/L (ref 35–104)
ALT SERPL-CCNC: 7 U/L (ref 8–45)
ANION GAP SERPL CALC-SCNC: 12 MMOL/L (ref 7–16)
AST SERPL-CCNC: 23 U/L (ref 15–37)
BACTERIA URNS QL MICRO: NEGATIVE /HPF
BASOPHILS # BLD: 0.02 K/UL (ref 0–0.2)
BASOPHILS NFR BLD: 0.3 % (ref 0–2)
BILIRUB SERPL-MCNC: 0.5 MG/DL (ref 0–1.2)
BILIRUB UR QL: NEGATIVE
BUN SERPL-MCNC: 22 MG/DL (ref 8–23)
CALCIUM SERPL-MCNC: 9.2 MG/DL (ref 8.8–10.2)
CHLORIDE SERPL-SCNC: 99 MMOL/L (ref 98–107)
CO2 SERPL-SCNC: 26 MMOL/L (ref 20–29)
CREAT SERPL-MCNC: 1.17 MG/DL (ref 0.6–1.1)
DIFFERENTIAL METHOD BLD: ABNORMAL
EKG ATRIAL RATE: 55 BPM
EKG ATRIAL RATE: 57 BPM
EKG DIAGNOSIS: NORMAL
EKG DIAGNOSIS: NORMAL
EKG P AXIS: 62 DEGREES
EKG P AXIS: 63 DEGREES
EKG P-R INTERVAL: 152 MS
EKG P-R INTERVAL: 170 MS
EKG Q-T INTERVAL: 444 MS
EKG Q-T INTERVAL: 471 MS
EKG QRS DURATION: 102 MS
EKG QRS DURATION: 84 MS
EKG QTC CALCULATION (BAZETT): 432 MS
EKG QTC CALCULATION (BAZETT): 447 MS
EKG R AXIS: 61 DEGREES
EKG R AXIS: 64 DEGREES
EKG T AXIS: 118 DEGREES
EKG T AXIS: 78 DEGREES
EKG VENTRICULAR RATE: 54 BPM
EKG VENTRICULAR RATE: 57 BPM
EOSINOPHIL # BLD: 0.12 K/UL (ref 0–0.8)
EOSINOPHIL NFR BLD: 2 % (ref 0.5–7.8)
EPI CELLS #/AREA URNS HPF: ABNORMAL /HPF
ERYTHROCYTE [DISTWIDTH] IN BLOOD BY AUTOMATED COUNT: 13.2 % (ref 11.9–14.6)
GLOBULIN SER CALC-MCNC: 3.1 G/DL (ref 2.3–3.5)
GLUCOSE SERPL-MCNC: 144 MG/DL (ref 70–99)
GLUCOSE UR QL STRIP.AUTO: NEGATIVE MG/DL
HCT VFR BLD AUTO: 36.7 % (ref 35.8–46.3)
HGB BLD-MCNC: 12.2 G/DL (ref 11.7–15.4)
HYALINE CASTS URNS QL MICRO: ABNORMAL /LPF
IMM GRANULOCYTES # BLD AUTO: 0.05 K/UL (ref 0–0.5)
IMM GRANULOCYTES NFR BLD AUTO: 0.9 % (ref 0–5)
KETONES UR-MCNC: NEGATIVE MG/DL
LEUKOCYTE ESTERASE UR QL STRIP: ABNORMAL
LIPASE SERPL-CCNC: 52 U/L (ref 13–60)
LYMPHOCYTES # BLD: 1.69 K/UL (ref 0.5–4.6)
LYMPHOCYTES NFR BLD: 28.8 % (ref 13–44)
MAGNESIUM SERPL-MCNC: 1.9 MG/DL (ref 1.8–2.4)
MCH RBC QN AUTO: 33 PG (ref 26.1–32.9)
MCHC RBC AUTO-ENTMCNC: 33.2 G/DL (ref 31.4–35)
MCV RBC AUTO: 99.2 FL (ref 82–102)
MONOCYTES # BLD: 0.64 K/UL (ref 0.1–1.3)
MONOCYTES NFR BLD: 10.9 % (ref 4–12)
NEUTS SEG # BLD: 3.35 K/UL (ref 1.7–8.2)
NEUTS SEG NFR BLD: 57.1 % (ref 43–78)
NITRITE UR QL: NEGATIVE
NRBC # BLD: 0 K/UL (ref 0–0.2)
NT PRO BNP: 692 PG/ML (ref 0–450)
PH UR: 7 (ref 5–9)
PLATELET # BLD AUTO: 278 K/UL (ref 150–450)
PMV BLD AUTO: 9.7 FL (ref 9.4–12.3)
POTASSIUM SERPL-SCNC: 4 MMOL/L (ref 3.5–5.1)
PROT SERPL-MCNC: 6.5 G/DL (ref 6.3–8.2)
PROT UR QL: NEGATIVE MG/DL
RBC # BLD AUTO: 3.7 M/UL (ref 4.05–5.2)
RBC # UR STRIP: NEGATIVE
RBC #/AREA URNS HPF: ABNORMAL /HPF
SERVICE CMNT-IMP: ABNORMAL
SODIUM SERPL-SCNC: 137 MMOL/L (ref 136–145)
SP GR UR: 1.01 (ref 1–1.02)
TROPONIN T SERPL HS-MCNC: 18.8 NG/L (ref 0–14)
TROPONIN T SERPL HS-MCNC: 19.3 NG/L (ref 0–14)
TROPONIN T SERPL HS-MCNC: 20.3 NG/L (ref 0–14)
UROBILINOGEN UR QL: 0.2 EU/DL (ref 0.2–1)
WBC # BLD AUTO: 5.9 K/UL (ref 4.3–11.1)
WBC URNS QL MICRO: ABNORMAL /HPF

## 2025-05-12 PROCEDURE — 85025 COMPLETE CBC W/AUTO DIFF WBC: CPT

## 2025-05-12 PROCEDURE — 83690 ASSAY OF LIPASE: CPT

## 2025-05-12 PROCEDURE — 84484 ASSAY OF TROPONIN QUANT: CPT

## 2025-05-12 PROCEDURE — 6370000000 HC RX 637 (ALT 250 FOR IP): Performed by: INTERNAL MEDICINE

## 2025-05-12 PROCEDURE — 93010 ELECTROCARDIOGRAM REPORT: CPT | Performed by: INTERNAL MEDICINE

## 2025-05-12 PROCEDURE — 83880 ASSAY OF NATRIURETIC PEPTIDE: CPT

## 2025-05-12 PROCEDURE — 99223 1ST HOSP IP/OBS HIGH 75: CPT | Performed by: INTERNAL MEDICINE

## 2025-05-12 PROCEDURE — 81001 URINALYSIS AUTO W/SCOPE: CPT

## 2025-05-12 PROCEDURE — 87086 URINE CULTURE/COLONY COUNT: CPT

## 2025-05-12 PROCEDURE — 99285 EMERGENCY DEPT VISIT HI MDM: CPT

## 2025-05-12 PROCEDURE — 36415 COLL VENOUS BLD VENIPUNCTURE: CPT

## 2025-05-12 PROCEDURE — 93005 ELECTROCARDIOGRAM TRACING: CPT | Performed by: EMERGENCY MEDICINE

## 2025-05-12 PROCEDURE — 71046 X-RAY EXAM CHEST 2 VIEWS: CPT

## 2025-05-12 PROCEDURE — 1100000000 HC RM PRIVATE

## 2025-05-12 PROCEDURE — 83735 ASSAY OF MAGNESIUM: CPT

## 2025-05-12 PROCEDURE — 2140000000 HC CCU INTERMEDIATE R&B

## 2025-05-12 PROCEDURE — 80053 COMPREHEN METABOLIC PANEL: CPT

## 2025-05-12 PROCEDURE — 6370000000 HC RX 637 (ALT 250 FOR IP): Performed by: EMERGENCY MEDICINE

## 2025-05-12 RX ORDER — ROSUVASTATIN CALCIUM 20 MG/1
40 TABLET, COATED ORAL EVERY EVENING
Status: DISCONTINUED | OUTPATIENT
Start: 2025-05-12 | End: 2025-05-14 | Stop reason: HOSPADM

## 2025-05-12 RX ORDER — LANOLIN ALCOHOL/MO/W.PET/CERES
400 CREAM (GRAM) TOPICAL DAILY
Status: DISCONTINUED | OUTPATIENT
Start: 2025-05-13 | End: 2025-05-14 | Stop reason: HOSPADM

## 2025-05-12 RX ORDER — ASPIRIN 325 MG
325 TABLET ORAL
Status: COMPLETED | OUTPATIENT
Start: 2025-05-12 | End: 2025-05-12

## 2025-05-12 RX ORDER — PANTOPRAZOLE SODIUM 40 MG/1
40 TABLET, DELAYED RELEASE ORAL
Status: DISCONTINUED | OUTPATIENT
Start: 2025-05-13 | End: 2025-05-14 | Stop reason: HOSPADM

## 2025-05-12 RX ORDER — POTASSIUM CHLORIDE 750 MG/1
10 TABLET, EXTENDED RELEASE ORAL DAILY
Status: DISCONTINUED | OUTPATIENT
Start: 2025-05-13 | End: 2025-05-14 | Stop reason: HOSPADM

## 2025-05-12 RX ORDER — FAMOTIDINE 20 MG/1
20 TABLET, FILM COATED ORAL EVERY EVENING
Status: DISCONTINUED | OUTPATIENT
Start: 2025-05-12 | End: 2025-05-14 | Stop reason: HOSPADM

## 2025-05-12 RX ORDER — ASPIRIN 81 MG/1
324 TABLET, CHEWABLE ORAL ONCE
Status: COMPLETED | OUTPATIENT
Start: 2025-05-12 | End: 2025-05-12

## 2025-05-12 RX ORDER — ESCITALOPRAM OXALATE 10 MG/1
20 TABLET ORAL DAILY
Status: DISCONTINUED | OUTPATIENT
Start: 2025-05-13 | End: 2025-05-14 | Stop reason: HOSPADM

## 2025-05-12 RX ORDER — RANOLAZINE 500 MG/1
500 TABLET, EXTENDED RELEASE ORAL 2 TIMES DAILY
Status: DISCONTINUED | OUTPATIENT
Start: 2025-05-12 | End: 2025-05-13

## 2025-05-12 RX ORDER — TIZANIDINE 2 MG/1
2 TABLET ORAL 3 TIMES DAILY PRN
Status: DISCONTINUED | OUTPATIENT
Start: 2025-05-12 | End: 2025-05-14 | Stop reason: HOSPADM

## 2025-05-12 RX ORDER — FUROSEMIDE 40 MG/1
40 TABLET ORAL 2 TIMES DAILY
Status: DISCONTINUED | OUTPATIENT
Start: 2025-05-13 | End: 2025-05-14 | Stop reason: HOSPADM

## 2025-05-12 RX ORDER — MEMANTINE HYDROCHLORIDE 5 MG/1
10 TABLET ORAL DAILY
Status: DISCONTINUED | OUTPATIENT
Start: 2025-05-13 | End: 2025-05-14 | Stop reason: HOSPADM

## 2025-05-12 RX ORDER — ASPIRIN 81 MG/1
81 TABLET ORAL DAILY
Status: DISCONTINUED | OUTPATIENT
Start: 2025-05-13 | End: 2025-05-14 | Stop reason: HOSPADM

## 2025-05-12 RX ORDER — CARVEDILOL 25 MG/1
25 TABLET ORAL 2 TIMES DAILY WITH MEALS
Status: DISCONTINUED | OUTPATIENT
Start: 2025-05-12 | End: 2025-05-14 | Stop reason: HOSPADM

## 2025-05-12 RX ORDER — ACETAMINOPHEN 500 MG
500 TABLET ORAL EVERY 6 HOURS PRN
Status: DISCONTINUED | OUTPATIENT
Start: 2025-05-12 | End: 2025-05-14 | Stop reason: HOSPADM

## 2025-05-12 RX ADMIN — ASPIRIN 325 MG: 325 TABLET, FILM COATED ORAL at 17:37

## 2025-05-12 RX ADMIN — NITROGLYCERIN 0.5 INCH: 20 OINTMENT TOPICAL at 22:59

## 2025-05-12 RX ADMIN — FAMOTIDINE 20 MG: 20 TABLET, FILM COATED ORAL at 22:57

## 2025-05-12 RX ADMIN — ROSUVASTATIN CALCIUM 40 MG: 20 TABLET, FILM COATED ORAL at 22:57

## 2025-05-12 RX ADMIN — RANOLAZINE 500 MG: 500 TABLET, FILM COATED, EXTENDED RELEASE ORAL at 22:57

## 2025-05-12 RX ADMIN — CARVEDILOL 25 MG: 25 TABLET, FILM COATED ORAL at 22:57

## 2025-05-12 RX ADMIN — ASPIRIN 324 MG: 81 TABLET, CHEWABLE ORAL at 22:57

## 2025-05-12 ASSESSMENT — PAIN - FUNCTIONAL ASSESSMENT: PAIN_FUNCTIONAL_ASSESSMENT: NONE - DENIES PAIN

## 2025-05-12 ASSESSMENT — PAIN SCALES - GENERAL: PAINLEVEL_OUTOF10: 0

## 2025-05-12 NOTE — TELEPHONE ENCOUNTER
Pt.called in reporting that she is SOB when getting up and moving around.She said chest gets tight w/exertion,pain in neck and up into shoulder bladed.Better today then yesterday.Anytime she gets up to do anything chest is heavy.She took Nitro x 2 yesterday and it helped.She put the patch on this am.She denies any active CP but is SOB.She says  told her bypasses may be shutting down and  that they may not be able to do anything else but she just has not been feeling well.Should she head to the ER or come to be seen?

## 2025-05-12 NOTE — ED PROVIDER NOTES
Q10 (COQ10 PO)    Take by mouth    DOCUSATE SODIUM (COLACE) 100 MG CAPSULE    Take 1 capsule by mouth 2 times daily    ESCITALOPRAM (LEXAPRO) 20 MG TABLET    Take 1 tablet by mouth daily TAKE 1 TABLET BY MOUTH EVERY DAY    FAMOTIDINE (PEPCID) 20 MG TABLET    TAKE 1 TABLET BY MOUTH EVERY DAY IN THE EVENING    FUROSEMIDE (LASIX) 40 MG TABLET    Take 1 tablet by mouth 2 times daily    MAGNESIUM OXIDE (MAG-OX) 400 MG TABLET    Take 1 tablet by mouth daily    MECLIZINE (ANTIVERT) 25 MG CHEW    Take 1 tablet by mouth 3 times daily as needed (PRN for dizziness)    MEMANTINE (NAMENDA) 10 MG TABLET    TAKE 1 TABLET BY MOUTH EVERY DAY    MULTIPLE VITAMINS-MINERALS (OCUVITE ADULT FORMULA PO)    Take by mouth    NITROGLYCERIN (NITRODUR) 0.2 MG/HR    Place 1 patch onto the skin daily    NITROGLYCERIN (NITROLINGUAL) 0.4 MG/SPRAY 0.4 MG SPRAY    Place 1 spray under the tongue every 5 minutes as needed for Chest pain    OMEPRAZOLE (PRILOSEC) 40 MG DELAYED RELEASE CAPSULE    TAKE 1 CAPSULE BY MOUTH EVERY AM 30-60 MINS BEFORE BREAKFAST    POTASSIUM CHLORIDE (MICRO-K) 10 MEQ EXTENDED RELEASE CAPSULE    Take 1 capsule by mouth daily    PROBIOTIC PRODUCT (PROBIOTIC-10 PO)    Take by mouth    RANOLAZINE (RANEXA) 500 MG EXTENDED RELEASE TABLET    Take 1 tablet by mouth 2 times daily    RIVAROXABAN (XARELTO) 20 MG TABS TABLET    Take 1 tablet by mouth daily (with breakfast) HOLD XARELTO UNTIL Friday, AUGUST 5TH.  MAY RESUME THEN, IF NO WOUND DRAINAGE.    ROSUVASTATIN (CRESTOR) 40 MG TABLET    TAKE 1 TABLET BY MOUTH EVERY DAY IN THE EVENING    SPIRONOLACTONE (ALDACTONE) 25 MG TABLET    Take 1 tablet by mouth daily    TIZANIDINE (ZANAFLEX) 2 MG TABLET    Take 1 tablet by mouth 3 times daily as needed (prn)    VITAMIN B-12 (CYANOCOBALAMIN) 1000 MCG TABLET    Take 1 tablet by mouth daily        Results for orders placed or performed during the hospital encounter of 05/12/25   XR CHEST (2 VW)    Narrative    Chest X-ray    INDICATION: Chest  - 2.4 mg/dL   Troponin   Result Value Ref Range    Troponin T 20.3 (H) 0 - 14 ng/L   Troponin   Result Value Ref Range    Troponin T 19.3 (H) 0 - 14 ng/L   Lipase   Result Value Ref Range    Lipase 52 13 - 60 U/L   Brain Natriuretic Peptide   Result Value Ref Range    NT Pro- (H) 0 - 450 PG/ML   Urinalysis, Micro   Result Value Ref Range    WBC, UA 10-20 (A) U4 /hpf    RBC, UA 0-5 U5 /hpf    Epithelial Cells, UA 0-5 U5 /hpf    BACTERIA, URINE Negative NEG /hpf    Hyaline Casts, UA 0-2 /lpf   POCT Urinalysis no Micro   Result Value Ref Range    Specific Gravity, Urine, POC 1.015 1.001 - 1.023      pH, Urine, POC 7.0 5.0 - 9.0      Protein, Urine, POC Negative NEG mg/dL    Glucose, UA POC Negative NEG mg/dL    Ketones, Urine, POC Negative NEG mg/dL    Bilirubin, Urine, POC Negative NEG      Blood, UA POC Negative NEG      URINE UROBILINOGEN POC 0.2 0.2 - 1.0 EU/dL    Nitrite, Urine, POC Negative NEG      Leukocyte Est, UA POC SMALL (A) NEG      Performed by: Som Govea    EKG 12 Lead   Result Value Ref Range    Ventricular Rate 57 BPM    Atrial Rate 57 BPM    P-R Interval 152 ms    QRS Duration 84 ms    Q-T Interval 444 ms    QTc Calculation (Bazett) 432 ms    P Axis 62 degrees    R Axis 64 degrees    T Axis 118 degrees    Diagnosis       Sinus bradycardia  Nonspecific ST abnormality  When compared with ECG of 19-NOV-2023 16:51,  No significant change was found  Confirmed by MICKEY MAHARAJ ([id number]) on 5/12/2025 2:39:00 PM           XR CHEST (2 VW)   Final Result   No acute findings in the chest         Electronically signed by Johnson Reaves                   No results for input(s): \"COVID19\" in the last 72 hours.    Voice dictation software was used during the making of this note.  This software is not perfect and grammatical and other typographical errors may be present.  This note has not been completely proofread for errors.     Luann Erazo PA  05/12/25 9511

## 2025-05-13 ENCOUNTER — APPOINTMENT (OUTPATIENT)
Dept: NON INVASIVE DIAGNOSTICS | Age: 89
DRG: 303 | End: 2025-05-13
Attending: INTERNAL MEDICINE
Payer: MEDICARE

## 2025-05-13 LAB
CHOLEST SERPL-MCNC: 224 MG/DL (ref 0–200)
EKG ATRIAL RATE: 55 BPM
EKG DIAGNOSIS: NORMAL
EKG P AXIS: 63 DEGREES
EKG P-R INTERVAL: 170 MS
EKG Q-T INTERVAL: 471 MS
EKG QRS DURATION: 102 MS
EKG QTC CALCULATION (BAZETT): 447 MS
EKG R AXIS: 61 DEGREES
EKG T AXIS: 78 DEGREES
EKG VENTRICULAR RATE: 54 BPM
HDLC SERPL-MCNC: 73 MG/DL (ref 40–60)
HDLC SERPL: 3.1 (ref 0–5)
LDLC SERPL CALC-MCNC: 133 MG/DL (ref 0–100)
TRIGL SERPL-MCNC: 91 MG/DL (ref 0–150)
TROPONIN T SERPL HS-MCNC: 25.9 NG/L (ref 0–14)
VLDLC SERPL CALC-MCNC: 18 MG/DL (ref 6–23)

## 2025-05-13 PROCEDURE — 99232 SBSQ HOSP IP/OBS MODERATE 35: CPT | Performed by: INTERNAL MEDICINE

## 2025-05-13 PROCEDURE — 6370000000 HC RX 637 (ALT 250 FOR IP): Performed by: INTERNAL MEDICINE

## 2025-05-13 PROCEDURE — 2140000000 HC CCU INTERMEDIATE R&B

## 2025-05-13 PROCEDURE — 84484 ASSAY OF TROPONIN QUANT: CPT

## 2025-05-13 PROCEDURE — 80061 LIPID PANEL: CPT

## 2025-05-13 PROCEDURE — 6360000004 HC RX CONTRAST MEDICATION: Performed by: INTERNAL MEDICINE

## 2025-05-13 PROCEDURE — 36415 COLL VENOUS BLD VENIPUNCTURE: CPT

## 2025-05-13 PROCEDURE — 93010 ELECTROCARDIOGRAM REPORT: CPT | Performed by: INTERNAL MEDICINE

## 2025-05-13 PROCEDURE — 6370000000 HC RX 637 (ALT 250 FOR IP): Performed by: NURSE PRACTITIONER

## 2025-05-13 PROCEDURE — C8929 TTE W OR WO FOL WCON,DOPPLER: HCPCS

## 2025-05-13 RX ORDER — ISOSORBIDE MONONITRATE 30 MG/1
30 TABLET, EXTENDED RELEASE ORAL DAILY
Status: DISCONTINUED | OUTPATIENT
Start: 2025-05-13 | End: 2025-05-14

## 2025-05-13 RX ORDER — POLYETHYLENE GLYCOL 3350 17 G/17G
17 POWDER, FOR SOLUTION ORAL DAILY PRN
Status: DISCONTINUED | OUTPATIENT
Start: 2025-05-13 | End: 2025-05-14 | Stop reason: HOSPADM

## 2025-05-13 RX ORDER — RANOLAZINE 500 MG/1
1000 TABLET, EXTENDED RELEASE ORAL 2 TIMES DAILY
Status: DISCONTINUED | OUTPATIENT
Start: 2025-05-13 | End: 2025-05-14 | Stop reason: HOSPADM

## 2025-05-13 RX ORDER — AMLODIPINE BESYLATE 5 MG/1
5 TABLET ORAL DAILY
Status: DISCONTINUED | OUTPATIENT
Start: 2025-05-13 | End: 2025-05-14 | Stop reason: HOSPADM

## 2025-05-13 RX ADMIN — POLYETHYLENE GLYCOL 3350 17 G: 17 POWDER, FOR SOLUTION ORAL at 09:08

## 2025-05-13 RX ADMIN — MAGNESIUM GLUCONATE 500 MG ORAL TABLET 400 MG: 500 TABLET ORAL at 08:04

## 2025-05-13 RX ADMIN — ESCITALOPRAM OXALATE 20 MG: 10 TABLET ORAL at 08:04

## 2025-05-13 RX ADMIN — POTASSIUM CHLORIDE 10 MEQ: 750 TABLET, EXTENDED RELEASE ORAL at 08:04

## 2025-05-13 RX ADMIN — ROSUVASTATIN CALCIUM 40 MG: 20 TABLET, FILM COATED ORAL at 17:47

## 2025-05-13 RX ADMIN — ASPIRIN 81 MG: 81 TABLET ORAL at 08:04

## 2025-05-13 RX ADMIN — PANTOPRAZOLE SODIUM 40 MG: 40 TABLET, DELAYED RELEASE ORAL at 05:43

## 2025-05-13 RX ADMIN — FAMOTIDINE 20 MG: 20 TABLET, FILM COATED ORAL at 17:47

## 2025-05-13 RX ADMIN — RANOLAZINE 1000 MG: 500 TABLET, FILM COATED, EXTENDED RELEASE ORAL at 20:38

## 2025-05-13 RX ADMIN — ISOSORBIDE MONONITRATE 30 MG: 30 TABLET, EXTENDED RELEASE ORAL at 09:08

## 2025-05-13 RX ADMIN — AMLODIPINE BESYLATE 5 MG: 5 TABLET ORAL at 09:08

## 2025-05-13 RX ADMIN — MEMANTINE 10 MG: 5 TABLET ORAL at 08:04

## 2025-05-13 RX ADMIN — FUROSEMIDE 40 MG: 40 TABLET ORAL at 08:04

## 2025-05-13 RX ADMIN — FUROSEMIDE 40 MG: 40 TABLET ORAL at 15:37

## 2025-05-13 RX ADMIN — SULFUR HEXAFLUORIDE 5 ML: KIT at 17:01

## 2025-05-13 RX ADMIN — RANOLAZINE 500 MG: 500 TABLET, FILM COATED, EXTENDED RELEASE ORAL at 08:03

## 2025-05-13 ASSESSMENT — PAIN SCALES - GENERAL
PAINLEVEL_OUTOF10: 0

## 2025-05-13 NOTE — ED NOTES
TRANSFER - OUT REPORT:    Verbal report given to Rn on Muriel Barajas  being transferred to [facility] for routine progression of patient care       Report consisted of patient's Situation, Background, Assessment and   Recommendations(SBAR).     Information from the following report(s) Nurse Handoff Report, ED Encounter Summary, ED SBAR, Adult Overview, MAR, Recent Results, and Med Rec Status was reviewed with the receiving nurse.    [place] Fall Assessment:    Presents to emergency department  because of falls (Syncope, seizure, or loss of consciousness): No  Age > 70: No  Altered Mental Status, Intoxication with alcohol or substance confusion (Disorientation, impaired judgment, poor safety awaremess, or inability to follow instructions): No  Impaired Mobility: Ambulates or transfers with assistive devices or assistance; Unable to ambulate or transer.: No  Nursing Judgement: No          Lines:   Peripheral IV 05/12/25 Left;Proximal Forearm (Active)        Opportunity for questions and clarification was provided.      Patient transported with:  Registered Nurse          Virgil Walsh RN  05/12/25 5720

## 2025-05-13 NOTE — PROGRESS NOTES
[facility] CARDIOLOGY PROGRESS NOTE           5/13/2025 8:45 AM    Admit Date: 5/12/2025      Subjective:   Patient with history of four-vessel bypass surgery in 2005.  Last cardiac catheterization was in 2022.  Patient with 2/4 grafts patent with severe small vessel disease best managed medically.  She presents with atypical type chest pain.  High-sensitivity troponins are negative.  No acute ST-T wave changes on EKG.  She currently denies pain while resting in bed.  She has been ambulating in the room without difficulty.    ROS:  Cardiovascular:  As noted above    Objective:      Vitals:    05/13/25 0009 05/13/25 0424 05/13/25 0804 05/13/25 0838   BP: (!) 144/51 (!) 127/57 (!) 166/66 (!) 133/55   Pulse: 57 53 55 51   Resp: 18 18  16   Temp: 97.7 °F (36.5 °C) 97.9 °F (36.6 °C)  97.5 °F (36.4 °C)   TempSrc:       SpO2: 100% 96%  98%   Weight:  70.6 kg (155 lb 9.6 oz)     Height:           Physical Exam:  General-No Acute Distress  Neck- supple, no JVD  CV- regular rate and rhythm no MRG  Lung- clear bilaterally  Abd- soft, nontender, nondistended  Ext- no edema bilaterally.  Skin- warm and dry    Data Review:   Recent Labs     05/12/25  1200      K 4.0   MG 1.9   BUN 22   WBC 5.9   HGB 12.2   HCT 36.7          Assessment/Plan:     Principal Problem:    Accelerating angina (HCC)  Plan: Patient with complex history of coronary artery disease and remote history of coronary artery bypass grafting.  Most recent cardiac catheterization 2022 with 2/4 grafts patent and severe small vessel disease best managed medically.  Given the lack of objective evidence to support proceeding with cardiac catheterization and high risk patient with chronic kidney disease agree with medical management.  Will add amlodipine 5 mg daily.  Retry Imdur as she has had issues with this in the past.  Titrate Ranexa to 1000 mg twice daily.  Will recheck echocardiogram to assess LVEF as she has not had an

## 2025-05-13 NOTE — PLAN OF CARE
4 Eyes Skin Assessment     NAME:  Muriel Barajas  YOB: 1935  MEDICAL RECORD NUMBER:  [id number]    The patient is being assessed for  Admission    I agree that at least one RN has performed a thorough Head to Toe Skin Assessment on the patient. ALL assessment sites listed below have been assessed.      Areas assessed by both nurses:    Head, Face, Ears, Shoulders, Back, Chest, Arms, Elbows, Hands, Sacrum. Buttock, Coccyx, Ischium, Legs. Feet and Heels, and Under Medical Devices         Does the Patient have a Wound? Yes wound(s) were present on assessment. LDA wound assessment was Initiated and completed by RN. bilateral wound (from previous fall per pt) on shins, vasc discoloration lower legs, bruising on R lower back (from prev fall), scattered scars and ecchy        Joseph Prevention initiated by RN: No  Wound Care Orders initiated by RN: No    Pressure Injury (Stage 3,4, Unstageable, DTI, NWPT, and Complex wounds) if present, place Wound referral order by RN under : No    New Ostomies, if present place, Ostomy referral order under : No     Nurse 1 eSignature: Electronically signed by Leobardo Owen RN on 5/12/25 at 11:25 PM EDT    **SHARE this note so that the co-signing nurse can place an eSignature**    Nurse 2 eSignature: Electronically signed by Daisy Moses RN on 5/12/25 at 11:25 PM EDT

## 2025-05-13 NOTE — H&P
[facility] CARDIOLOGY  [address] [place] [address], [address]  [place] [address]  PHONE: [phone]         CONSULT        25      NAME:  Muriel Barajas  : 1935  MRN: [id number]      SUBJECTIVE:   Muriel Barajas is a 89 y.o. female seen for a consultation visit regarding the following:     Chief Complaint   Patient presents with    Shortness of Breath            HPI:    89-year-old female with chronic chest pain.  She has had an increase in chest pain the last few days.  She calls it retrosternal pressure.  She also has pain in the side of her neck.  Weight has been elevated few pounds above baseline the last few days.  This chest pains are chronic.  There is associated shortness of breath.  No orthopnea or PND.  No palpitations or syncope      Allergies   Allergen Reactions    Amoxicillin-Pot Clavulanate Other (See Comments) and Itching     Causes yeast infection  Other reaction(s): Itching-Allergy  Causes yeast infection  Causes yeast infection      Gabapentin Other (See Comments)     Other reaction(s): Altered Mental State-Intolerance, Other (see comments)    Hydrochlorothiazide Other (See Comments)     Hyponatremia  Other reaction(s): Other (see comments), Other- (not listed) - Allergy  Hyponatremia  Hyponatremia      Nitrofurantoin Other (See Comments)     Other reaction(s): Altered Mental State-Intolerance    Prednisone Other (See Comments)     Visual loss and headache  Other reaction(s): Other (see comments)  Visual loss and headache    Sulfamethoxazole-Trimethoprim Nausea Only    Adhesive Tape Rash    Codeine Other (See Comments) and Palpitations     Made my heart go crazy  Other reaction(s): Palpitations-Intolerance, Palpitations-Intolerance     Past Medical History:   Diagnosis Date    Abdominal pain 10/28/2014    Angina at rest     pt denies    Arthritis     osteo - low back,  shoulders, hips, low back    Bilateral carotid bruits     Bursitis     CAD (coronary artery disease)     4  PLAN      89-year-old female with chest pain.  There are multiple atypical features.  She also has dementia and chronic renal failure.  Will give a dose of IV Lasix as her weight is elevated from baseline and see if that helps with her symptoms of shortness of breath.  She is very hesitant to undergo catheterization and has had discussions with her primary cardiologist Dr. aRmirez.  Will try nitroglycerin ointment instead of the nitroglycerin patch that she cannot tolerate.  Will increase Ranexa as well.        Thank you for allowing me to participate in this patient's care.  Please call or contact me if there are any questions or concerns regarding the above.      RONNIE SANTO MD  05/12/25  8:39 PM

## 2025-05-13 NOTE — WOUND CARE
Patient seen by wound care for RLE skin tear.    Patient lying supine, HOB up 45 degrees, alert and awake. She relates that she was getting into a truck that was high off the ground, slipped on the rail and scraped her shin. There was a significant amount of bleeding as she is on blood thinners.     Right shin skin tear superficial, minimal drainage, with % of the skin flap approximated over the wound. I recommend continuing with the same wound care of Xeroform, gauze, and gauze roll. Surrounding skin is very fragile compounded by age and what may be chronic venous stasis AEB hemosiderin staining to the BLE gator area, therefore I do not recommend any adhesives to the skin including silicone.         Patient educated in wound care, skin tear prevention, and treatment. She will need further education and reinforcements. She has home health already set up.

## 2025-05-13 NOTE — CARE COORDINATION
Patient admitted with accelerating angina. ECHO completed. Wound care consulted for LE wound. CM met with patient in room for assessment. Patient is walking in the room ad lanre. On room air.  Patient verified PCP, demographic, and insurance. Lives alone in her house. She has been independent in living until admission. Patient no longer drives so her sister or son provide transportation. DME: [employer] [place]. No history of home health or STR.   CM will follow and assist with discharge needs.     05/13/25 6907   Service Assessment   Patient Orientation Alert and Oriented   Cognition Alert   History Provided By Patient   Primary Caregiver Self   Accompanied By/Relationship No one at bedside   Support Systems Family Members;Children   Patient's Healthcare Decision Maker is: Legal Next of Kin   PCP Verified by CM Yes  (Dr Nobles)   Last Visit to PCP Within last 3 months   Prior Functional Level Assistance with the following:;Shopping   Current Functional Level Assistance with the following:;Shopping   Can patient return to prior living arrangement Yes   Ability to make needs known: Good   Family able to assist with home care needs: Yes   Would you like for me to discuss the discharge plan with any other family members/significant others, and if so, who? No   Financial Resources Medicare  ([place] supplement)   Community Resources None   CM/SW Referral Other (see comment)  (N/A)   Social/Functional History   Lives With Alone   Type of Home House   Bathroom Equipment Shower chair   Home Equipment Cane;Walker - Rolling   Receives Help From Family   Prior Level of Assist for ADLs Independent   Prior Level of Assist for Homemaking Independent   Ambulation Assistance Independent   Prior Level of Assist for Transfers Independent   Active  No   Patient's  Info Sister or son   Mode of Transportation Car   Occupation Retired   Discharge Planning   Type of Residence House   Living Arrangements Alone   Current Services  Prior To Admission None   Potential Assistance Needed Other (Comment)  (Palliative)   DME Ordered? No   Potential Assistance Purchasing Medications No   Services At/After Discharge   [place] Resource Information Provided? No   Mode of Transport at Discharge Other (see comment)  (Family member)   Confirm Follow Up Transport Family   Condition of Participation: Discharge Planning   The Plan for Transition of Care is related to the following treatment goals: Home with home health   The Patient and/Or Patient Representative agree with the Discharge Plan? Yes

## 2025-05-13 NOTE — PLAN OF CARE
Problem: Chronic Conditions and Co-morbidities  Goal: Patient's chronic conditions and co-morbidity symptoms are monitored and maintained or improved  Outcome: Progressing  Flowsheets (Taken 5/12/2025 2236 by Leobardo Owen, RN)  Care Plan - Patient's Chronic Conditions and Co-Morbidity Symptoms are Monitored and Maintained or Improved:   Monitor and assess patient's chronic conditions and comorbid symptoms for stability, deterioration, or improvement   Collaborate with multidisciplinary team to address chronic and comorbid conditions and prevent exacerbation or deterioration   Update acute care plan with appropriate goals if chronic or comorbid symptoms are exacerbated and prevent overall improvement and discharge     Problem: Discharge Planning  Goal: Discharge to home or other facility with appropriate resources  Outcome: Progressing  Flowsheets (Taken 5/12/2025 2236 by Leobardo Owen, RN)  Discharge to home or other facility with appropriate resources:   Identify barriers to discharge with patient and caregiver   Arrange for needed discharge resources and transportation as appropriate   Identify discharge learning needs (meds, wound care, etc)   Refer to discharge planning if patient needs post-hospital services based on physician order or complex needs related to functional status, cognitive ability or social support system     Problem: Safety - Adult  Goal: Free from fall injury  Outcome: Progressing     Problem: ABCDS Injury Assessment  Goal: Absence of physical injury  Outcome: Progressing

## 2025-05-14 VITALS
WEIGHT: 154.32 LBS | OXYGEN SATURATION: 98 % | SYSTOLIC BLOOD PRESSURE: 117 MMHG | HEIGHT: 62 IN | HEART RATE: 56 BPM | DIASTOLIC BLOOD PRESSURE: 60 MMHG | TEMPERATURE: 98.4 F | RESPIRATION RATE: 16 BRPM | BODY MASS INDEX: 28.4 KG/M2

## 2025-05-14 DIAGNOSIS — I50.22 CHRONIC SYSTOLIC (CONGESTIVE) HEART FAILURE (HCC): ICD-10-CM

## 2025-05-14 DIAGNOSIS — I10 ESSENTIAL HYPERTENSION, BENIGN: Primary | ICD-10-CM

## 2025-05-14 DIAGNOSIS — N18.32 STAGE 3B CHRONIC KIDNEY DISEASE (HCC): ICD-10-CM

## 2025-05-14 LAB
BACTERIA SPEC CULT: NORMAL
ECHO AO ASC DIAM: 3.2 CM
ECHO AO ASCENDING AORTA INDEX: 1.87 CM/M2
ECHO AO ROOT DIAM: 2.7 CM
ECHO AO ROOT INDEX: 1.58 CM/M2
ECHO AV ACCELERATION TIME: 71 MS
ECHO AV AREA PEAK VELOCITY: 1.4 CM2
ECHO AV AREA VTI: 1.4 CM2
ECHO AV AREA/BSA PEAK VELOCITY: 0.8 CM2/M2
ECHO AV AREA/BSA VTI: 0.8 CM2/M2
ECHO AV MEAN GRADIENT: 9 MMHG
ECHO AV MEAN VELOCITY: 1.4 M/S
ECHO AV PEAK GRADIENT: 14 MMHG
ECHO AV PEAK VELOCITY: 1.9 M/S
ECHO AV VELOCITY RATIO: 0.42
ECHO AV VTI: 45.1 CM
ECHO BSA: 1.76 M2
ECHO EST RA PRESSURE: 3 MMHG
ECHO IVC PROX: 1.4 CM
ECHO LA AREA 4C: 17.2 CM2
ECHO LA DIAMETER INDEX: 1.99 CM/M2
ECHO LA DIAMETER: 3.4 CM
ECHO LA MAJOR AXIS: 5.4 CM
ECHO LA TO AORTIC ROOT RATIO: 1.26
ECHO LA VOL MOD A4C: 44 ML (ref 22–52)
ECHO LA VOLUME INDEX MOD A4C: 26 ML/M2 (ref 16–34)
ECHO LV E' LATERAL VELOCITY: 6.53 CM/S
ECHO LV E' SEPTAL VELOCITY: 7.4 CM/S
ECHO LV EDV A2C: 72 ML
ECHO LV EDV A4C: 85 ML
ECHO LV EDV INDEX A4C: 50 ML/M2
ECHO LV EDV NDEX A2C: 42 ML/M2
ECHO LV EF PHYSICIAN: 55 %
ECHO LV EJECTION FRACTION A2C: 62 %
ECHO LV EJECTION FRACTION A4C: 56 %
ECHO LV EJECTION FRACTION BIPLANE: 59 % (ref 55–100)
ECHO LV ESV A2C: 27 ML
ECHO LV ESV A4C: 37 ML
ECHO LV ESV INDEX A2C: 16 ML/M2
ECHO LV ESV INDEX A4C: 22 ML/M2
ECHO LV FRACTIONAL SHORTENING: 31 % (ref 28–44)
ECHO LV INTERNAL DIMENSION DIASTOLE INDEX: 2.87 CM/M2
ECHO LV INTERNAL DIMENSION DIASTOLIC: 4.9 CM (ref 3.9–5.3)
ECHO LV INTERNAL DIMENSION SYSTOLIC INDEX: 1.99 CM/M2
ECHO LV INTERNAL DIMENSION SYSTOLIC: 3.4 CM
ECHO LV IVSD: 1 CM (ref 0.6–0.9)
ECHO LV MASS 2D: 200.5 G (ref 67–162)
ECHO LV MASS INDEX 2D: 117.3 G/M2 (ref 43–95)
ECHO LV POSTERIOR WALL DIASTOLIC: 1.2 CM (ref 0.6–0.9)
ECHO LV RELATIVE WALL THICKNESS RATIO: 0.49
ECHO LVOT AREA: 3.1 CM2
ECHO LVOT AV VTI INDEX: 0.44
ECHO LVOT DIAM: 2 CM
ECHO LVOT MEAN GRADIENT: 2 MMHG
ECHO LVOT PEAK GRADIENT: 3 MMHG
ECHO LVOT PEAK VELOCITY: 0.8 M/S
ECHO LVOT STROKE VOLUME INDEX: 36.2 ML/M2
ECHO LVOT SV: 61.9 ML
ECHO LVOT VTI: 19.7 CM
ECHO MV A VELOCITY: 0.93 M/S
ECHO MV AREA VTI: 1.5 CM2
ECHO MV E DECELERATION TIME (DT): 190 MS
ECHO MV E VELOCITY: 0.79 M/S
ECHO MV E/A RATIO: 0.85
ECHO MV E/E' LATERAL: 12.1
ECHO MV E/E' RATIO (AVERAGED): 11.39
ECHO MV E/E' SEPTAL: 10.68
ECHO MV LVOT VTI INDEX: 2.05
ECHO MV MAX VELOCITY: 1 M/S
ECHO MV MEAN GRADIENT: 1 MMHG
ECHO MV MEAN VELOCITY: 0.6 M/S
ECHO MV PEAK GRADIENT: 4 MMHG
ECHO MV VTI: 40.3 CM
ECHO PV ACCELERATION TIME (AT): 121 MS
ECHO PV MAX VELOCITY: 1.2 M/S
ECHO PV PEAK GRADIENT: 6 MMHG
ECHO RIGHT VENTRICULAR SYSTOLIC PRESSURE (RVSP): 20 MMHG
ECHO RV FREE WALL PEAK S': 10.9 CM/S
ECHO RV TAPSE: 1.4 CM (ref 1.7–?)
ECHO TV REGURGITANT MAX VELOCITY: 2.05 M/S
ECHO TV REGURGITANT PEAK GRADIENT: 17 MMHG
ECHO TV REGURGITANT PEAK GRADIENT: 17 MMHG
SERVICE CMNT-IMP: NORMAL

## 2025-05-14 PROCEDURE — 6370000000 HC RX 637 (ALT 250 FOR IP): Performed by: INTERNAL MEDICINE

## 2025-05-14 PROCEDURE — 99238 HOSP IP/OBS DSCHRG MGMT 30/<: CPT | Performed by: INTERNAL MEDICINE

## 2025-05-14 PROCEDURE — 93306 TTE W/DOPPLER COMPLETE: CPT | Performed by: INTERNAL MEDICINE

## 2025-05-14 RX ORDER — RANOLAZINE 1000 MG/1
1000 TABLET, EXTENDED RELEASE ORAL 2 TIMES DAILY
Qty: 60 TABLET | Refills: 3 | Status: SHIPPED | OUTPATIENT
Start: 2025-05-14

## 2025-05-14 RX ORDER — AMLODIPINE BESYLATE 5 MG/1
5 TABLET ORAL DAILY
Qty: 30 TABLET | Refills: 3 | Status: SHIPPED | OUTPATIENT
Start: 2025-05-15

## 2025-05-14 RX ORDER — ISOSORBIDE MONONITRATE 60 MG/1
60 TABLET, EXTENDED RELEASE ORAL DAILY
Status: DISCONTINUED | OUTPATIENT
Start: 2025-05-15 | End: 2025-05-14 | Stop reason: HOSPADM

## 2025-05-14 RX ORDER — ISOSORBIDE MONONITRATE 60 MG/1
60 TABLET, EXTENDED RELEASE ORAL DAILY
Qty: 30 TABLET | Refills: 3 | Status: SHIPPED | OUTPATIENT
Start: 2025-05-15

## 2025-05-14 RX ADMIN — MEMANTINE 10 MG: 5 TABLET ORAL at 08:40

## 2025-05-14 RX ADMIN — RANOLAZINE 1000 MG: 500 TABLET, FILM COATED, EXTENDED RELEASE ORAL at 08:38

## 2025-05-14 RX ADMIN — ASPIRIN 81 MG: 81 TABLET ORAL at 08:38

## 2025-05-14 RX ADMIN — MAGNESIUM GLUCONATE 500 MG ORAL TABLET 400 MG: 500 TABLET ORAL at 08:40

## 2025-05-14 RX ADMIN — PANTOPRAZOLE SODIUM 40 MG: 40 TABLET, DELAYED RELEASE ORAL at 06:18

## 2025-05-14 RX ADMIN — AMLODIPINE BESYLATE 5 MG: 5 TABLET ORAL at 08:40

## 2025-05-14 RX ADMIN — FUROSEMIDE 40 MG: 40 TABLET ORAL at 08:38

## 2025-05-14 RX ADMIN — ESCITALOPRAM OXALATE 20 MG: 10 TABLET ORAL at 08:40

## 2025-05-14 RX ADMIN — POTASSIUM CHLORIDE 10 MEQ: 750 TABLET, EXTENDED RELEASE ORAL at 08:40

## 2025-05-14 RX ADMIN — ISOSORBIDE MONONITRATE 30 MG: 30 TABLET, EXTENDED RELEASE ORAL at 08:40

## 2025-05-14 ASSESSMENT — PAIN SCALES - GENERAL
PAINLEVEL_OUTOF10: 0

## 2025-05-14 NOTE — DISCHARGE SUMMARY
In this split/shared evaluation I performed reviewed the patients's H&P, available images, labs, cultures., discussed case in detail with ACP, performed a medically appropriate history and exam, counseled and educated the patient and/or family member, ordered and/or reviewed medications, tests or procedures, documented information in EMR, independently interpreted images and coordinated care.     Personal Time: 25 minutes -this equates to greater than 50% of total time in patient consultation/care.      Patient seen and examined by me.  Agree with above note by physician extender.  Key findings are: 89-year-old female with established coronary disease and known severe small vessel disease best managed medically.  She was admitted with chest pain.  She has improved with medication adjustments.  She wishes to avoid invasive procedures such as cardiac catheterization.  She is stable for discharge today.    Vitals:    05/14/25 0449 05/14/25 0706 05/14/25 0838 05/14/25 1107   BP: (!) 153/65 (!) 140/51 (!) 140/51 117/60   Pulse: 59 57 58 56   Resp: 16 16     Temp: 97.9 °F (36.6 °C) 97.9 °F (36.6 °C)  98.4 °F (36.9 °C)   TempSrc: Oral Oral     SpO2: 92% 96%  98%   Weight: 70 kg (154 lb 5.2 oz)      Height:            General: Patient is alert and oriented, no apparent distress  HEENT: Pupils equal reactive, oropharynx clear  Chest: Clear to auscultation bilaterally  Cardiovascular: S1-S2 regular with no murmur  Abdomen: Soft positive bowel sounds  Extremities: Soft no edema with intact distal pulses    Principal Problem:    Accelerating angina (HCC)  Plan: Please see previous note from today regarding plan.          CATALINO BLANCO MD             [facility] Cardiology Discharge Summary     Patient ID:  Muriel Barajas  [id number]  89 y.o.  1935    Admit date: 5/12/2025    Discharge date and time:  5-    Admitting Physician: Devendra Plummer MD     Discharge Physician: SHARON Harris -  NP/.    Admission Diagnoses: Shortness of breath [R06.02]  Accelerating angina ([facility]) [I20.0]  Chest pain, unspecified type [R07.9]    Discharge Diagnoses:   Patient Active Problem List    Diagnosis    Stage 3b chronic kidney disease ([facility])    Chronic systolic (congestive) heart failure    Accelerating angina ([facility])    Alzheimer's disease, unspecified    Carotid stenosis, asymptomatic, bilateral    Chest pain    Controlled type 2 diabetes mellitus without complication, without long-term current use of insulin ([facility])    Mixed hyperlipidemia    Diastolic CHF, chronic ([facility])    PAD (peripheral artery disease)    Hypertensive CHF (congestive heart failure) ([facility])    S/P CABG (coronary artery bypass graft)    S/P angioplasty with stent    Essential hypertension, benign    Coronary artery disease of native artery of native heart with stable angina pectoris       Cardiology Procedures this admission: Echocardiogram       Hospital Course:  Patient presented to ER on 5/12/25 with retrosternal chest pressure and shortness of breath. Patient was given dose of IV lasix for shortness of breath and mild leg swelling. She was hesitant to undergo heart cath. Most recent cardiac catheterization 2022 with 2/4 grafts patent and severe small vessel disease best managed medically. Amlodipine was added, Imdur restarted and Ranexa increased.     05/12/25    ECHO (TTE) COMPLETE (PRN CONTRAST/BUBBLE/STRAIN/3D) 05/14/2025  7:57 AM (Final)    Interpretation Summary    Left Ventricle: Normal left ventricular systolic function with a visually estimated EF of 55 - 60%. Left ventricle size is normal. Mildly increased wall thickness. Findings consistent with mild concentric hypertrophy. Normal wall motion. Normal diastolic function.    Right Ventricle: Right ventricle size is normal. Normal systolic function.    Aortic Valve: Trileaflet valve. Mildly calcified cusps. Mild regurgitation. Mild stenosis of the aortic valve.    Mitral Valve: Mild

## 2025-05-14 NOTE — DISCHARGE INSTRUCTIONS
The patient is being discharged home in stable condition on a low saturated fat, low cholesterol and low salt diet. Pt is instructed to advance activities as tolerated limited to fatigue or shortness of breath.

## 2025-05-14 NOTE — PROGRESS NOTES
[facility] CARDIOLOGY PROGRESS NOTE           5/14/2025 9:37 AM    Admit Date: 5/12/2025      Subjective:   Patient with history of four-vessel bypass surgery in 2005.  Last cardiac catheterization was in 2022.  Patient with 2/4 grafts patent with severe small vessel disease best managed medically.  She presents with atypical type chest pain.  High-sensitivity troponins are negative.  No acute ST-T wave changes on EKG.  She currently denies pain while resting in bed but mild tightness with activity.    ROS:  Cardiovascular:  As noted above    Objective:      Vitals:    05/14/25 0003 05/14/25 0449 05/14/25 0706 05/14/25 0838   BP: (!) 146/56 (!) 153/65 (!) 140/51 (!) 140/51   Pulse: 60 59 57 58   Resp: 16 16 16    Temp: 97.7 °F (36.5 °C) 97.9 °F (36.6 °C) 97.9 °F (36.6 °C)    TempSrc: Oral Oral Oral    SpO2: 97% 92% 96%    Weight:  70 kg (154 lb 5.2 oz)     Height:           Physical Exam:  General-No Acute Distress  Neck- supple, no JVD  CV- regular rate and rhythm no MRG  Lung- clear bilaterally  Abd- soft, nontender, nondistended  Ext- no edema bilaterally.  Skin- warm and dry    Data Review:   Recent Labs     05/12/25  1200 05/13/25  0057     --    K 4.0  --    MG 1.9  --    BUN 22  --    WBC 5.9  --    HGB 12.2  --    HCT 36.7  --      --    CHOL  --  224*   HDL  --  73*       Assessment/Plan:     Principal Problem:    Accelerating angina (HCC)  Plan: Patient with complex history of coronary artery disease and remote history of coronary artery bypass grafting.  Most recent cardiac catheterization 2022 with 2/4 grafts patent and severe small vessel disease best managed medically.  Given the lack of objective evidence to support proceeding with cardiac catheterization and high risk patient with chronic kidney disease agree with medical management.  Patient feels better on amlodipine, Imdur and Ranexa.  Will titrate Imdur to 60 mg daily as she has tolerated this so far.  Patient

## 2025-05-14 NOTE — PROGRESS NOTES
Remote Patient Monitoring Treatment Plan    Received request from [facility] IP Care Management Team  to order remote patient monitoring for in home monitoring of Kidney Disease; condition managed by PCP. CHF and HTN; condition managed by [facility].   and order completed.     Patient will be monitoring blood pressure   pulse ox   weight.      Patient will engage in Remote Patient Monitoring each day to develop the skills necessary for self management.       RPM Care Team Responsibilities:   Alerts will be reviewed daily and addressed within 2-4 hours during operational hours (Monday -Friday 9 am-4 pm)  Alert response and intervention documented in patient medical record  Alert response escalated to PCP per protocol and documented in patient medical record  Patient monitored over approximately  days  Discharge from program based on self-management readiness    See care coordination encounters for additional details.

## 2025-05-14 NOTE — CARE COORDINATION
05/14/25 1218   Services At/After Discharge   Transition of Care Consult (CM Consult) Home Health  ([facility])   Internal Home Health No   Reason Outside Agency Chosen Patient already serviced by other home care/hospice agency   Services At/After Discharge PT;OT;Nursing services   [facility] Resource Information Provided? No   Mode of Transport at Discharge Other (see comment)  (Son to transport by car.)   Confirm Follow Up Transport Family   Condition of Participation: Discharge Planning   The Plan for Transition of Care is related to the following treatment goals: Home with home health to assist and support return to baseline function.   The Patient and/or Patient Representative was provided with a Choice of Provider? Patient   The Patient and/Or Patient Representative agree with the Discharge Plan? Yes   Freedom of Choice list was provided with basic dialogue that supports the patient's individualized plan of care/goals, treatment preferences, and shares the quality data associated with the providers?  Yes     Discharge order placed this AM. CM met with patient and her son to discuss discharge plan. Patient reports she is current with home health and is agreeable to [facility]. CM reviewed chart and found home health referral sent to [facility] (SN, PT, OT) by Dr Ramirez. CM sent [facility] care order with SN( wound care RLE and medication management)with documents to [facility]. Awaiting confirmation.     Patient is also eligible for RPM. CM presented the program to patient and her son. Patient was agreeable. CM provided kit ([id number]) to patient's room. ROLDAN updated Pepper Wright with kit number in [employer].   Son transporting home by car.   [address] [facility] accepted referral. CM selected in [facility].

## 2025-05-15 ENCOUNTER — TELEPHONE (OUTPATIENT)
Age: 89
End: 2025-05-15

## 2025-05-15 NOTE — TELEPHONE ENCOUNTER
Patient was discharged from [facility] yesterday.   She says they changed some of her meds and it is making her dizzy.

## 2025-05-16 ENCOUNTER — CARE COORDINATION (OUTPATIENT)
Dept: CARE COORDINATION | Facility: CLINIC | Age: 89
End: 2025-05-16

## 2025-05-16 NOTE — CARE COORDINATION
RPM Verification and Welcome Call Successful? Yes,     Remote Patient Monitoring Welcome Note  Date/Time:  2025 12:52 PM  Patient Current Location: [place]  Verified patients name and  as identifiers. Pt requested call be completed with her son / emergency contact, Toño Barajas.        Completed and confirmed the following:    [x] Patient received all RPM equipment (tablet, scale, blood pressure device and cuff, and pulse oximeter)  Cuff Size:  22 cm - 44 cm     Weight Scale:  regular (<330lbs)                    [x] Instructed patient's son to keep box for use when returning equipment                                                          [x] Reviewed Patient Welcome Letter with patient's son    [x]  Reviewed Consent Form  Copy of consent form in HRS; has not been uploaded to Epic as of this time.                 [x] Reviewed expectations for patient and care team  Monitoring hours M-F 9-4pm  It is important patient takes her vitals every day, even on the weekends, to keep her care team aware of how she is doing every day of the week.  Completing monitoring by 12pm on  so that alerts can be responded to in the same day  Patient weighs self at same time every day (or after urinating and waking up)  Take blood pressure 1-2 hrs after medications   RPM team may have different phone area code (including [place], [place], [place] or [place])                              [x] Instructed patient's son to keep scale on flat surface                                                         [x] Instructed patient's son to keep tablet plugged in at all times                         [x] Instructed how to contact IT support  ([phone])  [x] Provided Remote Patient Monitoring care  information     Emergency Contact Verified: Toño Barajas (son) [phone]               Son reports he used RPM equipment to \"test\" during set up. Current recorded metrics are not patients; removed from HRS. States he

## 2025-05-16 NOTE — TELEPHONE ENCOUNTER
I called pt.again today and she answered.She said yesterday she was really dizzy and had a little fall but no injury.Today she is just fine.She does have tinnitus so she had bouts of dizziness off/on.BP is good.She is feeling good now and has appt.Monday.

## 2025-05-19 ENCOUNTER — OFFICE VISIT (OUTPATIENT)
Age: 89
End: 2025-05-19
Payer: MEDICARE

## 2025-05-19 ENCOUNTER — CARE COORDINATION (OUTPATIENT)
Dept: OTHER | Facility: CLINIC | Age: 89
End: 2025-05-19

## 2025-05-19 VITALS
SYSTOLIC BLOOD PRESSURE: 138 MMHG | WEIGHT: 156 LBS | HEART RATE: 56 BPM | BODY MASS INDEX: 28.71 KG/M2 | DIASTOLIC BLOOD PRESSURE: 71 MMHG | HEIGHT: 62 IN

## 2025-05-19 DIAGNOSIS — I50.22 HYPERTENSIVE HEART DISEASE WITH CHRONIC SYSTOLIC CONGESTIVE HEART FAILURE (HCC): ICD-10-CM

## 2025-05-19 DIAGNOSIS — I25.118 CORONARY ARTERY DISEASE OF NATIVE ARTERY OF NATIVE HEART WITH STABLE ANGINA PECTORIS: ICD-10-CM

## 2025-05-19 DIAGNOSIS — I10 ESSENTIAL HYPERTENSION, BENIGN: ICD-10-CM

## 2025-05-19 DIAGNOSIS — I11.0 HYPERTENSIVE HEART DISEASE WITH CHRONIC SYSTOLIC CONGESTIVE HEART FAILURE (HCC): ICD-10-CM

## 2025-05-19 DIAGNOSIS — I87.2 VENOUS (PERIPHERAL) INSUFFICIENCY: Primary | ICD-10-CM

## 2025-05-19 PROCEDURE — G8428 CUR MEDS NOT DOCUMENT: HCPCS | Performed by: INTERNAL MEDICINE

## 2025-05-19 PROCEDURE — 1123F ACP DISCUSS/DSCN MKR DOCD: CPT | Performed by: INTERNAL MEDICINE

## 2025-05-19 PROCEDURE — 1090F PRES/ABSN URINE INCON ASSESS: CPT | Performed by: INTERNAL MEDICINE

## 2025-05-19 PROCEDURE — 1036F TOBACCO NON-USER: CPT | Performed by: INTERNAL MEDICINE

## 2025-05-19 PROCEDURE — 1111F DSCHRG MED/CURRENT MED MERGE: CPT | Performed by: INTERNAL MEDICINE

## 2025-05-19 PROCEDURE — 1126F AMNT PAIN NOTED NONE PRSNT: CPT | Performed by: INTERNAL MEDICINE

## 2025-05-19 PROCEDURE — G8417 CALC BMI ABV UP PARAM F/U: HCPCS | Performed by: INTERNAL MEDICINE

## 2025-05-19 PROCEDURE — 99214 OFFICE O/P EST MOD 30 MIN: CPT | Performed by: INTERNAL MEDICINE

## 2025-05-19 NOTE — PROGRESS NOTES
MINS BEFORE BREAKFAST      cetirizine (ZYRTEC) 10 MG tablet Take 1 tablet by mouth daily      rivaroxaban (XARELTO) 20 MG TABS tablet Take 1 tablet by mouth daily (with breakfast) HOLD XARELTO UNTIL Friday, AUGUST 5TH.  MAY RESUME THEN, IF NO WOUND DRAINAGE. 90 tablet 3    nitroGLYCERIN (NITROLINGUAL) 0.4 MG/SPRAY 0.4 mg spray Place 1 spray under the tongue every 5 minutes as needed for Chest pain 1 each 2    carvedilol (COREG) 25 MG tablet Take 1 tablet by mouth 2 times daily 180 tablet 3    famotidine (PEPCID) 20 MG tablet TAKE 1 TABLET BY MOUTH EVERY DAY IN THE EVENING 90 tablet 3    rosuvastatin (CRESTOR) 40 MG tablet TAKE 1 TABLET BY MOUTH EVERY DAY IN THE EVENING 90 tablet 3    spironolactone (ALDACTONE) 25 MG tablet Take 1 tablet by mouth daily 90 tablet 3    memantine (NAMENDA) 10 MG tablet TAKE 1 TABLET BY MOUTH EVERY DAY 30 tablet 5    magnesium oxide (MAG-OX) 400 MG tablet Take 1 tablet by mouth daily 30 tablet 5    escitalopram (LEXAPRO) 20 MG tablet Take 1 tablet by mouth daily TAKE 1 TABLET BY MOUTH EVERY DAY 90 tablet 3    potassium chloride (MICRO-K) 10 MEQ extended release capsule Take 1 capsule by mouth daily 180 capsule 3    meclizine (ANTIVERT) 25 MG CHEW Take 1 tablet by mouth 3 times daily as needed (PRN for dizziness) 90 tablet 2    docusate sodium (COLACE) 100 MG capsule Take 1 capsule by mouth 2 times daily      Coenzyme Q10 (COQ10 PO) Take by mouth      Cholecalciferol 400 UNIT TABS tablet Take 1 tablet by mouth daily      aspirin 81 MG EC tablet Take 1 tablet by mouth daily      Multiple Vitamins-Minerals (OCUVITE ADULT FORMULA PO) Take by mouth      Probiotic Product (PROBIOTIC-10 PO) Take by mouth      acetaminophen (TYLENOL) 325 MG tablet Take 500 mg by mouth every 6 hours as needed      tiZANidine (ZANAFLEX) 2 MG tablet Take 1 tablet by mouth 3 times daily as needed (prn) (Patient not taking: Reported on 5/19/2025) 90 tablet 2    vitamin B-12 (CYANOCOBALAMIN) 1000 MCG tablet Take 1

## 2025-05-19 NOTE — CARE COORDINATION
5/19/2025 12:12 PM  *  Unable to Reach Date/Time:  5/19/2025 12:12 PM  ACM attempted to reach patient by telephone regarding yellow alert r/t no PO x 2 days in remote patient monitoring program. Left HIPAA compliant message requesting a return call. Will attempt to reach patient again.       YASH Martin, RN  Associate Care Manager   Cell: [phone]  [email or web address]

## 2025-05-20 ENCOUNTER — CARE COORDINATION (OUTPATIENT)
Dept: CARE COORDINATION | Age: 89
End: 2025-05-20

## 2025-05-20 NOTE — CARE COORDINATION
RN attempted to reach patient by phone to discuss no PO readings x3 days. Patient did not answer and VM left reminding patient to take readings daily with other vitals.     All other readings within parameters at this time

## 2025-05-21 ENCOUNTER — CARE COORDINATION (OUTPATIENT)
Dept: CARE COORDINATION | Facility: CLINIC | Age: 89
End: 2025-05-21

## 2025-05-21 ENCOUNTER — TELEPHONE (OUTPATIENT)
Dept: PRIMARY CARE CLINIC | Facility: CLINIC | Age: 89
End: 2025-05-21

## 2025-05-21 ENCOUNTER — CARE COORDINATION (OUTPATIENT)
Dept: OTHER | Facility: CLINIC | Age: 89
End: 2025-05-21

## 2025-05-21 NOTE — CARE COORDINATION
5/21/2025 9:53 AM  *  Unable to Reach Date/Time:  5/21/2025 9:53 AM  ACM attempted to reach patient by telephone regarding red alert r/t weight and yellow alert r/t no PO x 4 days in remote patient monitoring program. Left HIPAA compliant message requesting a return call. ACM notified.      YASH Martin, RN  Associate Care Manager   Cell: [phone]  [email or web address]

## 2025-05-21 NOTE — TELEPHONE ENCOUNTER
Alfredo from [facility] called to inform the office that the patient has a lot going on and that she has gained 3.2 pounds since yesterday.  And she has been in the hospital but she already had a follow up with the cardiologist.

## 2025-05-21 NOTE — CARE COORDINATION
Care Transitions Note    Follow Up Call     Patient Current Location:  Home: [address] [address]    Care Transition Nurse contacted the patient by telephone. Verified name and  as identifiers.    Additional needs identified to be addressed with provider   No needs identified                 Method of communication with provider: none.        Plan of care updates since last contact:   Received a return call from patient regarding her pulse ox readings. She reports she hasn't been able to get it to work and she had the therapist check it yesterday. She reports he thought it may need new batteries. She has now purchased the batteries and will input her metric,once she gets the new batteries installed. She said she may wait until her son comes by to assist her. She does have a pulse oximeter herself and states her reading this morning was 98%. CTN also advised patient to only use her RPM scales for her morning weights and she can use her personal scales to weigh herself in the evening. Patient reports feeling fine with no issues at time of call. She agrees to outreach to CTN with any issues prior to next outreach.  Continue taking all medications as ordered  Continue with adequate nutritional and fluid intake  Patient/Family to contact CTN with any issues, questions, or concerns prior to next outreach.  Advance activities as recommended by  therapy    Advance Care Planning:   Does patient have an Advance Directive: reviewed during previous call, see note. .    Medication Review:  No changes since last call.     Remote Patient Monitoring:  Offered patient enrollment in the Remote Patient Monitoring (RPM) program for in-home monitoring: Yes, patient enrolled; current status is activated and monitoring.    Assessments:  Care Transitions 24 Hour Call    Schedule Follow Up Appointment with PCP: Completed  Do you have any ongoing symptoms?: No  Do you have all of your prescriptions and are they filled?:

## 2025-05-21 NOTE — CARE COORDINATION
Care Transitions Note    Follow Up Call     Attempted to reach patient for transitions of care follow up.  Unable to reach patient.      Outreach Attempts:   HIPAA compliant voicemail left for patient.     Care Summary Note: Patient enrolled with RPM. Red alert received r/t weight gain 3/2 in 24 hours and yellow alert r/t no pulse ox input  for 4 days.   Will continue to monitor and outreach again if no return call received.  Follow Up Appointment:   Future Appointments         Provider Specialty Dept Phone    7/8/2025 10:45 AM Mietsh Ramirez DO Cardiology [phone]    8/19/2025 3:00 PM Mitesh Ramirez DO Cardiology [phone]    9/8/2025 9:00 AM [facility] Primary Care [phone]    9/15/2025 1:40 PM Juan Nobles MD Primary Care [phone]    3/25/2026 10:45 AM Yanick Jay MD Urology [phone]            Plan for follow-up on next business day.  based on severity of symptoms and risk factors. Plan for next call: symptom management-RPM    Jessi Michaels RN

## 2025-05-21 NOTE — CARE COORDINATION
5/21/2025 9:01 AM  *  Unable to Reach Date/Time:  5/21/2025 9:01 AM  ACM attempted to reach patient by telephone regarding red alert r/t weight gain (3.2 lb in 1 day) and yellow alert r/t no PO x4 days in remote patient monitoring program. Left HIPAA compliant message requesting a return call. Will attempt to reach patient again.       YASH Martin, RN  Associate Care Manager   Cell: [phone]  [email or web address]

## 2025-05-23 VITALS
DIASTOLIC BLOOD PRESSURE: 71 MMHG | BODY MASS INDEX: 28.61 KG/M2 | SYSTOLIC BLOOD PRESSURE: 132 MMHG | OXYGEN SATURATION: 97 % | WEIGHT: 156.4 LBS | HEART RATE: 55 BPM

## 2025-05-28 ENCOUNTER — CARE COORDINATION (OUTPATIENT)
Dept: CARE COORDINATION | Facility: CLINIC | Age: 89
End: 2025-05-28

## 2025-05-28 ENCOUNTER — OFFICE VISIT (OUTPATIENT)
Dept: VASCULAR SURGERY | Age: 89
End: 2025-05-28
Payer: MEDICARE

## 2025-05-28 VITALS
DIASTOLIC BLOOD PRESSURE: 71 MMHG | OXYGEN SATURATION: 96 % | HEIGHT: 62 IN | WEIGHT: 154 LBS | BODY MASS INDEX: 28.34 KG/M2 | SYSTOLIC BLOOD PRESSURE: 181 MMHG | HEART RATE: 54 BPM

## 2025-05-28 DIAGNOSIS — I73.9 PAD (PERIPHERAL ARTERY DISEASE): Primary | ICD-10-CM

## 2025-05-28 PROCEDURE — G2211 COMPLEX E/M VISIT ADD ON: HCPCS | Performed by: NURSE PRACTITIONER

## 2025-05-28 PROCEDURE — 99214 OFFICE O/P EST MOD 30 MIN: CPT | Performed by: NURSE PRACTITIONER

## 2025-05-28 PROCEDURE — 1123F ACP DISCUSS/DSCN MKR DOCD: CPT | Performed by: NURSE PRACTITIONER

## 2025-05-28 PROCEDURE — 1159F MED LIST DOCD IN RCRD: CPT | Performed by: NURSE PRACTITIONER

## 2025-05-28 PROCEDURE — 1036F TOBACCO NON-USER: CPT | Performed by: NURSE PRACTITIONER

## 2025-05-28 PROCEDURE — G8427 DOCREV CUR MEDS BY ELIG CLIN: HCPCS | Performed by: NURSE PRACTITIONER

## 2025-05-28 PROCEDURE — G8417 CALC BMI ABV UP PARAM F/U: HCPCS | Performed by: NURSE PRACTITIONER

## 2025-05-28 PROCEDURE — 1111F DSCHRG MED/CURRENT MED MERGE: CPT | Performed by: NURSE PRACTITIONER

## 2025-05-28 PROCEDURE — 1090F PRES/ABSN URINE INCON ASSESS: CPT | Performed by: NURSE PRACTITIONER

## 2025-05-28 NOTE — PROGRESS NOTES
DATE OF VISIT: 5/28/2025      [facility]  Muriel Barajas is a 89 y.o. female who called wanting an evaluation of her legs. She states that her ankles feel funny and she had a recently fall getting into the vehicle. She has RLE wound which is healing well. She has known venous insufficiency. She states she cannot wear any type of compression because they are too tight and cause pain around her knees. She is taking Xarelto, Crestor and ASA.         MEDICAL HISTORY:   Past Medical History:   Diagnosis Date    Abdominal pain 10/28/2014    Angina at rest     pt denies    Arthritis     osteo - low back,  shoulders, hips, low back    Bilateral carotid bruits     Bursitis     CAD (coronary artery disease)     4 vessel CABG 2005;--- stents x 4--- last one placed 2014-- followed by dr rasheed    Cervicalgia     Chronic pain     left shoulder    Coagulation defects     Xarelto    Constipation     Cough 09/11/2014    10/8/14, Methacholine Challenge Study: The point at which the FEV1 fell by at least 20%, the PC20, occurred above a dose of 25mg/mL of methacholine. This rules out the diagnosis of asthma with an extremely high degree of sensitivity. No post-challenge FEV1 recovery was identified.  Micki Emery MD        Depressive disorder     Dyspnea 09/11/2014    [facility]; 9/29/14: IMPRESSION: The flow volume loop is consistent restriction. Spirometry suggest restriction with concomitant obstruction at low lung volumes. There is not a significant bronchodilator response. Lung volumes reveal mild restriction. The unadjusted diffusion capacity is normal.  Michele Orellana MD      GERD (gastroesophageal reflux disease)     controlled with med    Headache     Hoarseness or changing voice     thougfht to be related to gerd    Hyperlipidemia     Hypertension     controlled with med    Kidney stone     yrs ago-- no tx required    Lumbago     Macular degeneration     Nodule of left lung     dx'ed 4 years ago-watched for several

## 2025-05-29 ENCOUNTER — CARE COORDINATION (OUTPATIENT)
Dept: CARE COORDINATION | Facility: CLINIC | Age: 89
End: 2025-05-29

## 2025-05-29 NOTE — CARE COORDINATION
Care Transitions Note    Follow Up Call     Patient Current Location:  Home: [address] [place] [address]    Care Transition Nurse contacted the patient by telephone. Verified name and  as identifiers.    Additional needs identified to be addressed with provider   No needs identified                 Method of communication with provider: none.    Plan of care updates since last contact:  Patient reports she is doing better. Patient reports she completed follow up with her vascular as scheduled. Patient reports she is eating and drinking fluids. Patient endorses compliance with her medications. Patient is monitoring herself at home with RPM. Patient is engaged with home health. Patient reports good support from her family. Patient is aware of upcoming appointments and plans to attend. Patient encouraged to reach out if needs arise.      Care Transition Nurse reviewed medical action plan and red flags with patient. The patient was given an opportunity to ask questions; all questions answered at this time. The patient verbalized understanding.  Medication Review:  No changes since last call.     Remote Patient Monitoring:  Offered patient enrollment in the Remote Patient Monitoring (RPM) program for in-home monitoring: Yes, patient enrolled; current status is activated and monitoring.    Assessments:  Care Transitions Subsequent and Final Call    Schedule Follow Up Appointment with PCP: Completed  Subsequent and Final Calls  Have your medications changed?: No  Do you have any questions related to your medications?: No  Do you currently have any active services?: Yes  Are you currently active with any services?: Home Health  Do you have any needs or concerns that I can assist you with?: No  Identified Barriers: None  Care Transitions Interventions  Other Interventions:            Follow Up Appointment:   Reviewed upcoming appointment(s).  Future Appointments         Provider Specialty Dept Phone    2025

## 2025-05-30 RX ORDER — MAGNESIUM OXIDE 400 MG/1
1 TABLET ORAL DAILY
Qty: 30 TABLET | Refills: 5 | OUTPATIENT
Start: 2025-05-30

## 2025-06-03 NOTE — PROGRESS NOTES
Physician Progress Note      PATIENT:               ANAYA LEARY  CSN #:                  [id number]  :                       1935  ADMIT DATE:       2025 12:20 PM  DISCH DATE:        2025 11:38 AM  RESPONDING  PROVIDER #:        Sandeep Amaya MD          QUERY TEXT:    89-year-old female with established coronary disease and known severe small   vessel disease admitted with chest pain. Patient was noted to have troponin   rise from 18.8 to 25.9 with nonspecific ST abnormalities on EKG. Based on your   medical judgment, please clarify these findings and document if any of the   following are being evaluated and/or treated:    The clinical indicators include:  H&P : 89-year-old female with chronic chest pain.  She has had an increase   in chest pain the last few days with associated shortness of breath.  pmh Dementia chronic renal failure ,  four-vessel bypass surgery in .    Last cardiac catheterization was in .  Patient with 2/4 grafts patent with   severe small vessel disease best managed medically.   progress note: amlodipine, Imdur and Ranexa.  Will titrate Imdur to 60 mg   daily as she has tolerated this so far. She has known severe small vessel   disease best managed medically.   EKG: Sinus rhythm Abnormal R-wave progression, early transition    Nonspecific T abnrm, anterolateral leads  DS : improved with medication adjustments.  She wishes to avoid invasive   procedures such as cardiac catheterization    Thank You, Ora CDS  Options provided:  -- Elevated troponins due myocardial infarction  -- Elevated troponins due to accelerating angina.  -- Other - I will add my own diagnosis  -- Disagree - Not applicable / Not valid  -- Disagree - Clinically unable to determine / Unknown  -- Refer to Clinical Documentation Reviewer    PROVIDER RESPONSE TEXT:    Elevated troponins due to accelerating angina    Query created by: Luann Hurtado on 2025 11:56

## 2025-06-04 ENCOUNTER — TELEPHONE (OUTPATIENT)
Dept: PRIMARY CARE CLINIC | Facility: CLINIC | Age: 89
End: 2025-06-04

## 2025-06-04 ENCOUNTER — CARE COORDINATION (OUTPATIENT)
Dept: CARE COORDINATION | Facility: CLINIC | Age: 89
End: 2025-06-04

## 2025-06-04 NOTE — CARE COORDINATION
Care Transitions Note    Follow Up Call     Attempted to reach patient for transitions of care follow up.  Unable to reach patient.      Outreach Attempts:   HIPAA compliant voicemail left for patient.     Care Summary Note: Patient enrolled with RPM program for CHF and HTN. Today's metrics: BP-156/88, O2-95%,weight-151.8. Unable to reach patient for scheduled CORA outreach. Message left requesting a return call.    Follow Up Appointment:   Future Appointments         Provider Specialty Dept Phone    6/11/2025 11:00 AM Juan Nobles MD Primary Care [phone]    7/8/2025 10:45 AM Mitesh Ramirez DO Cardiology [phone]    8/19/2025 3:00 PM Mitesh Ramirez DO Cardiology [phone]    9/8/2025 9:00 AM [facility] Primary Care [phone]    9/15/2025 1:40 PM Juan Nobles MD Primary Care [phone]    3/25/2026 10:45 AM Yanick Jay MD Urology [phone]            Plan for follow-up call in 6-10 days based on severity of symptoms and risk factors. Plan for next call: symptom management-RPM    Jessi Michaels RN

## 2025-06-04 NOTE — TELEPHONE ENCOUNTER
Ariana bullock/ [employer] called for a order for tubi  compression socks size small / medium. Please fax order to [phone]

## 2025-06-09 RX ORDER — MEMANTINE HYDROCHLORIDE 10 MG/1
10 TABLET ORAL DAILY
Qty: 30 TABLET | Refills: 5 | OUTPATIENT
Start: 2025-06-09

## 2025-06-10 ENCOUNTER — CARE COORDINATION (OUTPATIENT)
Dept: CARE COORDINATION | Facility: CLINIC | Age: 89
End: 2025-06-10

## 2025-06-10 NOTE — CARE COORDINATION
Care Transitions Note    Follow Up Call     Attempted to reach patient for transitions of care follow up.  Unable to reach patient.      Outreach Attempts:   Unable to leave message. No answer    Care Summary Note: CTN scheduled outreach for CORA follow up call. Patient enrolled with RPM. Today's metrics: BP-162/73. O2-95%, weight-156.    Follow Up Appointment:   Future Appointments         Provider Specialty Dept Phone    6/11/2025 11:00 AM Juan Nobles MD Primary Care [phone]    7/8/2025 10:45 AM Mitesh Ramirez DO Cardiology [phone]    8/19/2025 3:00 PM Mitesh Ramirez DO Cardiology [phone]    9/8/2025 9:00 AM [facility] Primary Care [phone]    9/15/2025 1:40 PM Juan Nobles MD Primary Care [phone]    3/25/2026 10:45 AM Yanick Jay MD Urology [phone]            Plan for follow-up call in 2-5 days based on severity of symptoms and risk factors. Plan for next call: symptom management-RPM    Jessi Michaels RN

## 2025-06-11 ENCOUNTER — OFFICE VISIT (OUTPATIENT)
Dept: PRIMARY CARE CLINIC | Facility: CLINIC | Age: 89
End: 2025-06-11
Payer: MEDICARE

## 2025-06-11 VITALS
OXYGEN SATURATION: 96 % | BODY MASS INDEX: 28.78 KG/M2 | WEIGHT: 156.38 LBS | TEMPERATURE: 97.7 F | HEIGHT: 62 IN | SYSTOLIC BLOOD PRESSURE: 131 MMHG | HEART RATE: 53 BPM | DIASTOLIC BLOOD PRESSURE: 62 MMHG

## 2025-06-11 DIAGNOSIS — F41.9 ANXIETY: ICD-10-CM

## 2025-06-11 DIAGNOSIS — I25.118 CORONARY ARTERY DISEASE OF NATIVE ARTERY OF NATIVE HEART WITH STABLE ANGINA PECTORIS: ICD-10-CM

## 2025-06-11 DIAGNOSIS — J20.9 ACUTE BRONCHITIS, UNSPECIFIED ORGANISM: Primary | ICD-10-CM

## 2025-06-11 PROCEDURE — G8417 CALC BMI ABV UP PARAM F/U: HCPCS | Performed by: FAMILY MEDICINE

## 2025-06-11 PROCEDURE — 1160F RVW MEDS BY RX/DR IN RCRD: CPT | Performed by: FAMILY MEDICINE

## 2025-06-11 PROCEDURE — 1090F PRES/ABSN URINE INCON ASSESS: CPT | Performed by: FAMILY MEDICINE

## 2025-06-11 PROCEDURE — 1159F MED LIST DOCD IN RCRD: CPT | Performed by: FAMILY MEDICINE

## 2025-06-11 PROCEDURE — G8427 DOCREV CUR MEDS BY ELIG CLIN: HCPCS | Performed by: FAMILY MEDICINE

## 2025-06-11 PROCEDURE — 99214 OFFICE O/P EST MOD 30 MIN: CPT | Performed by: FAMILY MEDICINE

## 2025-06-11 PROCEDURE — 1123F ACP DISCUSS/DSCN MKR DOCD: CPT | Performed by: FAMILY MEDICINE

## 2025-06-11 PROCEDURE — 1111F DSCHRG MED/CURRENT MED MERGE: CPT | Performed by: FAMILY MEDICINE

## 2025-06-11 PROCEDURE — 1036F TOBACCO NON-USER: CPT | Performed by: FAMILY MEDICINE

## 2025-06-11 RX ORDER — BROMPHENIRAMINE MALEATE, PSEUDOEPHEDRINE HYDROCHLORIDE, AND DEXTROMETHORPHAN HYDROBROMIDE 2; 30; 10 MG/5ML; MG/5ML; MG/5ML
SYRUP ORAL
COMMUNITY
Start: 2025-06-10

## 2025-06-11 RX ORDER — FLUCONAZOLE 100 MG/1
TABLET ORAL
COMMUNITY
Start: 2025-06-10

## 2025-06-11 NOTE — PROGRESS NOTES
[facility] PHYSICIAN SERVICES  [facility] PRIMARY CARE  [address] [place] [address] DR TRINI BRUNSON [address]  Dept: [phone]       Patient: Muriel Barajas  YOB: 1935  Patient Age: 89 y.o.  Patient Sex: female  Medical Record: [id number]  Visit Date: 06/11/2025     [facility] Note    Chief Complaint   Patient presents with    Follow-up     Patient presented to ER on 5/12/25 with retrosternal chest pressure and shortness of breath. Patient was given dose of IV lasix for shortness of breath and mild leg swelling. She was hesitant to undergo heart cath. Most recent cardiac catheterization 2022 with 2/4 grafts patent and severe small vessel disease best managed medically. Amlodipine was added, Imdur restarted and Ranexa increased.     Cough     Patient has a cough, seen by Urgent Care for bronchitis and they prescribed cough medication. Home Nurse stating it may interact with Potassium.        History of Present Illness  The patient presents for evaluation of chest pain, cough, and anxiety.    She was hospitalized approximately a month ago due to cardiac issues. She was presented with the option of either undergoing catheterization or trying a new medication regimen. She has been diagnosed with small vessel disease, which is causing her stents to close. She is considering discussing the possibility of catheterization with Dr. Barrett again, in hopes of identifying one or two arteries that could be stented. She reports no significant chest pain but experiences severe shortness of breath, limiting her physical activity. She was informed that her bronchitis is likely contributing to her symptoms, but she believes her heart condition is the primary cause. She recalls that her two main arteries were 90% blocked during her last operation. Upon discharge from the hospital, she was prescribed Ranexa to improve blood flow, to be taken twice daily. However, she discontinued the medication due to

## 2025-06-12 DIAGNOSIS — R60.0 PERIPHERAL EDEMA: Primary | ICD-10-CM

## 2025-06-13 ENCOUNTER — CLINICAL DOCUMENTATION (OUTPATIENT)
Dept: CARE COORDINATION | Facility: CLINIC | Age: 89
End: 2025-06-13

## 2025-06-13 ENCOUNTER — APPOINTMENT (OUTPATIENT)
Dept: GENERAL RADIOLOGY | Age: 89
End: 2025-06-13
Payer: MEDICARE

## 2025-06-13 ENCOUNTER — CARE COORDINATION (OUTPATIENT)
Dept: CARE COORDINATION | Facility: CLINIC | Age: 89
End: 2025-06-13

## 2025-06-13 ENCOUNTER — HOSPITAL ENCOUNTER (EMERGENCY)
Age: 89
Discharge: HOME | End: 2025-06-13
Attending: EMERGENCY MEDICINE
Payer: MEDICARE

## 2025-06-13 VITALS
HEIGHT: 63 IN | SYSTOLIC BLOOD PRESSURE: 136 MMHG | WEIGHT: 150 LBS | RESPIRATION RATE: 17 BRPM | OXYGEN SATURATION: 98 % | HEART RATE: 60 BPM | DIASTOLIC BLOOD PRESSURE: 56 MMHG | TEMPERATURE: 97.3 F | BODY MASS INDEX: 26.58 KG/M2

## 2025-06-13 DIAGNOSIS — J45.41 MODERATE PERSISTENT REACTIVE AIRWAY DISEASE WITH ACUTE EXACERBATION: ICD-10-CM

## 2025-06-13 DIAGNOSIS — R05.1 ACUTE COUGH: ICD-10-CM

## 2025-06-13 DIAGNOSIS — R06.02 SHORTNESS OF BREATH: ICD-10-CM

## 2025-06-13 DIAGNOSIS — I50.9 ACUTE ON CHRONIC CONGESTIVE HEART FAILURE, UNSPECIFIED HEART FAILURE TYPE (HCC): Primary | ICD-10-CM

## 2025-06-13 LAB
ALBUMIN SERPL-MCNC: 3.2 G/DL (ref 3.2–4.6)
ALBUMIN/GLOB SERPL: 1 (ref 1–1.9)
ALP SERPL-CCNC: 86 U/L (ref 35–104)
ALT SERPL-CCNC: 7 U/L (ref 8–45)
ANION GAP SERPL CALC-SCNC: 12 MMOL/L (ref 7–16)
AST SERPL-CCNC: 25 U/L (ref 15–37)
BILIRUB SERPL-MCNC: 0.3 MG/DL (ref 0–1.2)
BUN SERPL-MCNC: 17 MG/DL (ref 8–23)
CALCIUM SERPL-MCNC: 9.1 MG/DL (ref 8.8–10.2)
CHLORIDE SERPL-SCNC: 100 MMOL/L (ref 98–107)
CO2 SERPL-SCNC: 27 MMOL/L (ref 20–29)
CREAT SERPL-MCNC: 1.18 MG/DL (ref 0.6–1.1)
ERYTHROCYTE [DISTWIDTH] IN BLOOD BY AUTOMATED COUNT: 13.7 % (ref 11.9–14.6)
GLOBULIN SER CALC-MCNC: 3.1 G/DL (ref 2.3–3.5)
GLUCOSE SERPL-MCNC: 90 MG/DL (ref 70–99)
HCT VFR BLD AUTO: 34.7 % (ref 35.8–46.3)
HGB BLD-MCNC: 11.3 G/DL (ref 11.7–15.4)
LACTATE SERPL-SCNC: 0.9 MMOL/L (ref 0.5–2)
MCH RBC QN AUTO: 31.9 PG (ref 26.1–32.9)
MCHC RBC AUTO-ENTMCNC: 32.6 G/DL (ref 31.4–35)
MCV RBC AUTO: 98 FL (ref 82–102)
NRBC # BLD: 0 K/UL (ref 0–0.2)
NT PRO BNP: 1545 PG/ML (ref 0–450)
PLATELET # BLD AUTO: 230 K/UL (ref 150–450)
PMV BLD AUTO: 9.3 FL (ref 9.4–12.3)
POTASSIUM SERPL-SCNC: 3.9 MMOL/L (ref 3.5–5.1)
PROCALCITONIN SERPL-MCNC: 0.05 NG/ML (ref 0–0.1)
PROT SERPL-MCNC: 6.3 G/DL (ref 6.3–8.2)
RBC # BLD AUTO: 3.54 M/UL (ref 4.05–5.2)
SODIUM SERPL-SCNC: 138 MMOL/L (ref 136–145)
WBC # BLD AUTO: 6.3 K/UL (ref 4.3–11.1)

## 2025-06-13 PROCEDURE — 71046 X-RAY EXAM CHEST 2 VIEWS: CPT

## 2025-06-13 PROCEDURE — 84145 PROCALCITONIN (PCT): CPT

## 2025-06-13 PROCEDURE — 85027 COMPLETE CBC AUTOMATED: CPT

## 2025-06-13 PROCEDURE — 83880 ASSAY OF NATRIURETIC PEPTIDE: CPT

## 2025-06-13 PROCEDURE — 6360000002 HC RX W HCPCS: Performed by: EMERGENCY MEDICINE

## 2025-06-13 PROCEDURE — 94664 DEMO&/EVAL PT USE INHALER: CPT

## 2025-06-13 PROCEDURE — 2500000003 HC RX 250 WO HCPCS: Performed by: EMERGENCY MEDICINE

## 2025-06-13 PROCEDURE — 99284 EMERGENCY DEPT VISIT MOD MDM: CPT

## 2025-06-13 PROCEDURE — 94640 AIRWAY INHALATION TREATMENT: CPT

## 2025-06-13 PROCEDURE — 96374 THER/PROPH/DIAG INJ IV PUSH: CPT

## 2025-06-13 PROCEDURE — 80053 COMPREHEN METABOLIC PANEL: CPT

## 2025-06-13 PROCEDURE — 96375 TX/PRO/DX INJ NEW DRUG ADDON: CPT

## 2025-06-13 PROCEDURE — 6370000000 HC RX 637 (ALT 250 FOR IP): Performed by: EMERGENCY MEDICINE

## 2025-06-13 PROCEDURE — 83605 ASSAY OF LACTIC ACID: CPT

## 2025-06-13 PROCEDURE — 94644 CONT INHLJ TX 1ST HOUR: CPT

## 2025-06-13 RX ORDER — IPRATROPIUM BROMIDE AND ALBUTEROL SULFATE 2.5; .5 MG/3ML; MG/3ML
1 SOLUTION RESPIRATORY (INHALATION)
Status: COMPLETED | OUTPATIENT
Start: 2025-06-13 | End: 2025-06-13

## 2025-06-13 RX ORDER — METHYLPREDNISOLONE 4 MG/1
TABLET ORAL
Qty: 1 KIT | Refills: 0 | Status: SHIPPED | OUTPATIENT
Start: 2025-06-13

## 2025-06-13 RX ORDER — ALBUTEROL SULFATE 90 UG/1
2 INHALANT RESPIRATORY (INHALATION) 4 TIMES DAILY PRN
Qty: 18 G | Refills: 0 | Status: SHIPPED | OUTPATIENT
Start: 2025-06-13

## 2025-06-13 RX ORDER — FUROSEMIDE 10 MG/ML
40 INJECTION INTRAMUSCULAR; INTRAVENOUS ONCE
Status: COMPLETED | OUTPATIENT
Start: 2025-06-13 | End: 2025-06-13

## 2025-06-13 RX ORDER — ALBUTEROL SULFATE 0.83 MG/ML
5 SOLUTION RESPIRATORY (INHALATION)
Status: COMPLETED | OUTPATIENT
Start: 2025-06-13 | End: 2025-06-13

## 2025-06-13 RX ADMIN — FUROSEMIDE 40 MG: 10 INJECTION, SOLUTION INTRAMUSCULAR; INTRAVENOUS at 12:43

## 2025-06-13 RX ADMIN — IPRATROPIUM BROMIDE AND ALBUTEROL SULFATE 1 DOSE: 2.5; .5 SOLUTION RESPIRATORY (INHALATION) at 11:04

## 2025-06-13 RX ADMIN — ALBUTEROL SULFATE 5 MG: 2.5 SOLUTION RESPIRATORY (INHALATION) at 12:55

## 2025-06-13 RX ADMIN — WATER 125 MG: 1 INJECTION INTRAMUSCULAR; INTRAVENOUS; SUBCUTANEOUS at 11:51

## 2025-06-13 ASSESSMENT — PAIN - FUNCTIONAL ASSESSMENT: PAIN_FUNCTIONAL_ASSESSMENT: 0-10

## 2025-06-13 ASSESSMENT — PAIN SCALES - GENERAL: PAINLEVEL_OUTOF10: 0

## 2025-06-13 NOTE — DISCHARGE INSTRUCTIONS
For the next 2 to 3 days please take 1 extra 40 mg Lasix per day for a total of 3/day.  Until you are at your dry weight.  Please use the albuterol every 4 hours and take the steroid course.  Return here if you are worsening with this plan otherwise follow-up closely with your providers.

## 2025-06-13 NOTE — CARE COORDINATION
Care Transitions Note    Final Call     Attempted to reach patient for transitions of care follow up.  Unable to reach patient.      Outreach Attempts:   Unable to leave message.     Patient graduated from the Care Transitions program on 6/13/2025.  Patient/family progressing towards self-management. .      Handoff:   Patient enrolled in [facility] for HTN,Kidney Disease   CHF and will be monitoring blood pressure , glucose, pulse ox , weight, and survey questions daily.   Patient has graduated from Transitions of Care program and will be transitioned to [facility], Karen Gill - [phone] .   RPM team has been notified of transition in patients care.   Patient provided ACM name and contact information for future needs.         Care Summary Note:   Metrics this am:  BP:BP-165/73, O2-97%, weight-155.2  Patient sent to ED this am by [facility] via EMS for further evaluation for SOB,cough, and chest congestion. Patient seen by PCP 6/11/2025 for diagnosis of acute bronchitis with recommendations per PCP note:  - Previously treated with ceftriaxone and dexamethasone injections at urgent care.  - Advised to continue prescribed antibiotic, likely Levaquin, once daily until completion.  - Advised to take prescribed cough medicine containing brompheniramine, pseudoephedrine, and dextromethorphan, which should not affect potassium levels.    CTN advised ACM of patient's current situation and she will follow up with patient upon d/c from ED.  Upcoming Appointments:    Future Appointments         Provider Specialty Dept Phone    7/8/2025 10:45 AM Mitesh Ramirez DO Cardiology [phone]    8/19/2025 3:00 PM Mitesh Ramirez DO Cardiology [phone]    9/8/2025 9:00 AM [facility] Primary Care [phone]    9/15/2025 1:40 PM Juan Nobles MD Primary Care [phone]    3/25/2026 10:45 AM Yanick Jay MD Urology [phone]            Jessi Michaels RN

## 2025-06-13 NOTE — ED PROVIDER NOTES
Emergency Department Provider Note     Patient Name: Muriel Barajas,[id number]  Patient : 1935      ED Room: ER04/04    DISPOSITION Decision To Discharge 2025 03:08:55 PM    ICD-10-CM    1. Acute on chronic congestive heart failure, unspecified heart failure type (HCC)  I50.9       2. Moderate persistent reactive airway disease with acute exacerbation  J45.41       3. Shortness of breath  R06.02       4. Acute cough  R05.1              HISTORY OF PRESENT ILLNESS  89-year-old female with a history of heart failure presented via self-transport with one week of cough and dyspnea, initially diagnosed as bronchitis at urgent care where she received two injections and antibiotics with only transient improvement before worsening symptoms. She now reports sob, severe cough, and difficulty expectorating sputum, but denies fever, history of chronic obstructive pulmonary disease, asthma, or tobacco use. She has not used inhalers prior to arrival. Denies medication allergies. She denies recent travel, immobilization, recent trauma, recent surgery, or exogenous hormone use.    HEALTHCARE PROVIDERS  - Urgent care - name not provided (diagnosed bronchitis, administered 2 shots and antibiotics)    PAST MEDICAL HISTORY  - Patient has a history of heart failure.    SOCIAL HISTORY  - Denies tobacco use; denies prior tobacco use    PHYSICAL EXAM  Vitals and nursing note reviewed.  General: No acute distress.  Appearance: Patient is not ill-appearing, toxic-appearing or diaphoretic.  Neck: Supple No tenderness  Head: Atraumatic.  Eyes: General: No scleral icterus.  Cardiovascular: Rate and Rhythm: Normal rate and regular rhythm.  Respiratory: Expiratory wheezing noted on auscultation, no respiratory distress, normal respiratory rate, good air exchange, able to speak in full fluent sentences.  Abdominal: Abdomen is soft. There is no abdominal tenderness. There is no guarding or rebound.  Musculoskeletal:  Normal range  ablation for mass in left side of abd    REVISION TOTAL HIP ARTHROPLASTY Left     SKIN CANCER EXCISION      TOTAL HIP ARTHROPLASTY Left 08/02/2022    LEFT HIP TOTAL ARTHROPLASTY performed by Devendra Wright MD at [facility] MAIN OR    UPPER GASTROINTESTINAL ENDOSCOPY      Esophagus stretch        Social History     Socioeconomic History    Marital status:    Tobacco Use    Smoking status: Never    Smokeless tobacco: Never   Substance and Sexual Activity    Alcohol use: No    Drug use: No   Social History Narrative    Worked in the [employer] in the street for 12 years, worked in a [employer] for 20 years and 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    , 2 sons.    Denies alcohol use.    Has always lived in [place].    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    Lifelong nonsmoker with 20 year secondhand smoke exposure from her  cigarettes.     Social Drivers of Health     Financial Resource Strain: Medium Risk (5/28/2024)    Overall Financial Resource Strain (CARDIA)     Difficulty of Paying Living Expenses: Somewhat hard   Food Insecurity: No Food Insecurity (5/13/2025)    Hunger Vital Sign     Worried About Running Out of Food in the Last Year: Never true     Ran Out of Food in the Last Year: Never true   Transportation Needs: Unmet Transportation Needs (5/13/2025)    PRAPARE - Transportation     Lack of Transportation (Medical): No     Lack of Transportation (Non-Medical): Yes   Physical Activity: Insufficiently Active (12/2/2024)    Exercise Vital Sign     Days of Exercise per Week: 2 days     Minutes of Exercise per Session: 60 min   Social Connections: Unknown (3/20/2021)    Received from [facility]    Social Connections     Frequency of Communication with Friends and Family: Not asked     Frequency of Social Gatherings with Friends and Family: Not asked   Intimate Partner Violence: Unknown (3/20/2021)    Received from [facility]

## 2025-06-13 NOTE — CARE COORDINATION
Opened encounter in error.  Patient is currently enrolled in RPM.  Will notify CTN of current ED admission.

## 2025-06-13 NOTE — ED TRIAGE NOTES
Pt arriving from [facility] urgent care via [facility]. Pt diagnosed with pneumonia a few days ago, finished abx and steroids. Pt c/o SOB, cough, and chest congestion     EMS states VSS en route

## 2025-06-13 NOTE — CARE COORDINATION
Ambulatory Care Coordination Note     6/13/2025 1:30 PM     ACM received RPM patient hand off from CTJAYCOB Michaels .  ACM will outreach to patient in 1-2 business days to enroll in a High Risk Care Management Program.    Pt currently in ED, will monitor for outcome.

## 2025-06-16 ENCOUNTER — CARE COORDINATION (OUTPATIENT)
Dept: CARE COORDINATION | Facility: CLINIC | Age: 89
End: 2025-06-16

## 2025-06-16 NOTE — CARE COORDINATION
Ambulatory Care Coordination Note     2025 1:37 PM     Patient Current Location:  Home: [address] [address]     This patient was received as a referral from Care Transition Nurse.    ACM contacted the patient by telephone. Verified name and  with patient as identifiers. Provided introduction to self, and explanation of the ACM role.   Patient accepted care management services at this time.          ACM: Mya Gill RN     Challenges to be reviewed by the provider   Additional needs identified to be addressed with provider - ER f/u appt                  Method of communication with provider: phone.    Utilization: Initial Call - Discharge Date: 25   Discharge Facility: [facility]  Reason for ED Visit:    Visit Diagnosis:      Number of ED visits in the last 6 months:        Do you have any ongoing symptoms? Yes, my symptoms have improved.   Current symptoms:  .    Did you call your PCP prior to going to the ED? Yes, advised to go to the ED.    Review of Discharge Instructions:   [x] AVS discharge instructions  [x] Right Care, Right Place, Right Time document  [x] Medication changes  [x] Follow up appointments - declines appt w/ PCP as had seen PCP day prior to ED visit.        Care Summary Note:     Discussed HF educ. Pt denies fever, taking meds prescribed. Pt w/ productive cough, green but clearing. Pt drinking fluids, mostly water. Per ED instructions: \"For the next 2 to 3 days please take 1 extra 40 mg Lasix per day for a total of 3/day. Until you are at your dry weight\". Pt states she will continue taking extra dose for next two days to get to Baseline wt 153lb.         Offered patient enrollment in the Remote Patient Monitoring (RPM) program for in-home monitoring: Yes, patient enrolled; current status is activated and monitoring.     Assessments Completed:   Congestive Heart Failure Assessment    Do you understand a low sodium diet?: Yes  Do you understand how to read

## 2025-06-18 DIAGNOSIS — G30.9 ALZHEIMER'S DISEASE, UNSPECIFIED (CODE) (HCC): Primary | ICD-10-CM

## 2025-06-19 ENCOUNTER — TELEPHONE (OUTPATIENT)
Facility: CLINIC | Age: 89
End: 2025-06-19

## 2025-06-19 ENCOUNTER — CARE COORDINATION (OUTPATIENT)
Dept: CARE COORDINATION | Facility: CLINIC | Age: 89
End: 2025-06-19

## 2025-06-19 RX ORDER — MEMANTINE HYDROCHLORIDE 10 MG/1
10 TABLET ORAL DAILY
Qty: 30 TABLET | Refills: 5 | Status: SHIPPED | OUTPATIENT
Start: 2025-06-19

## 2025-06-19 NOTE — TELEPHONE ENCOUNTER
06/19/25 11:42 AM    REMOTE PATIENT MONITORING ALERT RESPONSE         ASSESSMENT AND PLAN   CHF  Cough and SOB improving  Reports intermittent sweats. No fever  No intervention at this time  Continue to monitor with RPM    CHIEF COMPLAINT    Chief Complaint   Patient presents with    [facility] alert     SOB and dizziness with activity and cough.     HPI    Muriel Barajas is a 89 y.o. female who is enrolled in [facility] for CKD, HTN and CHF. Alert received today for reports of symptoms of SOB and dizziness with activity and productive cough.   Patient reports she has had some constipation and last night she had some urinary urgency with some incontinence. She is still taking antibiotics and prednisone.  Patient is in no acute distress and able to converse without SOB. Overall her cough and SOB symptoms are improving a lot, but she is experiencing intermittent sweats through out the day and night. Home health nurse just left and she does not have a fever.   Labs, VS, notes and RPM LPN triage reviewed.     REVIEW OF SYSTEMS    See HPI     CURRENT MEDICATIONS    Current Outpatient Rx   Medication Sig Dispense Refill    memantine (NAMENDA) 10 MG tablet Take 1 tablet by mouth daily 30 tablet 5    albuterol sulfate HFA (VENTOLIN HFA) 108 (90 Base) MCG/ACT inhaler Inhale 2 puffs into the lungs 4 times daily as needed for Wheezing or Shortness of Breath 18 g 0    methylPREDNISolone (MEDROL DOSEPACK) 4 MG tablet Take by mouth. 1 kit 0    Misc. Devices MISC Compression stockings size sm/med 1 each 0    fluconazole (DIFLUCAN) 100 MG tablet       brompheniramine-pseudoephedrine-DM 2-30-10 MG/5ML syrup       isosorbide mononitrate (IMDUR) 60 MG extended release tablet Take 1 tablet by mouth daily 30 tablet 3    ranolazine (RANEXA) 1000 MG extended release tablet Take 1 tablet by mouth 2 times daily 60 tablet 3    amLODIPine (NORVASC) 5 MG tablet Take 1 tablet by mouth daily 30 tablet 3    furosemide (LASIX) 40 MG tablet Take 1 tablet by

## 2025-06-19 NOTE — CARE COORDINATION
6/19/2025 11:23 AM  *  No Provider Response 11:23 AM6/19/2025  Patient is currently enrolled in RPM RPM Care Plans: Kidney Disease, CHF, and HTN. Will route to RPM Beulah Fine NP per RPM protocol for review of alert escalation.

## 2025-06-19 NOTE — CARE COORDINATION
2025 9:41 AM  *  Alert and Triage   -Remote Alert Monitoring Note      Date/Time:  2025 9:41 AM  Patient Current Location: [place]  Verified patients name and  as identifiers.    Rpm alert to be reviewed by the provider   yellow alert  blood pressure reading (180/82)  Vitals Recheck blood pressure reading (148/78)  Additional needs to be addressed by provider: Pt enrolled in RPM r/t Kidney Disease, HTN and CHF. Pt c/o SOB and dizziness with activity, productive cough with yellow tinged sputum, and worsening \"spells of sweating\". SpO2: 96%. Speaking in full clear sentences, denies CP, headache, vision changes, numbness, tingling, new or worsening edema, chills and known fever at this time. Does not have a thermometer to check temperature. Seen in ED on 25; prescribed PRN albuterol and Medrol Dosepack. States currently taking ABT r/t bronchitis; pt unsure name of antibiotic. Completed Medrol Dosepack today. Took Lasix 40 mg TID x 3 days per ED instructions. Attempted to review other medications with pt who states, \"I take what I'm supposed to\". BP recheck: 148/78. Cardiology f/u: 25 and 25. PCP office visit: 09/15/25. Please advise.            LPN contacted patient by telephone regarding yellow alert received   Background: Pt enrolled in RPM r/t Kidney Disease, HTN and CHF  Refer to 911 immediately if:  Patient unresponsive or unable to provide history  Change in cognition or sudden confusion  Patient unable to respond in complete sentences  Intense chest pain/tightness  Any concern for any clinical emergency  Red Alert: Provider response time of 1 hr required for any red alert requiring intervention  Yellow Alert: Provider response time of 3hr required for any escalated yellow alert  Patient Chief Complaint:  Blood Pressure BP Triage  Are you having any Chest Pain? no   Are you having any Shortness of Breath? Yes, with activity    Do you have a headache or have any vision

## 2025-06-19 NOTE — CARE COORDINATION
6/19/2025 8:49 AM  *  Unable to Reach Date/Time:  6/19/2025 8:49 AM  LPN attempted to reach patient by telephone regarding yellow alert in remote patient monitoring program. Left HIPAA compliant message requesting a return call. Will attempt to reach patient again.

## 2025-06-23 ENCOUNTER — CARE COORDINATION (OUTPATIENT)
Dept: CARE COORDINATION | Facility: CLINIC | Age: 89
End: 2025-06-23

## 2025-06-23 NOTE — CARE COORDINATION
Ambulatory Care Coordination Note     6/23/2025 12:36 PM     Patient outreach attempt by this ACM today to perform care management follow up . ACM was unable to reach the patient by telephone today;   left voice message requesting a return phone call to this ACM.     ACM: Mya Gill RN     PCP/Specialist follow up:   Future Appointments         Provider Specialty Dept Phone    7/8/2025 10:45 AM Mitesh Ramirez DO Cardiology [phone]    8/19/2025 3:00 PM Mitesh Ramirez DO Cardiology [phone]    9/8/2025 9:00 AM [facility] Primary Care [phone]    9/15/2025 1:40 PM Juan Nobles MD Primary Care [phone]    3/25/2026 10:45 AM Yanick Jay MD Urology [phone]            Follow Up:   Plan for next ACM outreach in approximately 1 week to complete:  - goal progression  - education .

## 2025-06-23 NOTE — CARE COORDINATION
Ambulatory Care Coordination Note     2025 2:42 PM     Patient Current Location:  Home: [address] [place] [address]     Patient contacted the [facility] by telephone. Verified name and  with patient as identifiers.         ACM: Mya Gill RN     Challenges to be reviewed by the provider   Additional needs identified to be addressed with provider Yes  Burning and itching w/ urination               Method of communication with provider: phone.    Utilization: Patient has not had any utilization since our last call.     Care Summary Note:     Pt reports having more energy, feeling much better other than sxs of UTI. [facility] called for nurse visit for pt to provide urine specimen for tomorrow 10a, pt is agreeable.     Offered patient enrollment in the Remote Patient Monitoring (RPM) program for in-home monitoring: Yes, patient enrolled; current status is activated and monitoring.     Assessments Completed:   General Assessment    Do you have any symptoms that are causing concern?: Yes  Progression since Onset: Intermittent - Waxing/Waning  Reported Symptoms: Other (Comment: burning w/ urination)          Medications Reviewed:   Completed during a previous call     Advance Care Planning:   Not reviewed during this call     Care Planning:   Education Documentation  heart failure self-management, taught by Mya Gill, RN at 2025  2:42 PM.  Learner: Patient  Readiness: Eager  Method: Explanation  Response: Verbalizes Understanding    Education Comments  No comments found.     ,    Goals Addressed                   This Visit's Progress     Conditions and Symptoms   On track     I will schedule office visits, as directed by my provider.  I will keep my appointment or reschedule if I have to cancel.  I will notify my provider of any barriers to my plan of care.  I will notify my provider of any symptoms that indicate a worsening of my condition.    Barriers: none  Plan for overcoming my barriers:

## 2025-06-24 ENCOUNTER — CLINICAL SUPPORT (OUTPATIENT)
Dept: PRIMARY CARE CLINIC | Facility: CLINIC | Age: 89
End: 2025-06-24
Payer: MEDICARE

## 2025-06-24 DIAGNOSIS — R30.0 DYSURIA: Primary | ICD-10-CM

## 2025-06-24 DIAGNOSIS — R30.0 DYSURIA: ICD-10-CM

## 2025-06-24 LAB
BILIRUBIN, URINE, POC: NEGATIVE
BLOOD URINE, POC: NEGATIVE
GLUCOSE URINE, POC: NEGATIVE
KETONES, URINE, POC: NEGATIVE
LEUKOCYTE ESTERASE, URINE, POC: ABNORMAL
NITRITE, URINE, POC: POSITIVE
PH, URINE, POC: 6.5 (ref 4.6–8)
PROTEIN,URINE, POC: NEGATIVE
SPECIFIC GRAVITY, URINE, POC: 1.01 (ref 1–1.03)
URINALYSIS CLARITY, POC: CLEAR
URINALYSIS COLOR, POC: YELLOW
UROBILINOGEN, POC: ABNORMAL

## 2025-06-24 PROCEDURE — 1159F MED LIST DOCD IN RCRD: CPT | Performed by: FAMILY MEDICINE

## 2025-06-24 PROCEDURE — 99213 OFFICE O/P EST LOW 20 MIN: CPT | Performed by: FAMILY MEDICINE

## 2025-06-24 PROCEDURE — 1160F RVW MEDS BY RX/DR IN RCRD: CPT | Performed by: FAMILY MEDICINE

## 2025-06-24 PROCEDURE — 1123F ACP DISCUSS/DSCN MKR DOCD: CPT | Performed by: FAMILY MEDICINE

## 2025-06-24 PROCEDURE — 81003 URINALYSIS AUTO W/O SCOPE: CPT | Performed by: FAMILY MEDICINE

## 2025-06-24 RX ORDER — CIPROFLOXACIN 250 MG/1
250 TABLET, FILM COATED ORAL 2 TIMES DAILY
Qty: 10 TABLET | Refills: 0 | Status: SHIPPED | OUTPATIENT
Start: 2025-06-24 | End: 2025-06-29

## 2025-06-24 NOTE — PROGRESS NOTES
2025    TELEHEALTH EVALUATION -- Audio/Visual    History of Present Illness  The patient presents with signs and symptoms of a urinary tract infection (UTI) for several days.    She reports experiencing dysuria, increased urinary frequency, and urgency. She does not have fever, chills, nausea, vomiting, rash, or recent falls.      Muriel Barajas (:  1935) has requested an audio/video evaluation for the following concern(s):    Chief Complaint   Patient presents with    Dysuria     Pt presents today for uti check. States she has had pain and itching. She noticed this morning discoloration.        Review of Systems    Prior to Visit Medications    Medication Sig Taking? Authorizing Provider   ciprofloxacin (CIPRO) 250 MG tablet Take 1 tablet by mouth 2 times daily for 5 days Yes Juan Nobles MD   memantine (NAMENDA) 10 MG tablet Take 1 tablet by mouth daily  Juan Nobles MD   albuterol sulfate HFA (VENTOLIN HFA) 108 (90 Base) MCG/ACT inhaler Inhale 2 puffs into the lungs 4 times daily as needed for Wheezing or Shortness of Breath  Rey Adair MD   methylPREDNISolone (MEDROL DOSEPACK) 4 MG tablet Take by mouth.  Rey Adair MD   Misc. Devices MISC Compression stockings size sm/med  Juan Nobles MD   fluconazole (DIFLUCAN) 100 MG tablet   Provider, MD Kendell   brompheniramine-pseudoephedrine-DM 2-30-10 MG/5ML syrup   Provider, MD Kendell   isosorbide mononitrate (IMDUR) 60 MG extended release tablet Take 1 tablet by mouth daily  Shannon Jacome APRN - NP   ranolazine (RANEXA) 1000 MG extended release tablet Take 1 tablet by mouth 2 times daily  Shannon Jacome APRN - NP   amLODIPine (NORVASC) 5 MG tablet Take 1 tablet by mouth daily  Shannon Jacome APRN - NP   furosemide (LASIX) 40 MG tablet Take 1 tablet by mouth 2 times daily  Mitesh Ramirez DO   cephALEXin (KEFLEX) 250 MG capsule Take 1 capsule by mouth daily  Patient not taking: Reported

## 2025-06-25 RX ORDER — POTASSIUM CHLORIDE 750 MG/1
CAPSULE, EXTENDED RELEASE ORAL DAILY
Qty: 90 CAPSULE | Refills: 1 | Status: SHIPPED | OUTPATIENT
Start: 2025-06-25

## 2025-06-26 LAB
BACTERIA SPEC CULT: NORMAL
SERVICE CMNT-IMP: NORMAL

## 2025-06-30 ENCOUNTER — CARE COORDINATION (OUTPATIENT)
Dept: CARE COORDINATION | Facility: CLINIC | Age: 89
End: 2025-06-30

## 2025-06-30 NOTE — CARE COORDINATION
Ambulatory Care Coordination Note     6/30/2025 2:31 PM     Patient outreach attempt by this ACM today to perform care management follow up . ACM was unable to reach the patient by telephone today;   left voice message requesting a return phone call to this ACM.     ACM: Mya Gill RN  PCP/Specialist follow up:   Future Appointments         Provider Specialty Dept Phone    7/8/2025 10:45 AM Mitesh Ramirez DO Cardiology [phone]    8/19/2025 3:00 PM Mitesh Ramirez DO Cardiology [phone]    9/8/2025 9:00 AM [facility] Primary Care [phone]    9/15/2025 1:40 PM Juan Nobles MD Primary Care [phone]    3/25/2026 10:45 AM Yanick Jay MD Urology [phone]            Follow Up:   Plan for next ACM outreach in approximately 1 week to complete:  - goal progression  - education .

## 2025-07-01 ENCOUNTER — CARE COORDINATION (OUTPATIENT)
Dept: CARE COORDINATION | Facility: CLINIC | Age: 89
End: 2025-07-01

## 2025-07-01 NOTE — CARE COORDINATION
7/1/2025 12:18 PM  *  RPM Alert   Remote Patient Monitoring Note      Date/Time:  7/1/2025 12:18 PM  BP recheck of 162/77 noted in HRS and is within RPM parameters. No further pt outreach by this nurse indicated at this time.   Background: Pt enrolled in RPM r/t HTN, Kidney Disease and CHF  Plan/Follow Up: Will continue to review, monitor and address alerts with follow up based on severity of symptoms and risk factors.

## 2025-07-01 NOTE — CARE COORDINATION
7/1/2025 10:15 AM  *  Unable to Reach Date/Time:  7/1/2025 10:15 AM  LPN attempted to reach patient by telephone regarding yellow alert in remote patient monitoring program. Left HIPAA compliant message requesting a return call. Will attempt to reach patient again.

## 2025-07-01 NOTE — CARE COORDINATION
2025 11:08 AM  *  Alert and Triage   -Remote Alert Monitoring Note      Date/Time:  2025 11:08 AM  Patient Current Location: [place]  Verified patients name and  as identifiers.    Rpm alert to be reviewed by the provider   yellow alert  blood pressure reading (176/90)  Vitals Recheck blood pressure reading (TBD)  Additional needs to be addressed by provider: No           LPN contacted patient by telephone regarding yellow alert received   Background: Pt enrolled in RPM r/t HTN, Kidney Disease and CHF  Refer to 911 immediately if:  Patient unresponsive or unable to provide history  Change in cognition or sudden confusion  Patient unable to respond in complete sentences  Intense chest pain/tightness  Any concern for any clinical emergency  Red Alert: Provider response time of 1 hr required for any red alert requiring intervention  Yellow Alert: Provider response time of 3hr required for any escalated yellow alert  Patient Chief Complaint:  Blood Pressure BP Triage  Are you having any Chest Pain? no   Are you having any Shortness of Breath? no   Do you have a headache or have any vision changes? no   Are you having any numbness or tingling? no   Are you having any other health concerns or issues? Nothing new  Patient/Caregiver educated on how to properly take a blood pressure. Patient/Caregiver verbalizes understanding.     Clinical Interventions: Reviewed and followed up on alerts and treatments-Pt speaking in full clear sentences. Denies CP, SOB, headache, vision changes, numbness, and tingling at this time. Attempted to review medications with pt who states, \"I take what I'm supposed to\". Pt has orders for: Norvasc 5 mg qd, Coreg 25 mg BID, Lasix 40 mg BID and Aldactone 25 mg qd. Agreeable to recheck BP \"in a little while\". Currently scheduled for cardiology f/u on 25. Pt educated on RPM hours, when to notify provider(s) of changes or concerns and when to seek emergent medical care; pt v/u.

## 2025-07-03 ENCOUNTER — CARE COORDINATION (OUTPATIENT)
Dept: CARE COORDINATION | Facility: CLINIC | Age: 89
End: 2025-07-03

## 2025-07-03 NOTE — CARE COORDINATION
7/3/2025 9:59 AM  *  Unable to Reach Date/Time:  7/3/2025 9:59 AM  LPN 2nd attempt to reach patient by telephone regarding yellow alert in remote patient monitoring program for BP reading (172/77). Left another HIPAA compliant message requesting a return call. LPN attempted to contact patients emergency contact, Toño Barajas. Left HIPAA compliant message requesting a return call. Will escalate to ACM. Updated pulse ox / pulse ox HR and weight are within RPM parameters.

## 2025-07-03 NOTE — CARE COORDINATION
Ambulatory Care Coordination Note     7/3/2025 2:16 PM     Patient Current Location:  Home: [address] [address]     ACM contacted the patient by telephone. Verified name and  with patient as identifiers.         ACM: Mya Gill RN     Challenges to be reviewed by the provider   Additional needs identified to be addressed with provider No                  Method of communication with provider: none.    Utilization: Patient has not had any utilization since our last call.     Care Summary Note:     Discussed RPM metrics, HTN educ. Pt reports she was busy this morning, preparing to go get her hair fixed, doing housework. BP has come down today. Pt reports felt dizzy yesterday, but has felt better today, thinks she was anxious this morning running errands and doing house work. Pt denies swelling or SOB, wt stable, taking meds as prescribed. Encouraged to avoid heat if possible.           Offered patient enrollment in the Remote Patient Monitoring (RPM) program for in-home monitoring: Yes, patient enrolled; current status is activated and monitoring.     Assessments Completed:   Hypertension - Encounter Level    Symptom course: stable      ,   General Assessment    Do you have any symptoms that are causing concern?: No          Medications Reviewed:   Completed during a previous call     Advance Care Planning:   Not reviewed during this call     Care Planning:   Education Documentation  Diuretics, taught by Mya Gill, RN at 7/3/2025  2:16 PM.  Learner: Patient  Readiness: Eager  Method: Explanation  Response: Verbalizes Understanding    WHEN TO CALL THE DOCTOR CHF, taught by Mya Gill, RN at 7/3/2025  2:16 PM.  Learner: Patient  Readiness: Eager  Method: Explanation  Response: Verbalizes Understanding    Monitoring Weight, taught by Mya Gill RN at 7/3/2025  2:16 PM.  Learner: Patient  Readiness: Eager  Method: Explanation  Response: Verbalizes Understanding    Education Comments  No

## 2025-07-03 NOTE — CARE COORDINATION
Ambulatory Care Coordination Note     7/3/2025 1:54 PM     Patient outreach attempt by this ACM today to perform care management follow up . ACM was unable to reach the patient by telephone today;   left voice message requesting a return phone call to this ACM.     ACM: Mya Gill RN     PCP/Specialist follow up:   Future Appointments         Provider Specialty Dept Phone    7/8/2025 10:45 AM Mitesh Ramirez DO Cardiology [phone]    8/19/2025 3:00 PM Mitesh Ramirez DO Cardiology [phone]    9/8/2025 9:00 AM [facility] Primary Care [phone]    9/15/2025 1:40 PM Juan Nobles MD Primary Care [phone]    3/25/2026 10:45 AM Yanick Jay MD Urology [phone]            Follow Up:   Plan for next ACM outreach in approximately 1 week to complete:  - goal progression  - education .

## 2025-07-03 NOTE — CARE COORDINATION
7/3/2025 9:07 AM  *  Unable to Reach Date/Time:  7/3/2025 9:07 AM  LPN attempted to reach patient by telephone regarding yellow alert in remote patient monitoring program. Left HIPAA compliant message requesting a return call. Will attempt to reach patient again.

## 2025-07-06 ENCOUNTER — HOSPITAL ENCOUNTER (EMERGENCY)
Age: 89
Discharge: HOME | End: 2025-07-07
Attending: EMERGENCY MEDICINE
Payer: MEDICARE

## 2025-07-06 ENCOUNTER — APPOINTMENT (OUTPATIENT)
Dept: GENERAL RADIOLOGY | Age: 89
End: 2025-07-06
Payer: MEDICARE

## 2025-07-06 DIAGNOSIS — I10 ESSENTIAL HYPERTENSION: Primary | ICD-10-CM

## 2025-07-06 DIAGNOSIS — R07.9 CHEST PAIN, UNSPECIFIED TYPE: ICD-10-CM

## 2025-07-06 DIAGNOSIS — I50.9 CHRONIC CONGESTIVE HEART FAILURE, UNSPECIFIED HEART FAILURE TYPE (HCC): ICD-10-CM

## 2025-07-06 LAB
ALBUMIN SERPL-MCNC: 3.1 G/DL (ref 3.2–4.6)
ALBUMIN/GLOB SERPL: 1 (ref 1–1.9)
ALP SERPL-CCNC: 70 U/L (ref 35–104)
ALT SERPL-CCNC: 5 U/L (ref 8–45)
ANION GAP SERPL CALC-SCNC: 12 MMOL/L (ref 7–16)
AST SERPL-CCNC: 23 U/L (ref 15–37)
BASOPHILS # BLD: 0.02 K/UL (ref 0–0.2)
BASOPHILS NFR BLD: 0.5 % (ref 0–2)
BILIRUB SERPL-MCNC: 0.3 MG/DL (ref 0–1.2)
BUN SERPL-MCNC: 24 MG/DL (ref 8–23)
CALCIUM SERPL-MCNC: 9.1 MG/DL (ref 8.8–10.2)
CHLORIDE SERPL-SCNC: 99 MMOL/L (ref 98–107)
CO2 SERPL-SCNC: 27 MMOL/L (ref 20–29)
CREAT SERPL-MCNC: 1.21 MG/DL (ref 0.6–1.1)
DIFFERENTIAL METHOD BLD: ABNORMAL
EOSINOPHIL # BLD: 0.17 K/UL (ref 0–0.8)
EOSINOPHIL NFR BLD: 3.8 % (ref 0.5–7.8)
ERYTHROCYTE [DISTWIDTH] IN BLOOD BY AUTOMATED COUNT: 15.6 % (ref 11.9–14.6)
GLOBULIN SER CALC-MCNC: 3.1 G/DL (ref 2.3–3.5)
GLUCOSE SERPL-MCNC: 88 MG/DL (ref 70–99)
HCT VFR BLD AUTO: 34.9 % (ref 35.8–46.3)
HGB BLD-MCNC: 11.4 G/DL (ref 11.7–15.4)
IMM GRANULOCYTES # BLD AUTO: 0.03 K/UL (ref 0–0.5)
IMM GRANULOCYTES NFR BLD AUTO: 0.7 % (ref 0–5)
LIPASE SERPL-CCNC: 68 U/L (ref 13–60)
LYMPHOCYTES # BLD: 1.66 K/UL (ref 0.5–4.6)
LYMPHOCYTES NFR BLD: 37.5 % (ref 13–44)
MCH RBC QN AUTO: 32.6 PG (ref 26.1–32.9)
MCHC RBC AUTO-ENTMCNC: 32.7 G/DL (ref 31.4–35)
MCV RBC AUTO: 99.7 FL (ref 82–102)
MONOCYTES # BLD: 0.69 K/UL (ref 0.1–1.3)
MONOCYTES NFR BLD: 15.6 % (ref 4–12)
NEUTS SEG # BLD: 1.86 K/UL (ref 1.7–8.2)
NEUTS SEG NFR BLD: 41.9 % (ref 43–78)
NRBC # BLD: 0 K/UL (ref 0–0.2)
NT PRO BNP: 484 PG/ML (ref 0–450)
PLATELET # BLD AUTO: 285 K/UL (ref 150–450)
PMV BLD AUTO: 9.6 FL (ref 9.4–12.3)
POTASSIUM SERPL-SCNC: 3.8 MMOL/L (ref 3.5–5.1)
PROT SERPL-MCNC: 6.2 G/DL (ref 6.3–8.2)
RBC # BLD AUTO: 3.5 M/UL (ref 4.05–5.2)
SODIUM SERPL-SCNC: 137 MMOL/L (ref 136–145)
TROPONIN T SERPL HS-MCNC: 16 NG/L (ref 0–14)
TROPONIN T SERPL HS-MCNC: 17.2 NG/L (ref 0–14)
WBC # BLD AUTO: 4.4 K/UL (ref 4.3–11.1)

## 2025-07-06 PROCEDURE — 83880 ASSAY OF NATRIURETIC PEPTIDE: CPT

## 2025-07-06 PROCEDURE — 85025 COMPLETE CBC W/AUTO DIFF WBC: CPT

## 2025-07-06 PROCEDURE — 93005 ELECTROCARDIOGRAM TRACING: CPT | Performed by: EMERGENCY MEDICINE

## 2025-07-06 PROCEDURE — 94762 N-INVAS EAR/PLS OXIMTRY CONT: CPT

## 2025-07-06 PROCEDURE — 99285 EMERGENCY DEPT VISIT HI MDM: CPT

## 2025-07-06 PROCEDURE — 84484 ASSAY OF TROPONIN QUANT: CPT

## 2025-07-06 PROCEDURE — 83690 ASSAY OF LIPASE: CPT

## 2025-07-06 PROCEDURE — 80053 COMPREHEN METABOLIC PANEL: CPT

## 2025-07-06 PROCEDURE — 71046 X-RAY EXAM CHEST 2 VIEWS: CPT

## 2025-07-06 RX ORDER — ASPIRIN 325 MG
325 TABLET ORAL
Status: COMPLETED | OUTPATIENT
Start: 2025-07-06 | End: 2025-07-07

## 2025-07-06 RX ORDER — FUROSEMIDE 10 MG/ML
40 INJECTION INTRAMUSCULAR; INTRAVENOUS ONCE
Status: COMPLETED | OUTPATIENT
Start: 2025-07-06 | End: 2025-07-07

## 2025-07-07 VITALS
HEART RATE: 64 BPM | BODY MASS INDEX: 26.75 KG/M2 | RESPIRATION RATE: 16 BRPM | HEIGHT: 63 IN | TEMPERATURE: 98.4 F | SYSTOLIC BLOOD PRESSURE: 147 MMHG | OXYGEN SATURATION: 94 % | DIASTOLIC BLOOD PRESSURE: 68 MMHG | WEIGHT: 151 LBS

## 2025-07-07 LAB
EKG ATRIAL RATE: 60 BPM
EKG DIAGNOSIS: NORMAL
EKG P AXIS: 46 DEGREES
EKG P-R INTERVAL: 163 MS
EKG Q-T INTERVAL: 430 MS
EKG QRS DURATION: 100 MS
EKG QTC CALCULATION (BAZETT): 434 MS
EKG R AXIS: 58 DEGREES
EKG T AXIS: 70 DEGREES
EKG VENTRICULAR RATE: 61 BPM

## 2025-07-07 PROCEDURE — 6360000002 HC RX W HCPCS: Performed by: EMERGENCY MEDICINE

## 2025-07-07 PROCEDURE — 93010 ELECTROCARDIOGRAM REPORT: CPT | Performed by: INTERNAL MEDICINE

## 2025-07-07 PROCEDURE — 96374 THER/PROPH/DIAG INJ IV PUSH: CPT

## 2025-07-07 PROCEDURE — 6370000000 HC RX 637 (ALT 250 FOR IP): Performed by: EMERGENCY MEDICINE

## 2025-07-07 RX ADMIN — ASPIRIN 325 MG: 325 TABLET, FILM COATED ORAL at 00:16

## 2025-07-07 RX ADMIN — FUROSEMIDE 40 MG: 10 INJECTION, SOLUTION INTRAMUSCULAR; INTRAVENOUS at 00:17

## 2025-07-07 NOTE — ED PROVIDER NOTES
Hemoglobin 11.4 (L) 11.7 - 15.4 g/dL    Hematocrit 34.9 (L) 35.8 - 46.3 %    MCV 99.7 82 - 102 FL    MCH 32.6 26.1 - 32.9 PG    MCHC 32.7 31.4 - 35.0 g/dL    RDW 15.6 (H) 11.9 - 14.6 %    Platelets 285 150 - 450 K/uL    MPV 9.6 9.4 - 12.3 FL    nRBC 0.00 0.0 - 0.2 K/uL    Differential Type AUTOMATED      Neutrophils % 41.9 (L) 43.0 - 78.0 %    Lymphocytes % 37.5 13.0 - 44.0 %    Monocytes % 15.6 (H) 4.0 - 12.0 %    Eosinophils % 3.8 0.5 - 7.8 %    Basophils % 0.5 0.0 - 2.0 %    Immature Granulocytes % 0.7 0.0 - 5.0 %    Neutrophils Absolute 1.86 1.70 - 8.20 K/UL    Lymphocytes Absolute 1.66 0.50 - 4.60 K/UL    Monocytes Absolute 0.69 0.10 - 1.30 K/UL    Eosinophils Absolute 0.17 0.00 - 0.80 K/UL    Basophils Absolute 0.02 0.00 - 0.20 K/UL    Immature Granulocytes Absolute 0.03 0.0 - 0.5 K/UL   CMP   Result Value Ref Range    Sodium 137 136 - 145 mmol/L    Potassium 3.8 3.5 - 5.1 mmol/L    Chloride 99 98 - 107 mmol/L    CO2 27 20 - 29 mmol/L    Anion Gap 12 7 - 16 mmol/L    Glucose 88 70 - 99 mg/dL    BUN 24 (H) 8 - 23 MG/DL    Creatinine 1.21 (H) 0.60 - 1.10 MG/DL    Est, Glom Filt Rate 43 (L) >60 ml/min/1.73m2    Calcium 9.1 8.8 - 10.2 MG/DL    Total Bilirubin 0.3 0.0 - 1.2 MG/DL    ALT 5 (L) 8 - 45 U/L    AST 23 15 - 37 U/L    Alk Phosphatase 70 35 - 104 U/L    Total Protein 6.2 (L) 6.3 - 8.2 g/dL    Albumin 3.1 (L) 3.2 - 4.6 g/dL    Globulin 3.1 2.3 - 3.5 g/dL    Albumin/Globulin Ratio 1.0 1.0 - 1.9     Troponin   Result Value Ref Range    Troponin T 16.0 (H) 0 - 14 ng/L   Lipase   Result Value Ref Range    Lipase 68 (H) 13 - 60 U/L   Troponin   Result Value Ref Range    Troponin T 17.2 (H) 0 - 14 ng/L   Brain Natriuretic Peptide   Result Value Ref Range    NT Pro- (H) 0 - 450 PG/ML   EKG 12 Lead   Result Value Ref Range    Ventricular Rate 61 BPM    Atrial Rate 60 BPM    P-R Interval 163 ms    QRS Duration 100 ms    Q-T Interval 430 ms    QTc Calculation (Bazett) 434 ms    P Axis 46 degrees    R Axis 58

## 2025-07-07 NOTE — DISCHARGE INSTRUCTIONS
Remain compliant with home medications.  Schedule close follow-up with your cardiologist scheduled for this coming Tuesday.  Please return to ED if you develop any worsening chest discomfort, shortness of breath or if you develop any diaphoresis, nausea, vomiting, dizziness, syncope, headache, etc.

## 2025-07-07 NOTE — ED TRIAGE NOTES
Pt coming from home via ems for HTN. Pt has been checking BP at home and consistently been about 190/90s. Pt endorsing tightness/heaviness in chest     Hx stents

## 2025-07-08 ENCOUNTER — OFFICE VISIT (OUTPATIENT)
Age: 89
End: 2025-07-08
Payer: MEDICARE

## 2025-07-08 VITALS
HEIGHT: 63 IN | WEIGHT: 156 LBS | SYSTOLIC BLOOD PRESSURE: 132 MMHG | BODY MASS INDEX: 27.64 KG/M2 | DIASTOLIC BLOOD PRESSURE: 64 MMHG | HEART RATE: 64 BPM

## 2025-07-08 DIAGNOSIS — I25.118 CORONARY ARTERY DISEASE OF NATIVE ARTERY OF NATIVE HEART WITH STABLE ANGINA PECTORIS: ICD-10-CM

## 2025-07-08 DIAGNOSIS — I11.0 HYPERTENSIVE HEART DISEASE WITH CHRONIC SYSTOLIC CONGESTIVE HEART FAILURE (HCC): ICD-10-CM

## 2025-07-08 DIAGNOSIS — I73.9 PAD (PERIPHERAL ARTERY DISEASE): Primary | ICD-10-CM

## 2025-07-08 DIAGNOSIS — I50.22 HYPERTENSIVE HEART DISEASE WITH CHRONIC SYSTOLIC CONGESTIVE HEART FAILURE (HCC): ICD-10-CM

## 2025-07-08 PROCEDURE — 1126F AMNT PAIN NOTED NONE PRSNT: CPT | Performed by: INTERNAL MEDICINE

## 2025-07-08 PROCEDURE — 1123F ACP DISCUSS/DSCN MKR DOCD: CPT | Performed by: INTERNAL MEDICINE

## 2025-07-08 PROCEDURE — 1159F MED LIST DOCD IN RCRD: CPT | Performed by: INTERNAL MEDICINE

## 2025-07-08 PROCEDURE — 1090F PRES/ABSN URINE INCON ASSESS: CPT | Performed by: INTERNAL MEDICINE

## 2025-07-08 PROCEDURE — 99214 OFFICE O/P EST MOD 30 MIN: CPT | Performed by: INTERNAL MEDICINE

## 2025-07-08 PROCEDURE — 1036F TOBACCO NON-USER: CPT | Performed by: INTERNAL MEDICINE

## 2025-07-08 PROCEDURE — G8427 DOCREV CUR MEDS BY ELIG CLIN: HCPCS | Performed by: INTERNAL MEDICINE

## 2025-07-08 PROCEDURE — G8417 CALC BMI ABV UP PARAM F/U: HCPCS | Performed by: INTERNAL MEDICINE

## 2025-07-08 NOTE — PROGRESS NOTES
[address] [facility], [place] [address]  PHONE: [phone]     25    NAME:  Muriel Barajas  : 1935  MRN: [id number]       SUBJECTIVE:   Muriel Barajas is a 89 y.o. female seen for a follow up visit regarding the following:     Chief Complaint   Patient presents with    Coronary Artery Disease    Follow-up       HPI:   Here for DHF, CAD s/p 4v CABG in   NO PVD on duplex 2017.   LLE DVT 2018  [facility] 2022:  grafts patent.  Small vessel disease involving ramus intermedius which is the only change in comparison to 2018.  Recommend medical therapy for small vessel coronary disease.  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   Echo 2025:  normal EF, mild AI and AS     Memory worsening, son watching her.     Struggling with some AWAN, some SOB, lack of energy.  Getting over URI she feels now.    To ER for stress and higher BP.     She does not like new meds.  Some AWAN, some angina.   AWAN remains issue.     No new CP, pressure.     She is following daily weights.    On lasix BID and aldactone daily.      Doing well on anticoagulation treatment as reviewed today, no bleeding issues or excessive bruising noted.             Past Medical History, Past Surgical History, Family history, Social History, and Medications were all reviewed with the patient today and updated as necessary.     Current Outpatient Medications   Medication Sig Dispense Refill    potassium chloride (MICRO-K) 10 MEQ extended release capsule TAKE 1 CAPSULE BY MOUTH EVERY DAY 90 capsule 1    memantine (NAMENDA) 10 MG tablet Take 1 tablet by mouth daily 30 tablet 5    albuterol sulfate HFA (VENTOLIN HFA) 108 (90 Base) MCG/ACT inhaler Inhale 2 puffs into the lungs 4 times daily as needed for Wheezing or Shortness of Breath 18 g 0    Misc. Devices MISC Compression stockings size sm/med 1 each 0    isosorbide mononitrate (IMDUR) 60 MG extended release tablet Take 1 tablet by mouth daily 30 tablet 3    ranolazine (RANEXA) 1000 MG

## 2025-07-10 ENCOUNTER — CARE COORDINATION (OUTPATIENT)
Dept: CARE COORDINATION | Facility: CLINIC | Age: 89
End: 2025-07-10

## 2025-07-10 NOTE — CARE COORDINATION
7/10/2025 3:02 PM  *  RPM Alert   Remote Patient Monitoring Note      Date/Time:  7/10/2025 3:02 PM  Patient Current Location: [place]  No BP recheck noted in HRS as of this time. LPN contacted patient by telephone to f/u on red alert received for blood pressure reading (185/85). Verified patients name and  as identifiers.  Background: Pt enrolled in RPM r/t Kidney Disease, CHF, and HTN   Clinical Interventions: Reviewed and followed up on alerts and treatments- Pt speaking in full clear sentences. Continues to deny CP, SOB while at rest, headache, vision changes, numbness and tingling at this time. Pt previously reported SOB with exertion is baseline and stated her PCP and cardiologist are aware. Denies worsening SOB with exertion at this time. Shares that she recently returned home from the store and is agreeable to recheck BP at this time. Updated reading of 158/70 noted in HRS and is within RPM parameters. Pt again educated on when to notify provider(s) of changes or concerns and when to seek emergent medical care; pt v/u. Will route FYI to [facility].     Plan/Follow Up: Will continue to review, monitor and address alerts with follow up based on severity of symptoms and risk factors.

## 2025-07-10 NOTE — CARE COORDINATION
7/10/2025 1:35 PM  *  Unable to Reach Date/Time:  7/10/2025 1:35 PM  No BP recheck noted in HRS as of this time. LPN attempted to reach patient by telephone to f/u on red alert in remote patient monitoring program. Left HIPAA compliant message requesting a return call. Pulse ox / pulse ox HR and weight are within RPM parameters.

## 2025-07-10 NOTE — CARE COORDINATION
7/10/2025 11:12 AM  *  Alert and Triage   -Remote Alert Monitoring Note      Date/Time:  7/10/2025 11:12 AM  Patient Current Location: [place]  Verified patients name and  as identifiers.    Rpm alert to be reviewed by the provider   red alert  blood pressure reading (185/85)  Vitals Recheck blood pressure reading (TBD)  Additional needs to be addressed by provider: No             LPN contacted patient by telephone regarding red alert received   Background: Pt enrolled in RPM r/t Kidney Disease, CHF, and HTN  Refer to 911 immediately if:  Patient unresponsive or unable to provide history  Change in cognition or sudden confusion  Patient unable to respond in complete sentences  Intense chest pain/tightness  Any concern for any clinical emergency  Red Alert: Provider response time of 1 hr required for any red alert requiring intervention  Yellow Alert: Provider response time of 3hr required for any escalated yellow alert  Patient Chief Complaint:  Blood Pressure BP Triage  Are you having any Chest Pain? no   Are you having any Shortness of Breath? Yes, with exertion (baseline per pt)   Do you have a headache or have any vision changes? no   Are you having any numbness or tingling? no   Are you having any other health concerns or issues? Nothing new  Patient/Caregiver educated on how to properly take a blood pressure. Patient/Caregiver verbalizes understanding.     Clinical Interventions: Reviewed and followed up on alerts and treatments-Pt speaking in full clear sentences, denies CP, SOB while at rest, headache, vision changes, numbness and tingling at this time. Reports SOB with exertion is baseline and states her PCP and cardiologist are aware. SpO2 of 96% noted in HRS and is within RPM parameters. Attempted to review medications with pt who states, \"I don't know the names of all that\". Reports taking morning medications around 7:30 this AM. States she was preforming ADL's \"right before\" updating BP

## 2025-07-11 ENCOUNTER — CARE COORDINATION (OUTPATIENT)
Dept: CARE COORDINATION | Facility: CLINIC | Age: 89
End: 2025-07-11

## 2025-07-11 NOTE — CARE COORDINATION
7/11/2025 2:59 PM  *  RPM Alert   Remote Patient Monitoring Note      Date/Time:  7/11/2025 2:59 PM  BP recheck of 156/72 noted in HRS and is within RPM parameters. No further pt outreach by this nurse indicated at this time.   All updated metrics are within RPM parameters.   Background: Pt enrolled in RPM r/t Kidney Disease, CHF, and HTN

## 2025-07-11 NOTE — CARE COORDINATION
2025 2:08 PM  *  Alert and Triage   -Remote Alert Monitoring Note      Date/Time:  2025 2:08 PM  Patient Current Location: [place]  Verified patients name and  as identifiers.    Rpm alert to be reviewed by the provider   red alert  blood pressure reading (181/72)  Vitals Recheck blood pressure reading (TBD)  Additional needs to be addressed by provider: No           LPN contacted patient by telephone regarding red alert received   Background: Pt enrolled in RPM r/t Kidney Disease, CHF, and HTN   Refer to 911 immediately if:  Patient unresponsive or unable to provide history  Change in cognition or sudden confusion  Patient unable to respond in complete sentences  Intense chest pain/tightness  Any concern for any clinical emergency  Red Alert: Provider response time of 1 hr required for any red alert requiring intervention  Yellow Alert: Provider response time of 3hr required for any escalated yellow alert  Patient Chief Complaint:  Blood Pressure BP Triage  Are you having any Chest Pain? no   Are you having any Shortness of Breath? Yes, with exertion (baseline per pt). No, when at rest.    Do you have a headache or have any vision changes? no   Are you having any numbness or tingling? no   Are you having any other health concerns or issues? Nothing new  Patient/Caregiver educated on how to properly take a blood pressure. Patient/Caregiver verbalizes understanding.     Clinical Interventions: Reviewed and followed up on alerts and treatments- Pt speaking in full clear sentences, denies CP, SOB while at rest, headache, vision changes, numbness and tingling at this time. Per pt, SOB with exertion is baseline; states, \"I've had that a long long time\". Denies worsening SOB with exertion at this time. SpO2 of 95% noted in HRS and is within RPM parameters. Attempted to review medications with pt who states, \"I'm taking everything I'm supposed to\". Reports she was doing house work and decided to check

## 2025-07-14 ENCOUNTER — CARE COORDINATION (OUTPATIENT)
Dept: CARE COORDINATION | Facility: CLINIC | Age: 89
End: 2025-07-14
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
## 2025-07-14 NOTE — CARE COORDINATION
7/14/2025 10:29 AM  *  Unable to Reach Date/Time:  7/14/2025 10:29 AM  LPN attempted to reach patient by telephone x3 regarding yellow alert in remote patient monitoring program. Unable to reach pt and unable to leave message. Recording received x 3 stating, \"call cannot be completed\". Confirmed correct number was dialed. Will attempt to reach patient again.

## 2025-07-14 NOTE — CARE COORDINATION
7/14/2025 11:04 AM  *  Unable to Reach Date/Time:  7/14/2025 11:04 AM  Multiple attempts have been made, by this LPN, to reach patient by telephone regarding yellow alert in remote patient monitoring program. Unable to reach pt and unable to leave message. Recording received stating, \"call cannot be completed\". LPN attempted to contact patients emergency contact, Toño Barajas. Left HIPAA compliant message requesting a return call. Will escalate to ACM.

## 2025-07-16 ENCOUNTER — CARE COORDINATION (OUTPATIENT)
Dept: CARE COORDINATION | Facility: CLINIC | Age: 89
End: 2025-07-16

## 2025-07-16 NOTE — CARE COORDINATION
7/16/2025 9:57 AM  *  Unable to Reach Date/Time:  7/16/2025 9:57 AM  LPN attempted to reach patient by telephone regarding yellow alert in remote patient monitoring program. Unable to reach pt and unable to leave message. Voicemail not offered. Will attempt to reach patient again.

## 2025-07-16 NOTE — CARE COORDINATION
7/16/2025 10:30 AM  *  Unable to Reach Date/Time:  7/16/2025 10:30 AM  LPN 2nd attempt to reach patient by telephone regarding yellow alert in remote patient monitoring program. Unable to reach pt and unable to leave message. Voicemail not offered. LPN attempted to contact patients emergency contact, Toño Barajas. Left HIPAA compliant message requesting a return call. Will escalate to ACM.

## 2025-07-21 ENCOUNTER — TELEPHONE (OUTPATIENT)
Dept: PRIMARY CARE CLINIC | Facility: CLINIC | Age: 89
End: 2025-07-21

## 2025-07-21 ENCOUNTER — CARE COORDINATION (OUTPATIENT)
Dept: CARE COORDINATION | Facility: CLINIC | Age: 89
End: 2025-07-21
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
## 2025-07-24 ENCOUNTER — CARE COORDINATION (OUTPATIENT)
Dept: CARE COORDINATION | Facility: CLINIC | Age: 89
End: 2025-07-24

## 2025-07-24 NOTE — CARE COORDINATION
Ambulatory Care Coordination Note     2025 2:42 PM     Patient Current Location:  Home: [address] [place] [address]     ACM contacted the patient by telephone. Verified name and  with patient as identifiers.         ACM: Mya Gill RN     Challenges to be reviewed by the provider   Additional needs identified to be addressed with provider                     Method of communication with provider: none.    Utilization: Patient has not had any utilization since our last call.     Care Summary Note:     Pt phone working again. Discussed RPM parameters, BP stable. Denies sxs of HTN. Pt reports excessive sweating that started after taking prednisone injection taken last month.   Discussed upcoming stress test. Denies other questions or concerns.     Offered patient enrollment in the Remote Patient Monitoring (RPM) program for in-home monitoring: Yes, patient enrolled; current status is activated and monitoring.     Assessments Completed:   Hypertension - Encounter Level    Symptom course: stable      ,   General Assessment    Do you have any symptoms that are causing concern?: No          Medications Reviewed:   Completed during a previous call     Advance Care Planning:   Not reviewed during this call     Care Planning:   Education Documentation  No documentation found.  Education Comments  No comments found.     ,    Goals Addressed                   This Visit's Progress     Conditions and Symptoms   On track     I will schedule office visits, as directed by my provider.  I will keep my appointment or reschedule if I have to cancel.  I will notify my provider of any barriers to my plan of care.  I will notify my provider of any symptoms that indicate a worsening of my condition.    Barriers: none  Plan for overcoming my barriers: N/A  Confidence: 10/10  Anticipated Goal Completion Date: 9/15/22         Returns to baseline activity level.   On track     Patient/Family able to obtain medicine after

## 2025-07-25 ENCOUNTER — CARE COORDINATION (OUTPATIENT)
Dept: CARE COORDINATION | Facility: CLINIC | Age: 89
End: 2025-07-25

## 2025-07-25 NOTE — CARE COORDINATION
7/25/2025 10:29 AM  *  Unable to Reach Date/Time:  7/25/2025 10:29 AM  LPN attempted to reach patient by telephone regarding yellow alert in remote patient monitoring program. Left HIPAA compliant message requesting a return call. Will attempt to reach patient again.

## 2025-07-25 NOTE — CARE COORDINATION
7/25/2025 11:16 AM  *  Unable to Reach Date/Time:  7/25/2025 11:16 AM  LPN 2nd attempt to reach patient by telephone regarding yellow alert in remote patient monitoring program. Left another HIPAA compliant message requesting a return call. LPN attempted to contact patients emergency contact, Toño Barajas. Left HIPAA compliant message requesting a return call. Will escalate to ACM. Updated pulse ox / pulse ox HR and weight are within RPM parameters.

## 2025-07-28 ENCOUNTER — TELEPHONE (OUTPATIENT)
Dept: PRIMARY CARE CLINIC | Facility: CLINIC | Age: 89
End: 2025-07-28

## 2025-07-28 NOTE — TELEPHONE ENCOUNTER
----- Message from MODESTO PIMENTEL RN sent at 7/24/2025  2:43 PM EDT -----  Regarding: excessive sweating  Hi,    Ms. Barajas reports excessive sweating since taking steroid injection last month. She would like to if Dr. Nobles thinks this is related to the prednisone.     Thank you,     Mya \"Karen\" MODESTO Gill, BSN, Ambulatory Care Manager  c: [phone]

## 2025-07-31 ENCOUNTER — CARE COORDINATION (OUTPATIENT)
Dept: CARE COORDINATION | Facility: CLINIC | Age: 89
End: 2025-07-31

## 2025-07-31 NOTE — CARE COORDINATION
2025 10:50 AM  *  Alert and Triage   -Remote Alert Monitoring Note      Date/Time:  2025 10:50 AM  Patient Current Location: [place]  Verified patients name and  as identifiers.    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 no   Are you having any Shortness of Breath? Yes, with exertion (baseline per pt)    Do you have a headache or have any vision changes? no   Are you having any numbness or tingling? no   Are you having any other health concerns or issues? Nothing new  Patient/Caregiver educated on how to properly take a blood pressure. Patient/Caregiver verbalizes understanding.     Clinical Interventions: Reviewed and followed up on alerts and treatments-Pt speaking in full clear sentences, denies CP, SOB while at rest, headache, vision changes, numbness and tingling at this time. Reports SOB with exertion is baseline and states \"It's no worse than usual\". Reports she was outside working \"right before\" checking BP. Attempted to review medications with pt who states, \"I take all my medications\". Pt has orders for Norvasc 5 mg qd, Coreg 25 mg BID, Lasix 40 mg BID, and Aldactone 25 mg qd. Agreeable to recheck BP at

## 2025-08-05 ENCOUNTER — CLINICAL SUPPORT (OUTPATIENT)
Dept: PRIMARY CARE CLINIC | Facility: CLINIC | Age: 89
End: 2025-08-05
Payer: MEDICARE

## 2025-08-05 ENCOUNTER — OFFICE VISIT (OUTPATIENT)
Dept: ORTHOPEDIC SURGERY | Age: 89
End: 2025-08-05
Payer: MEDICARE

## 2025-08-05 ENCOUNTER — CARE COORDINATION (OUTPATIENT)
Dept: CARE COORDINATION | Age: 89
End: 2025-08-05

## 2025-08-05 DIAGNOSIS — M19.012 GLENOHUMERAL ARTHRITIS, LEFT: ICD-10-CM

## 2025-08-05 DIAGNOSIS — M25.512 ACUTE PAIN OF LEFT SHOULDER: Primary | ICD-10-CM

## 2025-08-05 DIAGNOSIS — R82.90 URINE MALODOR: Primary | ICD-10-CM

## 2025-08-05 LAB
BILIRUBIN, URINE, POC: NEGATIVE
BLOOD URINE, POC: NEGATIVE
GLUCOSE URINE, POC: NEGATIVE
KETONES, URINE, POC: NEGATIVE
LEUKOCYTE ESTERASE, URINE, POC: NEGATIVE
NITRITE, URINE, POC: NEGATIVE
PH, URINE, POC: 6 (ref 4.6–8)
PROTEIN,URINE, POC: NEGATIVE
SPECIFIC GRAVITY, URINE, POC: 1.02 (ref 1–1.03)
URINALYSIS CLARITY, POC: CLEAR
URINALYSIS COLOR, POC: YELLOW
UROBILINOGEN, POC: NORMAL

## 2025-08-05 PROCEDURE — 99214 OFFICE O/P EST MOD 30 MIN: CPT | Performed by: STUDENT IN AN ORGANIZED HEALTH CARE EDUCATION/TRAINING PROGRAM

## 2025-08-05 PROCEDURE — 1090F PRES/ABSN URINE INCON ASSESS: CPT | Performed by: STUDENT IN AN ORGANIZED HEALTH CARE EDUCATION/TRAINING PROGRAM

## 2025-08-05 PROCEDURE — 1036F TOBACCO NON-USER: CPT | Performed by: STUDENT IN AN ORGANIZED HEALTH CARE EDUCATION/TRAINING PROGRAM

## 2025-08-05 PROCEDURE — G8417 CALC BMI ABV UP PARAM F/U: HCPCS | Performed by: STUDENT IN AN ORGANIZED HEALTH CARE EDUCATION/TRAINING PROGRAM

## 2025-08-05 PROCEDURE — G8428 CUR MEDS NOT DOCUMENT: HCPCS | Performed by: STUDENT IN AN ORGANIZED HEALTH CARE EDUCATION/TRAINING PROGRAM

## 2025-08-05 PROCEDURE — 1123F ACP DISCUSS/DSCN MKR DOCD: CPT | Performed by: STUDENT IN AN ORGANIZED HEALTH CARE EDUCATION/TRAINING PROGRAM

## 2025-08-05 PROCEDURE — 81003 URINALYSIS AUTO W/O SCOPE: CPT | Performed by: FAMILY MEDICINE

## 2025-08-07 ENCOUNTER — CARE COORDINATION (OUTPATIENT)
Dept: CARE COORDINATION | Facility: CLINIC | Age: 89
End: 2025-08-07

## 2025-08-08 ENCOUNTER — CARE COORDINATION (OUTPATIENT)
Dept: CASE MANAGEMENT | Age: 89
End: 2025-08-08

## 2025-08-12 ENCOUNTER — OFFICE VISIT (OUTPATIENT)
Dept: ORTHOPEDIC SURGERY | Age: 89
End: 2025-08-12

## 2025-08-12 DIAGNOSIS — M19.012 GLENOHUMERAL ARTHRITIS, LEFT: Primary | ICD-10-CM

## 2025-08-12 RX ORDER — METHYLPREDNISOLONE ACETATE 40 MG/ML
40 INJECTION, SUSPENSION INTRA-ARTICULAR; INTRALESIONAL; INTRAMUSCULAR; SOFT TISSUE ONCE
Status: COMPLETED | OUTPATIENT
Start: 2025-08-12 | End: 2025-08-12

## 2025-08-12 RX ADMIN — METHYLPREDNISOLONE ACETATE 40 MG: 40 INJECTION, SUSPENSION INTRA-ARTICULAR; INTRALESIONAL; INTRAMUSCULAR; SOFT TISSUE at 09:18

## 2025-08-14 ENCOUNTER — CARE COORDINATION (OUTPATIENT)
Dept: CASE MANAGEMENT | Age: 89
End: 2025-08-14

## 2025-08-19 ENCOUNTER — OFFICE VISIT (OUTPATIENT)
Age: 89
End: 2025-08-19
Payer: MEDICARE

## 2025-08-19 VITALS
DIASTOLIC BLOOD PRESSURE: 80 MMHG | BODY MASS INDEX: 27.46 KG/M2 | WEIGHT: 155 LBS | SYSTOLIC BLOOD PRESSURE: 126 MMHG | HEART RATE: 76 BPM | HEIGHT: 63 IN

## 2025-08-19 DIAGNOSIS — I11.0 HYPERTENSIVE HEART DISEASE WITH CHRONIC SYSTOLIC CONGESTIVE HEART FAILURE (HCC): ICD-10-CM

## 2025-08-19 DIAGNOSIS — I73.9 PAD (PERIPHERAL ARTERY DISEASE): ICD-10-CM

## 2025-08-19 DIAGNOSIS — I87.2 VENOUS (PERIPHERAL) INSUFFICIENCY: Primary | ICD-10-CM

## 2025-08-19 DIAGNOSIS — I65.23 CAROTID STENOSIS, ASYMPTOMATIC, BILATERAL: ICD-10-CM

## 2025-08-19 DIAGNOSIS — N18.32 STAGE 3B CHRONIC KIDNEY DISEASE (HCC): ICD-10-CM

## 2025-08-19 DIAGNOSIS — I25.118 CORONARY ARTERY DISEASE OF NATIVE ARTERY OF NATIVE HEART WITH STABLE ANGINA PECTORIS: ICD-10-CM

## 2025-08-19 DIAGNOSIS — I50.22 HYPERTENSIVE HEART DISEASE WITH CHRONIC SYSTOLIC CONGESTIVE HEART FAILURE (HCC): ICD-10-CM

## 2025-08-19 DIAGNOSIS — E78.2 MIXED HYPERLIPIDEMIA: ICD-10-CM

## 2025-08-19 DIAGNOSIS — I50.32 DIASTOLIC CHF, CHRONIC (HCC): ICD-10-CM

## 2025-08-19 DIAGNOSIS — Z95.1 S/P CABG (CORONARY ARTERY BYPASS GRAFT): ICD-10-CM

## 2025-08-19 PROBLEM — I20.0 ACCELERATING ANGINA (HCC): Status: RESOLVED | Noted: 2025-05-12 | Resolved: 2025-08-19

## 2025-08-19 PROCEDURE — G8417 CALC BMI ABV UP PARAM F/U: HCPCS | Performed by: INTERNAL MEDICINE

## 2025-08-19 PROCEDURE — 1036F TOBACCO NON-USER: CPT | Performed by: INTERNAL MEDICINE

## 2025-08-19 PROCEDURE — 1090F PRES/ABSN URINE INCON ASSESS: CPT | Performed by: INTERNAL MEDICINE

## 2025-08-19 PROCEDURE — G8428 CUR MEDS NOT DOCUMENT: HCPCS | Performed by: INTERNAL MEDICINE

## 2025-08-19 PROCEDURE — 99214 OFFICE O/P EST MOD 30 MIN: CPT | Performed by: INTERNAL MEDICINE

## 2025-08-19 PROCEDURE — 1123F ACP DISCUSS/DSCN MKR DOCD: CPT | Performed by: INTERNAL MEDICINE

## 2025-08-19 PROCEDURE — 1126F AMNT PAIN NOTED NONE PRSNT: CPT | Performed by: INTERNAL MEDICINE

## 2025-08-19 RX ORDER — CARVEDILOL 25 MG/1
25 TABLET ORAL 2 TIMES DAILY
Qty: 180 TABLET | Refills: 3 | Status: SHIPPED | OUTPATIENT
Start: 2025-08-19

## 2025-08-19 RX ORDER — FUROSEMIDE 40 MG/1
40 TABLET ORAL 2 TIMES DAILY
Qty: 180 TABLET | Refills: 3 | Status: SHIPPED | OUTPATIENT
Start: 2025-08-19

## 2025-08-21 ENCOUNTER — CARE COORDINATION (OUTPATIENT)
Dept: CARE COORDINATION | Facility: CLINIC | Age: 89
End: 2025-08-21

## 2025-08-24 DIAGNOSIS — F41.9 ANXIETY: ICD-10-CM

## 2025-08-24 DIAGNOSIS — F33.2 SEVERE EPISODE OF RECURRENT MAJOR DEPRESSIVE DISORDER, WITHOUT PSYCHOTIC FEATURES (HCC): ICD-10-CM

## 2025-08-25 RX ORDER — ESCITALOPRAM OXALATE 20 MG/1
20 TABLET ORAL DAILY
Qty: 90 TABLET | Refills: 3 | Status: SHIPPED | OUTPATIENT
Start: 2025-08-25

## 2025-08-29 ENCOUNTER — CARE COORDINATION (OUTPATIENT)
Dept: CARE COORDINATION | Facility: CLINIC | Age: 89
End: 2025-08-29

## 2025-09-02 ENCOUNTER — TELEPHONE (OUTPATIENT)
Age: 89
End: 2025-09-02

## 2025-09-02 ENCOUNTER — CARE COORDINATION (OUTPATIENT)
Dept: CARE COORDINATION | Facility: CLINIC | Age: 89
End: 2025-09-02

## 2025-09-05 ENCOUNTER — CARE COORDINATION (OUTPATIENT)
Dept: CARE COORDINATION | Facility: CLINIC | Age: 89
End: 2025-09-05

## (undated) DEVICE — CUP MED 1OZ CLR POLYPR FEED GRAD W/O LID

## (undated) DEVICE — SKIN PREP TRAY 4 COMPARTM TRAY: Brand: MEDLINE INDUSTRIES, INC.

## (undated) DEVICE — SOLUTION IRRIG 3000ML 0.9% SOD CHL USP UROMATIC PLAS CONT

## (undated) DEVICE — SUTURE MCRYL SZ 2-0 L27IN ABSRB UD CP-1 1 L36MM 1/2 CIR REV Y266H

## (undated) DEVICE — SUTURE ETHBND EXCEL SZ 5 L30IN NONABSORBABLE GRN L40MM V-37 MB66G

## (undated) DEVICE — SYR 3ML LL TIP 1/10ML GRAD --

## (undated) DEVICE — STRYKER PERFORMANCE SERIES SAGITTAL BLADE: Brand: STRYKER PERFORMANCE SERIES

## (undated) DEVICE — BAND COMPR L21CM SHT CLR PLAS HEMSTAT EXT HK AND LOOP RETEN

## (undated) DEVICE — GLIDESHEATH SLENDER STAINLESS STEEL KIT: Brand: GLIDESHEATH SLENDER

## (undated) DEVICE — GUIDEWIRE 035IN 210CM PTFE COAT FIX COR J TIP 15MM FIRM BODY

## (undated) DEVICE — SYRINGE CATH TIP 50ML

## (undated) DEVICE — CATHETER DIAG AD 5FR L110CM 145DEG COR GRY HYDRPHLC NYL

## (undated) DEVICE — DRESSING HYDROFIBER AQUACEL AG ADVANTAGE 3.5X12 IN

## (undated) DEVICE — BIPOLAR SEALER 23-112-1 AQM 6.0: Brand: AQUAMANTYS ®

## (undated) DEVICE — SOLUTION MNOMTR 80ML ES HI VISC FOR VISCOUS SWALLOW DONE

## (undated) DEVICE — TOTAL HIP DR KAVOLUS: Brand: MEDLINE INDUSTRIES, INC.

## (undated) DEVICE — SUTURE PDS II SZ 1 L36IN ABSRB VLT L48MM CTX 1/2 CIR Z371T

## (undated) DEVICE — SUTURE PDS II SZ 1 L96IN ABSRB VLT TP-1 L65MM 1/2 CIR Z880G

## (undated) DEVICE — 3M™ TRANSPORE™ WHITE SURGICAL TAPE 1534-1, 1 INCH X 10 YARD (2,5CM X 9,1M), 12 ROLLS/CARTON 10 CARTONS/CASE: Brand: 3M™ TRANSPORE™

## (undated) DEVICE — CATHETER DIAG AD 5FR L100CM COR NYL JUDKINS R 5 DILATED

## (undated) DEVICE — CATHETER DIAG AD 5FR L100CM COR GRY HYDRPHLC NYL IM W/O

## (undated) DEVICE — CATHETER ANGIO 5FR L100CM GRY S STL NYL JL3.5 3 SEG BRAID L

## (undated) DEVICE — FLEXIBLE YANKAUER,MEDIUM TIP, NO VACUUM CONTROL: Brand: ARGYLE

## (undated) DEVICE — CATHETER DIAG AD 5FR L100CM COR GRY HYDRPHLC NYL MPA 2 W/ 2

## (undated) DEVICE — SOLUTION IRRIG 1000ML 0.9% SOD CHL USP POUR PLAS BTL

## (undated) DEVICE — TISSUE FACE KLNX 9X8IN --

## (undated) DEVICE — BASIN EMESIS 500CC ROSE 250/CS 60/PLT: Brand: MEDEGEN MEDICAL PRODUCTS, LLC

## (undated) DEVICE — CATHETER ANGIO NTR 0.045 INX5 FRX100 CM THRULUMEN EXPO

## (undated) DEVICE — SOLUTION IV 250ML 0.9% SOD CHL PH 5 INJ USP VIAFLX PLAS

## (undated) DEVICE — SYRINGE MED 50ML LUERLOCK TIP